# Patient Record
Sex: FEMALE | Race: WHITE | NOT HISPANIC OR LATINO | Employment: FULL TIME | URBAN - METROPOLITAN AREA
[De-identification: names, ages, dates, MRNs, and addresses within clinical notes are randomized per-mention and may not be internally consistent; named-entity substitution may affect disease eponyms.]

---

## 2019-11-07 ENCOUNTER — OFFICE VISIT - HEALTHEAST (OUTPATIENT)
Dept: INTERNAL MEDICINE | Facility: CLINIC | Age: 49
End: 2019-11-07

## 2019-11-07 DIAGNOSIS — Z12.31 VISIT FOR SCREENING MAMMOGRAM: ICD-10-CM

## 2019-11-07 DIAGNOSIS — E66.813 CLASS 3 SEVERE OBESITY DUE TO EXCESS CALORIES WITHOUT SERIOUS COMORBIDITY WITH BODY MASS INDEX (BMI) OF 45.0 TO 49.9 IN ADULT (H): ICD-10-CM

## 2019-11-07 DIAGNOSIS — L70.8 OTHER ACNE: ICD-10-CM

## 2019-11-07 DIAGNOSIS — M25.561 CHRONIC PAIN OF BOTH KNEES: ICD-10-CM

## 2019-11-07 DIAGNOSIS — G89.29 CHRONIC PAIN OF BOTH KNEES: ICD-10-CM

## 2019-11-07 DIAGNOSIS — Z00.00 ROUTINE GENERAL MEDICAL EXAMINATION AT A HEALTH CARE FACILITY: ICD-10-CM

## 2019-11-07 DIAGNOSIS — M25.562 CHRONIC PAIN OF BOTH KNEES: ICD-10-CM

## 2019-11-07 DIAGNOSIS — F32.5 MAJOR DEPRESSIVE DISORDER IN FULL REMISSION, UNSPECIFIED WHETHER RECURRENT (H): ICD-10-CM

## 2019-11-07 DIAGNOSIS — N39.3 FEMALE STRESS INCONTINENCE: ICD-10-CM

## 2019-11-07 DIAGNOSIS — E66.01 CLASS 3 SEVERE OBESITY DUE TO EXCESS CALORIES WITHOUT SERIOUS COMORBIDITY WITH BODY MASS INDEX (BMI) OF 45.0 TO 49.9 IN ADULT (H): ICD-10-CM

## 2019-11-07 LAB
ALBUMIN SERPL-MCNC: 4.1 G/DL (ref 3.5–5)
ALBUMIN UR-MCNC: NEGATIVE MG/DL
ALP SERPL-CCNC: 77 U/L (ref 45–120)
ALT SERPL W P-5'-P-CCNC: 28 U/L (ref 0–45)
ANION GAP SERPL CALCULATED.3IONS-SCNC: 9 MMOL/L (ref 5–18)
APPEARANCE UR: CLEAR
AST SERPL W P-5'-P-CCNC: 27 U/L (ref 0–40)
BILIRUB SERPL-MCNC: 0.4 MG/DL (ref 0–1)
BILIRUB UR QL STRIP: NEGATIVE
BUN SERPL-MCNC: 13 MG/DL (ref 8–22)
CALCIUM SERPL-MCNC: 9.8 MG/DL (ref 8.5–10.5)
CHLORIDE BLD-SCNC: 99 MMOL/L (ref 98–107)
CHOLEST SERPL-MCNC: 213 MG/DL
CO2 SERPL-SCNC: 27 MMOL/L (ref 22–31)
COLOR UR AUTO: YELLOW
CREAT SERPL-MCNC: 0.86 MG/DL (ref 0.6–1.1)
ERYTHROCYTE [DISTWIDTH] IN BLOOD BY AUTOMATED COUNT: 12.1 % (ref 11–14.5)
FASTING STATUS PATIENT QL REPORTED: YES
GFR SERPL CREATININE-BSD FRML MDRD: >60 ML/MIN/1.73M2
GLUCOSE BLD-MCNC: 135 MG/DL (ref 70–125)
GLUCOSE UR STRIP-MCNC: NEGATIVE MG/DL
HCT VFR BLD AUTO: 41.3 % (ref 35–47)
HDLC SERPL-MCNC: 39 MG/DL
HGB BLD-MCNC: 13.9 G/DL (ref 12–16)
HGB UR QL STRIP: NEGATIVE
KETONES UR STRIP-MCNC: NEGATIVE MG/DL
LDLC SERPL CALC-MCNC: 137 MG/DL
LEUKOCYTE ESTERASE UR QL STRIP: NEGATIVE
MCH RBC QN AUTO: 28.1 PG (ref 27–34)
MCHC RBC AUTO-ENTMCNC: 33.7 G/DL (ref 32–36)
MCV RBC AUTO: 84 FL (ref 80–100)
NITRATE UR QL: NEGATIVE
PH UR STRIP: 6 [PH] (ref 5–8)
PLATELET # BLD AUTO: 482 THOU/UL (ref 140–440)
PMV BLD AUTO: 6.7 FL (ref 7–10)
POTASSIUM BLD-SCNC: 4.2 MMOL/L (ref 3.5–5)
PROT SERPL-MCNC: 7.9 G/DL (ref 6–8)
RBC # BLD AUTO: 4.95 MILL/UL (ref 3.8–5.4)
SODIUM SERPL-SCNC: 135 MMOL/L (ref 136–145)
SP GR UR STRIP: 1.01 (ref 1–1.03)
TRIGL SERPL-MCNC: 187 MG/DL
TSH SERPL DL<=0.005 MIU/L-ACNC: 1.88 UIU/ML (ref 0.3–5)
UROBILINOGEN UR STRIP-ACNC: NORMAL
WBC: 9.6 THOU/UL (ref 4–11)

## 2019-11-07 ASSESSMENT — MIFFLIN-ST. JEOR: SCORE: 2038.83

## 2019-11-08 ENCOUNTER — COMMUNICATION - HEALTHEAST (OUTPATIENT)
Dept: INTERNAL MEDICINE | Facility: CLINIC | Age: 49
End: 2019-11-08

## 2019-11-08 DIAGNOSIS — E78.5 HYPERLIPIDEMIA LDL GOAL <130: ICD-10-CM

## 2019-11-08 DIAGNOSIS — R73.9 HYPERGLYCEMIA: ICD-10-CM

## 2019-11-22 ENCOUNTER — TELEPHONE (OUTPATIENT)
Dept: OTHER | Facility: CLINIC | Age: 49
End: 2019-11-22

## 2019-12-03 ENCOUNTER — COMMUNICATION - HEALTHEAST (OUTPATIENT)
Dept: INTERNAL MEDICINE | Facility: CLINIC | Age: 49
End: 2019-12-03

## 2020-01-15 ENCOUNTER — OFFICE VISIT - HEALTHEAST (OUTPATIENT)
Dept: INTERNAL MEDICINE | Facility: CLINIC | Age: 50
End: 2020-01-15

## 2020-01-15 ENCOUNTER — COMMUNICATION - HEALTHEAST (OUTPATIENT)
Dept: SCHEDULING | Facility: CLINIC | Age: 50
End: 2020-01-15

## 2020-01-15 DIAGNOSIS — R21 RASH AND NONSPECIFIC SKIN ERUPTION: ICD-10-CM

## 2020-01-15 ASSESSMENT — MIFFLIN-ST. JEOR: SCORE: 2025.22

## 2020-12-10 ENCOUNTER — COMMUNICATION - HEALTHEAST (OUTPATIENT)
Dept: INTERNAL MEDICINE | Facility: CLINIC | Age: 50
End: 2020-12-10

## 2020-12-10 DIAGNOSIS — L70.8 OTHER ACNE: ICD-10-CM

## 2020-12-10 DIAGNOSIS — F32.5 MAJOR DEPRESSIVE DISORDER IN FULL REMISSION, UNSPECIFIED WHETHER RECURRENT (H): ICD-10-CM

## 2021-01-04 ENCOUNTER — HEALTH MAINTENANCE LETTER (OUTPATIENT)
Age: 51
End: 2021-01-04

## 2021-01-12 ENCOUNTER — OFFICE VISIT - HEALTHEAST (OUTPATIENT)
Dept: INTERNAL MEDICINE | Facility: CLINIC | Age: 51
End: 2021-01-12

## 2021-01-12 DIAGNOSIS — Z00.00 ROUTINE GENERAL MEDICAL EXAMINATION AT A HEALTH CARE FACILITY: ICD-10-CM

## 2021-01-12 DIAGNOSIS — Z12.31 VISIT FOR SCREENING MAMMOGRAM: ICD-10-CM

## 2021-01-12 DIAGNOSIS — E66.813 CLASS 3 SEVERE OBESITY DUE TO EXCESS CALORIES WITHOUT SERIOUS COMORBIDITY WITH BODY MASS INDEX (BMI) OF 40.0 TO 44.9 IN ADULT (H): ICD-10-CM

## 2021-01-12 DIAGNOSIS — M25.522 LEFT ELBOW PAIN: ICD-10-CM

## 2021-01-12 DIAGNOSIS — F32.5 MAJOR DEPRESSIVE DISORDER IN FULL REMISSION, UNSPECIFIED WHETHER RECURRENT (H): ICD-10-CM

## 2021-01-12 DIAGNOSIS — R73.9 HYPERGLYCEMIA: ICD-10-CM

## 2021-01-12 DIAGNOSIS — E66.01 CLASS 3 SEVERE OBESITY DUE TO EXCESS CALORIES WITHOUT SERIOUS COMORBIDITY WITH BODY MASS INDEX (BMI) OF 40.0 TO 44.9 IN ADULT (H): ICD-10-CM

## 2021-01-12 LAB
ALBUMIN SERPL-MCNC: 4.1 G/DL (ref 3.5–5)
ALP SERPL-CCNC: 79 U/L (ref 45–120)
ALT SERPL W P-5'-P-CCNC: 15 U/L (ref 0–45)
ANION GAP SERPL CALCULATED.3IONS-SCNC: 8 MMOL/L (ref 5–18)
AST SERPL W P-5'-P-CCNC: 14 U/L (ref 0–40)
BILIRUB SERPL-MCNC: 0.4 MG/DL (ref 0–1)
BUN SERPL-MCNC: 12 MG/DL (ref 8–22)
CALCIUM SERPL-MCNC: 9.6 MG/DL (ref 8.5–10.5)
CHLORIDE BLD-SCNC: 102 MMOL/L (ref 98–107)
CHOLEST SERPL-MCNC: 203 MG/DL
CO2 SERPL-SCNC: 26 MMOL/L (ref 22–31)
CREAT SERPL-MCNC: 0.91 MG/DL (ref 0.6–1.1)
ERYTHROCYTE [DISTWIDTH] IN BLOOD BY AUTOMATED COUNT: 11.6 % (ref 11–14.5)
FASTING STATUS PATIENT QL REPORTED: YES
GFR SERPL CREATININE-BSD FRML MDRD: >60 ML/MIN/1.73M2
GLUCOSE BLD-MCNC: 129 MG/DL (ref 70–125)
HBA1C MFR BLD: 6.9 %
HCT VFR BLD AUTO: 40 % (ref 35–47)
HDLC SERPL-MCNC: 40 MG/DL
HGB BLD-MCNC: 13.9 G/DL (ref 12–16)
LDLC SERPL CALC-MCNC: 135 MG/DL
MCH RBC QN AUTO: 28.2 PG (ref 27–34)
MCHC RBC AUTO-ENTMCNC: 34.6 G/DL (ref 32–36)
MCV RBC AUTO: 81 FL (ref 80–100)
PLATELET # BLD AUTO: 410 THOU/UL (ref 140–440)
PMV BLD AUTO: 6.4 FL (ref 7–10)
POTASSIUM BLD-SCNC: 4.2 MMOL/L (ref 3.5–5)
PROT SERPL-MCNC: 7.2 G/DL (ref 6–8)
RBC # BLD AUTO: 4.91 MILL/UL (ref 3.8–5.4)
SODIUM SERPL-SCNC: 136 MMOL/L (ref 136–145)
TRIGL SERPL-MCNC: 139 MG/DL
TSH SERPL DL<=0.005 MIU/L-ACNC: 2.42 UIU/ML (ref 0.3–5)
WBC: 8.1 THOU/UL (ref 4–11)

## 2021-01-12 ASSESSMENT — PATIENT HEALTH QUESTIONNAIRE - PHQ9: SUM OF ALL RESPONSES TO PHQ QUESTIONS 1-9: 0

## 2021-01-12 ASSESSMENT — MIFFLIN-ST. JEOR: SCORE: 1957.18

## 2021-03-07 ENCOUNTER — HEALTH MAINTENANCE LETTER (OUTPATIENT)
Age: 51
End: 2021-03-07

## 2021-05-27 ASSESSMENT — PATIENT HEALTH QUESTIONNAIRE - PHQ9: SUM OF ALL RESPONSES TO PHQ QUESTIONS 1-9: 0

## 2021-06-03 VITALS
DIASTOLIC BLOOD PRESSURE: 82 MMHG | HEART RATE: 103 BPM | HEIGHT: 68 IN | OXYGEN SATURATION: 95 % | WEIGHT: 293 LBS | SYSTOLIC BLOOD PRESSURE: 130 MMHG | BODY MASS INDEX: 44.41 KG/M2

## 2021-06-03 NOTE — PROGRESS NOTES
Office Visit - Physical   Yuliya Li   49 y.o.  female    Date of visit: 11/7/2019  Physician: Madison Smiley MD     Assessment and Plan   1. Routine general medical examination at a health care facility  Her examination is satisfactory today.  She is due for mammogram and will set that up.  We did discuss weight loss.  With her BMI of 45 we will get a fasting glucose and lipid panel today.  She does not need Pap smear as she has had in a hysterectomy.  She is a non-smoker.  She is otherwise up-to-date on age-appropriate health maintenance.    2. Major depressive disorder in full remission, unspecified whether recurrent (H)  She is doing quite well on fluoxetine and her is excellent.  We will continue with the medication.  - FLUoxetine (PROZAC) 40 MG capsule; Take 1 capsule (40 mg total) by mouth daily.  Dispense: 90 capsule; Refill: 3  - HM2(CBC w/o Differential)    3. Class 3 severe obesity due to excess calories without serious comorbidity with body mass index (BMI) of 45.0 to 49.9 in adult (H)  As above will check her labs today.  - Lipid Cascade  - Comprehensive Metabolic Panel  - Thyroid Cascade    4. Chronic pain of both knees  She is having worsening knee pain with symptoms that they might give out on her at times.  I am going to have her see Ortho to determine next steps.  She does have a history of known arthritis.  - Ambulatory referral to Orthopedics    5. Female stress incontinence  She has nearly constant incontinence.  We will check her glucose today as well as a urine analysis.  I am also going to refer her to urology.  - Urinalysis-UC if Indicated  - Ambulatory referral to Urology    6. Other acne  Spironolactone is been very helpful.  She sees dermatology.  I will go ahead and check her labs today.  - spironolactone (ALDACTONE) 100 MG tablet; Take 1 tablet (100 mg total) by mouth daily.  Dispense: 90 tablet; Refill: 3    7. Visit for screening mammogram  - Mammo Screening Bilateral;  Future          Return in about 1 year (around 11/7/2020) for Annual physical.     Chief Complaint   Chief Complaint   Patient presents with     Annual Exam     new pt physical-est. [facility]-med check-pt states that she has had a hysterectomy        Patient Profile   Social History     Patient does not qualify to have social determinant information on file (likely too young).   Social History Narrative     Not on file        Past Medical History   Patient Active Problem List   Diagnosis     Other acne     Obesity     Major depressive disorder in full remission, unspecified whether recurrent (H)     Female stress incontinence       Past Surgical History  She has a past surgical history that includes Hysterectomy and Cyst Removal.     History of Present Illness   This 49 y.o. old female comes in to [facility] care and for physical.  She is due for mammogram.  She has had a hysterectomy and does not need a Pap smear.  She is up-to-date on vaccinations.  She is a non-smoker.  Her BMI is 45 and we did discuss working on weight loss.  She is fasting today and will check her labs.  She does have concerns about her knees.  They are painful and at times she feels her going to give out on her.  She was evaluated a few years ago and found to have some osteoarthritis.  I think it would be worth seeing Ortho again.  In addition she has symptoms of urinary incontinence which are extremely frequent.  She has had a hysterectomy.  Feels that the incontinence is worsening.  She has no other acute concerns today.  She has a history of depression that is very well controlled with fluoxetine.  She also has a history of acne which has been improved with spironolactone.    Review of Systems: A comprehensive review of systems was negative except as noted.     Medications and Allergies   Current Outpatient Medications   Medication Sig Dispense Refill     FLUoxetine (PROZAC) 40 MG capsule Take 1 capsule (40 mg total) by mouth daily. 90 capsule 3  "    spironolactone (ALDACTONE) 100 MG tablet Take 1 tablet (100 mg total) by mouth daily. 90 tablet 3     No current facility-administered medications for this visit.      Allergies   Allergen Reactions     Cefuroxime Hives     Keflex [Cephalexin] Hives     Minocycline Hives        Family and Social History   Family History   Problem Relation Age of Onset     Diabetes Mother      Atrial fibrillation Father      Alcohol abuse Father      Breast cancer Maternal Aunt      Cervical cancer Maternal Aunt      Colon cancer Neg Hx         Social History     Tobacco Use     Smoking status: Former Smoker     Packs/day: 0.00     Last attempt to quit: 1999     Years since quittin.0     Smokeless tobacco: Never Used   Substance Use Topics     Alcohol use: Yes     Comment: 1 a week     Drug use: Not Currently        Physical Exam   General Appearance:   This is an alert female, sitting comfortably, no apparent distress.    /82   Pulse (!) 103   Ht 5' 8\" (1.727 m)   Wt (!) 301 lb (136.5 kg)   SpO2 95% Comment: RA  BMI 45.77 kg/m      EYES: PERRL.  EOM were intact.   HEAD, EARS, NOSE, MOUTH, AND THROAT: Normocephalic, atraumatic. Hearing was normal to voice and the ears were normal to external exam. Nose appearance was normal and there was no discharge. Oropharynx was normal.   NECK: Neck appearance was normal and supple. There were no neck masses and the thyroid was not enlarged.  RESPIRATORY: Normal respiratory effort.  Lungs are clear to ausculation both anteriorly and posteriorly.  No wheezes or rales.   CARDIOVASCULAR: Heart rate and rhythm were normal.  S1 and S2 were normal and there were no extra sounds or murmurs. Dorsalis pedis pulses were normal.  Jugular venous pressure was normal.  There was no peripheral edema.  GASTROINTESTINAL:  Bowel sounds were present.  Abdomen was soft, nontender, nondistended, with no organomegaly detected.  No rebound or guarding.  MUSCULOSKELETAL: Skeletal configuration " was normal and muscle mass was normal for age. Joint appearance was overall normal.  LYMPHATIC: There were no enlarged nodes.  SKIN/HAIR/NAILS: Skin was warm and well perfused.  There were no skin lesions or rashes noted.  Hair and nails were normal.  NEUROLOGIC: The patient was alert and oriented to person, place, time, and circumstance. Speech was normal. Cranial nerves were normal. No focal defecits were noted.  PSYCHIATRIC:  Mood and affect were normal and the patient had normal recent and remote memory. The patient's judgment and insight were normal.  BREASTS: Breast exam with no masses or abnormalities.     Additional Information        Madison Smiley MD  Internal Medicine  Contact me at [phone]

## 2021-06-04 VITALS
BODY MASS INDEX: 44.41 KG/M2 | SYSTOLIC BLOOD PRESSURE: 124 MMHG | OXYGEN SATURATION: 97 % | DIASTOLIC BLOOD PRESSURE: 80 MMHG | WEIGHT: 293 LBS | HEIGHT: 68 IN | HEART RATE: 94 BPM | TEMPERATURE: 98.5 F

## 2021-06-05 VITALS
OXYGEN SATURATION: 97 % | DIASTOLIC BLOOD PRESSURE: 88 MMHG | TEMPERATURE: 97.2 F | WEIGHT: 283 LBS | BODY MASS INDEX: 42.89 KG/M2 | SYSTOLIC BLOOD PRESSURE: 124 MMHG | HEART RATE: 84 BPM | HEIGHT: 68 IN

## 2021-06-05 NOTE — TELEPHONE ENCOUNTER
"Triage call:   Wide spread rash for 4 days-   - started on her legs now is wide spread- legs and arms and torso   Smaller lesions and crusted- initially looked like pimples and crusted over  Reports that she has had chicken pox in the past and she feels this rash is very similar  Very itchy   No fever   No sores in her mouth   Legs and arms mainly - \"a handful of spots\" on her torso  Benadryl last night- hydrocortisone cream     Triaged to be seen in the office today- reviewed additional care advice with patient and she verbalizes understanding. Patient warm transferred to scheduling for appointment. Patient got disconnected when connecting with scheduling- scheduling will call patient back to help her schedule. Appointment @ 2:20 pm with Dr Romulo Davila, RN [facility] Triage/Med Refill 1/15/2020 1:50 PM    Reason for Disposition    SEVERE itching    Protocols used: RASH OR REDNESS - WIDESPREAD-A-OH      "

## 2021-06-05 NOTE — PROGRESS NOTES
Office Visit - Follow Up   Yuliya Li   49 y.o. female    Date of Visit: 1/15/2020    Chief Complaint   Patient presents with     Rash     itchy, small red circular rash on arms, legs , and chest since Sunday         Assessment and Plan   1. Rash and nonspecific skin eruption  Sudden onset of skin rashes on her arms chest, back, abdomen and legs which are described to be itchy.  Skin rashes are red and maculopapular in appearance.  Denies allergies but her skin rashes look like they are allergic skin reaction or eruption.  Has a cat at home but she is not allergic to it since  her cat has been with her for years.  Recalls she developed skin rashes in the form of hives when she took minocycline for her acne in the past.  Does not have history of asthma.  She is not taking any new medication and has not used any new detergents, soaps, lotions or fragrances.  Denies allergies to fish or any seafood like shell containing seafood.  Will treat with prednisone on a tapering dose and to follow with Dr. Smiley in 5 days.  - predniSONE (DELTASONE) 10 mg tablet; Take 10 mg by mouth daily Take 3 tablets by mouth daily for 2 days, then take 2 tablets for 2 days, then 1 tablet for 2 days, then stop..  Dispense: 12 tablet; Refill: 0    Follow up in 5 days with Dr. Smiley.     History of Present Illness   This 49 y.o. old female, patient of Dr. Smiley complains of skin rashes which spontaneously erupted 3 days ago.  These are red and maculopapular in appearance and described to be very itchy.  Does not have any allergies.  Has a cat at home but the cat has been with her for years.  Denies using new soap, detergent, lotion, pregnancies.  Denies allergies to fish or any seafood like shell containing seafood.  Does not have history of asthma.  Denies shortness of breath and wheezing with her skin rashes.    Review of Systems   A 12 point comprehensive review of systems was negative except as noted..     Medications, Allergies  "and Problem List   Reviewed and updated             Chief Complaint   Rash (itchy, small red circular rash on arms, legs , and chest since  )       Patient Profile   Social History     Social History Narrative     Not on file        Past Medical History   Patient Active Problem List   Diagnosis     Other acne     Obesity     Major depressive disorder in full remission, unspecified whether recurrent (H)     Female stress incontinence       Past Surgical History  She has a past surgical history that includes Hysterectomy and Cyst Removal.       Medications and Allergies   Current Outpatient Medications   Medication Sig     FLUoxetine (PROZAC) 40 MG capsule Take 1 capsule (40 mg total) by mouth daily.     spironolactone (ALDACTONE) 100 MG tablet Take 1 tablet (100 mg total) by mouth daily.     predniSONE (DELTASONE) 10 mg tablet Take 10 mg by mouth daily Take 3 tablets by mouth daily for 2 days, then take 2 tablets for 2 days, then 1 tablet for 2 days, then stop..     Allergies   Allergen Reactions     Cefuroxime Hives     Keflex [Cephalexin] Hives     Minocycline Hives        Family and Social History   Family History   Problem Relation Age of Onset     Diabetes Mother      Atrial fibrillation Father      Alcohol abuse Father      Breast cancer Maternal Aunt      Cervical cancer Maternal Aunt      Colon cancer Neg Hx         Social History     Tobacco Use     Smoking status: Former Smoker     Packs/day: 0.00     Last attempt to quit: 1999     Years since quittin.2     Smokeless tobacco: Never Used   Substance Use Topics     Alcohol use: Yes     Comment: 1 a week     Drug use: Not Currently        Physical Exam       Physical Exam  /80   Pulse 94   Temp 98.5  F (36.9  C) (Oral)   Ht 5' 8\" (1.727 m)   Wt (!) 298 lb (135.2 kg)   SpO2 97%   BMI 45.31 kg/m    General appearance: alert, appears stated age, cooperative and no distress  Head: Normocephalic, without obvious abnormality, " atraumatic  Ears: normal TM's and external ear canals both ears  Nose: Nares normal. Septum midline. Mucosa normal. No drainage or sinus tenderness.  Throat: lips, mucosa, and tongue normal; teeth and gums normal  Neck: no adenopathy, no carotid bruit, no JVD, supple, symmetrical, trachea midline and thyroid not enlarged, symmetric, no tenderness/mass/nodules  Back: symmetric, no curvature. ROM normal. No CVA tenderness.  Lungs: clear to auscultation bilaterally  Heart: regular rate and rhythm, S1, S2 normal, no murmur, click, rub or gallop  Abdomen: soft, non-tender; bowel sounds normal; no masses,  no organomegaly  Extremities: extremities normal, atraumatic, no cyanosis or edema  Skin:  red pruritic macular papular skin rashes on her arms, legs, chest back and abdomen     Additional Information   Post Discharge Medication Reconciliation Status: discharge medications reconciled, continue medications without change      Bairon Sebastian MD  Internal Medicine  Contact me at [phone]     Additional Information   Current Outpatient Medications   Medication Sig     FLUoxetine (PROZAC) 40 MG capsule Take 1 capsule (40 mg total) by mouth daily.     spironolactone (ALDACTONE) 100 MG tablet Take 1 tablet (100 mg total) by mouth daily.     predniSONE (DELTASONE) 10 mg tablet Take 10 mg by mouth daily Take 3 tablets by mouth daily for 2 days, then take 2 tablets for 2 days, then 1 tablet for 2 days, then stop..     Allergies   Allergen Reactions     Cefuroxime Hives     Keflex [Cephalexin] Hives     Minocycline Hives     Social History     Tobacco Use     Smoking status: Former Smoker     Packs/day: 0.00     Last attempt to quit: 1999     Years since quittin.2     Smokeless tobacco: Never Used   Substance Use Topics     Alcohol use: Yes     Comment: 1 a week     Drug use: Not Currently         Time: total time spent with the patient was 25 minutes of which >50% was spent in counseling and coordination of care

## 2021-06-13 NOTE — TELEPHONE ENCOUNTER
Medication Question or Clarification  Who is calling: Patient  What medication are you calling about (include dose and sig)?:   spironolactone (ALDACTONE) 100 MG tablet  100 mg, Oral, DAILY     FLUoxetine (PROZAC) 40 MG capsule  40 mg, Oral, DAILY         Summary: Take 1 capsule (40 mg total) by mouth daily., Starting Thu 11/7/2019, Normal          Who prescribed the medication?: Madison Smiley MD  What is your question/concern?: Patient had to reschedule the appointment today for her annual due to sore throat. She is scheduled for 1.12.21 for her annual.  She is in need of more medication until appointment.  Please advise.   Requested Pharmacy: [facility] [id number]  Okay to leave a detailed message?: Yes

## 2021-06-14 NOTE — PROGRESS NOTES
Office Visit - Physical   Yuliya Li   50 y.o.  female    Date of visit: 1/12/2021  Physician: Madison Smiley MD     Assessment and Plan   1. Routine general medical examination at a health care facility  Her examination is satisfactory today.  She is due for mammogram.  She does not need a Pap smear.  She is fasting today and will do her labs including a cholesterol and blood sugar.  We will also check her thyroid and A1c due to her obesity.  She is due for a flu shot and will give her that today.  She is otherwise up-to-date on age-appropriate health maintenance.  She has lost about 20 pounds in the last few months and I did applaud her efforts at that.  She will continue with the weight watchers plan.    2. Hyperglycemia  - metFORMIN (GLUCOPHAGE XR) 500 MG 24 hr tablet; Take 1 tablet (500 mg total) by mouth daily with breakfast.  Dispense: 90 tablet; Refill: 3  - HM2(CBC w/o Differential)  - Lipid Cascade  - Glycosylated Hemoglobin A1c  - Thyroid Cascade  - Comprehensive Metabolic Panel    3. Class 3 severe obesity due to excess calories without serious comorbidity with body mass index (BMI) of 40.0 to 44.9 in adult (H)  - Lipid Cascade  - Glycosylated Hemoglobin A1c  - Thyroid Cascade    4. Left elbow pain  - Ambulatory referral to Orthopedics    5. Major depressive disorder in full remission, unspecified whether recurrent (H)    6. Visit for screening mammogram  - Mammo Screening Bilateral; Future          Return in about 1 year (around 1/12/2022) for Routine preventive.     Chief Complaint   Chief Complaint   Patient presents with     Annual Exam     Pt is fasting        Patient Profile   Social History     Social History Narrative     Not on file        Past Medical History   Patient Active Problem List   Diagnosis     Other acne     Obesity     Major depressive disorder in full remission, unspecified whether recurrent (H)     Female stress incontinence       Past Surgical History  She has a past  surgical history that includes Hysterectomy and Cyst Removal.     History of Present Illness   This 50 y.o. old female comes in for physical exam.  She is due for a mammogram.  She is due for flu shot.  She does have a history of obesity.  She has been doing a weight watchers diet and has lost about 20 pounds over the last couple of months.  She is feeling much better.  She has a history of borderline diabetes and hyperlipidemia.  We will plan to recheck her labs today.  She has not had any recent cardiac symptoms or GI symptoms.  Her only concern is some left elbow pain.  She is interested in seeing Ortho about that because it bothers her daily.  She has no other acute concerns today.  She is interested in starting Metformin due to her borderline diabetes.  She has been on it in the past and thinks that it would be helpful.  I think that would be a good plan.    Review of Systems: A comprehensive review of systems was negative except as noted.     Medications and Allergies   Current Outpatient Medications   Medication Sig Dispense Refill     FLUoxetine (PROZAC) 40 MG capsule Take 1 capsule (40 mg total) by mouth daily. 90 capsule 3     spironolactone (ALDACTONE) 100 MG tablet Take 1 tablet (100 mg total) by mouth daily. 90 tablet 3     metFORMIN (GLUCOPHAGE XR) 500 MG 24 hr tablet Take 1 tablet (500 mg total) by mouth daily with breakfast. 90 tablet 3     No current facility-administered medications for this visit.      Allergies   Allergen Reactions     Cefuroxime Hives     Keflex [Cephalexin] Hives     Minocycline Hives        Family and Social History   Family History   Problem Relation Age of Onset     Diabetes Mother      Atrial fibrillation Father      Alcohol abuse Father      Breast cancer Maternal Aunt      Cervical cancer Maternal Aunt      Colon cancer Neg Hx         Social History     Tobacco Use     Smoking status: Former Smoker     Packs/day: 0.00     Quit date: 11/7/1999     Years since quitting:  "21.1     Smokeless tobacco: Never Used   Substance Use Topics     Alcohol use: Yes     Comment: 1 a week     Drug use: Not Currently        Physical Exam   General Appearance:   This is an alert female, sitting comfortably, no apparent distress.    /88 (Patient Site: Left Arm, Patient Position: Sitting)   Pulse 84   Temp 97.2  F (36.2  C)   Ht 5' 8\" (1.727 m)   Wt (!) 283 lb (128.4 kg)   SpO2 97%   BMI 43.03 kg/m      HEENT:  Normocephalic, atraumatic.      NECK: Supple with no lymphadenopathy.  Thyroid was normal.  RESPIRATORY: Normal respiratory effort.  Lungs were clear to ausculation both anteriorly and posteriorly.  No wheezes or rales were detected.   CARDIOVASCULAR: Heart rate and rhythm were normal.  S1 and S2 were normal and there were no extra sounds or murmurs. Dorsalis pedis pulses were normal.  Jugular venous pressure was normal.  There was no peripheral edema.  GASTROINTESTINAL:  Bowel sounds were present.  Abdomen was soft, nontender, nondistended, with no organomegaly detected.  No rebound or guarding.  MUSCULOSKELETAL: Skeletal configuration was normal and muscle mass was normal for age. Joint appearance was overall normal.  LYMPHATIC: There were no enlarged nodes.  SKIN/HAIR/NAILS: Skin was warm and well perfused.  There were no skin lesions or rashes noted.  Hair and nails were normal.  NEUROLOGIC: The patient was alert and oriented to person, place, time, and circumstance. Speech was normal. Cranial nerves were normal. No focal defecits were noted.  PSYCHIATRIC:  Mood and affect were normal and the patient had normal recent and remote memory. The patient's judgment and insight were normal.  CHEST WALL/BREASTS: Normal breast tissue with no masses or abnormalities.       Additional Information        Madison Smiley MD  Internal Medicine  Contact me at [phone]  "

## 2021-06-16 PROBLEM — F32.5 MAJOR DEPRESSIVE DISORDER IN FULL REMISSION, UNSPECIFIED WHETHER RECURRENT (H): Status: ACTIVE | Noted: 2019-11-07

## 2021-06-16 PROBLEM — N39.3 FEMALE STRESS INCONTINENCE: Status: ACTIVE | Noted: 2019-11-07

## 2021-06-16 PROBLEM — L70.8 OTHER ACNE: Status: ACTIVE | Noted: 2019-11-07

## 2021-06-16 PROBLEM — E66.9 OBESITY: Status: ACTIVE | Noted: 2019-11-07

## 2021-06-19 NOTE — LETTER
Letter by Madison Smiley MD at      Author: Madison Smiley MD Service: -- Author Type: --    Filed:  Encounter Date: 12/3/2019 Status: Signed        [facility] INTERNAL MEDICINE  [address]  [facility] [address]  [phone]         Yuliya Li  [address]  [place] [address]        12/03/19    Dear Yuliya Li,     At [facility] we care about your health and well-being. Your primary care provider is committed to ensuring you receive high quality care and has chosen a network of specialists to assist in providing that care. Recently Dr. Smiley referred you to Urology for specialty care.      Please call [facility] [phone] at your earliest convenience for assistance in scheduling an appointment.  If you have already scheduled this appointment, please disregard this notice.  Thank you for choosing [facility] for your healthcare needs.       Sincerely,       [facility] Specialty Scheduling

## 2021-06-19 NOTE — LETTER
Letter by Madison Smiley MD at      Author: Madison Smiley MD Service: -- Author Type: --    Filed:  Encounter Date: 11/8/2019 Status: Signed         Yuliya Li  [address]  [place] [address]             November 8, 2019         Dear Ms. Li,    Below are the results from your recent visit:    Resulted Orders   HM2(CBC w/o Differential)   Result Value Ref Range    WBC 9.6 4.0 - 11.0 thou/uL    RBC 4.95 3.80 - 5.40 mill/uL    Hemoglobin 13.9 12.0 - 16.0 g/dL    Hematocrit 41.3 35.0 - 47.0 %    MCV 84 80 - 100 fL    MCH 28.1 27.0 - 34.0 pg    MCHC 33.7 32.0 - 36.0 g/dL    RDW 12.1 11.0 - 14.5 %    Platelets 482 (H) 140 - 440 thou/uL    MPV 6.7 (L) 7.0 - 10.0 fL   Lipid Cascade   Result Value Ref Range    Cholesterol 213 (H) <=199 mg/dL    Triglycerides 187 (H) <=149 mg/dL    HDL Cholesterol 39 (L) >=50 mg/dL    LDL Calculated 137 (H) <=129 mg/dL    Patient Fasting > 8hrs? Yes    Comprehensive Metabolic Panel   Result Value Ref Range    Sodium 135 (L) 136 - 145 mmol/L    Potassium 4.2 3.5 - 5.0 mmol/L    Chloride 99 98 - 107 mmol/L    CO2 27 22 - 31 mmol/L    Anion Gap, Calculation 9 5 - 18 mmol/L    Glucose 135 (H) 70 - 125 mg/dL    BUN 13 8 - 22 mg/dL    Creatinine 0.86 0.60 - 1.10 mg/dL    GFR MDRD Af Amer >60 >60 mL/min/1.73m2    GFR MDRD Non Af Amer >60 >60 mL/min/1.73m2    Bilirubin, Total 0.4 0.0 - 1.0 mg/dL    Calcium 9.8 8.5 - 10.5 mg/dL    Protein, Total 7.9 6.0 - 8.0 g/dL    Albumin 4.1 3.5 - 5.0 g/dL    Alkaline Phosphatase 77 45 - 120 U/L    AST 27 0 - 40 U/L    ALT 28 0 - 45 U/L    Narrative    Fasting Glucose reference range is 70-99 mg/dL per  American Diabetes Association (ADA) guidelines.   Thyroid Cascade   Result Value Ref Range    TSH 1.88 0.30 - 5.00 uIU/mL   Urinalysis-UC if Indicated   Result Value Ref Range    Color, UA Yellow Colorless, Yellow, Straw, Light Yellow    Clarity, UA Clear Clear    Glucose, UA Negative Negative    Bilirubin, UA Negative  Negative    Ketones, UA Negative Negative    Specific Gravity, UA 1.010 1.005 - 1.030    Blood, UA Negative Negative    pH, UA 6.0 5.0 - 8.0    Protein, UA Negative Negative mg/dL    Urobilinogen, UA 0.2 E.U./dL 0.2 E.U./dL, 1.0 E.U./dL    Nitrite, UA Negative Negative    Leukocytes, UA Negative Negative    Narrative    Microscopic not indicated  UC not indicated       Hi Uyliya.  Per our discussion, your glucose was a little high and your lipids were borderline high.  I'm ordering a recheck in 3 months.  Please feel free to contact me if you have questions.          Sincerely,        Electronically signed by Madison Smiley MD

## 2021-10-10 ENCOUNTER — HEALTH MAINTENANCE LETTER (OUTPATIENT)
Age: 51
End: 2021-10-10

## 2021-11-30 DIAGNOSIS — F32.5 MAJOR DEPRESSIVE DISORDER IN FULL REMISSION, UNSPECIFIED WHETHER RECURRENT (H): Primary | ICD-10-CM

## 2021-11-30 DIAGNOSIS — L70.8 OTHER ACNE: ICD-10-CM

## 2021-11-30 RX ORDER — SPIRONOLACTONE 100 MG/1
100 TABLET, FILM COATED ORAL
COMMUNITY
Start: 2020-12-10 | End: 2021-11-30

## 2021-11-30 RX ORDER — FLUOXETINE 40 MG/1
40 CAPSULE ORAL
COMMUNITY
Start: 2020-12-10 | End: 2021-11-30

## 2021-12-02 ENCOUNTER — MYC MEDICAL ADVICE (OUTPATIENT)
Dept: PEDIATRICS | Facility: CLINIC | Age: 51
End: 2021-12-02
Payer: COMMERCIAL

## 2021-12-02 RX ORDER — FLUOXETINE 40 MG/1
40 CAPSULE ORAL DAILY
Qty: 90 CAPSULE | Refills: 0 | Status: SHIPPED | OUTPATIENT
Start: 2021-12-02 | End: 2021-12-08

## 2021-12-02 RX ORDER — SPIRONOLACTONE 100 MG/1
100 TABLET, FILM COATED ORAL DAILY
Qty: 90 TABLET | Refills: 0 | Status: SHIPPED | OUTPATIENT
Start: 2021-12-02 | End: 2021-12-08

## 2021-12-02 NOTE — TELEPHONE ENCOUNTER
"Routing refill request to provider for review/approval because:  Unable to sign under Madison Smiley MD, patient has upcomming appt with Flakita Salas CNP. Patient last seen 1/21/21.    Last Written Prescription Date:  12/10/20  Last Fill Quantity: 90,  # refills: 3   Last office visit provider:  Madison Smiley     Requested Prescriptions   Pending Prescriptions Disp Refills     spironolactone (ALDACTONE) 100 MG tablet 90 tablet 0     Sig: Take 1 tablet (100 mg) by mouth daily       Diuretics (Including Combos) Protocol Failed - 11/30/2021  1:16 PM        Failed - Recent (12 mo) or future (30 days) visit within the authorizing provider's specialty     Patient has had an office visit with the authorizing provider or a provider within the authorizing providers department within the previous 12 mos or has a future within next 30 days. See \"Patient Info\" tab in inbasket, or \"Choose Columns\" in Meds & Orders section of the refill encounter.              Passed - Blood pressure under 140/90 in past 12 months     BP Readings from Last 3 Encounters:   01/12/21 124/88   01/15/20 124/80   11/07/19 130/82                 Passed - Medication is active on med list        Passed - Patient is age 18 or older        Passed - No active pregancy on record        Passed - Normal serum creatinine on file in past 12 months     Recent Labs   Lab Test 01/12/21  1110   CR 0.91              Passed - Normal serum potassium on file in past 12 months     Recent Labs   Lab Test 01/12/21  1110   POTASSIUM 4.2                    Passed - Normal serum sodium on file in past 12 months     Recent Labs   Lab Test 01/12/21  1110                 Passed - No positive pregnancy test in past 12 months           FLUoxetine (PROZAC) 40 MG capsule 90 capsule 0     Sig: Take 1 capsule (40 mg) by mouth daily       SSRIs Protocol Failed - 11/30/2021  1:16 PM        Failed - Recent (12 mo) or future (30 days) visit within the authorizing provider's " "specialty     Patient has had an office visit with the authorizing provider or a provider within the authorizing providers department within the previous 12 mos or has a future within next 30 days. See \"Patient Info\" tab in inbasket, or \"Choose Columns\" in Meds & Orders section of the refill encounter.              Passed - Medication is active on med list        Passed - Patient is age 18 or older        Passed - No active pregnancy on record        Passed - No positive pregnancy test in last 12 months         Signed Prescriptions Disp Refills    spironolactone (ALDACTONE) 100 MG tablet       Sig: Take 100 mg by mouth       There is no refill protocol information for this order       FLUoxetine (PROZAC) 40 MG capsule       Sig: Take 40 mg by mouth       There is no refill protocol information for this order          Jana Kennedy [facility] 12/02/21 9:29 AM      "

## 2021-12-08 ENCOUNTER — VIRTUAL VISIT (OUTPATIENT)
Dept: PEDIATRICS | Facility: CLINIC | Age: 51
End: 2021-12-08
Payer: COMMERCIAL

## 2021-12-08 DIAGNOSIS — Z12.31 ENCOUNTER FOR SCREENING MAMMOGRAM FOR BREAST CANCER: ICD-10-CM

## 2021-12-08 DIAGNOSIS — F32.5 MAJOR DEPRESSIVE DISORDER IN FULL REMISSION, UNSPECIFIED WHETHER RECURRENT (H): ICD-10-CM

## 2021-12-08 DIAGNOSIS — Z12.11 COLON CANCER SCREENING: ICD-10-CM

## 2021-12-08 DIAGNOSIS — L70.8 OTHER ACNE: ICD-10-CM

## 2021-12-08 DIAGNOSIS — N39.3 FEMALE STRESS INCONTINENCE: ICD-10-CM

## 2021-12-08 DIAGNOSIS — E66.01 MORBID OBESITY (H): ICD-10-CM

## 2021-12-08 DIAGNOSIS — E11.9 TYPE 2 DIABETES MELLITUS WITHOUT COMPLICATION, WITHOUT LONG-TERM CURRENT USE OF INSULIN (H): Primary | ICD-10-CM

## 2021-12-08 PROCEDURE — 99214 OFFICE O/P EST MOD 30 MIN: CPT | Mod: 95 | Performed by: NURSE PRACTITIONER

## 2021-12-08 RX ORDER — LANCETS
EACH MISCELLANEOUS
Qty: 100 EACH | Refills: 6 | Status: SHIPPED | OUTPATIENT
Start: 2021-12-08 | End: 2022-10-26

## 2021-12-08 RX ORDER — SPIRONOLACTONE 100 MG/1
100 TABLET, FILM COATED ORAL DAILY
Qty: 90 TABLET | Refills: 0 | Status: SHIPPED | OUTPATIENT
Start: 2021-12-08 | End: 2022-03-18

## 2021-12-08 RX ORDER — GLUCOSAMINE HCL/CHONDROITIN SU 500-400 MG
CAPSULE ORAL
Qty: 100 EACH | Refills: 3 | Status: SHIPPED | OUTPATIENT
Start: 2021-12-08

## 2021-12-08 RX ORDER — METFORMIN HCL 500 MG
500 TABLET, EXTENDED RELEASE 24 HR ORAL DAILY
COMMUNITY
Start: 2021-01-12 | End: 2021-12-08

## 2021-12-08 RX ORDER — METFORMIN HCL 500 MG
500 TABLET, EXTENDED RELEASE 24 HR ORAL DAILY
Qty: 90 TABLET | Refills: 3 | Status: SHIPPED | OUTPATIENT
Start: 2021-12-08 | End: 2022-10-26

## 2021-12-08 RX ORDER — FLUOXETINE 40 MG/1
40 CAPSULE ORAL DAILY
Qty: 90 CAPSULE | Refills: 3 | Status: SHIPPED | OUTPATIENT
Start: 2021-12-08 | End: 2022-03-18

## 2021-12-08 ASSESSMENT — ANXIETY QUESTIONNAIRES
4. TROUBLE RELAXING: MORE THAN HALF THE DAYS
7. FEELING AFRAID AS IF SOMETHING AWFUL MIGHT HAPPEN: NOT AT ALL
7. FEELING AFRAID AS IF SOMETHING AWFUL MIGHT HAPPEN: NOT AT ALL
6. BECOMING EASILY ANNOYED OR IRRITABLE: SEVERAL DAYS
3. WORRYING TOO MUCH ABOUT DIFFERENT THINGS: MORE THAN HALF THE DAYS
GAD7 TOTAL SCORE: 6
2. NOT BEING ABLE TO STOP OR CONTROL WORRYING: SEVERAL DAYS
5. BEING SO RESTLESS THAT IT IS HARD TO SIT STILL: NOT AT ALL
1. FEELING NERVOUS, ANXIOUS, OR ON EDGE: NOT AT ALL
GAD7 TOTAL SCORE: 6

## 2021-12-08 ASSESSMENT — PATIENT HEALTH QUESTIONNAIRE - PHQ9
SUM OF ALL RESPONSES TO PHQ QUESTIONS 1-9: 9
10. IF YOU CHECKED OFF ANY PROBLEMS, HOW DIFFICULT HAVE THESE PROBLEMS MADE IT FOR YOU TO DO YOUR WORK, TAKE CARE OF THINGS AT HOME, OR GET ALONG WITH OTHER PEOPLE: SOMEWHAT DIFFICULT
SUM OF ALL RESPONSES TO PHQ QUESTIONS 1-9: 9

## 2021-12-08 NOTE — PROGRESS NOTES
Yuliya is a 51 year old who is being evaluated via a billable video visit.      How would you like to obtain your AVS? MyChart  If the video visit is dropped, the invitation should be resent by: cell  Will anyone else be joining your video visit? No      Video Start Time: 3:16 PM    Assessment & Plan     Type 2 diabetes mellitus without complication, without long-term current use of insulin (H)  - Well controlled. Recommend glucometer for when symptomatic and to help with glucose awareness  - Should see diabetes educator. Can help with weight loss as well  - Hemoglobin A1c; Future  - TSH with free T4 reflex; Future  - Comprehensive metabolic panel (BMP + Alb, Alk Phos, ALT, AST, Total. Bili, TP); Future  - Lipid panel reflex to direct LDL Fasting; Future  - AMB Adult Diabetes Educator Referral; Future  - blood glucose monitoring (NO BRAND SPECIFIED) meter device kit; Use to test blood sugar 2 times daily or as directed. Preferred blood glucose meter OR supplies to accompany: Blood Glucose Monitor Brands: per insurance.  - blood glucose (NO BRAND SPECIFIED) test strip; Use to test blood sugar 2 times daily or as directed. To accompany: Blood Glucose Monitor Brands: per insurance.  - blood glucose calibration (NO BRAND SPECIFIED) solution; To accompany: Blood Glucose Monitor Brands: per insurance.  - thin (NO BRAND SPECIFIED) lancets; Use with lanceting device. To accompany: Blood Glucose Monitor Brands: per insurance.  - alcohol swab prep pads; Use to swab area of injection/amy as directed.  - metFORMIN (GLUCOPHAGE-XR) 500 MG 24 hr tablet; Take 1 tablet (500 mg) by mouth daily  - semaglutide (OZEMPIC) 2 MG/1.5ML SOPN pen; Inject .25 mg subcutaneus once weekly x4 weeks, then .5 mg once weekly for 4 weeks then 1 mg once weekly    Morbid obesity (H)  - Discussed indication for and risks and benefits of GLP1. Will try Ozempic. Started dose at .25 mg weekly. Should have little to impact on glucose. Monitor for S/S of  "hypoglycemia. Otherwise, only on low dose metformin    Other acne  - Needs potassium check  - spironolactone (ALDACTONE) 100 MG tablet; Take 1 tablet (100 mg) by mouth daily    Major depressive disorder in full remission, unspecified whether recurrent (H)  - Well controlled  - FLUoxetine (PROZAC) 40 MG capsule; Take 1 capsule (40 mg) by mouth daily    Female stress incontinence  - Adult Urology Referral; Future    Encounter for screening mammogram for breast cancer  - MA Screen Bilateral w/Ashish; Future    Colon cancer screening  - Adult Gastro Ref - Procedure Only; Future             BMI:   Estimated body mass index is 43.03 kg/m  as calculated from the following:    Height as of 1/12/21: 1.727 m (5' 8\").    Weight as of 1/12/21: 128.4 kg (283 lb).           No follow-ups on file.    SHIELA Taylor CNP  M [facility] RENEA Dwyer is a 51 year old who presents for the following health issues     HPI     Establish care    Diabetes - diagnosed earlier this year  A1c 6.9%  She thought she was still in pre-diabetes range  Was previously on metformin for weight loss though not effective     Cystic acne - on spironolactone 100 mg daily. Started 2 years ago by dermatology   Had acne when she was younger and then after menopause    Depression - was on 60 mg 3 years ago \"overmedicated\". \"manic\", mood up and down. Had assessment to rule out bipolar and BPD. Symptoms resolved after decreasing to 40 mg   Saw a therapist previously at the [facility] for binge eating - end of first marriage (8 to 9 years ago)  She found it helpful    Incontinence - has urethral sling  Has daily incontinence  Did pelvic floor rehab  2 vaginal deliveries, 11 pound 2 oz baby  1 c section    Tried whole 30, weight watchers and other options  Active lifestyle. Enjoys hiking, camping  Feeling more tired   Has been thinking about weight loss surgery  Can't lose more than 20 pounds with attempted \"diets\"   Exercise " difficult d/t breast size (H)        Review of Systems   Constitutional, HEENT, cardiovascular, pulmonary, gi and gu systems are negative, except as otherwise noted.      Objective           Vitals:  No vitals were obtained today due to virtual visit.    Physical Exam   GENERAL: Healthy, alert and no distress  EYES: Eyes grossly normal to inspection.  No discharge or erythema, or obvious scleral/conjunctival abnormalities.  RESP: No audible wheeze, cough, or visible cyanosis.  No visible retractions or increased work of breathing.    SKIN: Visible skin clear. No significant rash, abnormal pigmentation or lesions.  NEURO: Cranial nerves grossly intact.  Mentation and speech appropriate for age.  PSYCH: Mentation appears normal, affect normal/bright, judgement and insight intact, normal speech and appearance well-groomed.    Labs reviewed            Video-Visit Details    Type of service:  Video Visit    Video End Time:3:52 PM    Originating Location (pt. Location): Home    Distant Location (provider location):  [facility]     Platform used for Video Visit: [employer]

## 2021-12-08 NOTE — NURSING NOTE
Called to schedule patient appointments for mammo and labs with in the next month and px in the next 6-8 weeks. Pt states that she will call us back to schedule these appointments. Prema Price LPN

## 2021-12-09 ASSESSMENT — PATIENT HEALTH QUESTIONNAIRE - PHQ9: SUM OF ALL RESPONSES TO PHQ QUESTIONS 1-9: 9

## 2021-12-09 ASSESSMENT — ANXIETY QUESTIONNAIRES: GAD7 TOTAL SCORE: 6

## 2021-12-10 ENCOUNTER — TELEPHONE (OUTPATIENT)
Dept: PEDIATRICS | Facility: CLINIC | Age: 51
End: 2021-12-10
Payer: COMMERCIAL

## 2021-12-10 NOTE — TELEPHONE ENCOUNTER
Diabetes Education Scheduling Outreach #1:    Call to patient to schedule. Left message with phone number to call to schedule.    Plan for 2nd outreach attempt within 1 week.    Elisha Crawley OnCall  Diabetes and Nutrition Scheduling

## 2021-12-15 ENCOUNTER — E-VISIT (OUTPATIENT)
Dept: URGENT CARE | Facility: CLINIC | Age: 51
End: 2021-12-15
Payer: COMMERCIAL

## 2021-12-15 DIAGNOSIS — N39.0 ACUTE UTI (URINARY TRACT INFECTION): Primary | ICD-10-CM

## 2021-12-15 PROCEDURE — 99421 OL DIG E/M SVC 5-10 MIN: CPT | Performed by: PHYSICIAN ASSISTANT

## 2021-12-15 RX ORDER — NITROFURANTOIN 25; 75 MG/1; MG/1
100 CAPSULE ORAL 2 TIMES DAILY
Qty: 10 CAPSULE | Refills: 0 | Status: SHIPPED | OUTPATIENT
Start: 2021-12-15 | End: 2021-12-20

## 2021-12-16 NOTE — PATIENT INSTRUCTIONS
Dear Yuliya Li    After reviewing your responses, I've been able to diagnose you with a urinary tract infection, which is a common infection of the bladder with bacteria.  This is not a sexually transmitted infection, though urinating immediately after intercourse can help prevent infections.  Drinking lots of fluids is also helpful to clear your current infection and prevent the next one.      I have sent a prescription for antibiotics to your pharmacy to treat this infection.    It is important that you take all of your prescribed medication even if your symptoms are improving after a few doses.  Taking all of your medicine helps prevent the symptoms from returning.     If your symptoms worsen, you develop pain in your back or stomach, develop fevers, or are not improving in 5 days, please contact your primary care provider for an appointment or visit any of our convenient Walk-in or Urgent Care Centers to be seen, which can be found on our website here.    Thanks again for choosing us as your health care partner,    Jesus Butt PA-C    Urinary Tract Infections in Women  Urinary tract infections (UTIs) are most often caused by bacteria. These bacteria enter the urinary tract. The bacteria may come from inside the body. Or they may travel from the skin outside the rectum or vagina into the urethra. Female anatomy makes it easy for bacteria from the bowel to enter a woman s urinary tract, which is the most common source of UTI. This means women develop UTIs more often than men. Pain in or around the urinary tract is a common UTI symptom. But the only way to know for sure if you have a UTI for the healthcare provider to test your urine. The two tests that may be done are the urinalysis and urine culture.     Types of UTIs    Cystitis. A bladder infection (cystitis) is the most common UTI in women. You may have urgent or frequent need to pee. You may also have pain, burning when you pee, and bloody  urine.    Urethritis. This is an inflamed urethra, which is the tube that carries urine from the bladder to outside the body. You may have lower stomach or back pain. You may also have urgent or frequent need to pee.    Pyelonephritis. This is a kidney infection. If not treated, it can be serious and damage your kidneys. In severe cases, you may need to stay in the hospital. You may have a fever and lower back pain.    Medicines to treat a UTI  Most UTIs are treated with antibiotics. These kill the bacteria. The length of time you need to take them depends on the type of infection. It may be as short as 3 days. If you have repeated UTIs, you may need a low-dose antibiotic for several months. Take antibiotics exactly as directed. Don t stop taking them until all of the medicine is gone. If you stop taking the antibiotic too soon, the infection may not go away. You may also develop a resistance to the antibiotic. This can make it much harder to treat.   Lifestyle changes to treat and prevent UTIs   The lifestyle changes below will help get rid of your UTI. They may also help prevent future UTIs.     Drink plenty of fluids. This includes water, juice, or other caffeine-free drinks. Fluids help flush bacteria out of your body.    Empty your bladder. Always empty your bladder when you feel the urge to pee. And always pee before going to sleep. Urine that stays in your bladder can lead to infection. Try to pee before and after sex as well.    Practice good personal hygiene. Wipe yourself from front to back after using the toilet. This helps keep bacteria from getting into the urethra.    Use condoms during sex. These help prevent UTIs caused by sexually transmitted bacteria. Also don't use spermicides during sex. These can increase the risk for UTIs. Choose other forms of birth control instead. For women who tend to get UTIs after sex, a low-dose of a preventive antibiotic may be used. Be sure to discuss this option with  your healthcare provider.    Follow up with your healthcare provider as directed. He or she may test to make sure the infection has cleared. If needed, more treatment may be started.  Tania last reviewed this educational content on 7/1/2019 2000-2021 [employer]. All rights reserved. This information is not intended as a substitute for professional medical care. Always follow your healthcare professional's instructions.

## 2021-12-21 NOTE — TELEPHONE ENCOUNTER
Diabetes Education Scheduling Outreach #2:    Sent [employer] message.    Karma Crawley OnCall  Diabetes and Nutrition Scheduling

## 2021-12-27 ENCOUNTER — LAB (OUTPATIENT)
Dept: LAB | Facility: CLINIC | Age: 51
End: 2021-12-27
Payer: COMMERCIAL

## 2021-12-27 DIAGNOSIS — E11.9 TYPE 2 DIABETES MELLITUS WITHOUT COMPLICATION, WITHOUT LONG-TERM CURRENT USE OF INSULIN (H): ICD-10-CM

## 2021-12-27 LAB
ALBUMIN SERPL-MCNC: 3.6 G/DL (ref 3.4–5)
ALP SERPL-CCNC: 70 U/L (ref 40–150)
ALT SERPL W P-5'-P-CCNC: 24 U/L (ref 0–50)
ANION GAP SERPL CALCULATED.3IONS-SCNC: 5 MMOL/L (ref 3–14)
AST SERPL W P-5'-P-CCNC: 13 U/L (ref 0–45)
BILIRUB SERPL-MCNC: 0.2 MG/DL (ref 0.2–1.3)
BUN SERPL-MCNC: 12 MG/DL (ref 7–30)
CALCIUM SERPL-MCNC: 9 MG/DL (ref 8.5–10.1)
CHLORIDE BLD-SCNC: 104 MMOL/L (ref 94–109)
CHOLEST SERPL-MCNC: 194 MG/DL
CO2 SERPL-SCNC: 27 MMOL/L (ref 20–32)
CREAT SERPL-MCNC: 0.82 MG/DL (ref 0.52–1.04)
FASTING STATUS PATIENT QL REPORTED: YES
GFR SERPL CREATININE-BSD FRML MDRD: 87 ML/MIN/1.73M2
GLUCOSE BLD-MCNC: 157 MG/DL (ref 70–99)
HBA1C MFR BLD: 6.9 % (ref 0–5.6)
HDLC SERPL-MCNC: 38 MG/DL
LDLC SERPL CALC-MCNC: 121 MG/DL
NONHDLC SERPL-MCNC: 156 MG/DL
POTASSIUM BLD-SCNC: 4.4 MMOL/L (ref 3.4–5.3)
PROT SERPL-MCNC: 7.2 G/DL (ref 6.8–8.8)
SODIUM SERPL-SCNC: 136 MMOL/L (ref 133–144)
TRIGL SERPL-MCNC: 176 MG/DL
TSH SERPL DL<=0.005 MIU/L-ACNC: 2.36 MU/L (ref 0.4–4)

## 2021-12-27 PROCEDURE — 80061 LIPID PANEL: CPT

## 2021-12-27 PROCEDURE — 84443 ASSAY THYROID STIM HORMONE: CPT

## 2021-12-27 PROCEDURE — 83036 HEMOGLOBIN GLYCOSYLATED A1C: CPT

## 2021-12-27 PROCEDURE — 36415 COLL VENOUS BLD VENIPUNCTURE: CPT

## 2021-12-27 PROCEDURE — 80053 COMPREHEN METABOLIC PANEL: CPT

## 2021-12-29 ENCOUNTER — MYC MEDICAL ADVICE (OUTPATIENT)
Dept: PEDIATRICS | Facility: CLINIC | Age: 51
End: 2021-12-29
Payer: COMMERCIAL

## 2021-12-29 DIAGNOSIS — E11.9 TYPE 2 DIABETES MELLITUS WITHOUT COMPLICATION, WITHOUT LONG-TERM CURRENT USE OF INSULIN (H): ICD-10-CM

## 2021-12-29 DIAGNOSIS — E11.9 TYPE 2 DIABETES MELLITUS WITHOUT COMPLICATION, WITHOUT LONG-TERM CURRENT USE OF INSULIN (H): Primary | ICD-10-CM

## 2021-12-29 RX ORDER — ATORVASTATIN CALCIUM 10 MG/1
10 TABLET, FILM COATED ORAL DAILY
Qty: 90 TABLET | Refills: 3 | Status: SHIPPED | OUTPATIENT
Start: 2021-12-29 | End: 2022-10-26

## 2022-01-14 ENCOUNTER — VIRTUAL VISIT (OUTPATIENT)
Dept: EDUCATION SERVICES | Facility: CLINIC | Age: 52
End: 2022-01-14
Payer: COMMERCIAL

## 2022-01-14 DIAGNOSIS — E11.9 DIABETES MELLITUS, TYPE 2 (H): Primary | ICD-10-CM

## 2022-01-14 PROCEDURE — G0108 DIAB MANAGE TRN  PER INDIV: HCPCS | Mod: 95 | Performed by: DIETITIAN, REGISTERED

## 2022-01-14 NOTE — PATIENT INSTRUCTIONS
PLAN  1) aim for portion control at meals, follow plate planner or choose 30-60 g/meal  2) balanced plate of carb, protein, veg, lower fat as able  3) check blood 1x/daily at varying times pre- or 2 hour post- meal

## 2022-01-14 NOTE — Clinical Note
Rolando Boggs   Yuliya is really succeeding with diabetes self care! BG is decreasing and appetite is down with the Ozempic, she is having some weight loss.   We'll touch base again in a month.  Thanks! Gabrielle Perez RD, LD, [facility]

## 2022-01-14 NOTE — PROGRESS NOTES
"Diabetes Self-Management Education & Support    Presents for: Initial Assessment for new diagnosis    Type of Visit: Video Visit    How would patient like to obtain AVS? Marlene    ASSESSMENT:  Yuliya is doing well with diabetes self cares: she is checking BG, taking medication, and has lost 5# in 4 weeks on Ozempic.    She describes that grown up her mother was always dieting and, when in a bad marriage, Yuliya developed unhealthy relationship with food. She had the idea that if she was unhappy losing weight would fix it. She went to [facility] for binge eating do and past couple of years has been working to love herself how she is, not focus on body image and food manipulation. Feels she is in a good place with  and kids now. She is working at home.  Did weight Watchers in past year \"with no success, lost 20# then it stopped\".     Patient's most recent   Lab Results   Component Value Date    A1C 6.9 12/27/2021    is meeting goal of <7.0    Diabetes knowledge and skills assessment:   Patient is knowledgeable in diabetes management concepts related to: needs comprehensive ed    Continue education with the following diabetes management concepts: Healthy Eating, Being Active, Monitoring, Taking Medication, Problem Solving, Reducing Risks and Healthy Coping    Based on learning assessment above, most appropriate setting for further diabetes education would be: Individual setting.    INTERVENTIONS:    Education provided today on:  AADE Self-Care Behaviors:  Diabetes Pathophysiology    Healthy Eating: carbohydrate counting, portion control, plate planning method, label reading and rec'd 30-60 g carb/meal, or if doesn't want to get caught up in numbers, plate planner. Mentioned plant-based but not trying that at this time.    Being Active: relationship to blood glucose    Monitoring: purpose, log and interpret results, individual blood glucose targets, frequency of monitoring and rec'd pre/post meal checks varying " "from day to day    Taking Medication: action of prescribed medication and side effects of prescribed medications    Healthy Coping: recognize feelings about diagnosis, benefits of making appropriate lifestyle changes and utilize support systems    Opportunities for ongoing education and support in diabetes-self management were discussed. Pt verbalized understanding of concepts discussed and recommendations provided today.       Education Materials Provided:  BG Log Sheet, Carbohydrate Counting, My Plate Planner and AADE 7       PLAN  1) aim for portion control at meals, follow plate planner or choose 30-60 g/meal  2) balanced plate of carb, protein, veg, lower fat as able  3) check blood 1x/daily at varying times pre- or 2 hour post- meal    Topics to cover at upcoming visits: Problem Solving and Reducing Risks  Follow-up: 2/17    See Goals Section for co-developed, patient-stated behavior change goals.  AVS provided to patient today.          SUBJECTIVE / OBJECTIVE:  Presents for: Initial Assessment for new diagnosis  Accompanied by: Self  Diabetes education in the past 24mo: No (new)  Focus of Visit: Other (comprehensive ed)  Diabetes type: Type 2  Date of diagnosis: Jan 2022- says had borderline before, saw new doc who said it's T2D, her mom has T2D  Disease course: Improving  Diabetes management related comments/concerns: history of binge eating disorder, treated at [facility]  Other concerns:: None  Cultural Influences/Ethnic Background:  Not  or       Diabetes Symptoms & Complications:          Patient Problem List and Family Medical History reviewed for relevant medical history, current medical status, and diabetes risk factors.    Vitals:  There were no vitals taken for this visit.  Estimated body mass index is 43.03 kg/m  as calculated from the following:    Height as of 1/12/21: 1.727 m (5' 8\").    Weight as of 1/12/21: 128.4 kg (283 lb).   Last 3 BP:   BP Readings from Last 3 Encounters: " "  01/12/21 124/88   01/15/20 124/80   11/07/19 130/82       History   Smoking Status     Former Smoker     Packs/day: 0.00     Quit date: 11/7/1999   Smokeless Tobacco     Never Used       Labs:  Lab Results   Component Value Date    A1C 6.9 12/27/2021     Lab Results   Component Value Date     12/27/2021     Lab Results   Component Value Date     12/27/2021     Direct Measure HDL   Date Value Ref Range Status   12/27/2021 38 (L) >=50 mg/dL Final   ]  GFR Estimate   Date Value Ref Range Status   12/27/2021 87 >60 mL/min/1.73m2 Final     Comment:     Effective December 21, 2021 eGFRcr in adults is calculated using the 2021 CKD-EPI creatinine equation which includes age and gender (Rigo et al., NEJM, DOI: 10.1056/RSIJpz5753876)   01/12/2021 >60 >60 mL/min/1.73m2 Final     GFR Estimate If Black   Date Value Ref Range Status   01/12/2021 >60 >60 mL/min/1.73m2 Final     Lab Results   Component Value Date    CR 0.82 12/27/2021     No results found for: MICROALBUMIN    Healthy Eating:  Healthy Eating Assessed Today: Yes (my need for surgar is gone!)  Meal planning/habits: Smaller portions (already experiencing lower appetite with Ozempic)  How many times a week on average do you eat food made away from home (restaurant/take-out)?: 2 (some , her  is a  at restaurant)    Meals include: Breakfast,Lunch,Dinner (in effort to be kind to herself she had been eating whatever she wanted- says whenver she is asked to restrict it backfires)  Breakfast: PB otast and banana OR scrambled egg with veg and hash browns - lower portions than before & black coffee  Lunch: \"on a soup kick lately\"- onion soup (no bread, cheese) but some croutons  Dinner: chicken tortilla soup (chic, veg, tortillas, beans, corn) - she made it without the tortillas just to keep carb down OR gourmet pizza ( works in restaurant) OR started meal delivery: chicken with roasted potato and broccoli  Snacks: not much, in past " would have- they got some ice cream and she had just a little, maybe almonds- used to snack on toast all day but has stopped  Other: new favorite thing is to drizzle <TBS olive oil on roasted broccoli and onion  Beverages: Water,Coffee,Tea (once every couple months a coke)  Has patient met with a dietitian in the past?: Yes (weight watchers)    Being Active:  Being Active Assessed Today: Yes  Exercise:: Yes (enjoys hiking and camping, has not done lately, is doing some walking recently)  How intense was your typical exercise? : Moderate (like brisk walking)  Barrier to exercise: Physical limitation (Yuliya says she has always had large breasts, she recently walked 4 blocks and her back was in pain, hopes for a reduction eventually)    Monitoring:  Monitoring Assessed Today: Yes  Did patient bring glucose meter to appointment? : Yes  Blood Glucose Meter: Accu-chek (guide me - with [employer] fabienne)  Times checking blood sugar at home (number): Other Times checking blood sugar at home (per): Day  Blood glucose trend: Decreasing    Glucose data:  at first was doing 3x/day: FBG and before/after a meal, was 150-160's; after ozempic saw it come down- now just 1/day check 100-120 range      Taking Medications:  Diabetes Medication(s)     Biguanides       metFORMIN (GLUCOPHAGE-XR) 500 MG 24 hr tablet    Take 1 tablet (500 mg) by mouth daily    Incretin Mimetic Agents (GLP-1 Receptor Agonists)       semaglutide (OZEMPIC) 2 MG/1.5ML SOPN pen    Inject .25 mg subcutaneus once weekly x4 weeks, then .5 mg once weekly for 4 weeks then 1 mg once weekly          Taking Medication Assessed Today: Yes  Current Treatments: Oral Medication (taken by mouth),Non-insulin Injectables  Problems taking diabetes medications regularly?: No  Diabetes medication side effects?: No    Problem Solving:  Problem Solving Assessed Today: Yes  Is the patient at risk for hypoglycemia?: No  Is the patient at risk for DKA?: No  Does patient have severe  weather/disaster plan for diabetes management?: Not Needed  Does patient have sick day plan for diabetes management?: Not Needed              Reducing Risks:  Reducing Risks Assessed Today: Yes  Diabetes Risks: Age over 45 years,Family History  CAD Risks: Diabetes Mellitus,Obesity    Healthy Coping:  Healthy Coping Assessed Today: Yes  Emotional response to diabetes: Ready to learn,Acceptance,Confidence diabetes can be controlled  Informal Support system:: Spouse,Friends ( is a , ex  has DB)  Stage of change: ACTION (Actively working towards change)  Support resources: None  Patient Activation Measure Survey Score:  BRENNAN Score (Last Two) 12/2/2021   BRENNAN Raw Score 34   Activation Score 68.9   BRENNAN Level 3     Emailed with her permission:  Rolando Dwyer,   So nice to talk to you today!! You are off to a super start with diabetes self-care, way to go!  I'll attach a few items here that might be helpful ??  ~ blood glucose log- I know you're using the [employer] fabienne, but it might be helpful to jot down numbers for a week or so before the next visit just so you have them handy to report- or print off the fabienne! whatever makes sense to you  ~ AADE 7 handout- these are the main diabetes topics to be familiar with for success!  ~ sending a carb list, and also a plate planner image that both just encourage the portion control you're experiencing with the Ozempic    If you have any questions or need anything else, please let me know- I'm on mychart. Talk to you next month!  Gabrielle Perez RD, LD, CDCES    Time Spent: 60 minutes  Encounter Type: Individual      Any diabetes medication dose changes were made via the Certified Diabetes Care & Education Protocol in collaboration with the patient's referring provider. A copy of this encounter was shared with the provider.

## 2022-02-09 DIAGNOSIS — E11.9 TYPE 2 DIABETES MELLITUS WITHOUT COMPLICATION, WITHOUT LONG-TERM CURRENT USE OF INSULIN (H): ICD-10-CM

## 2022-02-10 RX ORDER — SEMAGLUTIDE 1.34 MG/ML
INJECTION, SOLUTION SUBCUTANEOUS
Qty: 1.5 ML | Refills: 1 | Status: SHIPPED | OUTPATIENT
Start: 2022-02-10 | End: 2022-03-18

## 2022-02-10 NOTE — TELEPHONE ENCOUNTER
Prescription approved per [facility] Refill Protocol.    Tiffany Wade RN

## 2022-03-18 ENCOUNTER — OFFICE VISIT (OUTPATIENT)
Dept: PEDIATRICS | Facility: CLINIC | Age: 52
End: 2022-03-18
Payer: COMMERCIAL

## 2022-03-18 VITALS
HEART RATE: 104 BPM | RESPIRATION RATE: 16 BRPM | WEIGHT: 275.4 LBS | DIASTOLIC BLOOD PRESSURE: 68 MMHG | SYSTOLIC BLOOD PRESSURE: 112 MMHG | OXYGEN SATURATION: 96 % | BODY MASS INDEX: 40.79 KG/M2 | HEIGHT: 69 IN | TEMPERATURE: 98.6 F

## 2022-03-18 DIAGNOSIS — F32.5 MAJOR DEPRESSIVE DISORDER IN FULL REMISSION, UNSPECIFIED WHETHER RECURRENT (H): Primary | ICD-10-CM

## 2022-03-18 DIAGNOSIS — Z23 HIGH PRIORITY FOR 2019-NCOV VACCINE: ICD-10-CM

## 2022-03-18 DIAGNOSIS — E66.01 MORBID OBESITY (H): ICD-10-CM

## 2022-03-18 DIAGNOSIS — L70.8 OTHER ACNE: ICD-10-CM

## 2022-03-18 DIAGNOSIS — E11.9 TYPE 2 DIABETES MELLITUS WITHOUT COMPLICATION, WITHOUT LONG-TERM CURRENT USE OF INSULIN (H): ICD-10-CM

## 2022-03-18 LAB — HBA1C MFR BLD: 5.9 % (ref 0–5.6)

## 2022-03-18 PROCEDURE — 80061 LIPID PANEL: CPT | Performed by: NURSE PRACTITIONER

## 2022-03-18 PROCEDURE — 91306 COVID-19,PF,MODERNA (18+ YRS BOOSTER .25ML): CPT | Performed by: NURSE PRACTITIONER

## 2022-03-18 PROCEDURE — 99214 OFFICE O/P EST MOD 30 MIN: CPT | Performed by: NURSE PRACTITIONER

## 2022-03-18 PROCEDURE — 83036 HEMOGLOBIN GLYCOSYLATED A1C: CPT | Performed by: NURSE PRACTITIONER

## 2022-03-18 PROCEDURE — 36415 COLL VENOUS BLD VENIPUNCTURE: CPT | Performed by: NURSE PRACTITIONER

## 2022-03-18 PROCEDURE — 0064A COVID-19,PF,MODERNA (18+ YRS BOOSTER .25ML): CPT | Performed by: NURSE PRACTITIONER

## 2022-03-18 RX ORDER — FLUOXETINE 40 MG/1
40 CAPSULE ORAL DAILY
Qty: 90 CAPSULE | Refills: 1 | Status: SHIPPED | OUTPATIENT
Start: 2022-03-18 | End: 2022-10-26

## 2022-03-18 RX ORDER — SPIRONOLACTONE 100 MG/1
100 TABLET, FILM COATED ORAL DAILY
Qty: 90 TABLET | Refills: 1 | Status: SHIPPED | OUTPATIENT
Start: 2022-03-18 | End: 2022-10-26

## 2022-03-18 RX ORDER — SEMAGLUTIDE 1.34 MG/ML
INJECTION, SOLUTION SUBCUTANEOUS
Qty: 1.5 ML | Refills: 5 | Status: SHIPPED | OUTPATIENT
Start: 2022-03-18 | End: 2022-03-29 | Stop reason: DRUGHIGH

## 2022-03-18 ASSESSMENT — ENCOUNTER SYMPTOMS
DIZZINESS: 0
HEMATURIA: 0
HEADACHES: 0
CHILLS: 0
HEARTBURN: 0
MYALGIAS: 0
DIARRHEA: 0
CONSTIPATION: 0
COUGH: 0
PALPITATIONS: 0
ABDOMINAL PAIN: 0
FEVER: 0
BREAST MASS: 0
ARTHRALGIAS: 0
SHORTNESS OF BREATH: 0
NERVOUS/ANXIOUS: 0
NAUSEA: 0
SORE THROAT: 0
WEAKNESS: 0
HEMATOCHEZIA: 0
EYE PAIN: 0
FREQUENCY: 0
DYSURIA: 0
PARESTHESIAS: 0
JOINT SWELLING: 0

## 2022-03-18 ASSESSMENT — PATIENT HEALTH QUESTIONNAIRE - PHQ9
SUM OF ALL RESPONSES TO PHQ QUESTIONS 1-9: 5
SUM OF ALL RESPONSES TO PHQ QUESTIONS 1-9: 5
10. IF YOU CHECKED OFF ANY PROBLEMS, HOW DIFFICULT HAVE THESE PROBLEMS MADE IT FOR YOU TO DO YOUR WORK, TAKE CARE OF THINGS AT HOME, OR GET ALONG WITH OTHER PEOPLE: VERY DIFFICULT

## 2022-03-18 NOTE — PROGRESS NOTES
"  Assessment & Plan     Major depressive disorder in full remission, unspecified whether recurrent (H)  - Doing well. Needs refill  - FLUoxetine (PROZAC) 40 MG capsule; Take 1 capsule (40 mg) by mouth daily    Morbid obesity (H)  - Doing well since starting semiglutide. Focusing more on healthy eating. Enjoys camping and hiking and hope to get back into these. Has lost 13 pounds since starting. Happy with slow, gradual weight loss. Went through treatment at [facility] for binge eating. Happy that medication decreases appetite and is able to live her life and not think about weight. So we're trying to focus treatment on feeling happy and healthy and not solely focusing on weight #    Type 2 diabetes mellitus without complication, without long-term current use of insulin (H)  - Recheck A1c   - semaglutide (OZEMPIC, 0.25 OR 0.5 MG/DOSE,) 2 MG/1.5ML SOPN pen; INJECT .5 MG ONCE WEEKLY  - **A1C FUTURE 6mo  - Lipid panel reflex to direct LDL Fasting    High priority for 2019-nCoV vaccine  - COVID-19,PF,MODERNA (18+ Yrs BOOSTER .25mL)    Other acne  - Refill. Potassium up to date  - spironolactone (ALDACTONE) 100 MG tablet; Take 1 tablet (100 mg) by mouth daily        See Patient Instructions    Return in about 6 months (around 9/18/2022).    SHIELA Taylor CNP  [facility] [place] is a 51 year old who presents for the following health issues     HPI     Happy to be on Ozempic  Currently on .5 mg weekly  \"its given me my life back\"  Notes that she doesn't have persistent hunger  Feels like she can eat and feel full and move on  Mind not constantly hungry  She is really happy with this  Asks about staying at current dose  Mild nausea with starting  Had visit with diabetes educator  Glucoses at goal  No highs or lows      Review of Systems   Constitutional, HEENT, cardiovascular, pulmonary, gi and gu systems are negative, except as otherwise noted.      Objective    /68 (BP " "Location: Right arm, Cuff Size: Adult Large)   Pulse 104   Temp 98.6  F (37  C) (Tympanic)   Resp 16   Ht 1.746 m (5' 8.75\")   Wt 124.9 kg (275 lb 6.4 oz)   LMP  (LMP Unknown)   SpO2 96%   BMI 40.97 kg/m    Body mass index is 40.97 kg/m .  Physical Exam   GENERAL: healthy, alert and no distress  RESP: lungs clear to auscultation - no rales, rhonchi or wheezes  CV: regular rate and rhythm, normal S1 S2, no S3 or S4, no murmur, click or rub, no peripheral edema and peripheral pulses strong  ABDOMEN: soft, nontender, no hepatosplenomegaly, no masses and bowel sounds normal  PSYCH: mentation appears normal, affect normal/bright    Lab on 12/27/2021   Component Date Value Ref Range Status     Hemoglobin A1C 12/27/2021 6.9 (A) 0.0 - 5.6 % Final    Normal <5.7%   Prediabetes 5.7-6.4%    Diabetes 6.5% or higher     Note: Adopted from ADA consensus guidelines.     TSH 12/27/2021 2.36  0.40 - 4.00 mU/L Final     Sodium 12/27/2021 136  133 - 144 mmol/L Final     Potassium 12/27/2021 4.4  3.4 - 5.3 mmol/L Final     Chloride 12/27/2021 104  94 - 109 mmol/L Final     Carbon Dioxide (CO2) 12/27/2021 27  20 - 32 mmol/L Final     Anion Gap 12/27/2021 5  3 - 14 mmol/L Final     Urea Nitrogen 12/27/2021 12  7 - 30 mg/dL Final     Creatinine 12/27/2021 0.82  0.52 - 1.04 mg/dL Final     Calcium 12/27/2021 9.0  8.5 - 10.1 mg/dL Final     Glucose 12/27/2021 157 (A) 70 - 99 mg/dL Final     Alkaline Phosphatase 12/27/2021 70  40 - 150 U/L Final     AST 12/27/2021 13  0 - 45 U/L Final     ALT 12/27/2021 24  0 - 50 U/L Final     Protein Total 12/27/2021 7.2  6.8 - 8.8 g/dL Final     Albumin 12/27/2021 3.6  3.4 - 5.0 g/dL Final     Bilirubin Total 12/27/2021 0.2  0.2 - 1.3 mg/dL Final     GFR Estimate 12/27/2021 87  >60 mL/min/1.73m2 Final    Effective December 21, 2021 eGFRcr in adults is calculated using the 2021 CKD-EPI creatinine equation which includes age and gender (Rigo ibrahim al., NEJM, DOI: 10.1056/PLAGjm6357102)     Cholesterol " 12/27/2021 194  <200 mg/dL Final     Triglycerides 12/27/2021 176 (A) <150 mg/dL Final     Direct Measure HDL 12/27/2021 38 (A) >=50 mg/dL Final     LDL Cholesterol Calculated 12/27/2021 121 (A) <=100 mg/dL Final     Non HDL Cholesterol 12/27/2021 156 (A) <130 mg/dL Final     Patient Fasting > 8hrs? 12/27/2021 Yes   Final

## 2022-03-18 NOTE — LETTER
My Depression Action Plan  Name: Yuliya Li   Date of Birth 1970  Date: 3/18/2022    My doctor: Flakita Salas   My clinic: [facility]  [address]  [address]  [place] [address] [phone]          GREEN    ZONE   Good Control    What it looks like:     Things are going generally well. You have normal ups and downs. You may even feel depressed from time to time, but bad moods usually last less than a day.   What you need to do:  1. Continue to care for yourself (see self care plan)  2. Check your depression survival kit and update it as needed  3. Follow your physician s recommendations including any medication.  4. Do not stop taking medication unless you consult with your physician first.           YELLOW         ZONE Getting Worse    What it looks like:     Depression is starting to interfere with your life.     It may be hard to get out of bed; you may be starting to isolate yourself from others.    Symptoms of depression are starting to last most all day and this has happened for several days.     You may have suicidal thoughts but they are not constant.   What you need to do:     1. Call your care team. Your response to treatment will improve if you keep your care team informed of your progress. Yellow periods are signs an adjustment may need to be made.     2. Continue your self-care.  Just get dressed and ready for the day.  Don't give yourself time to talk yourself out of it.    3. Talk to someone in your support network.    4. Open up your Depression Self-Care Plan/Wellness Kit.           RED    ZONE Medical Alert - Get Help    What it looks like:     Depression is seriously interfering with your life.     You may experience these or other symptoms: You can t get out of bed most days, can t work or engage in other necessary activities, you have trouble taking care of basic hygiene, or basic responsibilities, thoughts of suicide  or death that will not go away, self-injurious behavior.     What you need to do:  1. Call your care team and request a same-day appointment. If they are not available (weekends or after hours) call your local crisis line, emergency room or 911.          Depression Self-Care Plan / Wellness Kit    Many people find that medication and therapy are helpful treatments for managing depression. In addition, making small changes to your everyday life can help to boost your mood and improve your wellbeing. Below are some tips for you to consider. Be sure to talk with your medical provider and/or behavioral health consultant if your symptoms are worsening or not improving.     Sleep   Sleep hygiene  means all of the habits that support good, restful sleep. It includes maintaining a consistent bedtime and wake time, using your bedroom only for sleeping or sex, and keeping the bedroom dark and free of distractions like a computer, smartphone, or television.     Develop a Healthy Routine  Maintain good hygiene. Get out of bed in the morning, make your bed, brush your teeth, take a shower, and get dressed. Don t spend too much time viewing media that makes you feel stressed. Find time to relax each day.    Exercise  Get some form of exercise every day. This will help reduce pain and release endorphins, the  feel good  chemicals in your brain. It can be as simple as just going for a walk or doing some gardening, anything that will get you moving.      Diet  Strive to eat healthy foods, including fruits and vegetables. Drink plenty of water. Avoid excessive sugar, caffeine, alcohol, and other mood-altering substances.     Stay Connected with Others  Stay in touch with friends and family members.    Manage Your Mood  Try deep breathing, massage therapy, biofeedback, or meditation. Take part in fun activities when you can. Try to find something to smile about each day.     Psychotherapy  Be open to working with a therapist if your  provider recommends it.     Medication  Be sure to take your medication as prescribed. Most anti-depressants need to be taken every day. It usually takes several weeks for medications to work. Not all medicines work for all people. It is important to follow-up with your provider to make sure you have a treatment plan that is working for you. Do not stop your medication abruptly without first discussing it with your provider.    Crisis Resources   These hotlines are for both adults and children. They and are open 24 hours a day, 7 days a week unless noted otherwise.      National Suicide Prevention Lifeline   7-705-076-YHTA (9715)      Crisis Text Line    www.crisistextline.org  Text HOME to 033928 from anywhere in the [place], anytime, about any type of crisis. A live, trained crisis counselor will receive the text and respond quickly.      [place] Lifeline for LGBTQ Youth  A national crisis intervention and suicide lifeline for LGBTQ youth under 25. Provides a safe place to talk without judgement. Call 1-837.678.7746; text START to 519187 or visit www.thetrevorproject.org to talk to a trained counselor.      For [facility] crisis numbers, visit the [facility] website at:  https://[place].gov/dhs/people-we-serve/adults/health-care/mental-health/resources/crisis-contacts.jsp

## 2022-03-18 NOTE — PATIENT INSTRUCTIONS
Check with your insurance on coverage for the Shingrix vaccine. You can schedule a visit with our ancillary staff for the vaccine. You will abundio to schedule a booster within 2-6 months after the initial vaccine.     You can continue with your current dose of Ozempic    If you're continuing to do well and no concerns, plan to schedule with a new provider in 6 months    I recommend Dr. Fam, Dr. Knight or Edelmira Ch

## 2022-03-19 LAB
CHOLEST SERPL-MCNC: 122 MG/DL
FASTING STATUS PATIENT QL REPORTED: YES
HDLC SERPL-MCNC: 35 MG/DL
LDLC SERPL CALC-MCNC: 65 MG/DL
NONHDLC SERPL-MCNC: 87 MG/DL
TRIGL SERPL-MCNC: 110 MG/DL

## 2022-03-19 ASSESSMENT — PATIENT HEALTH QUESTIONNAIRE - PHQ9: SUM OF ALL RESPONSES TO PHQ QUESTIONS 1-9: 5

## 2022-03-26 ENCOUNTER — MYC MEDICAL ADVICE (OUTPATIENT)
Dept: PEDIATRICS | Facility: CLINIC | Age: 52
End: 2022-03-26
Payer: COMMERCIAL

## 2022-03-26 DIAGNOSIS — E66.01 MORBID OBESITY (H): Primary | ICD-10-CM

## 2022-03-27 ENCOUNTER — HEALTH MAINTENANCE LETTER (OUTPATIENT)
Age: 52
End: 2022-03-27

## 2022-07-16 ENCOUNTER — HEALTH MAINTENANCE LETTER (OUTPATIENT)
Age: 52
End: 2022-07-16

## 2022-08-01 ENCOUNTER — E-VISIT (OUTPATIENT)
Dept: URGENT CARE | Facility: CLINIC | Age: 52
End: 2022-08-01
Payer: COMMERCIAL

## 2022-08-01 DIAGNOSIS — N39.0 ACUTE UTI (URINARY TRACT INFECTION): Primary | ICD-10-CM

## 2022-08-01 PROCEDURE — 99421 OL DIG E/M SVC 5-10 MIN: CPT | Performed by: EMERGENCY MEDICINE

## 2022-08-01 RX ORDER — NITROFURANTOIN 25; 75 MG/1; MG/1
100 CAPSULE ORAL 2 TIMES DAILY
Qty: 10 CAPSULE | Refills: 0 | Status: SHIPPED | OUTPATIENT
Start: 2022-08-01 | End: 2022-08-06

## 2022-08-01 NOTE — PATIENT INSTRUCTIONS
Dear Yuliya Li    After reviewing your responses, I've been able to diagnose you with a urinary tract infection, which is a common infection of the bladder with bacteria.  This is not a sexually transmitted infection, though urinating immediately after intercourse can help prevent infections.  Drinking lots of fluids is also helpful to clear your current infection and prevent the next one.      I have sent a prescription for antibiotics to your pharmacy to treat this infection.    It is important that you take all of your prescribed medication even if your symptoms are improving after a few doses.  Taking all of your medicine helps prevent the symptoms from returning.     If your symptoms worsen, you develop pain in your back or stomach, develop fevers, or are not improving in 5 days, please contact your primary care provider for an appointment or visit any of our convenient Walk-in or Urgent Care Centers to be seen, which can be found on our website here.    Thanks again for choosing us as your health care partner,    Shaji Marin MD    Urinary Tract Infections in Women  Urinary tract infections (UTIs) are most often caused by bacteria. These bacteria enter the urinary tract. The bacteria may come from inside the body. Or they may travel from the skin outside the rectum or vagina into the urethra. Female anatomy makes it easy for bacteria from the bowel to enter a woman s urinary tract, which is the most common source of UTI. This means women develop UTIs more often than men. Pain in or around the urinary tract is a common UTI symptom. But the only way to know for sure if you have a UTI for the healthcare provider to test your urine. The two tests that may be done are the urinalysis and urine culture.     Types of UTIs    Cystitis. A bladder infection (cystitis) is the most common UTI in women. You may have urgent or frequent need to pee. You may also have pain, burning when you pee, and bloody  urine.    Urethritis. This is an inflamed urethra, which is the tube that carries urine from the bladder to outside the body. You may have lower stomach or back pain. You may also have urgent or frequent need to pee.    Pyelonephritis. This is a kidney infection. If not treated, it can be serious and damage your kidneys. In severe cases, you may need to stay in the hospital. You may have a fever and lower back pain.    Medicines to treat a UTI  Most UTIs are treated with antibiotics. These kill the bacteria. The length of time you need to take them depends on the type of infection. It may be as short as 3 days. If you have repeated UTIs, you may need a low-dose antibiotic for several months. Take antibiotics exactly as directed. Don t stop taking them until all of the medicine is gone. If you stop taking the antibiotic too soon, the infection may not go away. You may also develop a resistance to the antibiotic. This can make it much harder to treat.   Lifestyle changes to treat and prevent UTIs   The lifestyle changes below will help get rid of your UTI. They may also help prevent future UTIs.     Drink plenty of fluids. This includes water, juice, or other caffeine-free drinks. Fluids help flush bacteria out of your body.    Empty your bladder. Always empty your bladder when you feel the urge to pee. And always pee before going to sleep. Urine that stays in your bladder can lead to infection. Try to pee before and after sex as well.    Practice good personal hygiene. Wipe yourself from front to back after using the toilet. This helps keep bacteria from getting into the urethra.    Use condoms during sex. These help prevent UTIs caused by sexually transmitted bacteria. Also don't use spermicides during sex. These can increase the risk for UTIs. Choose other forms of birth control instead. For women who tend to get UTIs after sex, a low-dose of a preventive antibiotic may be used. Be sure to discuss this option with  your healthcare provider.    Follow up with your healthcare provider as directed. He or she may test to make sure the infection has cleared. If needed, more treatment may be started.  Tania last reviewed this educational content on 7/1/2019 2000-2021 [employer]. All rights reserved. This information is not intended as a substitute for professional medical care. Always follow your healthcare professional's instructions.

## 2022-09-18 ENCOUNTER — HEALTH MAINTENANCE LETTER (OUTPATIENT)
Age: 52
End: 2022-09-18

## 2022-10-03 DIAGNOSIS — E66.01 MORBID OBESITY (H): ICD-10-CM

## 2022-10-04 RX ORDER — SEMAGLUTIDE 1.34 MG/ML
INJECTION, SOLUTION SUBCUTANEOUS
Qty: 3 ML | Refills: 0 | Status: SHIPPED | OUTPATIENT
Start: 2022-10-04 | End: 2022-10-26

## 2022-10-04 NOTE — TELEPHONE ENCOUNTER
Routing refill request to provider for review/approval because:  Labs out of range:  A1c  Patient needs to be seen because:  due for 6 month follow up    Laine Davis RN on 10/4/2022 at 12:02 PM

## 2022-10-05 NOTE — TELEPHONE ENCOUNTER
The pt is aware and scheduled for her upcoming appointment.   Cassidy Arias on 10/5/2022 at 9:19 AM

## 2022-10-10 ENCOUNTER — TELEPHONE (OUTPATIENT)
Dept: PEDIATRICS | Facility: CLINIC | Age: 52
End: 2022-10-10

## 2022-10-10 NOTE — TELEPHONE ENCOUNTER
Reason for Call:  Other call back    Detailed comments: Patient is returning call from clinic she stated she did not have a physical on 03/18/2022 she only came in for a med check so she wants to keep her appointment.    Phone Number Patient can be reached at: Cell number on file:    Telephone Information:   Mobile [phone]       Best Time: Anytime    Can we leave a detailed message on this number? YES    Call taken on 10/10/2022 at 10:44 AM by Zakiya Perez

## 2022-10-26 ENCOUNTER — OFFICE VISIT (OUTPATIENT)
Dept: PEDIATRICS | Facility: CLINIC | Age: 52
End: 2022-10-26
Payer: COMMERCIAL

## 2022-10-26 VITALS
HEART RATE: 86 BPM | BODY MASS INDEX: 34.44 KG/M2 | OXYGEN SATURATION: 97 % | DIASTOLIC BLOOD PRESSURE: 66 MMHG | TEMPERATURE: 97.7 F | RESPIRATION RATE: 16 BRPM | HEIGHT: 69 IN | SYSTOLIC BLOOD PRESSURE: 98 MMHG | WEIGHT: 232.5 LBS

## 2022-10-26 DIAGNOSIS — Z12.11 COLON CANCER SCREENING: ICD-10-CM

## 2022-10-26 DIAGNOSIS — F32.5 MAJOR DEPRESSIVE DISORDER IN FULL REMISSION, UNSPECIFIED WHETHER RECURRENT (H): ICD-10-CM

## 2022-10-26 DIAGNOSIS — E11.9 TYPE 2 DIABETES MELLITUS WITHOUT COMPLICATION, WITHOUT LONG-TERM CURRENT USE OF INSULIN (H): ICD-10-CM

## 2022-10-26 DIAGNOSIS — Z12.11 SCREEN FOR COLON CANCER: ICD-10-CM

## 2022-10-26 DIAGNOSIS — E66.01 MORBID OBESITY (H): ICD-10-CM

## 2022-10-26 DIAGNOSIS — Z00.00 ROUTINE GENERAL MEDICAL EXAMINATION AT A HEALTH CARE FACILITY: Primary | ICD-10-CM

## 2022-10-26 DIAGNOSIS — N62 MACROMASTIA: ICD-10-CM

## 2022-10-26 DIAGNOSIS — Z13.220 SCREENING FOR HYPERLIPIDEMIA: ICD-10-CM

## 2022-10-26 DIAGNOSIS — Z23 HIGH PRIORITY FOR 2019-NCOV VACCINE: ICD-10-CM

## 2022-10-26 DIAGNOSIS — Z12.31 ENCOUNTER FOR SCREENING MAMMOGRAM FOR BREAST CANCER: ICD-10-CM

## 2022-10-26 DIAGNOSIS — L70.8 OTHER ACNE: ICD-10-CM

## 2022-10-26 LAB
ANION GAP SERPL CALCULATED.3IONS-SCNC: 5 MMOL/L (ref 3–14)
BUN SERPL-MCNC: 14 MG/DL (ref 7–30)
CALCIUM SERPL-MCNC: 9.5 MG/DL (ref 8.5–10.1)
CHLORIDE BLD-SCNC: 106 MMOL/L (ref 94–109)
CHOLEST SERPL-MCNC: 129 MG/DL
CO2 SERPL-SCNC: 27 MMOL/L (ref 20–32)
CREAT SERPL-MCNC: 1 MG/DL (ref 0.52–1.04)
CREAT UR-MCNC: 372 MG/DL
FASTING STATUS PATIENT QL REPORTED: ABNORMAL
GFR SERPL CREATININE-BSD FRML MDRD: 68 ML/MIN/1.73M2
GLUCOSE BLD-MCNC: 101 MG/DL (ref 70–99)
HBA1C MFR BLD: 5.4 % (ref 0–5.6)
HDLC SERPL-MCNC: 46 MG/DL
LDLC SERPL CALC-MCNC: 66 MG/DL
MICROALBUMIN UR-MCNC: 13 MG/L
MICROALBUMIN/CREAT UR: 3.49 MG/G CR (ref 0–25)
NONHDLC SERPL-MCNC: 83 MG/DL
POTASSIUM BLD-SCNC: 4.2 MMOL/L (ref 3.4–5.3)
SODIUM SERPL-SCNC: 138 MMOL/L (ref 133–144)
TRIGL SERPL-MCNC: 86 MG/DL

## 2022-10-26 PROCEDURE — 80061 LIPID PANEL: CPT | Performed by: STUDENT IN AN ORGANIZED HEALTH CARE EDUCATION/TRAINING PROGRAM

## 2022-10-26 PROCEDURE — 83036 HEMOGLOBIN GLYCOSYLATED A1C: CPT | Performed by: STUDENT IN AN ORGANIZED HEALTH CARE EDUCATION/TRAINING PROGRAM

## 2022-10-26 PROCEDURE — 82043 UR ALBUMIN QUANTITATIVE: CPT | Performed by: STUDENT IN AN ORGANIZED HEALTH CARE EDUCATION/TRAINING PROGRAM

## 2022-10-26 PROCEDURE — 99214 OFFICE O/P EST MOD 30 MIN: CPT | Mod: 25 | Performed by: STUDENT IN AN ORGANIZED HEALTH CARE EDUCATION/TRAINING PROGRAM

## 2022-10-26 PROCEDURE — 0134A COVID-19,PF,MODERNA BIVALENT: CPT | Performed by: STUDENT IN AN ORGANIZED HEALTH CARE EDUCATION/TRAINING PROGRAM

## 2022-10-26 PROCEDURE — 91313 COVID-19,PF,MODERNA BIVALENT: CPT | Performed by: STUDENT IN AN ORGANIZED HEALTH CARE EDUCATION/TRAINING PROGRAM

## 2022-10-26 PROCEDURE — 36415 COLL VENOUS BLD VENIPUNCTURE: CPT | Performed by: STUDENT IN AN ORGANIZED HEALTH CARE EDUCATION/TRAINING PROGRAM

## 2022-10-26 PROCEDURE — 99207 PR FOOT EXAM NO CHARGE: CPT | Mod: 25 | Performed by: STUDENT IN AN ORGANIZED HEALTH CARE EDUCATION/TRAINING PROGRAM

## 2022-10-26 PROCEDURE — 90677 PCV20 VACCINE IM: CPT | Performed by: STUDENT IN AN ORGANIZED HEALTH CARE EDUCATION/TRAINING PROGRAM

## 2022-10-26 PROCEDURE — 99396 PREV VISIT EST AGE 40-64: CPT | Mod: 25 | Performed by: STUDENT IN AN ORGANIZED HEALTH CARE EDUCATION/TRAINING PROGRAM

## 2022-10-26 PROCEDURE — 90471 IMMUNIZATION ADMIN: CPT | Performed by: STUDENT IN AN ORGANIZED HEALTH CARE EDUCATION/TRAINING PROGRAM

## 2022-10-26 PROCEDURE — 80048 BASIC METABOLIC PNL TOTAL CA: CPT | Performed by: STUDENT IN AN ORGANIZED HEALTH CARE EDUCATION/TRAINING PROGRAM

## 2022-10-26 RX ORDER — SEMAGLUTIDE 1.34 MG/ML
1 INJECTION, SOLUTION SUBCUTANEOUS WEEKLY
Qty: 3 ML | Refills: 0 | Status: SHIPPED | OUTPATIENT
Start: 2022-10-26 | End: 2022-11-28

## 2022-10-26 RX ORDER — METFORMIN HCL 500 MG
500 TABLET, EXTENDED RELEASE 24 HR ORAL DAILY
Qty: 90 TABLET | Refills: 3 | Status: SHIPPED | OUTPATIENT
Start: 2022-10-26 | End: 2023-11-01

## 2022-10-26 RX ORDER — ATORVASTATIN CALCIUM 10 MG/1
10 TABLET, FILM COATED ORAL DAILY
Qty: 90 TABLET | Refills: 3 | Status: SHIPPED | OUTPATIENT
Start: 2022-10-26 | End: 2023-02-02

## 2022-10-26 RX ORDER — FLUOXETINE 40 MG/1
40 CAPSULE ORAL DAILY
Qty: 90 CAPSULE | Refills: 1 | Status: SHIPPED | OUTPATIENT
Start: 2022-10-26 | End: 2023-06-01

## 2022-10-26 RX ORDER — SPIRONOLACTONE 100 MG/1
100 TABLET, FILM COATED ORAL DAILY
Qty: 90 TABLET | Refills: 1 | Status: SHIPPED | OUTPATIENT
Start: 2022-10-26 | End: 2023-06-01

## 2022-10-26 RX ORDER — LANCETS
EACH MISCELLANEOUS
Qty: 100 EACH | Refills: 6 | Status: SHIPPED | OUTPATIENT
Start: 2022-10-26

## 2022-10-26 SDOH — HEALTH STABILITY: PHYSICAL HEALTH: ON AVERAGE, HOW MANY MINUTES DO YOU ENGAGE IN EXERCISE AT THIS LEVEL?: 20 MIN

## 2022-10-26 SDOH — ECONOMIC STABILITY: FOOD INSECURITY: WITHIN THE PAST 12 MONTHS, THE FOOD YOU BOUGHT JUST DIDN'T LAST AND YOU DIDN'T HAVE MONEY TO GET MORE.: NEVER TRUE

## 2022-10-26 SDOH — ECONOMIC STABILITY: FOOD INSECURITY: WITHIN THE PAST 12 MONTHS, YOU WORRIED THAT YOUR FOOD WOULD RUN OUT BEFORE YOU GOT MONEY TO BUY MORE.: NEVER TRUE

## 2022-10-26 SDOH — ECONOMIC STABILITY: INCOME INSECURITY: IN THE LAST 12 MONTHS, WAS THERE A TIME WHEN YOU WERE NOT ABLE TO PAY THE MORTGAGE OR RENT ON TIME?: NO

## 2022-10-26 SDOH — ECONOMIC STABILITY: INCOME INSECURITY: HOW HARD IS IT FOR YOU TO PAY FOR THE VERY BASICS LIKE FOOD, HOUSING, MEDICAL CARE, AND HEATING?: NOT VERY HARD

## 2022-10-26 SDOH — HEALTH STABILITY: PHYSICAL HEALTH: ON AVERAGE, HOW MANY DAYS PER WEEK DO YOU ENGAGE IN MODERATE TO STRENUOUS EXERCISE (LIKE A BRISK WALK)?: 1 DAY

## 2022-10-26 SDOH — ECONOMIC STABILITY: TRANSPORTATION INSECURITY
IN THE PAST 12 MONTHS, HAS THE LACK OF TRANSPORTATION KEPT YOU FROM MEDICAL APPOINTMENTS OR FROM GETTING MEDICATIONS?: NO

## 2022-10-26 SDOH — ECONOMIC STABILITY: TRANSPORTATION INSECURITY
IN THE PAST 12 MONTHS, HAS LACK OF TRANSPORTATION KEPT YOU FROM MEETINGS, WORK, OR FROM GETTING THINGS NEEDED FOR DAILY LIVING?: NO

## 2022-10-26 ASSESSMENT — ENCOUNTER SYMPTOMS
BREAST MASS: 0
PALPITATIONS: 0
HEMATURIA: 0
SHORTNESS OF BREATH: 0
MYALGIAS: 0
HEARTBURN: 0
DIARRHEA: 0
ARTHRALGIAS: 0
HEADACHES: 0
HEMATOCHEZIA: 0
EYE PAIN: 0
PARESTHESIAS: 0
DIZZINESS: 0
COUGH: 0
CONSTIPATION: 0
CHILLS: 0
SORE THROAT: 0
WEAKNESS: 0
FEVER: 0
NERVOUS/ANXIOUS: 0
ABDOMINAL PAIN: 0
DYSURIA: 0
JOINT SWELLING: 0
FREQUENCY: 0
NAUSEA: 0

## 2022-10-26 ASSESSMENT — LIFESTYLE VARIABLES
HOW OFTEN DO YOU HAVE A DRINK CONTAINING ALCOHOL: MONTHLY OR LESS
AUDIT-C TOTAL SCORE: 1
HOW MANY STANDARD DRINKS CONTAINING ALCOHOL DO YOU HAVE ON A TYPICAL DAY: 1 OR 2
SKIP TO QUESTIONS 9-10: 1
HOW OFTEN DO YOU HAVE SIX OR MORE DRINKS ON ONE OCCASION: NEVER

## 2022-10-26 ASSESSMENT — PATIENT HEALTH QUESTIONNAIRE - PHQ9
SUM OF ALL RESPONSES TO PHQ QUESTIONS 1-9: 4
10. IF YOU CHECKED OFF ANY PROBLEMS, HOW DIFFICULT HAVE THESE PROBLEMS MADE IT FOR YOU TO DO YOUR WORK, TAKE CARE OF THINGS AT HOME, OR GET ALONG WITH OTHER PEOPLE: NOT DIFFICULT AT ALL
SUM OF ALL RESPONSES TO PHQ QUESTIONS 1-9: 4

## 2022-10-26 ASSESSMENT — SOCIAL DETERMINANTS OF HEALTH (SDOH)
HOW OFTEN DO YOU ATTEND CHURCH OR RELIGIOUS SERVICES?: NEVER
IN A TYPICAL WEEK, HOW MANY TIMES DO YOU TALK ON THE PHONE WITH FAMILY, FRIENDS, OR NEIGHBORS?: MORE THAN THREE TIMES A WEEK
HOW OFTEN DO YOU GET TOGETHER WITH FRIENDS OR RELATIVES?: ONCE A WEEK
DO YOU BELONG TO ANY CLUBS OR ORGANIZATIONS SUCH AS CHURCH GROUPS UNIONS, FRATERNAL OR ATHLETIC GROUPS, OR SCHOOL GROUPS?: NO

## 2022-10-26 ASSESSMENT — PAIN SCALES - GENERAL: PAINLEVEL: NO PAIN (0)

## 2022-10-26 NOTE — PROGRESS NOTES
SUBJECTIVE:   CC: Yuliya is an 52 year old who presents for preventive health visit.     Patient has been advised of split billing requirements and indicates understanding: Yes  Healthy Habits:     Getting at least 3 servings of Calcium per day:  Yes    Bi-annual eye exam:  Yes    Dental care twice a year:  Yes    Sleep apnea or symptoms of sleep apnea:  Sleep apnea    Diet:  Regular (no restrictions)    Frequency of exercise:  None    Taking medications regularly:  Yes    Medication side effects:  None    PHQ-2 Total Score: 1    Additional concerns today:  No    WESLEY - using CPAP nightly    MDD - mood has been well controlled, seeing a new therapist tomorrow.  Does not wish to change medication dosing at this time    DM2: On stable doses of metformin and Ozempic, tolerating these well.  Has been losing significant weight since adding the Ozempic.  States that she is feeling well.    Has been checking blood pressure at home, has been 110-120/70-80's.     Would like to discuss breast reduction surgery, has tried to pursue this in the past.  Has chronic back pain which is improved over time but still exist due to macromastia.  Due for mammogram, colonoscopy.  Continues on spironolactone for acne, which she states is working well for her.    Today's PHQ-2 Score:   PHQ-2 ( 1999 Pfizer) 10/26/2022   Q1: Little interest or pleasure in doing things 0   Q2: Feeling down, depressed or hopeless 1   PHQ-2 Score 1   Q1: Little interest or pleasure in doing things Not at all   Q2: Feeling down, depressed or hopeless Several days   PHQ-2 Score 1     Abuse: Current or Past (Physical, Sexual or Emotional) - No  Do you feel safe in your environment? Yes    Have you ever done Advance Care Planning? (For example, a Health Directive, POLST, or a discussion with a medical provider or your loved ones about your wishes): No, advance care planning information given to patient to review.  Patient declined advance care planning discussion  at this time.    Social History     Tobacco Use     Smoking status: Former     Packs/day: 0.00     Types: Cigarettes     Quit date: 1999     Years since quittin.9     Smokeless tobacco: Never   Substance Use Topics     Alcohol use: Yes     Comment: Alcoholic Drinks/day: 1 a week     If you drink alcohol do you typically have >3 drinks per day or >7 drinks per week? No    Alcohol Use 10/26/2022   Prescreen: >3 drinks/day or >7 drinks/week? No   Prescreen: >3 drinks/day or >7 drinks/week? -     Reviewed orders with patient.  Reviewed health maintenance and updated orders accordingly - Yes    Breast Cancer Screening:    FHS-7:   Breast CA Risk Assessment (FHS-7) 10/26/2022   Did any of your first-degree relatives have breast or ovarian cancer? No   Did any of your relatives have bilateral breast cancer? No   Did any man in your family have breast cancer? No   Did any woman in your family have breast and ovarian cancer? Yes   Did any woman in your family have breast cancer before age 50 y? Yes   Do you have 2 or more relatives with breast and/or ovarian cancer? No   Do you have 2 or more relatives with breast and/or bowel cancer? No     History of abnormal Pap smear: Status post hysterectomy. Pap no longer indicated.      Reviewed and updated as needed this visit by clinical staff   Tobacco  Allergies  Meds   Med Hx  Surg Hx  Fam Hx  Soc Hx      Reviewed and updated as needed this visit by Provider   Tobacco     Med Hx   Fam Hx           Review of Systems   Constitutional: Negative for chills and fever.   HENT: Negative for congestion, ear pain, hearing loss and sore throat.    Eyes: Negative for pain and visual disturbance.   Respiratory: Negative for cough and shortness of breath.    Cardiovascular: Negative for chest pain, palpitations and peripheral edema.   Gastrointestinal: Negative for abdominal pain, constipation, diarrhea, heartburn, hematochezia and nausea.   Breasts:  Negative for  "tenderness, breast mass and discharge.   Genitourinary: Negative for dysuria, frequency, genital sores, hematuria, pelvic pain, urgency, vaginal bleeding and vaginal discharge.   Musculoskeletal: Negative for arthralgias, joint swelling and myalgias.   Skin: Negative for rash.   Neurological: Negative for dizziness, weakness, headaches and paresthesias.   Psychiatric/Behavioral: Negative for mood changes. The patient is not nervous/anxious.      OBJECTIVE:   BP 98/66   Pulse 86   Temp 97.7  F (36.5  C)   Resp 16   Ht 1.756 m (5' 9.13\")   Wt 105.5 kg (232 lb 8 oz)   LMP  (LMP Unknown)   SpO2 97%   BMI 34.20 kg/m       Physical Exam  GENERAL: healthy, alert and no distress  EYES: Eyes grossly normal to inspection, PERRL and conjunctivae and sclerae normal  HENT: ear canals and TM's normal, nose and mouth without ulcers or lesions  NECK: no adenopathy, no asymmetry, masses, or scars and thyroid normal to palpation  RESP: lungs clear to auscultation - no rales, rhonchi or wheezes  CV: regular rate and rhythm, normal S1 S2, no S3 or S4, no murmur, click or rub, no peripheral edema and peripheral pulses strong  ABDOMEN: soft, nontender, no hepatosplenomegaly, no masses and bowel sounds normal  MS: no gross musculoskeletal defects noted, no edema  SKIN: no suspicious lesions or rashes  NEURO: Normal strength and tone, mentation intact and speech normal  PSYCH: mentation appears normal, affect normal/bright    ASSESSMENT/PLAN:   Yuliya was seen today for physical and imm/inj.  Diagnoses and all orders for this visit:    Routine general medical examination at a health care facility  Yuliya has been doing very well, she has no health concerns today.  She would like to talk to a surgeon about breast reduction.  She has been losing weight while on Ozempic, feeling well.  Her diabetes is well controlled.  She continues on spironolactone for acne, this is working well.  She is up-to-date with appropriate screening.  " Discussed the zoster vaccine, advised her to get this at the pharmacy.  Did not want the flu shot today.    Colon cancer screening  -     Colonoscopy Screening  Referral; Future    Morbid obesity (H)  -     Semaglutide, 1 MG/DOSE, (OZEMPIC, 1 MG/DOSE,) 4 MG/3ML SOPN; Inject 1 mg Subcutaneous once a week    Other acne  -     spironolactone (ALDACTONE) 100 MG tablet; Take 1 tablet (100 mg) by mouth daily    Type 2 diabetes mellitus without complication, without long-term current use of insulin (H)  Patient with well-controlled diabetes on metformin and Ozempic, A1c 5.4% today.  She has lost significant weight since starting Ozempic, she is feeling well.  Diabetic foot exam unremarkable.  Sees an eye doctor yearly.  -     Hemoglobin A1c; Future  -     Basic metabolic panel  (Ca, Cl, CO2, Creat, Gluc, K, Na, BUN); Future  -     Albumin Random Urine Quantitative with Creat Ratio; Future  -     metFORMIN (GLUCOPHAGE XR) 500 MG 24 hr tablet; Take 1 tablet (500 mg) by mouth daily  -     blood glucose monitoring (NO BRAND SPECIFIED) meter device kit; Use to test blood sugar 2 times daily or as directed. Preferred blood glucose meter OR supplies to accompany: Blood Glucose Monitor Brands: per insurance.  -     blood glucose calibration (NO BRAND SPECIFIED) solution; To accompany: Blood Glucose Monitor Brands: per insurance.  -     blood glucose (NO BRAND SPECIFIED) test strip; Use to test blood sugar 2 times daily or as directed. To accompany: Blood Glucose Monitor Brands: per insurance.  -     atorvastatin (LIPITOR) 10 MG tablet; Take 1 tablet (10 mg) by mouth daily  -     thin (NO BRAND SPECIFIED) lancets; Use with lanceting device. To accompany: Blood Glucose Monitor Brands: per insurance.  -     FOOT EXAM    Major depressive disorder in full remission, unspecified whether recurrent (H)  Feels that mood is well controlled on current dose of fluoxetine, will be getting a new therapist tomorrow.  Does not wish to  "change anything at this time.  -     FLUoxetine (PROZAC) 40 MG capsule; Take 1 capsule (40 mg) by mouth daily    High priority for 2019-nCoV vaccine    Screening for hyperlipidemia  -     Lipid panel reflex to direct LDL Fasting    Macromastia  -     Adult Plastic Surgery  Referral; Future    Encounter for screening mammogram for breast cancer  -     MA Screen Bilateral w/Ashish; Future    Other orders  -     Pneumococcal 20 Valent Conjugate (Prevnar 20)  -     COVID-19,PF,MODERNA BIVALENT 18+Yrs    Patient has been advised of split billing requirements and indicates understanding: Yes    COUNSELING:  Reviewed preventive health counseling, as reflected in patient instructions       Regular exercise       Healthy diet/nutrition       Immunizations    Vaccinated for: Pneumococcal, Covid       Colorectal Cancer Screening    Estimated body mass index is 34.2 kg/m  as calculated from the following:    Height as of this encounter: 1.756 m (5' 9.13\").    Weight as of this encounter: 105.5 kg (232 lb 8 oz).    She reports that she quit smoking about 22 years ago. Her smoking use included cigarettes. She has never used smokeless tobacco.    Counseling Resources:  ATP IV Guidelines  Pooled Cohorts Equation Calculator  Breast Cancer Risk Calculator  BRCA-Related Cancer Risk Assessment: FHS-7 Tool  FRAX Risk Assessment  ICSI Preventive Guidelines  Dietary Guidelines for Americans, 2010  [place]'s MyPlate  ASA Prophylaxis  Lung CA Screening    Latrice Perez MD  [facility]    I have seen the patient, discussed with the resident and agree with the history, physical and plan as documented above.    Jazzmine Yarbrough MD  Internal Medicine - Pediatrics      Answers for HPI/ROS submitted by the patient on 10/26/2022  If you checked off any problems, how difficult have these problems made it for you to do your work, take care of things at home, or get along with other people?: Not difficult at all  PHQ9 " TOTAL SCORE: 4

## 2022-10-26 NOTE — PATIENT INSTRUCTIONS
Get your shingles vaccine at any pharmacy.     Preventive Health Recommendations  Female Ages 50 - 64    Yearly exam: See your health care provider every year in order to  Review health changes.   Discuss preventive care.    Review your medicines if your doctor has prescribed any.    Get a Pap test every three years (unless you have an abnormal result and your provider advises testing more often).  If you get Pap tests with HPV test, you only need to test every 5 years, unless you have an abnormal result.   You do not need a Pap test if your uterus was removed (hysterectomy) and you have not had cancer.  You should be tested each year for STDs (sexually transmitted diseases) if you're at risk.   Have a mammogram every 1 to 2 years.  Have a colonoscopy at age 50, or have a yearly FIT test (stool test). These exams screen for colon cancer.    Have a cholesterol test every 5 years, or more often if advised.  Have a diabetes test (fasting glucose) every three years. If you are at risk for diabetes, you should have this test more often.   If you are at risk for osteoporosis (brittle bone disease), think about having a bone density scan (DEXA).    Shots: Get a flu shot each year. Get a tetanus shot every 10 years.    Nutrition:   Eat at least 5 servings of fruits and vegetables each day.  Eat whole-grain bread, whole-wheat pasta and brown rice instead of white grains and rice.  Get adequate Calcium and Vitamin D.     Lifestyle  Exercise at least 150 minutes a week (30 minutes a day, 5 days a week). This will help you control your weight and prevent disease.  Limit alcohol to one drink per day.  No smoking.   Wear sunscreen to prevent skin cancer.   See your dentist every six months for an exam and cleaning.  See your eye doctor every 1 to 2 years.

## 2022-11-22 ENCOUNTER — E-VISIT (OUTPATIENT)
Dept: FAMILY MEDICINE | Facility: CLINIC | Age: 52
End: 2022-11-22
Payer: COMMERCIAL

## 2022-11-22 DIAGNOSIS — N39.0 ACUTE UTI (URINARY TRACT INFECTION): Primary | ICD-10-CM

## 2022-11-22 PROCEDURE — 99421 OL DIG E/M SVC 5-10 MIN: CPT | Performed by: PHYSICIAN ASSISTANT

## 2022-11-22 RX ORDER — NITROFURANTOIN 25; 75 MG/1; MG/1
100 CAPSULE ORAL 2 TIMES DAILY
Qty: 10 CAPSULE | Refills: 0 | Status: SHIPPED | OUTPATIENT
Start: 2022-11-22 | End: 2022-11-27

## 2022-11-23 NOTE — PATIENT INSTRUCTIONS
Dear Yuliya Li    After reviewing your responses, I've been able to diagnose you with a urinary tract infection, which is a common infection of the bladder with bacteria.  This is not a sexually transmitted infection, though urinating immediately after intercourse can help prevent infections.  Drinking lots of fluids is also helpful to clear your current infection and prevent the next one.      I have sent a prescription for antibiotics to your pharmacy to treat this infection.    It is important that you take all of your prescribed medication even if your symptoms are improving after a few doses.  Taking all of your medicine helps prevent the symptoms from returning.     If your symptoms worsen, you develop pain in your back or stomach, develop fevers, or are not improving in 5 days, please contact your primary care provider for an appointment or visit any of our convenient Walk-in or Urgent Care Centers to be seen, which can be found on our website here.    Thanks again for choosing us as your health care partner,    Belkis Dupont PA-C    Urinary Tract Infections in Women  Urinary tract infections (UTIs) are most often caused by bacteria. These bacteria enter the urinary tract. The bacteria may come from inside the body. Or they may travel from the skin outside the rectum or vagina into the urethra. Female anatomy makes it easy for bacteria from the bowel to enter a woman s urinary tract, which is the most common source of UTI. This means women develop UTIs more often than men. Pain in or around the urinary tract is a common UTI symptom. But the only way to know for sure if you have a UTI for the healthcare provider to test your urine. The two tests that may be done are the urinalysis and urine culture.     Types of UTIs    Cystitis. A bladder infection (cystitis) is the most common UTI in women. You may have urgent or frequent need to pee. You may also have pain, burning when you pee, and bloody  urine.    Urethritis. This is an inflamed urethra, which is the tube that carries urine from the bladder to outside the body. You may have lower stomach or back pain. You may also have urgent or frequent need to pee.    Pyelonephritis. This is a kidney infection. If not treated, it can be serious and damage your kidneys. In severe cases, you may need to stay in the hospital. You may have a fever and lower back pain.    Medicines to treat a UTI  Most UTIs are treated with antibiotics. These kill the bacteria. The length of time you need to take them depends on the type of infection. It may be as short as 3 days. If you have repeated UTIs, you may need a low-dose antibiotic for several months. Take antibiotics exactly as directed. Don t stop taking them until all of the medicine is gone. If you stop taking the antibiotic too soon, the infection may not go away. You may also develop a resistance to the antibiotic. This can make it much harder to treat.   Lifestyle changes to treat and prevent UTIs   The lifestyle changes below will help get rid of your UTI. They may also help prevent future UTIs.     Drink plenty of fluids. This includes water, juice, or other caffeine-free drinks. Fluids help flush bacteria out of your body.    Empty your bladder. Always empty your bladder when you feel the urge to pee. And always pee before going to sleep. Urine that stays in your bladder can lead to infection. Try to pee before and after sex as well.    Practice good personal hygiene. Wipe yourself from front to back after using the toilet. This helps keep bacteria from getting into the urethra.    Use condoms during sex. These help prevent UTIs caused by sexually transmitted bacteria. Also don't use spermicides during sex. These can increase the risk for UTIs. Choose other forms of birth control instead. For women who tend to get UTIs after sex, a low-dose of a preventive antibiotic may be used. Be sure to discuss this option with  your healthcare provider.    Follow up with your healthcare provider as directed. He or she may test to make sure the infection has cleared. If needed, more treatment may be started.  Tania last reviewed this educational content on 7/1/2019 2000-2021 [employer]. All rights reserved. This information is not intended as a substitute for professional medical care. Always follow your healthcare professional's instructions.

## 2022-11-26 DIAGNOSIS — E66.01 MORBID OBESITY (H): ICD-10-CM

## 2022-11-28 RX ORDER — SEMAGLUTIDE 1.34 MG/ML
1 INJECTION, SOLUTION SUBCUTANEOUS WEEKLY
Qty: 9 ML | Refills: 0 | Status: SHIPPED | OUTPATIENT
Start: 2022-11-28 | End: 2023-02-02

## 2022-12-23 ENCOUNTER — ANCILLARY PROCEDURE (OUTPATIENT)
Dept: MAMMOGRAPHY | Facility: CLINIC | Age: 52
End: 2022-12-23
Attending: PEDIATRICS
Payer: COMMERCIAL

## 2022-12-23 DIAGNOSIS — Z12.31 ENCOUNTER FOR SCREENING MAMMOGRAM FOR BREAST CANCER: ICD-10-CM

## 2022-12-23 PROCEDURE — 77067 SCR MAMMO BI INCL CAD: CPT | Mod: TC | Performed by: RADIOLOGY

## 2022-12-23 PROCEDURE — 77063 BREAST TOMOSYNTHESIS BI: CPT | Mod: TC | Performed by: RADIOLOGY

## 2023-01-02 ENCOUNTER — HOSPITAL ENCOUNTER (OUTPATIENT)
Dept: MAMMOGRAPHY | Facility: CLINIC | Age: 53
Discharge: HOME | End: 2023-01-02
Attending: PEDIATRICS
Payer: COMMERCIAL

## 2023-01-02 ENCOUNTER — HOSPITAL ENCOUNTER (OUTPATIENT)
Dept: ULTRASOUND IMAGING | Facility: CLINIC | Age: 53
Discharge: HOME | End: 2023-01-02
Attending: PEDIATRICS
Payer: COMMERCIAL

## 2023-01-02 DIAGNOSIS — R92.8 ABNORMAL MAMMOGRAM: ICD-10-CM

## 2023-01-02 PROCEDURE — 76642 ULTRASOUND BREAST LIMITED: CPT | Mod: LT

## 2023-01-02 PROCEDURE — 77066 DX MAMMO INCL CAD BI: CPT

## 2023-01-03 ENCOUNTER — HOSPITAL ENCOUNTER (OUTPATIENT)
Dept: MAMMOGRAPHY | Facility: CLINIC | Age: 53
Discharge: HOME | End: 2023-01-03
Attending: PEDIATRICS
Payer: COMMERCIAL

## 2023-01-03 DIAGNOSIS — R92.8 ABNORMAL MAMMOGRAM: ICD-10-CM

## 2023-01-03 PROCEDURE — 88377 M/PHMTRC ALYS ISHQUANT/SEMIQ: CPT | Performed by: PEDIATRICS

## 2023-01-03 PROCEDURE — 88305 TISSUE EXAM BY PATHOLOGIST: CPT | Mod: 26 | Performed by: STUDENT IN AN ORGANIZED HEALTH CARE EDUCATION/TRAINING PROGRAM

## 2023-01-03 PROCEDURE — 88377 M/PHMTRC ALYS ISHQUANT/SEMIQ: CPT | Mod: 26 | Performed by: MEDICAL GENETICS

## 2023-01-03 PROCEDURE — 250N000009 HC RX 250: Performed by: RADIOLOGY

## 2023-01-03 PROCEDURE — A4648 IMPLANTABLE TISSUE MARKER: HCPCS

## 2023-01-03 PROCEDURE — 88360 TUMOR IMMUNOHISTOCHEM/MANUAL: CPT | Mod: TC | Performed by: PEDIATRICS

## 2023-01-03 PROCEDURE — 88360 TUMOR IMMUNOHISTOCHEM/MANUAL: CPT | Mod: 26 | Performed by: STUDENT IN AN ORGANIZED HEALTH CARE EDUCATION/TRAINING PROGRAM

## 2023-01-03 PROCEDURE — 999N000065 MA POST PROCEDURE LEFT

## 2023-01-03 RX ADMIN — LIDOCAINE HYDROCHLORIDE 10 ML: 10 INJECTION, SOLUTION INFILTRATION; PERINEURAL at 12:58

## 2023-01-03 NOTE — DISCHARGE INSTRUCTIONS

## 2023-01-03 NOTE — DISCHARGE INSTRUCTIONS

## 2023-01-04 DIAGNOSIS — C50.912 MALIGNANT NEOPLASM OF LEFT BREAST (H): Primary | ICD-10-CM

## 2023-01-04 NOTE — NURSING NOTE
Yuliya is scheduled for a bilateral breast MRI on 1/13/22 @ 7am (arrive at 6:30am) at the [facility]. I left a message for Yuliya to give me a call back with questions.     Concepcion Corea RN on 1/4/2023 at 2:05 PM  Concepcion Corea RN, BSN, PHN  Breast Care Nurse Coordinator  [facility] [place]  [phone]

## 2023-01-04 NOTE — PROGRESS NOTES
Malignant Path:  Pathology report reviewed with our breast radiologist Dr. Raul Williamson, who confirmed the recent breast imaging is concordant with the final surgical pathology results below.    I phoned Ms. Yuliya Li, confirmed her full name, date of birth, and notified patient of Ultasound Guided Left Breast Biopsy x2 results showing invasive ductal carcinoma.  Estrogen/ Progesterone Receptors are pending, and HER2 is still pending.  Informed patient we will call her once results are available.    Patient states no problems with biopsy site.  Recommended follow up is Medical Oncology & Surgical Consultation. Consider Breast MRI- message sent to Dr. Fontanez.   Surgical Consult has been arranged with Dr. Clarissa Fontanez on 1/10/22 at the [facility].   Medical Oncology Consult has been arranged with Dr. Annabella Watson on 1/10/23 at [facility].   Patient has directions and phone numbers.    Questions were answered and I explained my role as Breast Care Nurse Coordinator in assisting her with appointments, resources and social support.  New diagnosis information packet will be available for patient at surgical consult.  I will follow up with the patient. She has my phone number if she has further questions.  Patient verbalized understanding and agrees with the plan of care.  Ordering provider- Dr. Jazzmine Yarbrough has been notified of the results, recommendations for follow up, and scheduled surgical consultation.  I will forward this note, along with the pathology results.      Concepcion Corea 1/4/2023 1:33 PM    Concepcion Corea, RN, BSN, PHN  Breast Care Nurse Coordinator  [facility] [place]  [phone]            Yuliya Li [id number]  F, 1970  Surgical Pathology Report (Final result) [id number]  Authorizing Provider: Jazzmine Yarbrough MD Ordering Provider: Jazzmine Yarbrough  MD  Ordering Location: [facility]  Collected: 01/03/2023 01:05 PM  Pathologist: Farhana Baca MD Received: 01/03/2023 02:36 PM  .  Specimens  A Breast, Left  B Breast, Left  .  .  Final Diagnosis  A. Breast, left, 3:30, 12 cm from nipple, 0.4 cm size, core biopsy:  -Invasive ductal carcinoma with lobular features, Taon grade 2, see comment.  -In situ carcinoma is not identified.  -Breast biomarkers will be reported in an addendum.  B. Breast, left, 3:00, 11 cm from nipple, 1.9 cm size, core biopsy:  -Invasive ductal carcinoma with lobular features, Tano grade 2, see comment.  -Ductal carcinoma in situ, intermediate nuclear grade, cribriform and solid type.  -Breast biomarkers will be reported in an addendum.  Electronically signed by Farhana Baca MD on 1/4/2023 at 11:09 AM

## 2023-01-06 NOTE — PROGRESS NOTES
[facility]    Hematology/Oncology New Patient Note      Today's Date: 01/10/23    Reason for Consult:  Left breast cancer.    HISTORY OF PRESENT ILLNESS: Yuliya Li is a 52 year old female status post removal of single ovary and hysterectomy who presents with the following oncologic history:  1.  12/23/2022: Mammogram showed possible asymmetry in right breast at 12:00; possible mass in left breast at 3:00.  2. 1/2/2023: Diagnostic bilateral mammogram showed mass in left breast at 3:00 with 0.4 cm satellite mass in retroareolar breast. Right breast with effacement of possible finding at 12:00 consistent with artifact. Left breast U/S showed mass at 3:00, 11 cm from nipple measuring 1.9 x 1.6 x 1.8 cm; no abnormal axillary lymph nodes.  3. 1/03/2022: Left breast core biopsy of 0.4 cm mass at 3:30 position showed grade 2 invasive ductal carcinoma with lobular features. Biopsy of left breast 1.9 cm mass at 3:00 showed grade 2 invasive ductal carcinoma with lobular features, associated grade 2 DCIS. ER and WI strongly positive at %; HER-2 IHC = 2+; HER-2/crow FISH pending.    Yuliya reports palpating a left breast lump.  She has no associated pain with this.  She has had various pain in her neck, back and shoulders.  She was able to lose about 77 pounds over the past year intentionally.    Family history positive for maternal aunt with breast cancer.  Yuliya has a daughter age 22 who is healthy.    REVIEW OF SYSTEMS:   14 point ROS was reviewed and is negative other than as noted above in HPI.       HOME MEDICATIONS:  Current Outpatient Medications   Medication Sig Dispense Refill     alcohol swab prep pads Use to swab area of injection/amy as directed. 100 each 3     atorvastatin (LIPITOR) 10 MG tablet Take 1 tablet (10 mg) by mouth daily 90 tablet 3     blood glucose (NO BRAND SPECIFIED) test strip Use to test blood sugar 2 times daily or as directed. To accompany: Blood Glucose Monitor  Brands: per insurance. 100 strip 6     blood glucose calibration (NO BRAND SPECIFIED) solution To accompany: Blood Glucose Monitor Brands: per insurance. 1 each 1     blood glucose monitoring (NO BRAND SPECIFIED) meter device kit Use to test blood sugar 2 times daily or as directed. Preferred blood glucose meter OR supplies to accompany: Blood Glucose Monitor Brands: per insurance. 1 kit 0     FLUoxetine (PROZAC) 40 MG capsule Take 1 capsule (40 mg) by mouth daily 90 capsule 1     metFORMIN (GLUCOPHAGE XR) 500 MG 24 hr tablet Take 1 tablet (500 mg) by mouth daily 90 tablet 3     OZEMPIC, 1 MG/DOSE, 4 MG/3ML SOPN INJECT 1 MG SUBCUTANEOUS ONCE A WEEK 9 mL 0     spironolactone (ALDACTONE) 100 MG tablet Take 1 tablet (100 mg) by mouth daily 90 tablet 1     thin (NO BRAND SPECIFIED) lancets Use with lanceting device. To accompany: Blood Glucose Monitor Brands: per insurance. 100 each 6         ALLERGIES:  Allergies   Allergen Reactions     Keflex [Cephalexin-Fd&C Yellow #6]      Tetracycline      Trazodone      Very sedating-         PAST MEDICAL HISTORY:  Diabetes mellitus type 2  Hyperlipidemia    Gynecologic history:  Age of menarche at 11.  Last menstrual period in  prior to hysterectomy.  G3, P3.  Age of first pregnancy at 29.  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  No use of HRT.  Used OCPs for 4 years.  No IUD use.      PAST SURGICAL HISTORY:  Past Surgical History:   Procedure Laterality Date     CYST REMOVAL       HYSTERECTOMY       HYSTERECTOMY, PAP NO LONGER INDICATED      menorrhagia         SOCIAL HISTORY:  Social History     Socioeconomic History     Marital status:      Spouse name: Not on file     Number of children: 3     Years of education: Not on file     Highest education level: Not on file   Occupational History     Not on file   Tobacco Use     Smoking status: Former     Packs/day: 0.00     Types: Cigarettes     Quit date: 1999     Years since quittin.1     Smokeless tobacco: Never  "  Vaping Use     Vaping Use: Never used   Substance and Sexual Activity     Alcohol use: Yes     Comment: Alcoholic Drinks/day: 1 a week     Drug use: Yes     Types: Marijuana     Comment: nightly for sleep (gummy)     Sexual activity: Yes     Partners: Male   Other Topics Concern     Not on file   Social History Narrative     Not on file     Social Determinants of Health     Financial Resource Strain: Low Risk      Difficulty of Paying Living Expenses: Not very hard   Food Insecurity: No Food Insecurity     Worried About Running Out of Food in the Last Year: Never true     Ran Out of Food in the Last Year: Never true   Transportation Needs: No Transportation Needs     Lack of Transportation (Medical): No     Lack of Transportation (Non-Medical): No   Physical Activity: Insufficiently Active     Days of Exercise per Week: 1 day     Minutes of Exercise per Session: 20 min   Stress: Stress Concern Present     Feeling of Stress : Rather much   Social Connections: Moderately Isolated     Frequency of Communication with Friends and Family: More than three times a week     Frequency of Social Gatherings with Friends and Family: Once a week     Attends Hinduism Services: Never     Active Member of Clubs or Organizations: No     Attends Club or Organization Meetings: Not on file     Marital Status:    Intimate Partner Violence: Not on file   Housing Stability: Low Risk      Unable to Pay for Housing in the Last Year: No     Number of Places Lived in the Last Year: 1     Unstable Housing in the Last Year: No         FAMILY HISTORY:  Family History   Problem Relation Age of Onset     Diabetes Mother      Atrial fibrillation Father      Alcoholism Father      Breast Cancer Maternal Aunt         in her 40's     Cervical Cancer Maternal Aunt      Colon Cancer No family hx of          PHYSICAL EXAM:  Vital signs:  /81   Pulse 94   Temp 98.2  F (36.8  C) (Oral)   Resp 16   Ht 1.753 m (5' 9\")   Wt 99.3 kg (219 " lb)   LMP  (LMP Unknown)   SpO2 95%   BMI 32.34 kg/m     ECO  GENERAL/CONSTITUTIONAL: No acute distress.  EYES:  No scleral icterus.  ENT/MOUTH: Neck supple.  Oropharynx is grossly clear.  LYMPH: No palpable cervical, supraclavicular, axillary adenopathy.   BREAST: Approximately 2 x 2 centimeter palpable and movable mass in the lower outer left breast.  Both breasts are diffusely fibronodular.  Nipples are everted bilaterally with no discharge.  RESPIRATORY: No audible cough or wheezing.   CARDIOVASCULAR: No PMI displacement.  GASTROINTESTINAL: No hepatosplenomegaly, masses, or tenderness.  No guarding.  No distention.  MUSCULOSKELETAL: Warm and well-perfused, no cyanosis, clubbing, or edema.  NEUROLOGIC: No focal motor deficits. Alert, oriented, answers questions appropriately.  INTEGUMENTARY: No rashes or jaundice.  GAIT: Steady, does not use assistive device      LABS:  CBC RESULTS:   Recent Labs   Lab Test 21  1110   WBC 8.1   RBC 4.91   HGB 13.9   HCT 40.0   MCV 81   MCH 28.2   MCHC 34.6   RDW 11.6          Recent Labs   Lab Test 10/26/22  0958 21  0901    136   POTASSIUM 4.2 4.4   CHLORIDE 106 104   CO2 27 27   ANIONGAP 5 5   * 157*   BUN 14 12   CR 1.00 0.82   CHASE 9.5 9.0         PATHOLOGY:  Reviewed as per HPI.    IMAGING:  Reviewed as per HPI.    ASSESSMENT/PLAN:  Yuliya Li is a 52 year old female with the following issues:  1. Clinical anatomic stage IA, [id number], grade 2 multifocal invasive ductal carcinoma with lobular features of left breast overlapping sites, ER positive, MS positive, HER-2 IHC = 2+; HER-2/crow FISH pending  --I had a detailed discussion with Yuliya today regarding her breast imaging findings biopsy pathology, and significance of her biomarkers.  - She has at least 1.9 cm and 0.4 cm tumors that are histologically similar.  Discussed that the preliminary biomarker findings show that the tumor is strongly estrogen and progesterone receptor  positive at %.  The HER-2 IHC is equivocal at 2+ and has been reflexed to FISH, results of which are pending.  - I explained that given the volume of disease and if HER2 FISH is positive, then I would recommend neoadjuvant chemotherapy in conjunction with every 3-week dual anti-HER2 therapy with trastuzumab and pertuzumab in order to evaluate the pathologic response to neoadjuvant therapy, help prognosticate her breast cancer, and determine if she would benefit from different adjuvant anti-HER2 therapy (ado-trastuzumab) if residual disease is present after neoadjuvant therapy.  -However, if her tumor is HER2 FISH negative, then I would recommend surgery upfront with Oncotype DX testing to assess the predictive benefit of adjuvant chemotherapy and prognosticate with estimated distant risk of recurrence.  I discussed how we would interpret Oncotype DX testing for postmenopausal compared to premenopausal.   -Given that she has a single ovary remaining and is status post hysterectomy, we will need to determine her menopausal status.  Will obtain her FSH and estradiol levels in addition to her CBC and CMP today.  - We will further evaluate extent of disease with breast MRI, given the multifocality of her cancer.  -She is contemplating either bilateral mastectomies or lumpectomy with bilateral breast reduction.  She will be meeting with plastic surgery soon.  Surgery will be coordinated with Dr. Clarissa Fontanez.  -Further discussed that hormonal blockade therapy would play a large role in reducing her risk of breast cancer recurrence by relative 50%.  We will discuss the different types of hormone blockade therapy after surgery.  - Overall, her prognosis is excellent whether this is HER2 positive versus HER2 negative.    2.  Family history of breast cancer  - Maternal aunt with breast cancer.  - I have recommended and referred Yuliya to genetic counseling.  Discussed that Yuliya does not need to wait for genetic testing  results for surgery.    3.  Neck and back pain, chronic  - Yuliya has been heavy breasted which has contributed to neck and back pain.  She is contemplating bilateral breast reduction versus bilateral mastectomies which should help reduce her back pain.  -- Offered obtaining NM bone scan to rule out bone metastases but I think this is much less likely.  Yuliya agrees to hold off on scans at the present time.    Louise Watson MD  Hematology/Oncology  [facility]    Total time spent: 70 minutes in patient evaluation, counseling, documentation, and coordination of care.

## 2023-01-09 ENCOUNTER — TELEPHONE (OUTPATIENT)
Dept: SURGERY | Facility: CLINIC | Age: 53
End: 2023-01-09

## 2023-01-09 NOTE — TELEPHONE ENCOUNTER
Yuliya called wondering if her ER/DE/HER2 is back yet. It is still pending. I called pathology and the pathologist is off today unfortunately. They will have her look at it first thing tomorrow. They are aware Yuliya has an appointment tomorrow at 8am at the [facility].    She is still planning on coming in tomorrow. She is aware that we won't have all the information for her treatment tomorrow, as the receptors do dictate some of that.       Concepcion Corea 1/9/2023 1:20 PM      Concepcion Corea, RN, BSN, PHN  Breast Care Nurse Coordinator  [facility] [place]  [phone]

## 2023-01-10 ENCOUNTER — ONCOLOGY VISIT (OUTPATIENT)
Dept: ONCOLOGY | Facility: CLINIC | Age: 53
End: 2023-01-10
Payer: COMMERCIAL

## 2023-01-10 ENCOUNTER — TELEPHONE (OUTPATIENT)
Dept: SURGERY | Facility: CLINIC | Age: 53
End: 2023-01-10

## 2023-01-10 ENCOUNTER — OFFICE VISIT (OUTPATIENT)
Dept: SURGERY | Facility: CLINIC | Age: 53
End: 2023-01-10
Payer: COMMERCIAL

## 2023-01-10 ENCOUNTER — LAB (OUTPATIENT)
Dept: INFUSION THERAPY | Facility: CLINIC | Age: 53
End: 2023-01-10
Attending: NURSE PRACTITIONER
Payer: COMMERCIAL

## 2023-01-10 VITALS
WEIGHT: 219 LBS | RESPIRATION RATE: 16 BRPM | HEIGHT: 69 IN | TEMPERATURE: 98.2 F | SYSTOLIC BLOOD PRESSURE: 111 MMHG | HEART RATE: 94 BPM | BODY MASS INDEX: 32.44 KG/M2 | OXYGEN SATURATION: 95 % | DIASTOLIC BLOOD PRESSURE: 81 MMHG

## 2023-01-10 VITALS
HEIGHT: 69 IN | OXYGEN SATURATION: 95 % | SYSTOLIC BLOOD PRESSURE: 111 MMHG | BODY MASS INDEX: 32.44 KG/M2 | RESPIRATION RATE: 16 BRPM | WEIGHT: 219 LBS | HEART RATE: 94 BPM | TEMPERATURE: 98.2 F | DIASTOLIC BLOOD PRESSURE: 81 MMHG

## 2023-01-10 DIAGNOSIS — G89.29 OTHER CHRONIC BACK PAIN: ICD-10-CM

## 2023-01-10 DIAGNOSIS — C50.812 MALIGNANT NEOPLASM OF OVERLAPPING SITES OF LEFT BREAST IN FEMALE, ESTROGEN RECEPTOR POSITIVE (H): Primary | ICD-10-CM

## 2023-01-10 DIAGNOSIS — Z80.3 FAMILY HISTORY OF MALIGNANT NEOPLASM OF BREAST: ICD-10-CM

## 2023-01-10 DIAGNOSIS — M54.89 OTHER CHRONIC BACK PAIN: ICD-10-CM

## 2023-01-10 DIAGNOSIS — C50.512 MALIGNANT NEOPLASM OF LOWER-OUTER QUADRANT OF LEFT FEMALE BREAST, UNSPECIFIED ESTROGEN RECEPTOR STATUS (H): Primary | ICD-10-CM

## 2023-01-10 DIAGNOSIS — M54.2 NECK PAIN: ICD-10-CM

## 2023-01-10 DIAGNOSIS — E11.9 TYPE 2 DIABETES MELLITUS WITHOUT COMPLICATION, WITHOUT LONG-TERM CURRENT USE OF INSULIN (H): ICD-10-CM

## 2023-01-10 DIAGNOSIS — Z17.0 MALIGNANT NEOPLASM OF OVERLAPPING SITES OF LEFT BREAST IN FEMALE, ESTROGEN RECEPTOR POSITIVE (H): Primary | ICD-10-CM

## 2023-01-10 LAB
ALBUMIN SERPL-MCNC: 4 G/DL (ref 3.4–5)
ALP SERPL-CCNC: 60 U/L (ref 40–150)
ALT SERPL W P-5'-P-CCNC: 30 U/L (ref 0–50)
ANION GAP SERPL CALCULATED.3IONS-SCNC: 7 MMOL/L (ref 3–14)
AST SERPL W P-5'-P-CCNC: 21 U/L (ref 0–45)
BASOPHILS # BLD AUTO: 0 10E3/UL (ref 0–0.2)
BASOPHILS NFR BLD AUTO: 0 %
BILIRUB SERPL-MCNC: 0.3 MG/DL (ref 0.2–1.3)
BUN SERPL-MCNC: 16 MG/DL (ref 7–30)
CALCIUM SERPL-MCNC: 9.3 MG/DL (ref 8.5–10.1)
CHLORIDE BLD-SCNC: 102 MMOL/L (ref 94–109)
CO2 SERPL-SCNC: 26 MMOL/L (ref 20–32)
CREAT SERPL-MCNC: 0.91 MG/DL (ref 0.52–1.04)
EOSINOPHIL # BLD AUTO: 0.1 10E3/UL (ref 0–0.7)
EOSINOPHIL NFR BLD AUTO: 1 %
ERYTHROCYTE [DISTWIDTH] IN BLOOD BY AUTOMATED COUNT: 12.4 % (ref 10–15)
ESTRADIOL SERPL-MCNC: 63 PG/ML
FSH SERPL IRP2-ACNC: 36.6 MIU/ML
GFR SERPL CREATININE-BSD FRML MDRD: 76 ML/MIN/1.73M2
GLUCOSE BLD-MCNC: 100 MG/DL (ref 70–99)
HCT VFR BLD AUTO: 46.9 % (ref 35–47)
HGB BLD-MCNC: 14.9 G/DL (ref 11.7–15.7)
IMM GRANULOCYTES # BLD: 0 10E3/UL
IMM GRANULOCYTES NFR BLD: 0 %
INTERPRETATION: NORMAL
LYMPHOCYTES # BLD AUTO: 1.7 10E3/UL (ref 0.8–5.3)
LYMPHOCYTES NFR BLD AUTO: 23 %
MCH RBC QN AUTO: 27.6 PG (ref 26.5–33)
MCHC RBC AUTO-ENTMCNC: 31.8 G/DL (ref 31.5–36.5)
MCV RBC AUTO: 87 FL (ref 78–100)
MONOCYTES # BLD AUTO: 0.4 10E3/UL (ref 0–1.3)
MONOCYTES NFR BLD AUTO: 6 %
NEUTROPHILS # BLD AUTO: 5 10E3/UL (ref 1.6–8.3)
NEUTROPHILS NFR BLD AUTO: 70 %
NRBC # BLD AUTO: 0 10E3/UL
NRBC BLD AUTO-RTO: 0 /100
PATH REPORT.COMMENTS IMP SPEC: ABNORMAL
PATH REPORT.COMMENTS IMP SPEC: YES
PATH REPORT.FINAL DX SPEC: ABNORMAL
PATH REPORT.GROSS SPEC: ABNORMAL
PATH REPORT.MICROSCOPIC SPEC OTHER STN: ABNORMAL
PATHOLOGY SYNOPTIC REPORT: ABNORMAL
PHOTO IMAGE: ABNORMAL
PLATELET # BLD AUTO: 491 10E3/UL (ref 150–450)
POTASSIUM BLD-SCNC: 3.9 MMOL/L (ref 3.4–5.3)
PROT SERPL-MCNC: 7.9 G/DL (ref 6.8–8.8)
RBC # BLD AUTO: 5.39 10E6/UL (ref 3.8–5.2)
SODIUM SERPL-SCNC: 135 MMOL/L (ref 133–144)
WBC # BLD AUTO: 7.2 10E3/UL (ref 4–11)

## 2023-01-10 PROCEDURE — 36415 COLL VENOUS BLD VENIPUNCTURE: CPT | Performed by: INTERNAL MEDICINE

## 2023-01-10 PROCEDURE — 82670 ASSAY OF TOTAL ESTRADIOL: CPT | Performed by: INTERNAL MEDICINE

## 2023-01-10 PROCEDURE — G0463 HOSPITAL OUTPT CLINIC VISIT: HCPCS

## 2023-01-10 PROCEDURE — 99204 OFFICE O/P NEW MOD 45 MIN: CPT | Performed by: SURGERY

## 2023-01-10 PROCEDURE — 80053 COMPREHEN METABOLIC PANEL: CPT | Performed by: INTERNAL MEDICINE

## 2023-01-10 PROCEDURE — 83001 ASSAY OF GONADOTROPIN (FSH): CPT | Performed by: INTERNAL MEDICINE

## 2023-01-10 PROCEDURE — G0463 HOSPITAL OUTPT CLINIC VISIT: HCPCS | Performed by: INTERNAL MEDICINE

## 2023-01-10 PROCEDURE — 99205 OFFICE O/P NEW HI 60 MIN: CPT | Performed by: INTERNAL MEDICINE

## 2023-01-10 PROCEDURE — 85025 COMPLETE CBC W/AUTO DIFF WBC: CPT | Performed by: INTERNAL MEDICINE

## 2023-01-10 ASSESSMENT — PAIN SCALES - GENERAL
PAINLEVEL: SEVERE PAIN (6)
PAINLEVEL: SEVERE PAIN (6)

## 2023-01-10 NOTE — PROGRESS NOTES
"Oncology Rooming Note    January 10, 2023 8:22 AM   Yuliya Li is a 52 year old female who presents for:    Chief Complaint   Patient presents with     Oncology Clinic Visit     Initial Vitals: /81   Pulse 94   Temp 98.2  F (36.8  C) (Oral)   Resp 16   Ht 1.753 m (5' 9\")   Wt 99.3 kg (219 lb)   LMP  (LMP Unknown)   SpO2 95%   BMI 32.34 kg/m   Estimated body mass index is 32.34 kg/m  as calculated from the following:    Height as of this encounter: 1.753 m (5' 9\").    Weight as of this encounter: 99.3 kg (219 lb). Body surface area is 2.2 meters squared.  Severe Pain (6) Comment: left neck and back   No LMP recorded (lmp unknown). Patient has had a hysterectomy.  Allergies reviewed: Yes  Medications reviewed: Yes    Medications: Medication refills not needed today.  Pharmacy name entered into [facility]:    [facility] - [address] [place], [place] - [address] [facility] - [place], [place] - [facility] [address]      Shari J. Schoenberger, [facility]            "

## 2023-01-10 NOTE — PROGRESS NOTES
Breast Health History Form     BREAST PATIENTS (ALL)     1-Do you have any of the following symptoms? Lump(s) or Mass(es)  2-In which breast are you having the symptoms? left  3-Have you had a Mammogram? Denisa Bah - Date:  12/23/22  4-Have you ever had a breast cyst drained? No  5-Have you ever had a breast biopsy? Yes:  Left   -   Date:  Jan 2023  6-Have you ever had Breast Cancer? No   7-Is there a history of Breast Cancer in your family? Yes   Relationship to you:    Aunt  8-Have you ever had Ovarian Cancer? No  9-Is there a history of Ovarian Cancer in your family? No  10-Is there a history of Colon Cancer in your family?  Pt didn't respond  11-Is there a history of Uterine Cancer in your family?  Pt didn't resond  12-Any known genetic abnormalities in your family?  Pt didn't respond  13-Summarize your caffeine intake (i.e. coffee, tea, chocolate, soda etc.): 2-4 c a day     BREAST PATIENTS (FEMALE)     14-What age did your periods begin? 11  15-Date your last menstrual period began? 2005  16-Number of full-term pregnancies: 3  17-Your age when your first child was born? 29  18-Did you nurse your children? Yes  19-Are you pregnant now? No  20-Have you begun menopause? I think so  21-Have you had either ovary removed?Yes  Date of Surgery:  pt did not answer  22-Do you have breast implants? No   23-Do you use hormone replacement therapy?  No  24-Have you taken oral contraceptive pills?  Yes, For how many years?  4  25-Have you had an intrauterine device (IUD) placed?  No  26-What is your current bra size?  40L

## 2023-01-10 NOTE — PATIENT INSTRUCTIONS
Lab draw today.  Arrange for genetics consult.  Patient will be contacted for follow-up instructions.

## 2023-01-10 NOTE — LETTER
1/10/2023         RE: Yuliya Li  [address]  [place] [address]        Dear Colleague,    Thank you for referring your patient, Yuliya Li, to the [facility]. Please see a copy of my visit note below.    [facility]    Hematology/Oncology New Patient Note      Today's Date: 01/10/23    Reason for Consult:  Left breast cancer.    HISTORY OF PRESENT ILLNESS: Yuliya Li is a 52 year old female status post removal of single ovary and hysterectomy who presents with the following oncologic history:  1.  12/23/2022: Mammogram showed possible asymmetry in right breast at 12:00; possible mass in left breast at 3:00.  2. 1/2/2023: Diagnostic bilateral mammogram showed mass in left breast at 3:00 with 0.4 cm satellite mass in retroareolar breast. Right breast with effacement of possible finding at 12:00 consistent with artifact. Left breast U/S showed mass at 3:00, 11 cm from nipple measuring 1.9 x 1.6 x 1.8 cm; no abnormal axillary lymph nodes.  3. 1/03/2022: Left breast core biopsy of 0.4 cm mass at 3:30 position showed grade 2 invasive ductal carcinoma with lobular features. Biopsy of left breast 1.9 cm mass at 3:00 showed grade 2 invasive ductal carcinoma with lobular features, associated grade 2 DCIS. ER and ID strongly positive at %; HER-2 IHC = 2+; HER-2/crow FISH pending.    Yuliya reports palpating a left breast lump.  She has no associated pain with this.  She has had various pain in her neck, back and shoulders.  She was able to lose about 77 pounds over the past year intentionally.    Family history positive for maternal aunt with breast cancer.  Yuliya has a daughter age 22 who is healthy.    REVIEW OF SYSTEMS:   14 point ROS was reviewed and is negative other than as noted above in HPI.       HOME MEDICATIONS:  Current Outpatient Medications   Medication Sig Dispense Refill     alcohol swab prep pads Use to swab area of  injection/amy as directed. 100 each 3     atorvastatin (LIPITOR) 10 MG tablet Take 1 tablet (10 mg) by mouth daily 90 tablet 3     blood glucose (NO BRAND SPECIFIED) test strip Use to test blood sugar 2 times daily or as directed. To accompany: Blood Glucose Monitor Brands: per insurance. 100 strip 6     blood glucose calibration (NO BRAND SPECIFIED) solution To accompany: Blood Glucose Monitor Brands: per insurance. 1 each 1     blood glucose monitoring (NO BRAND SPECIFIED) meter device kit Use to test blood sugar 2 times daily or as directed. Preferred blood glucose meter OR supplies to accompany: Blood Glucose Monitor Brands: per insurance. 1 kit 0     FLUoxetine (PROZAC) 40 MG capsule Take 1 capsule (40 mg) by mouth daily 90 capsule 1     metFORMIN (GLUCOPHAGE XR) 500 MG 24 hr tablet Take 1 tablet (500 mg) by mouth daily 90 tablet 3     OZEMPIC, 1 MG/DOSE, 4 MG/3ML SOPN INJECT 1 MG SUBCUTANEOUS ONCE A WEEK 9 mL 0     spironolactone (ALDACTONE) 100 MG tablet Take 1 tablet (100 mg) by mouth daily 90 tablet 1     thin (NO BRAND SPECIFIED) lancets Use with lanceting device. To accompany: Blood Glucose Monitor Brands: per insurance. 100 each 6         ALLERGIES:  Allergies   Allergen Reactions     Keflex [Cephalexin-Fd&C Yellow #6]      Tetracycline      Trazodone      Very sedating-         PAST MEDICAL HISTORY:  Diabetes mellitus type 2  Hyperlipidemia    Gynecologic history:  Age of menarche at 11.  Last menstrual period in 2005 prior to hysterectomy.  G3, P3.  Age of first pregnancy at 29.  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  No use of HRT.  Used OCPs for 4 years.  No IUD use.      PAST SURGICAL HISTORY:  Past Surgical History:   Procedure Laterality Date     CYST REMOVAL       HYSTERECTOMY       HYSTERECTOMY, PAP NO LONGER INDICATED  2005    menorrhagia         SOCIAL HISTORY:  Social History     Socioeconomic History     Marital status:      Spouse name: Not on file     Number of children: 3     Years of  education: Not on file     Highest education level: Not on file   Occupational History     Not on file   Tobacco Use     Smoking status: Former     Packs/day: 0.00     Types: Cigarettes     Quit date: 1999     Years since quittin.1     Smokeless tobacco: Never   Vaping Use     Vaping Use: Never used   Substance and Sexual Activity     Alcohol use: Yes     Comment: Alcoholic Drinks/day: 1 a week     Drug use: Yes     Types: Marijuana     Comment: nightly for sleep (gummy)     Sexual activity: Yes     Partners: Male   Other Topics Concern     Not on file   Social History Narrative     Not on file     Social Determinants of Health     Financial Resource Strain: Low Risk      Difficulty of Paying Living Expenses: Not very hard   Food Insecurity: No Food Insecurity     Worried About Running Out of Food in the Last Year: Never true     Ran Out of Food in the Last Year: Never true   Transportation Needs: No Transportation Needs     Lack of Transportation (Medical): No     Lack of Transportation (Non-Medical): No   Physical Activity: Insufficiently Active     Days of Exercise per Week: 1 day     Minutes of Exercise per Session: 20 min   Stress: Stress Concern Present     Feeling of Stress : Rather much   Social Connections: Moderately Isolated     Frequency of Communication with Friends and Family: More than three times a week     Frequency of Social Gatherings with Friends and Family: Once a week     Attends Congregational Services: Never     Active Member of Clubs or Organizations: No     Attends Club or Organization Meetings: Not on file     Marital Status:    Intimate Partner Violence: Not on file   Housing Stability: Low Risk      Unable to Pay for Housing in the Last Year: No     Number of Places Lived in the Last Year: 1     Unstable Housing in the Last Year: No         FAMILY HISTORY:  Family History   Problem Relation Age of Onset     Diabetes Mother      Atrial fibrillation Father      Alcoholism  "Father      Breast Cancer Maternal Aunt         in her 40's     Cervical Cancer Maternal Aunt      Colon Cancer No family hx of          PHYSICAL EXAM:  Vital signs:  /81   Pulse 94   Temp 98.2  F (36.8  C) (Oral)   Resp 16   Ht 1.753 m (5' 9\")   Wt 99.3 kg (219 lb)   LMP  (LMP Unknown)   SpO2 95%   BMI 32.34 kg/m     ECO  GENERAL/CONSTITUTIONAL: No acute distress.  EYES:  No scleral icterus.  ENT/MOUTH: Neck supple.  Oropharynx is grossly clear.  LYMPH: No palpable cervical, supraclavicular, axillary adenopathy.   BREAST: Approximately 2 x 2 centimeter palpable and movable mass in the lower outer left breast.  Both breasts are diffusely fibronodular.  Nipples are everted bilaterally with no discharge.  RESPIRATORY: No audible cough or wheezing.   CARDIOVASCULAR: No PMI displacement.  GASTROINTESTINAL: No hepatosplenomegaly, masses, or tenderness.  No guarding.  No distention.  MUSCULOSKELETAL: Warm and well-perfused, no cyanosis, clubbing, or edema.  NEUROLOGIC: No focal motor deficits. Alert, oriented, answers questions appropriately.  INTEGUMENTARY: No rashes or jaundice.  GAIT: Steady, does not use assistive device      LABS:  CBC RESULTS:   Recent Labs   Lab Test 21  1110   WBC 8.1   RBC 4.91   HGB 13.9   HCT 40.0   MCV 81   MCH 28.2   MCHC 34.6   RDW 11.6          Recent Labs   Lab Test 10/26/22  0958 21  0901    136   POTASSIUM 4.2 4.4   CHLORIDE 106 104   CO2 27 27   ANIONGAP 5 5   * 157*   BUN 14 12   CR 1.00 0.82   CHASE 9.5 9.0         PATHOLOGY:  Reviewed as per HPI.    IMAGING:  Reviewed as per HPI.    ASSESSMENT/PLAN:  Yuliya Li is a 52 year old female with the following issues:  1. Clinical anatomic stage IA, qV9w-X3-G0, grade 2 multifocal invasive ductal carcinoma with lobular features of left breast overlapping sites, ER positive, MO positive, HER-2 IHC = 2+; HER-2/crow FISH pending  --I had a detailed discussion with Yuliya today regarding her " breast imaging findings biopsy pathology, and significance of her biomarkers.  - She has at least 1.9 cm and 0.4 cm tumors that are histologically similar.  Discussed that the preliminary biomarker findings show that the tumor is strongly estrogen and progesterone receptor positive at %.  The HER-2 IHC is equivocal at 2+ and has been reflexed to FISH, results of which are pending.  - I explained that given the volume of disease and if HER2 FISH is positive, then I would recommend neoadjuvant chemotherapy in conjunction with every 3-week dual anti-HER2 therapy with trastuzumab and pertuzumab in order to evaluate the pathologic response to neoadjuvant therapy, help prognosticate her breast cancer, and determine if she would benefit from different adjuvant anti-HER2 therapy (ado-trastuzumab) if residual disease is present after neoadjuvant therapy.  -However, if her tumor is HER2 FISH negative, then I would recommend surgery upfront with Oncotype DX testing to assess the predictive benefit of adjuvant chemotherapy and prognosticate with estimated distant risk of recurrence.  I discussed how we would interpret Oncotype DX testing for postmenopausal compared to premenopausal.   -Given that she has a single ovary remaining and is status post hysterectomy, we will need to determine her menopausal status.  Will obtain her FSH and estradiol levels in addition to her CBC and CMP today.  - We will further evaluate extent of disease with breast MRI, given the multifocality of her cancer.  -She is contemplating either bilateral mastectomies or lumpectomy with bilateral breast reduction.  She will be meeting with plastic surgery soon.  Surgery will be coordinated with Dr. Clarissa Fontanez.  -Further discussed that hormonal blockade therapy would play a large role in reducing her risk of breast cancer recurrence by relative 50%.  We will discuss the different types of hormone blockade therapy after surgery.  - Overall, her  prognosis is excellent whether this is HER2 positive versus HER2 negative.    2.  Family history of breast cancer  - Maternal aunt with breast cancer.  - I have recommended and referred Yuliya to genetic counseling.  Discussed that Yuliya does not need to wait for genetic testing results for surgery.    3.  Neck and back pain, chronic  - Yuliya has been heavy breasted which has contributed to neck and back pain.  She is contemplating bilateral breast reduction versus bilateral mastectomies which should help reduce her back pain.  -- Offered obtaining NM bone scan to rule out bone metastases but I think this is much less likely.  Yuliya agrees to hold off on scans at the present time.    Louise Watson MD  Hematology/Oncology  [facility]    Total time spent: 70 minutes in patient evaluation, counseling, documentation, and coordination of care.      Breast Health History Form     BREAST PATIENTS (ALL)     1-Do you have any of the following symptoms? Lump(s) or Mass(es)  2-In which breast are you having the symptoms? left  3-Have you had a Mammogram? [facility] - Date:  12/23/22  4-Have you ever had a breast cyst drained? No  5-Have you ever had a breast biopsy? Yes:  Left   -   Date:  Jan 2023  6-Have you ever had Breast Cancer? No   7-Is there a history of Breast Cancer in your family? Yes   Relationship to you:    Aunt  8-Have you ever had Ovarian Cancer? No  9-Is there a history of Ovarian Cancer in your family? No  10-Is there a history of Colon Cancer in your family?  Pt didn't respond  11-Is there a history of Uterine Cancer in your family?  Pt didn't resond  12-Any known genetic abnormalities in your family?  Pt didn't respond  13-Summarize your caffeine intake (i.e. coffee, tea, chocolate, soda etc.): 2-4 c a day     BREAST PATIENTS (FEMALE)     14-What age did your periods begin? 11  15-Date your last menstrual period began? 2005  16-Number of full-term pregnancies: 3  17-Your age  "when your first child was born? 29  18-Did you nurse your children? Yes  19-Are you pregnant now? No  20-Have you begun menopause? I think so  21-Have you had either ovary removed?Yes  Date of Surgery:  pt did not answer  22-Do you have breast implants? No   23-Do you use hormone replacement therapy?  No  24-Have you taken oral contraceptive pills?  Yes, For how many years?  4  25-Have you had an intrauterine device (IUD) placed?  No  26-What is your current bra size?  40L                Oncology Rooming Note    January 10, 2023 8:22 AM   Yuliya Li is a 52 year old female who presents for:    Chief Complaint   Patient presents with     Oncology Clinic Visit     Initial Vitals: /81   Pulse 94   Temp 98.2  F (36.8  C) (Oral)   Resp 16   Ht 1.753 m (5' 9\")   Wt 99.3 kg (219 lb)   LMP  (LMP Unknown)   SpO2 95%   BMI 32.34 kg/m   Estimated body mass index is 32.34 kg/m  as calculated from the following:    Height as of this encounter: 1.753 m (5' 9\").    Weight as of this encounter: 99.3 kg (219 lb). Body surface area is 2.2 meters squared.  Severe Pain (6) Comment: left neck and back   No LMP recorded (lmp unknown). Patient has had a hysterectomy.  Allergies reviewed: Yes  Medications reviewed: Yes    Medications: Medication refills not needed today.  Pharmacy name entered into [employer]:    [id number] - [address] [place], [place] - [address] [facility] - [place], [place] - [facility] [address], [facility]                Again, thank you for allowing me to participate in the care of your patient.        Sincerely,        Louise Watson MD    "

## 2023-01-10 NOTE — PROGRESS NOTES
Medical Assistant Note:  Yuliya GUAJARDO Jen presents today for blood draw.    Patient seen by provider today: Yes: kayden.   present during visit today: Not Applicable.    Concerns: No Concerns.    Procedure:  Lab draw site: rac, Needle type: bf, Gauge: 23.    Post Assessment:  Labs drawn without difficulty: Yes.    Discharge Plan:  Departure Mode: Ambulatory.    Face to Face Time: 5 min.    Alicia Schneider, CMA

## 2023-01-10 NOTE — PROGRESS NOTES
[facility] Consultation    CHIEF COMPLAINT:  Chief Complaint   Patient presents with     Consult     Left IDC x2 ER/MS/HER2 pending       HISTORY OF PRESENT ILLNESS:  Yuliya Li is a 52 year old female who is seen in consultation for evaluation of left breast cancer.  The patient reports that she noticed a palpable lump over the outer aspect of the left breast in December 2022.  She was due for her annual screening mammogram so she presented for imaging shortly after noticing the mass.  She does report noticing some discomfort related to the mass.  The discomfort also radiated towards the left nipple areolar complex.  She did not notice any masses or pain in the right breast.  No nipple discharge bilaterally.  Screening mammogram demonstrated a possible mass at the 3 o'clock position of the left breast.  The patient was also noted to have possible asymmetry in the right breast at the 12 o'clock position.  Follow-up bilateral breast imaging was completed.  This also demonstrated an approximately 4 mm satellite mass adjacent to the left breast mass.  The right breast asymmetry was revealed to be secondary to summation artifact.  The patient's left breast mass was 1.9 cm and 11 cm from the nipple.  The patient subsequently underwent ultrasound-guided biopsy of both the main left breast mass and the left breast satellite mass.  Pathology from both masses revealed invasive ductal carcinoma with lobular features, grade 2.  The main mass did have associated ductal carcinoma in situ.  ER/MS/HER2 are still pending.  This was the patient's first breast biopsy.  Family history is significant only for a maternal aunt with breast cancer in her 40s.  Patient began menarche at the age of 11.  She had her first child at age 29 and did breast-feed.  She believes she is currently perimenopausal.  She reports taking oral contraceptives for approximately 4 years.  Current bra size is 40L.      REVIEW OF  "SYSTEMS:  Constitutional: No fevers or chills  Eyes: No blurred or double vision  HENT: Reports headaches, No rhinorrhea, No sore throat  Respiratory: No cough or shortness of breath  Cardiovascular: Denies chest pain or palpitations  Gastrointestinal: No abdominal pain or nausea/vomiting  Genitourinary: No hematuria or dysuria  Musculoskeletal: Upper back and neck pain  Neurologic: No numbness or tingling  Integumentary: No skin rashes    Past medical history:  -Diabetes mellitus  -Depression  - \" Hormonal acne\"    Past Surgical History:   Procedure Laterality Date     CYST REMOVAL       HYSTERECTOMY       HYSTERECTOMY, PAP NO LONGER INDICATED      menorrhagia       Family History   Problem Relation Age of Onset     Diabetes Mother      Atrial fibrillation Father      Alcoholism Father      Breast Cancer Maternal Aunt         in her 40's     Cervical Cancer Maternal Aunt      Colon Cancer No family hx of        Social History     Tobacco Use     Smoking status: Former     Packs/day: 0.00     Types: Cigarettes     Quit date: 1999     Years since quittin.1     Smokeless tobacco: Never   Substance Use Topics     Alcohol use: Yes     Comment: Alcoholic Drinks/day: 1 a week       Patient Active Problem List   Diagnosis     Other acne     Obesity     Major depressive disorder in full remission, unspecified whether recurrent (H)     Female stress incontinence     Diabetes mellitus, type 2 (H)       Allergies   Allergen Reactions     Keflex [Cephalexin-Fd&C Yellow #6]      Tetracycline      Trazodone      Very sedating-       Current Outpatient Medications   Medication Sig Dispense Refill     alcohol swab prep pads Use to swab area of injection/amy as directed. 100 each 3     atorvastatin (LIPITOR) 10 MG tablet Take 1 tablet (10 mg) by mouth daily 90 tablet 3     blood glucose (NO BRAND SPECIFIED) test strip Use to test blood sugar 2 times daily or as directed. To accompany: Blood Glucose Monitor Brands: " "per insurance. 100 strip 6     blood glucose calibration (NO BRAND SPECIFIED) solution To accompany: Blood Glucose Monitor Brands: per insurance. 1 each 1     blood glucose monitoring (NO BRAND SPECIFIED) meter device kit Use to test blood sugar 2 times daily or as directed. Preferred blood glucose meter OR supplies to accompany: Blood Glucose Monitor Brands: per insurance. 1 kit 0     FLUoxetine (PROZAC) 40 MG capsule Take 1 capsule (40 mg) by mouth daily 90 capsule 1     metFORMIN (GLUCOPHAGE XR) 500 MG 24 hr tablet Take 1 tablet (500 mg) by mouth daily 90 tablet 3     OZEMPIC, 1 MG/DOSE, 4 MG/3ML SOPN INJECT 1 MG SUBCUTANEOUS ONCE A WEEK 9 mL 0     spironolactone (ALDACTONE) 100 MG tablet Take 1 tablet (100 mg) by mouth daily 90 tablet 1     thin (NO BRAND SPECIFIED) lancets Use with lanceting device. To accompany: Blood Glucose Monitor Brands: per insurance. 100 each 6       Vitals: /81   Pulse 94   Temp 98.2  F (36.8  C) (Oral)   Resp 16   Ht 1.753 m (5' 9\")   Wt 99.3 kg (219 lb)   LMP  (LMP Unknown)   SpO2 95%   BMI 32.34 kg/m    BMI= Body mass index is 32.34 kg/m .    EXAM:  General: Vital signs reviewed, in no apparent distress  Eyes: Anicteric  HENT: Normocephalic, atraumatic, trachea midline   Respiratory: Breathing nonlabored  Cardiovascular: Regular rate and rhythm  Breast: Palpable firm approximately 3 cm mass over the deep lateral outer lower quadrant of the left breast, no other discretely palpable breast masses bilaterally  Lymph: No palpable axillary lymphadenopathy bilaterally  Musculoskeletal: No gross deformities  Neurologic: Grossly nonfocal exam  Psychiatric: Normal mood, affect and insight  Integumentary: Warm and dry    All labs and imaging personally reviewed and significant for:     BILATERAL FULL FIELD DIGITAL SCREENING MAMMOGRAM WITH TOMOSYNTHESIS     Performed on: 12/23/22     Compared to: 02/16/2017, 10/21/2015, and 10/15/2014     Technique:  This study was evaluated with " the assistance of Computer-Aided Detection.  Breast Tomosynthesis was used in interpretation.     Findings: The breasts have scattered areas of fibroglandular density..     There is a possible asymmetry in the right breast at the 12 o'clock position, middle depth.     There is a possible mass in the left breast at the 3 o'clock position, posterior depth.     The remainder of the breast tissue is unremarkable.                                                                   MA DIAGNOSTIC BILATERAL W/ JACQUELINE, US BREAST LEFT LIMITED 1-3 QUADRANTS  -  1/2/2023 12:17 PM     HISTORY:  Screening recall     COMPARISON:  12/23/2022     BREAST DENSITY: Scattered fibroglandular densities.     FINDINGS:      Diagnostic mammogram with tomosynthesis: Additional views show a  persistent mass in the left breast at 3:00 posterior depth, with a 0.4  cm satellite mass in the retroareolar breast posterior depth.  Additional imaging of the right breast shows effacement of the  possible finding at 12:00 compatible with summation artifact.     Targeted ultrasound: Targeted ultrasound shows an irregular hypoechoic  mass at 3:00 11 cm from the nipple measuring 1.9 x 1.6 x 1.8 cm with  peripheral vascularity on color flow images. No correlate for the  mammographic satellite retroareolar breast at posterior depth. No  abnormal axillary lymph nodes.                                                                      IMPRESSION:   1. Suspicious 1.9 cm mass in the left breast at 3:00 for which  ultrasound-guided biopsy is recommended.  2. 0.4 cm satellite mass in the retroareolar breast at posterior  depth, with no correlate on ultrasound. Stereotactic biopsy is  recommended.     Final Diagnosis   A.  Breast, left, 3:30, 12 cm from nipple, 0.4 cm size, core biopsy:  -Invasive ductal carcinoma with lobular features, Tano grade 2, see comment.  -In situ carcinoma is not identified.  -Breast biomarkers will be reported in an addendum.    B.   Breast, left, 3:00, 11 cm from nipple, 1.9 cm size, core biopsy:  -Invasive ductal carcinoma with lobular features, Tano grade 2, see comment.  -Ductal carcinoma in situ, intermediate nuclear grade, cribriform and solid type.  -Breast biomarkers will be reported in an addendum.           ASSESSMENT:  Yuliya Li is a 52 year old who presents with multifocal left breast invasive ductal carcinoma.  Significant pertinent co-morbidities include: Obesity, diabetes mellitus.       PLAN:  A thorough discussion was had today in clinic regarding surgical management options for the patient's multifocal left breast cancer.  Both lumpectomy and mastectomy and their risks and benefits were discussed.  The patient understands that I would also recommend left axillary sentinel lymph node biopsy.  Prior to surgery, the patient will need to undergo bilateral breast MRI to further evaluate extent of disease.  I will plan to contact the patient directly when the imaging results are available.  The patient is also interested in meeting with plastic surgery, and I have placed a referral order.  We will plan to contact the patient directly when the hormone receptor results are available.    It was my pleasure to participate in the care of Yuliya Li in clinic today. Thank you for this consultation.         Clarissa Fontanez MD    Please route or send letter to:  Primary Care Provider (PCP) and Referring Provider

## 2023-01-10 NOTE — NURSING NOTE
Breast Nurse Care Coordination:      I introduced self to patient and her , Bigg, and explained my role of breast nurse coordinator. I accompanied Yuliya to her surgical consultation on 1/10/23 with Dr. Fontanez at the [facility].       Yuliya was given the new breast cancer patient packet which includes educational material and support resources such as American Cancer Society, Fighting Cancer through Diet and Lifestyle, [employer], [employer], Pathways and the [facility] Breast Cancer Support Group.  I also enclosed a copy of her left breast biopsy pathology report (1/3/2023).       At the end of the consultation, we reviewed Yuliya's plan of care and education.  Yluiya is also meeting Dr. Watson today (medical oncology). I will notify Yuliya when her HER2 is back. Yuliya is scheduled for a bilateral breast MRI on 1/13/23 @ 7am at the [facility]. She is aware the results take 24-48 hours and Dr. Fontanez will notify her of the results when they are final. Yuliya is debating between a left lumpectomy with bilateral reductions or bilateral mastectomies with reconstructions. She will meet with Plastics to discuss further.     I gave patient Krames educational materials regarding Exercises After Breast Surgery, [place]ins Lymph Node Biopsy, and Lumpectomy/Mastectomy. We will discuss surgical bra's at a later point, when a surgical decision has been reached.      I answered her questions and encouraged patient to call me back with any future questions or concerns.  Yuliya has my contact information, and knows to contact me in the future with any questions or concerns.     Concepcion Corea 1/10/2023 10:08 AM      Concepcion Corea, RN, BSN, PHN  Breast Care Nurse Coordinator  [facility] [place]  [phone]

## 2023-01-10 NOTE — LETTER
January 10, 2023          Latrice Perez MD      RE:   Yuliya Li 1970      Dear Colleague,    Thank you for referring your patient, Yuliya Li, to [facility]. Please see a copy of my visit note below.    Yuliya Li is a 52 year old female who is seen in consultation for evaluation of left breast cancer.  The patient reports that she noticed a palpable lump over the outer aspect of the left breast in December 2022.  She was due for her annual screening mammogram so she presented for imaging shortly after noticing the mass.  She does report noticing some discomfort related to the mass.  The discomfort also radiated towards the left nipple areolar complex.  She did not notice any masses or pain in the right breast.  No nipple discharge bilaterally.  Screening mammogram demonstrated a possible mass at the 3 o'clock position of the left breast.  The patient was also noted to have possible asymmetry in the right breast at the 12 o'clock position.  Follow-up bilateral breast imaging was completed.  This also demonstrated an approximately 4 mm satellite mass adjacent to the left breast mass.  The right breast asymmetry was revealed to be secondary to summation artifact.  The patient's left breast mass was 1.9 cm and 11 cm from the nipple.  The patient subsequently underwent ultrasound-guided biopsy of both the main left breast mass and the left breast satellite mass.  Pathology from both masses revealed invasive ductal carcinoma with lobular features, grade 2.  The main mass did have associated ductal carcinoma in situ.  ER/VA/HER2 are still pending.  This was the patient's first breast biopsy.  Family history is significant only for a maternal aunt with breast cancer in her 40s.  Patient began menarche at the age of 11.  She had her first child at age 29 and did breast-feed.  She believes she is currently perimenopausal.  She reports taking oral  "contraceptives for approximately 4 years.  Current bra size is 40L.       REVIEW OF SYSTEMS:  Constitutional: No fevers or chills  Eyes: No blurred or double vision  HENT: Reports headaches, No rhinorrhea, No sore throat  Respiratory: No cough or shortness of breath  Cardiovascular: Denies chest pain or palpitations  Gastrointestinal: No abdominal pain or nausea/vomiting  Genitourinary: No hematuria or dysuria  Musculoskeletal: Upper back and neck pain  Neurologic: No numbness or tingling  Integumentary: No skin rashes     Past medical history:  -Diabetes mellitus  -Depression  - \" Hormonal acne\"     Past Surgical History         Past Surgical History:   Procedure Laterality Date     CYST REMOVAL         HYSTERECTOMY         HYSTERECTOMY, PAP NO LONGER INDICATED        menorrhagia            Family History         Family History   Problem Relation Age of Onset     Diabetes Mother       Atrial fibrillation Father       Alcoholism Father       Breast Cancer Maternal Aunt           in her 40's     Cervical Cancer Maternal Aunt       Colon Cancer No family hx of              Social History            Tobacco Use     Smoking status: Former       Packs/day: 0.00       Types: Cigarettes       Quit date: 1999       Years since quittin.1     Smokeless tobacco: Never   Substance Use Topics     Alcohol use: Yes       Comment: Alcoholic Drinks/day: 1 a week             Patient Active Problem List   Diagnosis     Other acne     Obesity     Major depressive disorder in full remission, unspecified whether recurrent (H)     Female stress incontinence     Diabetes mellitus, type 2 (H)               Allergies   Allergen Reactions     Keflex [Cephalexin-Fd&C Yellow #6]       Tetracycline       Trazodone         Very sedating-         Current Outpatient Prescriptions          Current Outpatient Medications   Medication Sig Dispense Refill     alcohol swab prep pads Use to swab area of injection/amy as directed. 100 each " "3     atorvastatin (LIPITOR) 10 MG tablet Take 1 tablet (10 mg) by mouth daily 90 tablet 3     blood glucose (NO BRAND SPECIFIED) test strip Use to test blood sugar 2 times daily or as directed. To accompany: Blood Glucose Monitor Brands: per insurance. 100 strip 6     blood glucose calibration (NO BRAND SPECIFIED) solution To accompany: Blood Glucose Monitor Brands: per insurance. 1 each 1     blood glucose monitoring (NO BRAND SPECIFIED) meter device kit Use to test blood sugar 2 times daily or as directed. Preferred blood glucose meter OR supplies to accompany: Blood Glucose Monitor Brands: per insurance. 1 kit 0     FLUoxetine (PROZAC) 40 MG capsule Take 1 capsule (40 mg) by mouth daily 90 capsule 1     metFORMIN (GLUCOPHAGE XR) 500 MG 24 hr tablet Take 1 tablet (500 mg) by mouth daily 90 tablet 3     OZEMPIC, 1 MG/DOSE, 4 MG/3ML SOPN INJECT 1 MG SUBCUTANEOUS ONCE A WEEK 9 mL 0     spironolactone (ALDACTONE) 100 MG tablet Take 1 tablet (100 mg) by mouth daily 90 tablet 1     thin (NO BRAND SPECIFIED) lancets Use with lanceting device. To accompany: Blood Glucose Monitor Brands: per insurance. 100 each 6            Vitals: /81   Pulse 94   Temp 98.2  F (36.8  C) (Oral)   Resp 16   Ht 1.753 m (5' 9\")   Wt 99.3 kg (219 lb)   LMP  (LMP Unknown)   SpO2 95%   BMI 32.34 kg/m    BMI= Body mass index is 32.34 kg/m .     EXAM:  General: Vital signs reviewed, in no apparent distress  Eyes: Anicteric  HENT: Normocephalic, atraumatic, trachea midline   Respiratory: Breathing nonlabored  Cardiovascular: Regular rate and rhythm  Breast: Palpable firm approximately 3 cm mass over the deep lateral outer lower quadrant of the left breast, no other discretely palpable breast masses bilaterally  Lymph: No palpable axillary lymphadenopathy bilaterally  Musculoskeletal: No gross deformities  Neurologic: Grossly nonfocal exam  Psychiatric: Normal mood, affect and insight  Integumentary: Warm and dry     All labs and " imaging personally reviewed and significant for:      BILATERAL FULL FIELD DIGITAL SCREENING MAMMOGRAM WITH TOMOSYNTHESIS     Performed on: 12/23/22     Compared to: 02/16/2017, 10/21/2015, and 10/15/2014     Technique:  This study was evaluated with the assistance of Computer-Aided Detection.  Breast Tomosynthesis was used in interpretation.     Findings: The breasts have scattered areas of fibroglandular density..     There is a possible asymmetry in the right breast at the 12 o'clock position, middle depth.     There is a possible mass in the left breast at the 3 o'clock position, posterior depth.     The remainder of the breast tissue is unremarkable.                                                                   MA DIAGNOSTIC BILATERAL W/ JACQUELINE, US BREAST LEFT LIMITED 1-3 QUADRANTS  -  1/2/2023 12:17 PM     HISTORY:  Screening recall     COMPARISON:  12/23/2022     BREAST DENSITY: Scattered fibroglandular densities.     FINDINGS:      Diagnostic mammogram with tomosynthesis: Additional views show a  persistent mass in the left breast at 3:00 posterior depth, with a 0.4  cm satellite mass in the retroareolar breast posterior depth.  Additional imaging of the right breast shows effacement of the  possible finding at 12:00 compatible with summation artifact.     Targeted ultrasound: Targeted ultrasound shows an irregular hypoechoic  mass at 3:00 11 cm from the nipple measuring 1.9 x 1.6 x 1.8 cm with  peripheral vascularity on color flow images. No correlate for the  mammographic satellite retroareolar breast at posterior depth. No  abnormal axillary lymph nodes.                                                                      IMPRESSION:   1. Suspicious 1.9 cm mass in the left breast at 3:00 for which  ultrasound-guided biopsy is recommended.  2. 0.4 cm satellite mass in the retroareolar breast at posterior  depth, with no correlate on ultrasound. Stereotactic biopsy is  recommended.      Final Diagnosis    A.  Breast, left, 3:30, 12 cm from nipple, 0.4 cm size, core biopsy:  -Invasive ductal carcinoma with lobular features, [place] grade 2, see comment.  -In situ carcinoma is not identified.  -Breast biomarkers will be reported in an addendum.    B.  Breast, left, 3:00, 11 cm from nipple, 1.9 cm size, core biopsy:  -Invasive ductal carcinoma with lobular features, [place] grade 2, see comment.  -Ductal carcinoma in situ, intermediate nuclear grade, cribriform and solid type.  -Breast biomarkers will be reported in an addendum.               ASSESSMENT:  Yuliya Li is a 52 year old who presents with multifocal left breast invasive ductal carcinoma.  Significant pertinent co-morbidities include: Obesity, diabetes mellitus.         PLAN:  A thorough discussion was had today in clinic regarding surgical management options for the patient's multifocal left breast cancer.  Both lumpectomy and mastectomy and their risks and benefits were discussed.  The patient understands that I would also recommend left axillary sentinel lymph node biopsy.  Prior to surgery, the patient will need to undergo bilateral breast MRI to further evaluate extent of disease.  I will plan to contact the patient directly when the imaging results are available.  The patient is also interested in meeting with plastic surgery, and I have placed a referral order.  We will plan to contact the patient directly when the hormone receptor results are available.     It was my pleasure to participate in the care of Yuliya Li in clinic today.    Again, thank you for allowing me to participate in the care of your patient.      Sincerely,      Clarissa Fontanez MD

## 2023-01-11 ENCOUNTER — TELEPHONE (OUTPATIENT)
Dept: SURGERY | Facility: CLINIC | Age: 53
End: 2023-01-11

## 2023-01-11 NOTE — TELEPHONE ENCOUNTER
Yuliya returned my call, verified her name and date of birth, and I informed her of the HER2 results.     Questions answered. Pt verbalized understanding.     Concepcion Corea RN on 1/11/2023 at 10:59 AM  Concepcino Corea RN, BSN, PHN  Breast Care Nurse Coordinator  [facility] [place]  [phone]

## 2023-01-11 NOTE — TELEPHONE ENCOUNTER
I called Yuliya to inform her of the following HER2 result below. After consulting with Dr. Watson, the plan is still surgery upfront.     INTERPRETATION:  Block A1  Per the American Society of Clinical Oncology/College of American Pathologists Clinical Practice Guideline Focused Update (Bandar  AC et al, 2018, Arch Pathol Lab Med doi:10.5858/arpa.7369-7364-YF), the HER2/PETTY 17 ratio of 1.3 and average number of HER2  signals/cell of 3.4 places this specimen in Group 5 (GAIL Negative).  Taken together, the FISH and IHC results for Block A1 are interpreted as HER2 negative.  Block B1  Per the American Society of Clinical Oncology/College of American Pathologists Clinical Practice Guideline Focused Update (Bandar  AC et al, 2018, Arch Pathol Lab Med doi:10.5858/arpa.[id number]-HK), the HER2/PETTY 17 ratio of 1.4 and average number of HER2  signals/cell of 4.4 places this specimen in Group 4.    Taken together, the FISH and IHC results for Block B1 are interpreted as HER2 Negative with the following COMMENT:  Comment: It is uncertain whether patients with greater than/equal to 4.0 and less than 6.0 average HER2 signals/cell and  HER2/CEP17 ratio less than 2.0 benefit from HER2-targeted therapy in the absence of protein overexpression (IHC 3+). If the  specimen test result is close to the GAIL ratio threshold for positive, there is a higher likelihood that repeat testing will result in  different results by chance alone. Therefore, when IHC results are not 3+ positive, it is recommended that the sample be considered  HER2 negative without additional testing on the same specimen.    I left a message asking Yuliya to return my call. I will send a [employer] message as well.     Concepcion Corea 1/11/2023 10:38 AM      Concepcion Corea, RN, BSN, PHN  Breast Care Nurse Coordinator  [facility] [place] [place]  [phone]

## 2023-01-13 ENCOUNTER — TELEPHONE (OUTPATIENT)
Dept: SURGERY | Facility: CLINIC | Age: 53
End: 2023-01-13

## 2023-01-13 ENCOUNTER — ANCILLARY PROCEDURE (OUTPATIENT)
Dept: MRI IMAGING | Facility: CLINIC | Age: 53
End: 2023-01-13
Attending: SURGERY
Payer: COMMERCIAL

## 2023-01-13 DIAGNOSIS — C50.912 MALIGNANT NEOPLASM OF LEFT BREAST (H): ICD-10-CM

## 2023-01-13 PROCEDURE — 77049 MRI BREAST C-+ W/CAD BI: CPT

## 2023-01-13 PROCEDURE — A9585 GADOBUTROL INJECTION: HCPCS | Performed by: SURGERY

## 2023-01-13 PROCEDURE — 255N000002 HC RX 255 OP 636: Performed by: SURGERY

## 2023-01-13 RX ORDER — GADOBUTROL 604.72 MG/ML
10 INJECTION INTRAVENOUS ONCE
Status: COMPLETED | OUTPATIENT
Start: 2023-01-13 | End: 2023-01-13

## 2023-01-13 RX ADMIN — GADOBUTROL 10 ML: 604.72 INJECTION INTRAVENOUS at 06:54

## 2023-01-13 NOTE — TELEPHONE ENCOUNTER
Surgery:    Patient contacted and results of bilateral breast MRI discussed.  Patient reports she is leaning towards bilateral mastectomy with reconstruction.  She plans to meet with plastic surgery next Wednesday.  We will await the patient's final decision on surgical approach.    Bilateral Breast MRI with and without contrast, and computer aided kinetic analysis.   LOCATION: [facility]  DATE/TIME: 1/13/2023 7:31 AM     FINDINGS:  RIGHT BREAST:  Breast composition: Scattered fibroglandular tissue  Background parenchymal enhancement: Mild     No concerning areas of enhancement.      Right Axilla: No lymphadenopathy.   Right Internal Mammary Chain: No lymphadenopathy.      LEFT BREAST:  Breast composition: Scattered fibroglandular tissue  Background parenchymal enhancement: Mild     Enhancing mass at the 3:00 position measures 2.0 x 1.8 x 2.0 cm and corresponds to the known malignancy at the 3:00 position, 11 cm from the nipple.     There is a 0.5 cm satellite lesion posterior and slightly medial to the index malignancy consistent with the known malignant lesion at the 3:30 position.     No other suspicious left breast enhancement.     Left Axilla: No lymphadenopathy.   Left Internal Mammary Chain: No lymphadenopathy.                                                                          IMPRESSION:      1. Enhancing masses in the posterior left breast correspond to the biopsy-proven malignancy and the biopsy-proven satellite lesion. Maximum dimensions are 2.0 and 0.5 cm respectively. When the index lesion and the satellite are measured together, the   maximum dimension is 4.0 cm.     2. No other suspicious enhancement in either breast.     3. No adenopathy.    Clarissa Fontanez MD

## 2023-01-19 ENCOUNTER — VIRTUAL VISIT (OUTPATIENT)
Dept: ONCOLOGY | Facility: CLINIC | Age: 53
End: 2023-01-19
Attending: INTERNAL MEDICINE
Payer: COMMERCIAL

## 2023-01-19 DIAGNOSIS — C50.512 MALIGNANT NEOPLASM OF LOWER-OUTER QUADRANT OF LEFT FEMALE BREAST, UNSPECIFIED ESTROGEN RECEPTOR STATUS (H): Primary | ICD-10-CM

## 2023-01-19 DIAGNOSIS — C50.812 MALIGNANT NEOPLASM OF OVERLAPPING SITES OF LEFT BREAST IN FEMALE, ESTROGEN RECEPTOR POSITIVE (H): ICD-10-CM

## 2023-01-19 DIAGNOSIS — Z85.3 HISTORY OF INVASIVE BREAST CANCER: Primary | ICD-10-CM

## 2023-01-19 DIAGNOSIS — Z80.3 FAMILY HISTORY OF MALIGNANT NEOPLASM OF BREAST: ICD-10-CM

## 2023-01-19 DIAGNOSIS — Z17.0 MALIGNANT NEOPLASM OF OVERLAPPING SITES OF LEFT BREAST IN FEMALE, ESTROGEN RECEPTOR POSITIVE (H): ICD-10-CM

## 2023-01-19 PROCEDURE — 96040 HC GENETIC COUNSELING, EACH 30 MINUTES: CPT | Mod: GT,95 | Performed by: GENETIC COUNSELOR, MS

## 2023-01-19 NOTE — PROGRESS NOTES
1/19/2023    Yuliya is a 52 year old who is being evaluated via a billable video visit.      How would you like to obtain your AVS? MyChart  If the video visit is dropped, the invitation should be resent by: Text to cell phone: [phone]  Will anyone else be joining your video visit? Eun Flores VF    Video-Visit Details  Type of service:  Video Visit   Originating Location (pt. Location): Home  Distant Location (provider location):  Off-site  Platform used for Video Visit: CypherWorX   Length of video visit: 38 minutes    Referring Provider: Louise Watson MD    Presenting Information:   Given concerns regarding the potential for COVID-19 exposure during a clinic visit, Yuliya elected for a video genetic counseling visit through the Cancer Risk Management Program to discuss her personal and family history of breast cancer. We reviewed this history, cancer screening recommendations, and available genetic testing options.    Personal History:  Yuliya is a 52 year old female. She had a mammogram in December 2022, that resulted in a left-sided breast biopsy. She was subsequently diagnosed with multifocal invasive ductal carcinoma with lobular features and DCIS of her left breast at age 52; the tumor is ER/NV+ and Her2-. Treatment will likely begin with surgery (a double mastectomy, per Yuliya).    Yuliya had a hysterectomy plus removal of her left ovary in 2006 to address bleeding; her right ovary remains in place and she has had no ovarian cancer screening to date. Yuliya has not had a colonoscopy and does not regularly do any other cancer screening at this time.    Family History: (Please see scanned pedigree for detailed family history information)    Yuliya's mother is 77 and had an unknown type of skin cancer removed in her 70's.    One maternal aunt is in her 80's and was diagnosed with cervical cancer in her 40's, followed by breast cancer at age 51 and melanoma in her 60's.    Yuliya's father is 77  and was diagnosed with metastatic subcutaneous melanoma at age 77.    Yuliya's paternal grandfather was diagnosed with lung cancer and passed away in his 70's; he had a history of smoking.    Her maternal ethnicity is Algerian and Romansh. Her paternal ethnicity is English, German, Scandinavian, and 2% Ashkenazi Zoroastrian (per DNA testing).    Discussion:    We reviewed the features of sporadic, familial, and hereditary cancers. In looking at Yuliya's maternal family history, it is possible that a cancer susceptibility gene is present as both Yuliya and her maternal aunt have been diagnosed with breast cancer in their early 50's; her aunt has also had several primary cancers. That being said, Yuliya has multiple other relatives that have not been diagnosed with a related cancer, which likely reduces but does not eliminate, the chance for a cancer syndrome in her family.    We discussed the natural history and genetics of hereditary breast cancer, including Hereditary Breast and Ovarian Cancer (HBOC) syndrome.     We reviewed that the most common cause of hereditary breast cancer is HBOC syndrome, which is caused by mutations in the BRCA1 and BRCA2 genes. Individuals with HBOC syndrome are at increased risk for several different cancers, including breast, ovarian, male breast, prostate, melanoma, and pancreatic cancer.    We discussed that there are additional genes that could cause increased risk for breast and related cancers. As many of these genes present with overlapping features in a family and accurate cancer risk cannot always be established based upon the pedigree analysis alone, it would be reasonable for Yuliya to consider panel genetic testing to analyze multiple genes at once.    Based on her personal and family history, Yuliya does not meet current National Comprehensive Cancer Network (NCCN) criteria for genetic testing of the high penetrance breast cancer genes. However, according to the American Society of  Breast Surgeons Consensus Guideline on Genetic Testing for Hereditary Breast Cancer, genetic testing should be available to all patients who have been diagnosed with breast cancer. The guidelines state that testing should include BRCA1, BRCA2, and PALB2, and that additional testing may be warranted based on personal and/or family history.      A detailed handout regarding these genes/syndromes and the information we discussed was provided to Yuliya at the end of our appointment today and can be found in the after visit summary. Topics included: inheritance pattern, cancer risks, cancer screening recommendations, and also risks, benefits and limitations of testing.    We reviewed genetic testing options for hereditary breast and related cancers: actionable breast and gynecologic cancers panel ([facility] Breast [facility] Gyn Cancers Guidelines-Based Panel) and expanded pan-cancer panels ([employer] Common Hereditary Cancers Panel or Multi-Cancer Panel).    Yuliya shared that she may be interested in genetic testing, but would like to take some time to consider her testing options and focus on her upcoming surgery before making a decision. I encouraged her to contact me via [employer] if she is interested in readdressing her testing options. She verbalized agreement with this plan.    Cancer screening recommendations based on Yuliya's current personal/family history:    Yuliya should continue to follow all breast cancer treatment and follow-up recommendations as made by her medical providers.    Yuliya s close female relatives remain at increased risk for breast cancer given their family history. Breast cancer screening is generally recommended to begin approximately 10 years younger than the earliest age of breast cancer diagnosis in the family, or at age 40, whichever comes first. In this family, screening may begin at age 40. Breast screening options should be discussed with an individual's primary care provider and a genetic  counselor, to determine at what age to begin screening, what screening is appropriate, and if additional screening (such as breast MRI) is necessary based on personal/family history factors.    Other population cancer screening options, such as those recommended by the American Cancer Society and the National Comprehensive Cancer Network (NCCN), are also appropriate for Yuliya and her family. These screening recommendations may change if there are changes to Yuliya's personal and/or family history of cancer. These screening recommendations may also change depending on Yuliya's genetic testing results, if pursued. Final screening recommendations should be made by each individual's primary care provider.    Plan:  1) Today Yuliya elected to take some time to consider her genetic testing options before making a decision.  2) I encouraged Yuliya to contact me if she has any questions, if she wishes to readdress her testing options, and/or if there are any changes to her personal/family history.  3) Cancer screening recommendations were reviewed for Yuliya, based on her current family history.    Nalini Neumann MS, [facility]  Licensed, Certified Genetic Counselor  Office: [phone]  Pager: [phone]

## 2023-01-19 NOTE — LETTER
1/19/2023         RE: Yuliya Li  [address]  [place] [address]        Dear Colleague,    Thank you for referring your patient, Yuliya Li, to the [facility]. Please see a copy of my visit note below.    1/19/2023    Yuliya is a 52 year old who is being evaluated via a billable video visit.      How would you like to obtain your AVS? MyChart  If the video visit is dropped, the invitation should be resent by: Text to cell phone: [phone]  Will anyone else be joining your video visit? No    Lois MAJOR    Video-Visit Details  Type of service:  Video Visit   Originating Location (pt. Location): Home  Distant Location (provider location):  Off-site  Platform used for Video Visit: Funplus   Length of video visit: 38 minutes    Referring Provider: Louise Watson MD    Presenting Information:   Given concerns regarding the potential for COVID-19 exposure during a clinic visit, Yuliya elected for a video genetic counseling visit through the Cancer Risk [facility] Program to discuss her personal and family history of breast cancer. We reviewed this history, cancer screening recommendations, and available genetic testing options.    Personal History:  Yuliya is a 52 year old female. She had a mammogram in December 2022, that resulted in a left-sided breast biopsy. 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 Treatment will likely begin with surgery (a double mastectomy, per Yuliya).    Yuliya had a hysterectomy plus removal of her left ovary in 2006 to address bleeding; her right ovary remains in place and she has had no ovarian cancer screening to date. Yuliya has not had a colonoscopy and does not regularly do any other cancer screening at this time.    Family History: (Please see scanned pedigree for detailed family history  information)    Yuliya's mother is 77 and had an unknown type of skin cancer removed in her 70's.    One maternal aunt is in her 80's and was diagnosed with cervical cancer in her 40's, followed by breast cancer at age 51 and melanoma in her 60's.    Yuliya's father is 77 and was diagnosed with metastatic subcutaneous melanoma at age 77.    Yuliya's paternal grandfather was diagnosed with lung cancer and passed away in his 70's; he had a history of smoking.    Her maternal ethnicity is Monegasque and Togolese. Her paternal ethnicity is English, Croatian, Scandinavian, and 2% Ashkenazi Taoism (per DNA testing).    Discussion:    We reviewed the features of sporadic, familial, and hereditary cancers. In looking at Yuliya's maternal family history, it is possible that a cancer susceptibility gene is present as both Yuliya and her maternal aunt have been diagnosed with breast cancer in their early 50's; her aunt has also had several primary cancers. That being said, Yuliya has multiple other relatives that have not been diagnosed with a related cancer, which likely reduces but does not eliminate, the chance for a cancer syndrome in her family.    We discussed the natural history and genetics of hereditary breast cancer, including Hereditary Breast and Ovarian Cancer (HBOC) syndrome.     We reviewed that the most common cause of hereditary breast cancer is HBOC syndrome, which is caused by mutations in the BRCA1 and BRCA2 genes. Individuals with HBOC syndrome are at increased risk for several different cancers, including breast, ovarian, male breast, prostate, melanoma, and pancreatic cancer.    We discussed that there are additional genes that could cause increased risk for breast and related cancers. As many of these genes present with overlapping features in a family and accurate cancer risk cannot always be established based upon the pedigree analysis alone, it would be reasonable for Yuliya to consider panel genetic testing to  analyze multiple genes at once.    Based on her personal and family history, Yuliya does not meet current National Comprehensive Cancer Network (NCCN) criteria for genetic testing of the high penetrance breast cancer genes. However, according to the American Society of Breast Surgeons Consensus Guideline on Genetic Testing for Hereditary Breast Cancer, genetic testing should be available to all patients who have been diagnosed with breast cancer. The guidelines state that testing should include BRCA1, BRCA2, and PALB2, and that additional testing may be warranted based on personal and/or family history.      A detailed handout regarding these genes/syndromes and the information we discussed was provided to Yuliya at the end of our appointment today and can be found in the after visit summary. Topics included: inheritance pattern, cancer risks, cancer screening recommendations, and also risks, benefits and limitations of testing.    We reviewed genetic testing options for hereditary breast and related cancers: actionable breast and gynecologic cancers panel ([employer] Breast [facility] G[facility] Cancers Guidelines-Based Panel) and expanded pan-cancer panels ([employer] Common Hereditary Cancers Panel or Multi-Cancer Panel).    Yuliya shared that she may be interested in genetic testing, but would like to take some time to consider her testing options and focus on her upcoming surgery before making a decision. I encouraged her to contact me via [employer] if she is interested in readdressing her testing options. She verbalized agreement with this plan.    Cancer screening recommendations based on Yuliya's current personal/family history:    Yuliya should continue to follow all breast cancer treatment and follow-up recommendations as made by her medical providers.    Yuliya s close female relatives remain at increased risk for breast cancer given their family history. Breast cancer screening is generally recommended to begin approximately 10  years younger than the earliest age of breast cancer diagnosis in the family, or at age 40, whichever comes first. In this family, screening may begin at age 40. Breast screening options should be discussed with an individual's primary care provider and a genetic counselor, to determine at what age to begin screening, what screening is appropriate, and if additional screening (such as breast MRI) is necessary based on personal/family history factors.    Other population cancer screening options, such as those recommended by the American Cancer Society and the National Comprehensive Cancer Network (NCCN), are also appropriate for Yuliya and her family. These screening recommendations may change if there are changes to Yuliya's personal and/or family history of cancer. These screening recommendations may also change depending on Yuliya's genetic testing results, if pursued. Final screening recommendations should be made by each individual's primary care provider.    Plan:  1) Today Yuliya elected to take some time to consider her genetic testing options before making a decision.  2) I encouraged Yuliya to contact me if she has any questions, if she wishes to readdress her testing options, and/or if there are any changes to her personal/family history.  3) Cancer screening recommendations were reviewed for Yuliya, based on her current family history.    Nalini Neumann MS, [facility] [place]  Licensed, Certified Genetic Counselor  Office: [phone]  Pager: [phone]

## 2023-01-24 ENCOUNTER — TELEPHONE (OUTPATIENT)
Dept: PEDIATRICS | Facility: CLINIC | Age: 53
End: 2023-01-24
Payer: COMMERCIAL

## 2023-01-24 ENCOUNTER — TELEPHONE (OUTPATIENT)
Dept: SURGERY | Facility: CLINIC | Age: 53
End: 2023-01-24
Payer: COMMERCIAL

## 2023-01-24 NOTE — TELEPHONE ENCOUNTER
Reason for Call:  Appointment Request    Patient requesting this type of appt: Pre-op    Requested provider: ANY    Reason patient unable to be scheduled: Not within requested timeframe    When does patient want to be seen/preferred time: 1-2 days. Patient's procedure is 2/3.    Comments: Patient ok with any provider.     Could we send this information to you in [employer] or would you prefer to receive a phone call?:   Patient would prefer a phone call   Okay to leave a detailed message?: Yes at Home number on file [phone] (home)    Call taken on 1/24/2023 at 9:13 AM by Chandrika Kirkpatrick

## 2023-01-24 NOTE — TELEPHONE ENCOUNTER
Type of surgery: bilateral skin sparing mastectomies left axillary SLN biopsy combined with Dr Malave  Location of surgery: [facility]  Date and time of surgery: 2/3/23 12:30pm  Surgeon: Dr Fontanez  Pre-Op Appt Date: pt to schedule  Post-Op Appt Date: pt to schedule   Packet sent out: Yes  Pre-cert/Authorization completed:  Not Applicable  Date: 1/20/23

## 2023-01-25 ENCOUNTER — OFFICE VISIT (OUTPATIENT)
Dept: PEDIATRICS | Facility: CLINIC | Age: 53
End: 2023-01-25
Payer: COMMERCIAL

## 2023-01-25 ENCOUNTER — MYC MEDICAL ADVICE (OUTPATIENT)
Dept: SURGERY | Facility: CLINIC | Age: 53
End: 2023-01-25

## 2023-01-25 VITALS
HEIGHT: 69 IN | HEART RATE: 87 BPM | BODY MASS INDEX: 33.46 KG/M2 | DIASTOLIC BLOOD PRESSURE: 66 MMHG | SYSTOLIC BLOOD PRESSURE: 110 MMHG | RESPIRATION RATE: 18 BRPM | OXYGEN SATURATION: 96 % | WEIGHT: 225.9 LBS | TEMPERATURE: 97.5 F

## 2023-01-25 DIAGNOSIS — Z17.0 MALIGNANT NEOPLASM OF OVERLAPPING SITES OF LEFT BREAST IN FEMALE, ESTROGEN RECEPTOR POSITIVE (H): ICD-10-CM

## 2023-01-25 DIAGNOSIS — C50.812 MALIGNANT NEOPLASM OF OVERLAPPING SITES OF LEFT BREAST IN FEMALE, ESTROGEN RECEPTOR POSITIVE (H): ICD-10-CM

## 2023-01-25 DIAGNOSIS — L70.8 OTHER ACNE: ICD-10-CM

## 2023-01-25 DIAGNOSIS — E11.9 TYPE 2 DIABETES MELLITUS WITHOUT COMPLICATION, WITHOUT LONG-TERM CURRENT USE OF INSULIN (H): ICD-10-CM

## 2023-01-25 DIAGNOSIS — Z01.818 PREOP GENERAL PHYSICAL EXAM: Primary | ICD-10-CM

## 2023-01-25 DIAGNOSIS — F32.5 MAJOR DEPRESSIVE DISORDER IN FULL REMISSION, UNSPECIFIED WHETHER RECURRENT (H): ICD-10-CM

## 2023-01-25 PROCEDURE — 99214 OFFICE O/P EST MOD 30 MIN: CPT | Performed by: NURSE PRACTITIONER

## 2023-01-25 RX ORDER — SPIRONOLACTONE 100 MG/1
100 TABLET, FILM COATED ORAL DAILY
Qty: 90 TABLET | Refills: 1 | Status: CANCELLED | OUTPATIENT
Start: 2023-01-25

## 2023-01-25 RX ORDER — FLUOXETINE 40 MG/1
40 CAPSULE ORAL DAILY
Qty: 90 CAPSULE | Refills: 1 | Status: CANCELLED | OUTPATIENT
Start: 2023-01-25

## 2023-01-25 RX ORDER — SEMAGLUTIDE 1.34 MG/ML
1 INJECTION, SOLUTION SUBCUTANEOUS WEEKLY
Qty: 9 ML | Refills: 0 | Status: CANCELLED | OUTPATIENT
Start: 2023-01-25

## 2023-01-25 ASSESSMENT — PAIN SCALES - GENERAL: PAINLEVEL: NO PAIN (0)

## 2023-01-25 NOTE — H&P (VIEW-ONLY)
[facility]  [address]  [address]  [place] [address]  Phone: [phone]  Fax: [phone]  Primary Provider: Latrice Perez  Pre-op Performing Provider: MICHELET CROWE      PREOPERATIVE EVALUATION:  Today's date: 1/25/2023    Yuliya Li is a 52 year old female who presents for a preoperative evaluation.    Surgical Information:  Surgery/Procedure: Bilateral skin sparing mastectomies with left axillary sentinel lymph node biopsy, BILATERAL TISSUE EXPANDER OR, DIRECT SILICONE GEL IMPLANT BREAST RECONSTRUCTION  Surgery Location: [facility]  Surgeon: Clarissa Fontanez MD & Jaya Malave MD  Surgery Date: 2/3/23  Time of Surgery: 12:30pm  Where patient plans to recover: At home with family  Fax number for surgical facility: Note does not need to be faxed, will be available electronically in Epic.    Type of Anesthesia Anticipated: General    Assessment & Plan     The proposed surgical procedure is considered INTERMEDIATE risk.    Preop general physical exam  Examination performed for upcoming procedure 2/2/2023.     Malignant neoplasm of overlapping sites of left breast in female, estrogen receptor positive (H)  Followed by oncology, general, and plastic surgery.    Type 2 diabetes mellitus without complication, without long-term current use of insulin (H)  Controlled.  Hold metformin along with semaglutide the day of surgery.  Lab Results   Component Value Date    A1C 5.4 10/26/2022    A1C 5.9 03/18/2022    A1C 6.9 12/27/2021    A1C 6.9 01/12/2021     Major depressive disorder in full remission, unspecified whether recurrent (H)  Stable.  Continue with the fluoxetine 40mg daily.    Other acne  Stable.  Continue with medication    Last Comprehensive Metabolic Panel:  Sodium   Date Value Ref Range Status   01/10/2023 135 133 - 144 mmol/L Final     Potassium   Date Value Ref Range Status   01/10/2023 3.9 3.4 - 5.3 mmol/L Final     Chloride   Date  Value Ref Range Status   01/10/2023 102 94 - 109 mmol/L Final     Carbon Dioxide (CO2)   Date Value Ref Range Status   01/10/2023 26 20 - 32 mmol/L Final     Anion Gap   Date Value Ref Range Status   01/10/2023 7 3 - 14 mmol/L Final     Glucose   Date Value Ref Range Status   01/10/2023 100 (H) 70 - 99 mg/dL Final     Urea Nitrogen   Date Value Ref Range Status   01/10/2023 16 7 - 30 mg/dL Final     Creatinine   Date Value Ref Range Status   01/10/2023 0.91 0.52 - 1.04 mg/dL Final     GFR Estimate   Date Value Ref Range Status   01/10/2023 76 >60 mL/min/1.73m2 Final     Comment:     Effective December 21, 2021 eGFRcr in adults is calculated using the 2021 CKD-EPI creatinine equation which includes age and gender (Rigo et al., NEJ, DOI: 10.1056/ECNEwx5363032)   01/12/2021 >60 >60 mL/min/1.73m2 Final     Calcium   Date Value Ref Range Status   01/10/2023 9.3 8.5 - 10.1 mg/dL Final           Possible Sleep Apnea: 2  STOP-Bang Total Score 1/25/2023   Total Score 2   Risk Stratification 0 - 2: Low Risk for WESLEY       Risks and Recommendations:  The patient has the following additional risks and recommendations for perioperative complications:  Obstructive Sleep Apnea: Please note patient has WESLEY and uses CPAP.    Medication Instructions:   - Statins: Continue taking on the day of surgery.    - metformin: HOLD day of surgery.   - GLP-1 Injectable (exenitide, liraglutide, semaglutide, dulaglutide, etc.): HOLD day of surgery    - SSRIs, SNRIs, TCAs, Antipsychotics: Continue without modification.     RECOMMENDATION:  APPROVAL GIVEN to proceed with proposed procedure, without further diagnostic evaluation.      Subjective     HPI related to upcoming procedure:  Lump found on 12/22/2022 and scheduled a diagnostic mammogram.  Ultrasound and biopsy done.  Diagnosed with invasive ductal carcinoma.  Estrogen positive HER-2 negative.  Bilateral mastectomy and bilateral tissue expander.  Unsure regarding chemotherapy at this  point.    Preop Questions 1/25/2023   1. Have you ever had a heart attack or stroke? No   2. Have you ever had surgery on your heart or blood vessels, such as a stent placement, a coronary artery bypass, or surgery on an artery in your head, neck, heart, or legs? No   3. Do you have chest pain with activity? No   4. Do you have a history of  heart failure? No   5. Do you currently have a cold, bronchitis or symptoms of other infection? No   6. Do you have a cough, shortness of breath, or wheezing? No   7. Do you or anyone in your family have previous history of blood clots? No   8. Do you or does anyone in your family have a serious bleeding problem such as prolonged bleeding following surgeries or cuts? No   9. Have you ever had problems with anemia or been told to take iron pills? YES - At a young age.   10. Have you had any abnormal blood loss such as black, tarry or bloody stools, or abnormal vaginal bleeding? YES - due to chronic blood loss. Hysterectomy performed   11. Have you ever had a blood transfusion? No   12. Are you willing to have a blood transfusion if it is medically needed before, during, or after your surgery? Yes   13. Have you or any of your relatives ever had problems with anesthesia? No   14. Do you have sleep apnea, excessive snoring or daytime drowsiness? YES - Dx with sleep apnea.  Uses CPAP   14a. Do you have a CPAP machine? Yes   15. Do you have any artifical heart valves or other implanted medical devices like a pacemaker, defibrillator, or continuous glucose monitor? No   16. Do you have artificial joints? No   17. Are you allergic to latex? No   18. Is there any chance that you may be pregnant? No       Health Care Directive:  Patient does not have a Health Care Directive or Living Will: Patient states has Advance Directive and will bring in a copy to clinic.    Preoperative Review of :   reviewed - no record of controlled substances prescribed.      Status of Chronic  Conditions:  See problem list for active medical problems.  Problems all longstanding and stable, except as noted/documented.  See ROS for pertinent symptoms related to these conditions.      Review of Systems  CONSTITUTIONAL: NEGATIVE for fever, chills, change in weight  INTEGUMENTARY/SKIN: NEGATIVE for worrisome rashes, moles or lesions  EYES: NEGATIVE for vision changes or irritation  ENT/MOUTH: NEGATIVE for ear, mouth and throat problems  RESP: NEGATIVE for significant cough or SOB  CV: NEGATIVE for chest pain, palpitations or peripheral edema  GI: NEGATIVE for nausea, abdominal pain, heartburn, or change in bowel habits  : NEGATIVE for frequency, dysuria, or hematuria  MUSCULOSKELETAL: NEGATIVE for significant arthralgias or myalgia  NEURO: NEGATIVE for weakness, dizziness or paresthesias  ENDOCRINE: NEGATIVE for temperature intolerance, skin/hair changes  HEME: NEGATIVE for bleeding problems  PSYCHIATRIC: NEGATIVE for changes in mood or affect    Patient Active Problem List    Diagnosis Date Noted     Malignant neoplasm of overlapping sites of left breast in female, estrogen receptor positive (H) 01/25/2023     Priority: Medium     Diabetes mellitus, type 2 (H) 12/08/2021     Priority: Medium     Other acne 11/07/2019     Priority: Medium     Obesity 11/07/2019     Priority: Medium     Major depressive disorder in full remission, unspecified whether recurrent (H) 11/07/2019     Priority: Medium     Female stress incontinence 11/07/2019     Priority: Medium      No past medical history on file.  Past Surgical History:   Procedure Laterality Date     CYST REMOVAL       HYSTERECTOMY       HYSTERECTOMY, PAP NO LONGER INDICATED  2005    menorrhagia     Current Outpatient Medications   Medication Sig Dispense Refill     Cannabinoids (THC FREE PO) THC gummies       FLUoxetine (PROZAC) 40 MG capsule Take 1 capsule (40 mg) by mouth daily 90 capsule 1     MELATONIN PO        metFORMIN (GLUCOPHAGE XR) 500 MG 24 hr  tablet Take 1 tablet (500 mg) by mouth daily 90 tablet 3     OZEMPIC, 1 MG/DOSE, 4 MG/3ML SOPN INJECT 1 MG SUBCUTANEOUS ONCE A WEEK 9 mL 0     spironolactone (ALDACTONE) 100 MG tablet Take 1 tablet (100 mg) by mouth daily 90 tablet 1     alcohol swab prep pads Use to swab area of injection/amy as directed. (Patient not taking: Reported on 2023) 100 each 3     atorvastatin (LIPITOR) 10 MG tablet Take 1 tablet (10 mg) by mouth daily (Patient not taking: Reported on 2023) 90 tablet 3     blood glucose (NO BRAND SPECIFIED) test strip Use to test blood sugar 2 times daily or as directed. To accompany: Blood Glucose Monitor Brands: per insurance. (Patient not taking: Reported on 2023) 100 strip 6     blood glucose calibration (NO BRAND SPECIFIED) solution To accompany: Blood Glucose Monitor Brands: per insurance. (Patient not taking: Reported on 2023) 1 each 1     blood glucose monitoring (NO BRAND SPECIFIED) meter device kit Use to test blood sugar 2 times daily or as directed. Preferred blood glucose meter OR supplies to accompany: Blood Glucose Monitor Brands: per insurance. (Patient not taking: Reported on 2023) 1 kit 0     thin (NO BRAND SPECIFIED) lancets Use with lanceting device. To accompany: Blood Glucose Monitor Brands: per insurance. (Patient not taking: Reported on 2023) 100 each 6       Allergies   Allergen Reactions     Keflex [Cephalexin-Fd&C Yellow #6]      Tetracycline      Trazodone      Very sedating-        Social History     Tobacco Use     Smoking status: Former     Packs/day: 0.00     Types: Cigarettes     Quit date: 1999     Years since quittin.2     Smokeless tobacco: Never   Substance Use Topics     Alcohol use: Yes     Comment: Alcoholic Drinks/day: 1 a week     Family History   Problem Relation Age of Onset     Diabetes Mother      Atrial fibrillation Father      Alcoholism Father      Melanoma Father      Breast Cancer Maternal Aunt         in her  "40's     Cervical Cancer Maternal Aunt      Colon Cancer No family hx of      History   Drug Use     Types: Marijuana     Comment: nightly for sleep (gummy)         Objective     /66 (BP Location: Right arm, Cuff Size: Adult Large)   Pulse 87   Temp 97.5  F (36.4  C) (Tympanic)   Resp 18   Ht 1.746 m (5' 8.75\")   Wt 102.5 kg (225 lb 14.4 oz)   LMP  (LMP Unknown)   SpO2 96%   BMI 33.60 kg/m      Physical Exam    GENERAL APPEARANCE: healthy, alert and no distress     EYES: EOMI, PERRL     HENT: ear canals and TM's normal and nose and mouth without ulcers or lesions     NECK: no adenopathy, no asymmetry, masses, or scars and thyroid normal to palpation     RESP: lungs clear to auscultation - no rales, rhonchi or wheezes     CV: regular rates and rhythm, normal S1 S2, no S3 or S4 and no murmur, click or rub     ABDOMEN:  soft, nontender, no HSM or masses and bowel sounds normal     MS: extremities normal- no gross deformities noted, no evidence of inflammation in joints, FROM in all extremities.     SKIN: no suspicious lesions or rashes     NEURO: Normal strength and tone, sensory exam grossly normal, mentation intact and speech normal     PSYCH: mentation appears normal. and affect normal/bright     LYMPHATICS: No cervical adenopathy    Recent Labs   Lab Test 01/10/23  1020 01/10/23  1019 10/26/22  0958 03/18/22  1027   HGB  --  14.9  --   --    PLT  --  491*  --   --      --  138  --    POTASSIUM 3.9  --  4.2  --    CR 0.91  --  1.00  --    A1C  --   --  5.4 5.9*        Diagnostics:  No labs were ordered during this visit.   No EKG required, no history of coronary heart disease, significant arrhythmia, peripheral arterial disease or other structural heart disease.    Revised Cardiac Risk Index (RCRI):  The patient has the following serious cardiovascular risks for perioperative complications:   - No serious cardiac risks = 0 points     RCRI Interpretation: 0 points: Class I (very low risk - 0.4% " complication rate)           Signed Electronically by: Rosette Traylor NP  Copy of this evaluation report is provided to requesting physician.

## 2023-01-25 NOTE — PROGRESS NOTES
[facility]  [address]  [address]  [place] [address]  Phone: [phone]  Fax: [phone]  Primary Provider: Latrice Perez  Pre-op Performing Provider: MICHELET CROWE      PREOPERATIVE EVALUATION:  Today's date: 1/25/2023    Yuliya Li is a 52 year old female who presents for a preoperative evaluation.    Surgical Information:  Surgery/Procedure: Bilateral skin sparing mastectomies with left axillary sentinel lymph node biopsy, BILATERAL TISSUE EXPANDER OR, DIRECT SILICONE GEL IMPLANT BREAST RECONSTRUCTION  Surgery Location: [facility]  Surgeon: Clarissa Fontanez MD & Jaya Malave MD  Surgery Date: 2/3/23  Time of Surgery: 12:30pm  Where patient plans to recover: At home with family  Fax number for surgical facility: Note does not need to be faxed, will be available electronically in Epic.    Type of Anesthesia Anticipated: General    Assessment & Plan     The proposed surgical procedure is considered INTERMEDIATE risk.    Preop general physical exam  Examination performed for upcoming procedure 2/2/2023.     Malignant neoplasm of overlapping sites of left breast in female, estrogen receptor positive (H)  Followed by oncology, general, and plastic surgery.    Type 2 diabetes mellitus without complication, without long-term current use of insulin (H)  Controlled.  Hold metformin along with semaglutide the day of surgery.  Lab Results   Component Value Date    A1C 5.4 10/26/2022    A1C 5.9 03/18/2022    A1C 6.9 12/27/2021    A1C 6.9 01/12/2021     Major depressive disorder in full remission, unspecified whether recurrent (H)  Stable.  Continue with the fluoxetine 40mg daily.    Other acne  Stable.  Continue with medication    Last Comprehensive Metabolic Panel:  Sodium   Date Value Ref Range Status   01/10/2023 135 133 - 144 mmol/L Final     Potassium   Date Value Ref Range Status   01/10/2023 3.9 3.4 - 5.3 mmol/L Final     Chloride   Date  Value Ref Range Status   01/10/2023 102 94 - 109 mmol/L Final     Carbon Dioxide (CO2)   Date Value Ref Range Status   01/10/2023 26 20 - 32 mmol/L Final     Anion Gap   Date Value Ref Range Status   01/10/2023 7 3 - 14 mmol/L Final     Glucose   Date Value Ref Range Status   01/10/2023 100 (H) 70 - 99 mg/dL Final     Urea Nitrogen   Date Value Ref Range Status   01/10/2023 16 7 - 30 mg/dL Final     Creatinine   Date Value Ref Range Status   01/10/2023 0.91 0.52 - 1.04 mg/dL Final     GFR Estimate   Date Value Ref Range Status   01/10/2023 76 >60 mL/min/1.73m2 Final     Comment:     Effective December 21, 2021 eGFRcr in adults is calculated using the 2021 CKD-EPI creatinine equation which includes age and gender (Rigo et al., NEJ, DOI: 10.1056/UHLDiw2162085)   01/12/2021 >60 >60 mL/min/1.73m2 Final     Calcium   Date Value Ref Range Status   01/10/2023 9.3 8.5 - 10.1 mg/dL Final           Possible Sleep Apnea: 2  STOP-Bang Total Score 1/25/2023   Total Score 2   Risk Stratification 0 - 2: Low Risk for WESLEY       Risks and Recommendations:  The patient has the following additional risks and recommendations for perioperative complications:  Obstructive Sleep Apnea: Please note patient has WESLEY and uses CPAP.    Medication Instructions:   - Statins: Continue taking on the day of surgery.    - metformin: HOLD day of surgery.   - GLP-1 Injectable (exenitide, liraglutide, semaglutide, dulaglutide, etc.): HOLD day of surgery    - SSRIs, SNRIs, TCAs, Antipsychotics: Continue without modification.     RECOMMENDATION:  APPROVAL GIVEN to proceed with proposed procedure, without further diagnostic evaluation.      Subjective     HPI related to upcoming procedure:  Lump found on 12/22/2022 and scheduled a diagnostic mammogram.  Ultrasound and biopsy done.  Diagnosed with invasive ductal carcinoma.  Estrogen positive HER-2 negative.  Bilateral mastectomy and bilateral tissue expander.  Unsure regarding chemotherapy at this  point.    Preop Questions 1/25/2023   1. Have you ever had a heart attack or stroke? No   2. Have you ever had surgery on your heart or blood vessels, such as a stent placement, a coronary artery bypass, or surgery on an artery in your head, neck, heart, or legs? No   3. Do you have chest pain with activity? No   4. Do you have a history of  heart failure? No   5. Do you currently have a cold, bronchitis or symptoms of other infection? No   6. Do you have a cough, shortness of breath, or wheezing? No   7. Do you or anyone in your family have previous history of blood clots? No   8. Do you or does anyone in your family have a serious bleeding problem such as prolonged bleeding following surgeries or cuts? No   9. Have you ever had problems with anemia or been told to take iron pills? YES - At a young age.   10. Have you had any abnormal blood loss such as black, tarry or bloody stools, or abnormal vaginal bleeding? YES - due to chronic blood loss. Hysterectomy performed   11. Have you ever had a blood transfusion? No   12. Are you willing to have a blood transfusion if it is medically needed before, during, or after your surgery? Yes   13. Have you or any of your relatives ever had problems with anesthesia? No   14. Do you have sleep apnea, excessive snoring or daytime drowsiness? YES - Dx with sleep apnea.  Uses CPAP   14a. Do you have a CPAP machine? Yes   15. Do you have any artifical heart valves or other implanted medical devices like a pacemaker, defibrillator, or continuous glucose monitor? No   16. Do you have artificial joints? No   17. Are you allergic to latex? No   18. Is there any chance that you may be pregnant? No       Health Care Directive:  Patient does not have a Health Care Directive or Living Will: Patient states has Advance Directive and will bring in a copy to clinic.    Preoperative Review of :   reviewed - no record of controlled substances prescribed.      Status of Chronic  Conditions:  See problem list for active medical problems.  Problems all longstanding and stable, except as noted/documented.  See ROS for pertinent symptoms related to these conditions.      Review of Systems  CONSTITUTIONAL: NEGATIVE for fever, chills, change in weight  INTEGUMENTARY/SKIN: NEGATIVE for worrisome rashes, moles or lesions  EYES: NEGATIVE for vision changes or irritation  ENT/MOUTH: NEGATIVE for ear, mouth and throat problems  RESP: NEGATIVE for significant cough or SOB  CV: NEGATIVE for chest pain, palpitations or peripheral edema  GI: NEGATIVE for nausea, abdominal pain, heartburn, or change in bowel habits  : NEGATIVE for frequency, dysuria, or hematuria  MUSCULOSKELETAL: NEGATIVE for significant arthralgias or myalgia  NEURO: NEGATIVE for weakness, dizziness or paresthesias  ENDOCRINE: NEGATIVE for temperature intolerance, skin/hair changes  HEME: NEGATIVE for bleeding problems  PSYCHIATRIC: NEGATIVE for changes in mood or affect    Patient Active Problem List    Diagnosis Date Noted     Malignant neoplasm of overlapping sites of left breast in female, estrogen receptor positive (H) 01/25/2023     Priority: Medium     Diabetes mellitus, type 2 (H) 12/08/2021     Priority: Medium     Other acne 11/07/2019     Priority: Medium     Obesity 11/07/2019     Priority: Medium     Major depressive disorder in full remission, unspecified whether recurrent (H) 11/07/2019     Priority: Medium     Female stress incontinence 11/07/2019     Priority: Medium      No past medical history on file.  Past Surgical History:   Procedure Laterality Date     CYST REMOVAL       HYSTERECTOMY       HYSTERECTOMY, PAP NO LONGER INDICATED  2005    menorrhagia     Current Outpatient Medications   Medication Sig Dispense Refill     Cannabinoids (THC FREE PO) THC gummies       FLUoxetine (PROZAC) 40 MG capsule Take 1 capsule (40 mg) by mouth daily 90 capsule 1     MELATONIN PO        metFORMIN (GLUCOPHAGE XR) 500 MG 24 hr  tablet Take 1 tablet (500 mg) by mouth daily 90 tablet 3     OZEMPIC, 1 MG/DOSE, 4 MG/3ML SOPN INJECT 1 MG SUBCUTANEOUS ONCE A WEEK 9 mL 0     spironolactone (ALDACTONE) 100 MG tablet Take 1 tablet (100 mg) by mouth daily 90 tablet 1     alcohol swab prep pads Use to swab area of injection/amy as directed. (Patient not taking: Reported on 2023) 100 each 3     atorvastatin (LIPITOR) 10 MG tablet Take 1 tablet (10 mg) by mouth daily (Patient not taking: Reported on 2023) 90 tablet 3     blood glucose (NO BRAND SPECIFIED) test strip Use to test blood sugar 2 times daily or as directed. To accompany: Blood Glucose Monitor Brands: per insurance. (Patient not taking: Reported on 2023) 100 strip 6     blood glucose calibration (NO BRAND SPECIFIED) solution To accompany: Blood Glucose Monitor Brands: per insurance. (Patient not taking: Reported on 2023) 1 each 1     blood glucose monitoring (NO BRAND SPECIFIED) meter device kit Use to test blood sugar 2 times daily or as directed. Preferred blood glucose meter OR supplies to accompany: Blood Glucose Monitor Brands: per insurance. (Patient not taking: Reported on 2023) 1 kit 0     thin (NO BRAND SPECIFIED) lancets Use with lanceting device. To accompany: Blood Glucose Monitor Brands: per insurance. (Patient not taking: Reported on 2023) 100 each 6       Allergies   Allergen Reactions     Keflex [Cephalexin-Fd&C Yellow #6]      Tetracycline      Trazodone      Very sedating-        Social History     Tobacco Use     Smoking status: Former     Packs/day: 0.00     Types: Cigarettes     Quit date: 1999     Years since quittin.2     Smokeless tobacco: Never   Substance Use Topics     Alcohol use: Yes     Comment: Alcoholic Drinks/day: 1 a week     Family History   Problem Relation Age of Onset     Diabetes Mother      Atrial fibrillation Father      Alcoholism Father      Melanoma Father      Breast Cancer Maternal Aunt         in her  "40's     Cervical Cancer Maternal Aunt      Colon Cancer No family hx of      History   Drug Use     Types: Marijuana     Comment: nightly for sleep (gummy)         Objective     /66 (BP Location: Right arm, Cuff Size: Adult Large)   Pulse 87   Temp 97.5  F (36.4  C) (Tympanic)   Resp 18   Ht 1.746 m (5' 8.75\")   Wt 102.5 kg (225 lb 14.4 oz)   LMP  (LMP Unknown)   SpO2 96%   BMI 33.60 kg/m      Physical Exam    GENERAL APPEARANCE: healthy, alert and no distress     EYES: EOMI, PERRL     HENT: ear canals and TM's normal and nose and mouth without ulcers or lesions     NECK: no adenopathy, no asymmetry, masses, or scars and thyroid normal to palpation     RESP: lungs clear to auscultation - no rales, rhonchi or wheezes     CV: regular rates and rhythm, normal S1 S2, no S3 or S4 and no murmur, click or rub     ABDOMEN:  soft, nontender, no HSM or masses and bowel sounds normal     MS: extremities normal- no gross deformities noted, no evidence of inflammation in joints, FROM in all extremities.     SKIN: no suspicious lesions or rashes     NEURO: Normal strength and tone, sensory exam grossly normal, mentation intact and speech normal     PSYCH: mentation appears normal. and affect normal/bright     LYMPHATICS: No cervical adenopathy    Recent Labs   Lab Test 01/10/23  1020 01/10/23  1019 10/26/22  0958 03/18/22  1027   HGB  --  14.9  --   --    PLT  --  491*  --   --      --  138  --    POTASSIUM 3.9  --  4.2  --    CR 0.91  --  1.00  --    A1C  --   --  5.4 5.9*        Diagnostics:  No labs were ordered during this visit.   No EKG required, no history of coronary heart disease, significant arrhythmia, peripheral arterial disease or other structural heart disease.    Revised Cardiac Risk Index (RCRI):  The patient has the following serious cardiovascular risks for perioperative complications:   - No serious cardiac risks = 0 points     RCRI Interpretation: 0 points: Class I (very low risk - 0.4% " complication rate)           Signed Electronically by: Rosette Traylor NP  Copy of this evaluation report is provided to requesting physician.

## 2023-01-25 NOTE — PATIENT INSTRUCTIONS
Assessing Cancer Risk  Cancer is a common diagnosis which impacts many families.  Individuals may develop cancer due to environmental factors (such as exposures and lifestyle), aging, genetic predisposition, or a combination of these factors.      Only about 5-10% of cancers are thought to be due to an inherited cancer susceptibility gene.    These families often have:  Several people with the same or related types of cancer  Cancers diagnosed at a young age (before age 50)  Individuals with more than one primary cancer  Multiple generations of the family affected with cancer    Comprehensive Breast and Gynecologic Cancer Panel  We each inherit two copies of every gene in our bodies: one from our mother, and one from our father. Each gene has a specific job to do.  When a gene has a mistake or  mutation  in it, it does not work like it should.     Some people may be candidates for genetic testing of more than one gene.  For these families, genetic testing using a cancer panel may be offered. These panels will test different genes at once known to increase the risk for breast, ovarian, uterine, and/or other cancers.    This handout will review common hereditary breast and gynecologic cancer syndromes. The genes that will be discussed in this handout are: DIDI, BRCA1, BRCA2, BRIP1, CDH1, CHEK2, MLH1, MSH2, MSH6, PMS2, EPCAM, PTEN, PALB2, RAD51C, RAD51D, and TP53.    The purpose of this handout is to serve as a brief summary of the breast and gynecologic cancer risk genes that have published clinical management guidelines for individuals who are found to carry a mutation. Inheriting a mutation does not mean a person will develop cancer, but it does significantly increase their risk above the general population risk.     ______________________________________________________________________________    Hereditary Breast and Ovarian Cancer Syndrome (BRCA1 and BRCA2)  A single mutation in one of the copies of BRCA1 or  BRCA2 increases the risk for breast and ovarian cancer, among others.  The risk for pancreatic cancer and melanoma may also be slightly increased in some families.  The chart below shows the chance that someone with a BRCA mutation would develop cancer in his or her lifetime1,2,3,4.       Lifetime Cancer Risks    General Population BRCA1  BRCA2   Breast  12% >60% >60%   Ovarian  1-2% 39-58% 13-29%   Prostate 12% 7-26% 19-61%   Male Breast 0.1% 0.2-1.2% 1.8-7.1%   Pancreas 1-2% Up to 5% 5-10%     A person s ethnic background is also important to consider, as individuals of Ashkenazi Yazdanism ancestry have a higher chance of having a BRCA gene mutation.  There are three BRCA mutations that occur more frequently in this population.      Suarez Syndrome (MLH1, MSH2, MSH6, PMS2, and EPCAM)  Currently five genes are known to cause Suarez Syndrome: MLH1, MSH2, MSH6, PMS2, and EPCAM.  A single mutation in one of the Suarez Syndrome genes increases the risk for colon, endometrial, ovarian, and stomach cancers.  Other cancers that occur less commonly in Suarez Syndrome include urinary tract, skin, and brain cancers.  The chart below shows the chance that a person with Suarez syndrome would develop cancer in his or her lifetime5.      Lifetime Cancer Risks    General Population Suarez Syndrome   Colon 5% 10-61%   Endometrial 3% 13-57%   Ovarian 1-2% 1-38%   Stomach <1% 1-9%   *Cancer risk varies depending on Suarez syndrome gene found      Cowden Syndrome (PTEN)  Cowden syndrome is a hereditary condition that increases the risk for breast, thyroid, endometrial, colon, and kidney cancer.  Cowden syndrome is caused by a mutation in the PTEN gene.  A single mutation in one of the copies of PTEN causes Cowden syndrome and increases cancer risk.  The chart below shows the chance that someone with a PTEN mutation would develop cancer in their lifetime6,7.  Other benign features seen in some individuals with Cowden syndrome include benign  skin lesions (facial papules, keratoses, lipomas), learning disability, autism, thyroid nodules, colon polyps, and larger head size.     Lifetime Cancer Risks    General Population Cowden   Breast 12% 40-60%*   Thyroid 1% Up to 38%   Renal 1-2% Up to 35%   Endometrial 3% Up to 28%   Colon 5% Up to 9%   Melanoma 2-3% Up to 6%   *Emerging data suggests the risk for breast cancer could be greater than 60%               Li-Fraumeni Syndrome (TP53)  Li-Fraumeni Syndrome (LFS) is a cancer predisposition syndrome caused by a mutation in the TP53 gene. A single mutation in one of the copies of TP53 increases the risk for multiple cancers. Individuals with LFS are at an increased risk for developing cancer at a young age. The lifetime risk for development of a LFS-associated cancer is 50% by age 30 and 90% by age 60.   Core Cancers: Sarcomas, Breast, Brain, Lung, Leukemias/Lymphomas, Adrenocortical carcinomas  Other Cancers: Gastrointestinal, Thyroid, Skin, Genitourinary       Hereditary Diffuse Gastric Cancer (CDH1)  Currently, one gene is known to cause hereditary diffuse gastric cancer (HDGC): CDH1.  Individuals with HDGC are at increased risk for diffuse gastric cancer and lobular breast cancer. Of people diagnosed with HDGC, 30-50% have a mutation in the CDH1 gene.  This suggests there are likely other genes that may cause HDGC that have not been identified yet.      Lifetime Cancer Risks    General Population HDGC   Diffuse Gastric  <1% ~80%   Breast 12% 41-60%       Additional Genes    DIDI  DIDI is a moderate-risk breast cancer gene. Women who have a mutation in DIDI can have between a 2-4 fold increased risk for breast cancer compared to the general population8. DIDI mutations have also been associated with increased risk for pancreatic cancer between 5-10%9. Individuals who inherit two DIDI mutations have a condition called ataxia-telangiectasia (AT).  This rare autosomal recessive condition affects the nervous system  and immune system, and is associated with progressive cerebellar ataxia beginning in childhood. Individuals with ataxia-telangiectasia often have a weakened immune system and have an increased risk for childhood cancers.    PALB2  Mutations in PALB2 have been shown to increase the risk of breast cancer up to 41-60% in some families; where individuals fall within this risk range is dependent upon family [id number]. PALB2 mutations have also been associated with increased risk for pancreatic cancer between 5-10%.  Individuals who inherit two PALB2 mutations--one from their mother and one from their father--have a condition called Fanconi Anemia.  This rare autosomal recessive condition is associated with short stature, developmental delay, bone marrow failure, and increased risk for childhood cancers.    CHEK2   CHEK2 is a moderate-risk breast cancer gene.  Women who have a mutation in CHEK2 have around a 2-4 fold increased risk for breast cancer compared to the general population, and this risk may be higher depending upon family history.11,12,13 The risk of colon cancer may be twice as high as the general population risk of colon cancer of 5%. Mutations in CHEK2 have also been shown to increase the risk of other cancers, including prostate, however these cancer risks are currently not well understood.    BRIP1, RAD51C and RAD51D  Mutations in RAD51C and RAD51D have been shown to increase the risk of ovarian cancer and breast cancer 14,. Mutations in BRIP1 have been shown to increase the risk of ovarian cancer and possibly female breast cancer 15 .       Lifetime Cancer Risk    General Population        BRIP1   RAD51C  RAD51D   Breast 12% Not well defined 20-40% 20-40%   Ovarian 1-2% 5-15% 10-15% 10-20%     ______________________________________________________________  Inheritance  All of the cancer syndromes reviewed above are inherited in an autosomal dominant pattern.  This means that if a parent has a mutation,  each of their children will have a 50% chance of inheriting that same mutation. Therefore, each child --male or female-- would have a 50% chance of being at increased risk for developing cancer.    Image obtained from Genetics Home Reference, 2013     Mutations in some genes can occur de panfilo, which means that a person s mutation occurred for the first time in them and was not inherited from a parent.  Now that they have the mutation, however, it can be passed on to future generations.    Genetic Testing  Genetic testing involves a blood test and will look for any harmful mutations that are associated with increased cancer risk.  If possible, it is recommended that the person(s) who has had cancer be tested before other family members.  That person will give us the most useful information about whether or not a specific gene is associated with the cancer in the family.    Results  There are three possible results of genetic testing:  Positive--a harmful mutation was identified in one or more of the genes  Negative--no mutations were identified in any of the genes tested  Variant of unknown significance--a variation in one of the genes was identified, but it is unclear how this impacts cancer risk in the family    Advantages and Disadvantages   There are advantages and disadvantages to genetic testing.    Advantages  May clarify your cancer risk  Can help you make medical decisions  May explain the cancers in your family  May give useful information to your family members (if you share your results)    Disadvantages  Possible negative emotional impact of learning about inherited cancer risk  Uncertainty in interpreting a negative test result in some situations  Possible genetic discrimination concerns (see below)    Genetic Information Nondiscrimination Act (AMERICO)  The Genetic Information Nondiscrimination Act of 2008 (AMERICO) is a federal law that protects individuals from health insurance or employment discrimination  based on a genetic test result alone (with some exceptions, including employers with fewer than 15 employees, and ).  Although rare, AMERICO  does not cover discrimination protections in terms of life insurance, long term care, or disability insurances.  Visit the National Human Guides.co Research [place] website to learn more.    Reducing Cancer Risk  All of the genes described in this handout have nationally recognized cancer screening guidelines that would be recommended for individuals who test positive.  In addition to increased cancer screening, surgeries may be offered or recommended to reduce cancer risk.  Recommendations are based upon an individual s genetic test result as well as their personal and family history of cancer.    Questions to Think About Regarding Genetic Testing:  What effect will the test result have on me and my relationship with my family members if I have an inherited gene mutation?  If I don t have a gene mutation?  Should I share my test results, and how will my family react to this news, which may also affect them?  Are my children ready to learn new information that may one day affect their own health?    Hereditary Cancer Resources    FORCE: Facing Our Risk of Cancer Empowered facingourrisk.org   Bright Pink bebrightpink.org   Li-Fraumeni Syndrome Association lfsassociation.org   PTEN World PTENworld.com   No stomach for cancer, Inc. nostomachforcancer.org   Stomach cancer relief network Scrnet.org   Collaborative Group of the Americas on Inherited Colorectal Cancer (CGA) cgaicc.com    Cancer Care cancercare.org   American Cancer Society (ACS) cancer.org   National Cancer [place] (NCI) cancer.gov     Please call us if you have any questions or concerns.   Cancer Risk Management Program [phone]-CANCER ([phone])  John Lazaro, MS [facility]  [phone]  Suri Neely, MS, [facility] [phone]  Elayne Castrejon, MS, [facility] [place]  [phone]  Shannan Avilez, MS, [facility] [place]  [phone]  Bridgette Hamm,  MS, [facility] [place]  [phone]  Nalini Neumann, MS, [facility] [phone]  Angelica Aguilar, MS, [facility] [phone]    References  Estuardo Patterson PDP, Thierno S, Vicente MAIN, Nishi JE, Leonid JL, Raudel N, Fer H, Villa O, Crow A, Pasini B, Radicarie P, Manabhijeet S, Shirley DM, Cruz N, Carlos Manuel E, Rita H, Emlgoza E, Yina J, Grondawit J, Julius B, Tulinius H, Thorlacius S, Eerola H, Nevanlinna H, Martha K, Victor Hugo OP. Average risks of breast and ovarian cancer associated with BRCA1 or BRCA2 mutations detected in case series unselected for family history: a combined analysis of 222 studies. Am J Hum Erica. 2003;72:1117-30.  Jacob N, Naina M, Presley G.  BRCA1 and BRCA2 Hereditary Breast and Ovarian Cancer. Gene Reviews online. 2013.  Hakeem YC, Skip S, Bertram G, Looney S. Breast cancer risk among male BRCA1 and BRCA2 mutation carriers. J Natl Cancer Inst. 2007;99:1811-4.  Rafael MONTOYA, Katrina I, Chad J, Dontrell E, Yuni ER, Alexx F. Risk of breast cancer in male BRCA2 carriers. J Med Erica. 2010;47:710-1.  National Comprehensive Cancer Network. Clinical practice guidelines in oncology, colorectal cancer screening. Available online (registration required). 2015.  Christopher MH, Salomón J, Facundo J, Cruz WALTON, Cherrie MS, Eng C. Lifetime cancer risks in individuals with germline PTEN mutations. Clin Cancer Res. 2012;18:400-7.  Alivia R. Cowden Syndrome: A Critical Review of the Clinical Literature. J Erica . 2009:18:13-27.  Madeline FINE, Nikos MORALES, Unruly S, Jodee P, Law T, Elton M, Chris B, Lazaro H, Cara R, Salazar K, Annette L, Rafael MONTOYA, Shirley MORALES, Alber DF, Steven MR, The Breast Cancer Susceptibility Collaboration (UK) & Johnathan MCCARTY. DIDI mutations that cause ataxia-telangiectasia are breast cancer susceptibility alleles. Nature Genetics. 2006;38:873-875  Kevyn N , Sofia Y, Jessica J, Rocco L, Renetta MART , Mariaz ML, Torey S, Pepe AG, Bethany S, Praneeth ML, Scott J , Eddie R, Laina JOY, Lexus  JR, Margarita VE, Souleymane M, Vomeistein B, Trixie N, Kristian RH, Jamaal KW, and Dickson AP. DIDI mutations in patients with hereditary pancreatic cancer. Cancer Discover. 2012;2:41-46  Radha PIPER., et al. Breast-Cancer Risk in Families with Mutations in PALB2. NEJM. 2014; 371(6):497-506.  CHEK2 Breast Cancer Case-Control Consortium. CHEK2*1100delC and susceptibility to breast cancer: A collaborative analysis involving 10,860 breast cancer cases and 9,065 controls from 10 studies. Am J Hum Erica, 74 (2004), pp. 5035-5330  Ann Marie T, Marianne S, Shiloh K, et al. Spectrum of Mutations in BRCA1, BRCA2, CHEK2, and TP53 in Families at High Risk of Breast Cancer. MIRANDA. 2006;295(12):5903-1007.   Juan C, Demar D, Halley FINE, et al. Risk of breast cancer in women with a CHEK2 mutation with and without a family history of breast cancer. J Clin Oncol. 2011;29:7450-0860.  Song H, Giovannis E, Ramus SJ, et al. Contribution of germline mutations in the RAD51B, RAD51C, and RAD51D genes to ovarian cancer in the population. J Clin Oncol. 2015;33(26):3426-6350. Doi:10.1200/JCO.2015.61.2408.  Marion T, Robert DF, Yasmani P, et al. Mutations in BRIP1 confer high risk of ovarian cancer. Belen Erica. 2011;43(11):6882-8263. doi:10.1038/ng.955.

## 2023-01-25 NOTE — PATIENT INSTRUCTIONS
For informational purposes only. Not to replace the advice of your health care provider. Copyright   2003,  [facility]. All rights reserved. Clinically reviewed by Christel Shafer MD. "[employer] [id number] - REV .  Preparing for Your Surgery  Getting started  A nurse will call you to review your health history and instructions. They will give you an arrival time based on your scheduled surgery time. Please be ready to share:    Your doctor's clinic name and phone number    Your medical, surgical, and anesthesia history    A list of allergies and sensitivities    A list of medicines, including herbal treatments and over-the-counter drugs    Whether the patient has a legal guardian (ask how to send us the papers in advance)  Please tell us if you're pregnant--or if there's any chance you might be pregnant. Some surgeries may injure a fetus (unborn baby), so they require a pregnancy test. Surgeries that are safe for a fetus don't always need a test, and you can choose whether to have one.   If you have a child who's having surgery, please ask for a copy of Preparing for Your Child's Surgery.    Preparing for surgery    Within 10 to 30 days of surgery: Have a pre-op exam (sometimes called an H&P, or History and Physical). This can be done at a clinic or pre-operative center.  ? If you're having a , you may not need this exam. Talk to your care team.    At your pre-op exam, talk to your care team about all medicines you take. If you need to stop any medicines before surgery, ask when to start taking them again.  ? We do this for your safety. Many medicines can make you bleed too much during surgery. Some change how well surgery (anesthesia) drugs work.    Call your insurance company to let them know you're having surgery. (If you don't have insurance, call [phone].)    Call your clinic if there's any change in your health. This includes signs of a cold or flu (sore throat, runny nose,  cough, rash, fever). It also includes a scrape or scratch near the surgery site.    If you have questions on the day of surgery, call your hospital or surgery center.  Eating and drinking guidelines  For your safety: Unless your surgeon tells you otherwise, follow the guidelines below.    Eat and drink as usual until 8 hours before you arrive for surgery. After that, no food or milk.    Drink clear liquids until 2 hours before you arrive. These are liquids you can see through, like water, Gatorade, and Propel Water. They also include plain black coffee and tea (no cream or milk), candy, and breath mints. You can spit out gum when you arrive.    If you drink alcohol: Stop drinking it the night before surgery.    If your care team tells you to take medicine on the morning of surgery, it's okay to take it with a sip of water.  Preventing infection    Shower or bathe the night before and morning of your surgery. Follow the instructions your clinic gave you. (If no instructions, use regular soap.)    Don't shave or clip hair near your surgery site. We'll remove the hair if needed.    Don't smoke or vape the morning of surgery. You may chew nicotine gum up to 2 hours before surgery. A nicotine patch is okay.  ? Note: Some surgeries require you to completely quit smoking and nicotine. Check with your surgeon.    Your care team will make every effort to keep you safe from infection. We will:  ? Clean our hands often with soap and water (or an alcohol-based hand rub).  ? Clean the skin at your surgery site with a special soap that kills germs.  ? Give you a special gown to keep you warm. (Cold raises the risk of infection.)  ? Wear special hair covers, masks, gowns and gloves during surgery.  ? Give antibiotic medicine, if prescribed. Not all surgeries need antibiotics.  What to bring on the day of surgery    Photo ID and insurance card    Copy of your health care directive, if you have one    Glasses and hearing aids (bring  cases)  ? You can't wear contacts during surgery    Inhaler and eye drops, if you use them (tell us about these when you arrive)    CPAP machine or breathing device, if you use them    A few personal items, if spending the night    If you have . . .  ? A pacemaker, ICD (cardiac defibrillator) or other implant: Bring the ID card.  ? An implanted stimulator: Bring the remote control.  ? A legal guardian: Bring a copy of the certified (court-stamped) guardianship papers.  Please remove any jewelry, including body piercings. Leave jewelry and other valuables at home.  If you're going home the day of surgery    You must have a responsible adult drive you home. They should stay with you overnight as well.    If you don't have someone to stay with you, and you aren't safe to go home alone, we may keep you overnight. Insurance often won't pay for this.  After surgery  If it's hard to control your pain or you need more pain medicine, please call your surgeon's office.  Questions?   If you have any questions for your care team, list them here: _________________________________________________________________________________________________________________________________________________________________________ ____________________________________ ____________________________________ ____________________________________

## 2023-01-27 ENCOUNTER — MEDICAL CORRESPONDENCE (OUTPATIENT)
Dept: HEALTH INFORMATION MANAGEMENT | Facility: CLINIC | Age: 53
End: 2023-01-27
Payer: COMMERCIAL

## 2023-01-27 ENCOUNTER — TELEPHONE (OUTPATIENT)
Dept: SURGERY | Facility: CLINIC | Age: 53
End: 2023-01-27
Payer: COMMERCIAL

## 2023-01-27 NOTE — TELEPHONE ENCOUNTER
Breast Care Nurse Coordination:      Preoperative call placed to Yuliya today to assess her needs, and check in on whether she has any questions or concerns regarding her upcoming breast surgery.    Patient was recently diagnosed with left breast cancer and is scheduled to have bilateral mastectomies with left sentinel lymph node biopsy and reconstruction by Dr. Fontanez & Dr. Dumas on 2/3/23 at the [facility].    Yuliya states she has had a preop physical exam with her primary provider Rosette Traylor NP on 1/25/23. We discussed post mastectomy garments, and where to get them. Prescription to [facility] faxed. I informed patient to bring one of the post mastectomy garments with her to the hospital the day of surgery.  We discussed the day of surgery and what to expect the days and weeks following.  I informed patient to wear loose, comfortable fitted clothing.     Yuliya is scheduled for a post op with Dr. Fontanez on 2/17/23 @ 11:45am at [facility] [place]. Address & Directions provided.     I answered all of patient's questions completely and thoroughly to her satisfaction. I informed patient that I will reach out to her next week to check in with her, or she can always call me back with any questions or concerns.  Patient verbalized understanding and agrees with the plan of care.      Concepcion Corea 1/27/2023 12:24 PM    Concepcion Corea, RN, BSN, PHN  Breast Care Nurse Coordinator  [facility] [place]  [phone]

## 2023-01-29 ENCOUNTER — HEALTH MAINTENANCE LETTER (OUTPATIENT)
Age: 53
End: 2023-01-29

## 2023-02-02 RX ORDER — ACETAMINOPHEN 500 MG
500-1000 TABLET ORAL EVERY 6 HOURS PRN
COMMUNITY
End: 2023-11-01

## 2023-02-02 RX ORDER — SEMAGLUTIDE 1.34 MG/ML
1 INJECTION, SOLUTION SUBCUTANEOUS
COMMUNITY
End: 2023-02-20

## 2023-02-02 NOTE — PROGRESS NOTES
PTA medications updated by Medication Scribe prior to surgery via phone call with patient (last doses completed by Nurse)     Medication history sources: Patient, Surescripts and H&P  In the past week, patient estimated taking medication this percent of the time: Greater than 90%  Adherence assessment: N/A Not Observed    Significant changes made to the medication list:  Patient reports no longer taking the following meds (med scribe removed from PTA med list): Atrovastatin       Additional medication history information:   None    Medication reconciliation completed by provider prior to medication history? No    Time spent in this activity: 30 minutes    The information provided in this note is only as accurate as the sources available at the time of update(s)    Prior to Admission medications    Medication Sig Last Dose Taking? Auth Provider Long Term End Date   acetaminophen (TYLENOL) 500 MG tablet Take 500-1,000 mg by mouth every 6 hours as needed for mild pain Unknown at prn Yes Reported, Patient     FLUoxetine (PROZAC) 40 MG capsule Take 1 capsule (40 mg) by mouth daily 2/2/2023 at pm Yes Jazzmine Yarbrough MD Yes    medical cannabis (Patient's own supply) Take 1 Dose by mouth nightly as needed Gummies: 5 mg   Formulary: Red  Location: Unknown    (The purpose of this order is to document that the patient reports taking medical cannabis.  This is not a prescription, and is not used to certify that the patient has a qualifying medical condition.) 2/1/2023 at pm Yes Reported, Patient     MELATONIN PO Take 1 chew tab by mouth nightly as needed 2/1/2023 at pm Yes Reported, Patient     metFORMIN (GLUCOPHAGE XR) 500 MG 24 hr tablet Take 1 tablet (500 mg) by mouth daily 2/2/2023 at pm Yes Jazzmine Yarbrough MD Yes    Semaglutide, 1 MG/DOSE, (OZEMPIC, 1 MG/DOSE,) 4 MG/3ML pen Inject 1 mg Subcutaneous every 7 days (On Fridays) 1/27/2023 at am Yes Reported, Patient     spironolactone (ALDACTONE) 100 MG tablet Take  1 tablet (100 mg) by mouth daily 2/2/2023 at pm Yes Jazzmine Yarbrough MD Yes    alcohol swab prep pads Use to swab area of injection/amy as directed.  Patient not taking: Reported on 1/25/2023   Flakita Salas APRN CNP     blood glucose (NO BRAND SPECIFIED) test strip Use to test blood sugar 2 times daily or as directed. To accompany: Blood Glucose Monitor Brands: per insurance.  Patient not taking: Reported on 1/25/2023   Jazzmine Yarbrough MD     blood glucose calibration (NO BRAND SPECIFIED) solution To accompany: Blood Glucose Monitor Brands: per insurance.  Patient not taking: Reported on 1/25/2023   Jazzmine Yarbrough MD     blood glucose monitoring (NO BRAND SPECIFIED) meter device kit Use to test blood sugar 2 times daily or as directed. Preferred blood glucose meter OR supplies to accompany: Blood Glucose Monitor Brands: per insurance.  Patient not taking: Reported on 1/25/2023   Jazzmine Yarbrough MD     thin (NO BRAND SPECIFIED) lancets Use with lanceting device. To accompany: Blood Glucose Monitor Brands: per insurance.  Patient not taking: Reported on 1/25/2023   Jazzmine Yarbrough MD       Medication history completed by:    Sergey Byrne CPhT  Medication [facility]

## 2023-02-03 ENCOUNTER — OFFICE VISIT (OUTPATIENT)
Dept: SURGERY | Facility: PHYSICIAN GROUP | Age: 53
End: 2023-02-03
Payer: COMMERCIAL

## 2023-02-03 ENCOUNTER — HOSPITAL ENCOUNTER (OUTPATIENT)
Dept: NUCLEAR MEDICINE | Facility: CLINIC | Age: 53
Setting detail: OBSERVATION
Discharge: HOME | End: 2023-02-03
Attending: SURGERY | Admitting: SURGERY
Payer: COMMERCIAL

## 2023-02-03 ENCOUNTER — HOSPITAL ENCOUNTER (OUTPATIENT)
Facility: CLINIC | Age: 53
Discharge: HOME | End: 2023-02-04
Attending: SURGERY | Admitting: PLASTIC SURGERY
Payer: COMMERCIAL

## 2023-02-03 ENCOUNTER — ANESTHESIA EVENT (OUTPATIENT)
Dept: SURGERY | Facility: CLINIC | Age: 53
End: 2023-02-03
Payer: COMMERCIAL

## 2023-02-03 ENCOUNTER — ANESTHESIA (OUTPATIENT)
Dept: SURGERY | Facility: CLINIC | Age: 53
End: 2023-02-03
Payer: COMMERCIAL

## 2023-02-03 DIAGNOSIS — Z53.9 ERRONEOUS ENCOUNTER--DISREGARD: Primary | ICD-10-CM

## 2023-02-03 DIAGNOSIS — Z17.0 MALIGNANT NEOPLASM OF OVERLAPPING SITES OF LEFT BREAST IN FEMALE, ESTROGEN RECEPTOR POSITIVE (H): Primary | ICD-10-CM

## 2023-02-03 DIAGNOSIS — C50.812 MALIGNANT NEOPLASM OF OVERLAPPING SITES OF LEFT BREAST IN FEMALE, ESTROGEN RECEPTOR POSITIVE (H): ICD-10-CM

## 2023-02-03 DIAGNOSIS — Z17.0 MALIGNANT NEOPLASM OF OVERLAPPING SITES OF LEFT BREAST IN FEMALE, ESTROGEN RECEPTOR POSITIVE (H): ICD-10-CM

## 2023-02-03 DIAGNOSIS — C50.812 MALIGNANT NEOPLASM OF OVERLAPPING SITES OF LEFT BREAST IN FEMALE, ESTROGEN RECEPTOR POSITIVE (H): Primary | ICD-10-CM

## 2023-02-03 DIAGNOSIS — C50.512 MALIGNANT NEOPLASM OF LOWER-OUTER QUADRANT OF LEFT FEMALE BREAST, UNSPECIFIED ESTROGEN RECEPTOR STATUS (H): ICD-10-CM

## 2023-02-03 LAB
FASTING STATUS PATIENT QL REPORTED: YES
GLUCOSE BLDC GLUCOMTR-MCNC: 120 MG/DL (ref 70–99)
GLUCOSE SERPL-MCNC: 85 MG/DL (ref 70–99)
POTASSIUM SERPL-SCNC: 4.1 MMOL/L (ref 3.4–5.3)

## 2023-02-03 PROCEDURE — 88307 TISSUE EXAM BY PATHOLOGIST: CPT | Mod: 26 | Performed by: STUDENT IN AN ORGANIZED HEALTH CARE EDUCATION/TRAINING PROGRAM

## 2023-02-03 PROCEDURE — 84132 ASSAY OF SERUM POTASSIUM: CPT | Performed by: ANESTHESIOLOGY

## 2023-02-03 PROCEDURE — 360N000076 HC SURGERY LEVEL 3, PER MIN: Performed by: SURGERY

## 2023-02-03 PROCEDURE — 250N000011 HC RX IP 250 OP 636: Performed by: ANESTHESIOLOGY

## 2023-02-03 PROCEDURE — 258N000003 HC RX IP 258 OP 636: Performed by: ANESTHESIOLOGY

## 2023-02-03 PROCEDURE — 272N000001 HC OR GENERAL SUPPLY STERILE: Performed by: SURGERY

## 2023-02-03 PROCEDURE — 82565 ASSAY OF CREATININE: CPT

## 2023-02-03 PROCEDURE — 250N000011 HC RX IP 250 OP 636: Performed by: NURSE ANESTHETIST, CERTIFIED REGISTERED

## 2023-02-03 PROCEDURE — 88307 TISSUE EXAM BY PATHOLOGIST: CPT | Mod: TC | Performed by: SURGERY

## 2023-02-03 PROCEDURE — 82947 ASSAY GLUCOSE BLOOD QUANT: CPT | Performed by: ANESTHESIOLOGY

## 2023-02-03 PROCEDURE — 250N000009 HC RX 250: Performed by: ANESTHESIOLOGY

## 2023-02-03 PROCEDURE — 370N000017 HC ANESTHESIA TECHNICAL FEE, PER MIN: Performed by: SURGERY

## 2023-02-03 PROCEDURE — 88305 TISSUE EXAM BY PATHOLOGIST: CPT | Mod: TC | Performed by: SURGERY

## 2023-02-03 PROCEDURE — L8699 PROSTHETIC IMPLANT NOS: HCPCS | Performed by: SURGERY

## 2023-02-03 PROCEDURE — 82962 GLUCOSE BLOOD TEST: CPT

## 2023-02-03 PROCEDURE — 38525 BIOPSY/REMOVAL LYMPH NODES: CPT | Mod: LT | Performed by: SURGERY

## 2023-02-03 PROCEDURE — 19303 MAST SIMPLE COMPLETE: CPT | Mod: 50 | Performed by: SURGERY

## 2023-02-03 PROCEDURE — 250N000009 HC RX 250: Performed by: NURSE ANESTHETIST, CERTIFIED REGISTERED

## 2023-02-03 PROCEDURE — 38792 RA TRACER ID OF SENTINL NODE: CPT

## 2023-02-03 PROCEDURE — 88342 IMHCHEM/IMCYTCHM 1ST ANTB: CPT | Mod: 26 | Performed by: STUDENT IN AN ORGANIZED HEALTH CARE EDUCATION/TRAINING PROGRAM

## 2023-02-03 PROCEDURE — 88305 TISSUE EXAM BY PATHOLOGIST: CPT | Mod: 26 | Performed by: STUDENT IN AN ORGANIZED HEALTH CARE EDUCATION/TRAINING PROGRAM

## 2023-02-03 PROCEDURE — 250N000011 HC RX IP 250 OP 636

## 2023-02-03 PROCEDURE — 88377 M/PHMTRC ALYS ISHQUANT/SEMIQ: CPT | Performed by: SURGERY

## 2023-02-03 PROCEDURE — 19303 MAST SIMPLE COMPLETE: CPT | Mod: AS | Performed by: PHYSICIAN ASSISTANT

## 2023-02-03 PROCEDURE — 36415 COLL VENOUS BLD VENIPUNCTURE: CPT | Performed by: ANESTHESIOLOGY

## 2023-02-03 PROCEDURE — 88377 M/PHMTRC ALYS ISHQUANT/SEMIQ: CPT | Mod: 26 | Performed by: MEDICAL GENETICS

## 2023-02-03 PROCEDURE — 88360 TUMOR IMMUNOHISTOCHEM/MANUAL: CPT | Mod: 26 | Performed by: STUDENT IN AN ORGANIZED HEALTH CARE EDUCATION/TRAINING PROGRAM

## 2023-02-03 PROCEDURE — 250N000025 HC SEVOFLURANE, PER MIN: Performed by: SURGERY

## 2023-02-03 PROCEDURE — 999N000141 HC STATISTIC PRE-PROCEDURE NURSING ASSESSMENT: Performed by: SURGERY

## 2023-02-03 PROCEDURE — 250N000013 HC RX MED GY IP 250 OP 250 PS 637

## 2023-02-03 PROCEDURE — 710N000009 HC RECOVERY PHASE 1, LEVEL 1, PER MIN: Performed by: SURGERY

## 2023-02-03 PROCEDURE — 258N000003 HC RX IP 258 OP 636: Performed by: NURSE ANESTHETIST, CERTIFIED REGISTERED

## 2023-02-03 PROCEDURE — 250N000009 HC RX 250: Performed by: SURGERY

## 2023-02-03 DEVICE — NATRELLE TE SMOOTH 133S-FX-14-T (US)
Type: IMPLANTABLE DEVICE | Site: BREAST | Status: FUNCTIONAL
Brand: NATRELLE 133S TISSUE EXPANDERS

## 2023-02-03 RX ORDER — ACETAMINOPHEN 325 MG/1
975 TABLET ORAL EVERY 8 HOURS
Status: DISCONTINUED | OUTPATIENT
Start: 2023-02-03 | End: 2023-02-04 | Stop reason: HOSPADM

## 2023-02-03 RX ORDER — BISACODYL 10 MG
10 SUPPOSITORY, RECTAL RECTAL DAILY PRN
Status: DISCONTINUED | OUTPATIENT
Start: 2023-02-03 | End: 2023-02-04 | Stop reason: HOSPADM

## 2023-02-03 RX ORDER — ONDANSETRON 4 MG/1
4 TABLET, ORALLY DISINTEGRATING ORAL EVERY 30 MIN PRN
Status: DISCONTINUED | OUTPATIENT
Start: 2023-02-03 | End: 2023-02-03 | Stop reason: HOSPADM

## 2023-02-03 RX ORDER — AMOXICILLIN 250 MG
1 CAPSULE ORAL 2 TIMES DAILY
Status: DISCONTINUED | OUTPATIENT
Start: 2023-02-03 | End: 2023-02-04 | Stop reason: HOSPADM

## 2023-02-03 RX ORDER — FENTANYL CITRATE 0.05 MG/ML
50 INJECTION, SOLUTION INTRAMUSCULAR; INTRAVENOUS EVERY 5 MIN PRN
Status: DISCONTINUED | OUTPATIENT
Start: 2023-02-03 | End: 2023-02-03 | Stop reason: HOSPADM

## 2023-02-03 RX ORDER — SODIUM CHLORIDE, SODIUM LACTATE, POTASSIUM CHLORIDE, CALCIUM CHLORIDE 600; 310; 30; 20 MG/100ML; MG/100ML; MG/100ML; MG/100ML
INJECTION, SOLUTION INTRAVENOUS CONTINUOUS
Status: DISCONTINUED | OUTPATIENT
Start: 2023-02-03 | End: 2023-02-03 | Stop reason: HOSPADM

## 2023-02-03 RX ORDER — OXYCODONE HYDROCHLORIDE 5 MG/1
5 TABLET ORAL EVERY 4 HOURS PRN
Status: DISCONTINUED | OUTPATIENT
Start: 2023-02-03 | End: 2023-02-04 | Stop reason: HOSPADM

## 2023-02-03 RX ORDER — ONDANSETRON 2 MG/ML
INJECTION INTRAMUSCULAR; INTRAVENOUS PRN
Status: DISCONTINUED | OUTPATIENT
Start: 2023-02-03 | End: 2023-02-03

## 2023-02-03 RX ORDER — BUPIVACAINE HYDROCHLORIDE AND EPINEPHRINE 2.5; 5 MG/ML; UG/ML
INJECTION, SOLUTION INFILTRATION; PERINEURAL
Status: COMPLETED | OUTPATIENT
Start: 2023-02-03 | End: 2023-02-03

## 2023-02-03 RX ORDER — OXYCODONE HYDROCHLORIDE 5 MG/1
5 TABLET ORAL EVERY 6 HOURS PRN
Qty: 24 TABLET | Refills: 0 | Status: SHIPPED | OUTPATIENT
Start: 2023-02-03 | End: 2023-05-10

## 2023-02-03 RX ORDER — LIDOCAINE HYDROCHLORIDE 20 MG/ML
INJECTION, SOLUTION INFILTRATION; PERINEURAL PRN
Status: DISCONTINUED | OUTPATIENT
Start: 2023-02-03 | End: 2023-02-03

## 2023-02-03 RX ORDER — CEFAZOLIN SODIUM/WATER 2 G/20 ML
2 SYRINGE (ML) INTRAVENOUS SEE ADMIN INSTRUCTIONS
Status: DISCONTINUED | OUTPATIENT
Start: 2023-02-03 | End: 2023-02-03 | Stop reason: HOSPADM

## 2023-02-03 RX ORDER — LABETALOL HYDROCHLORIDE 5 MG/ML
5 INJECTION, SOLUTION INTRAVENOUS
Status: DISCONTINUED | OUTPATIENT
Start: 2023-02-03 | End: 2023-02-03 | Stop reason: HOSPADM

## 2023-02-03 RX ORDER — POLYETHYLENE GLYCOL 3350 17 G/17G
17 POWDER, FOR SOLUTION ORAL DAILY
Status: DISCONTINUED | OUTPATIENT
Start: 2023-02-04 | End: 2023-02-04 | Stop reason: HOSPADM

## 2023-02-03 RX ORDER — DEXAMETHASONE SODIUM PHOSPHATE 4 MG/ML
INJECTION, SOLUTION INTRA-ARTICULAR; INTRALESIONAL; INTRAMUSCULAR; INTRAVENOUS; SOFT TISSUE PRN
Status: DISCONTINUED | OUTPATIENT
Start: 2023-02-03 | End: 2023-02-03

## 2023-02-03 RX ORDER — NALOXONE HYDROCHLORIDE 0.4 MG/ML
0.4 INJECTION, SOLUTION INTRAMUSCULAR; INTRAVENOUS; SUBCUTANEOUS
Status: DISCONTINUED | OUTPATIENT
Start: 2023-02-03 | End: 2023-02-04 | Stop reason: HOSPADM

## 2023-02-03 RX ORDER — IBUPROFEN 600 MG/1
600 TABLET, FILM COATED ORAL EVERY 6 HOURS PRN
Status: DISCONTINUED | OUTPATIENT
Start: 2023-02-03 | End: 2023-02-04 | Stop reason: HOSPADM

## 2023-02-03 RX ORDER — HYDROMORPHONE HCL IN WATER/PF 6 MG/30 ML
0.2 PATIENT CONTROLLED ANALGESIA SYRINGE INTRAVENOUS
Status: DISCONTINUED | OUTPATIENT
Start: 2023-02-03 | End: 2023-02-04 | Stop reason: HOSPADM

## 2023-02-03 RX ORDER — PROPOFOL 10 MG/ML
INJECTION, EMULSION INTRAVENOUS PRN
Status: DISCONTINUED | OUTPATIENT
Start: 2023-02-03 | End: 2023-02-03

## 2023-02-03 RX ORDER — HYDROMORPHONE HCL IN WATER/PF 6 MG/30 ML
0.4 PATIENT CONTROLLED ANALGESIA SYRINGE INTRAVENOUS
Status: DISCONTINUED | OUTPATIENT
Start: 2023-02-03 | End: 2023-02-04 | Stop reason: HOSPADM

## 2023-02-03 RX ORDER — HYDROMORPHONE HYDROCHLORIDE 1 MG/ML
INJECTION, SOLUTION INTRAMUSCULAR; INTRAVENOUS; SUBCUTANEOUS PRN
Status: DISCONTINUED | OUTPATIENT
Start: 2023-02-03 | End: 2023-02-03

## 2023-02-03 RX ORDER — PROCHLORPERAZINE MALEATE 10 MG
10 TABLET ORAL EVERY 6 HOURS PRN
Status: DISCONTINUED | OUTPATIENT
Start: 2023-02-03 | End: 2023-02-04 | Stop reason: HOSPADM

## 2023-02-03 RX ORDER — OXYCODONE HYDROCHLORIDE 5 MG/1
10 TABLET ORAL EVERY 4 HOURS PRN
Status: DISCONTINUED | OUTPATIENT
Start: 2023-02-03 | End: 2023-02-03 | Stop reason: HOSPADM

## 2023-02-03 RX ORDER — FENTANYL CITRATE 0.05 MG/ML
25 INJECTION, SOLUTION INTRAMUSCULAR; INTRAVENOUS EVERY 5 MIN PRN
Status: DISCONTINUED | OUTPATIENT
Start: 2023-02-03 | End: 2023-02-03 | Stop reason: HOSPADM

## 2023-02-03 RX ORDER — CEFAZOLIN SODIUM/WATER 2 G/20 ML
2 SYRINGE (ML) INTRAVENOUS
Status: DISCONTINUED | OUTPATIENT
Start: 2023-02-03 | End: 2023-02-03 | Stop reason: HOSPADM

## 2023-02-03 RX ORDER — CLINDAMYCIN PHOSPHATE 900 MG/50ML
900 INJECTION, SOLUTION INTRAVENOUS
Status: COMPLETED | OUTPATIENT
Start: 2023-02-03 | End: 2023-02-03

## 2023-02-03 RX ORDER — OXYCODONE HYDROCHLORIDE 5 MG/1
10 TABLET ORAL EVERY 4 HOURS PRN
Status: DISCONTINUED | OUTPATIENT
Start: 2023-02-03 | End: 2023-02-04 | Stop reason: HOSPADM

## 2023-02-03 RX ORDER — BUPIVACAINE HYDROCHLORIDE 5 MG/ML
INJECTION, SOLUTION EPIDURAL; INTRACAUDAL
Status: DISCONTINUED
Start: 2023-02-03 | End: 2023-02-03 | Stop reason: HOSPADM

## 2023-02-03 RX ORDER — OXYCODONE HYDROCHLORIDE 5 MG/1
5 TABLET ORAL EVERY 4 HOURS PRN
Status: DISCONTINUED | OUTPATIENT
Start: 2023-02-03 | End: 2023-02-03 | Stop reason: HOSPADM

## 2023-02-03 RX ORDER — MAGNESIUM HYDROXIDE 1200 MG/15ML
LIQUID ORAL PRN
Status: DISCONTINUED | OUTPATIENT
Start: 2023-02-03 | End: 2023-02-03 | Stop reason: HOSPADM

## 2023-02-03 RX ORDER — CLINDAMYCIN PHOSPHATE 900 MG/50ML
900 INJECTION, SOLUTION INTRAVENOUS SEE ADMIN INSTRUCTIONS
Status: DISCONTINUED | OUTPATIENT
Start: 2023-02-03 | End: 2023-02-03 | Stop reason: HOSPADM

## 2023-02-03 RX ORDER — SPIRONOLACTONE 25 MG/1
100 TABLET ORAL DAILY
Status: DISCONTINUED | OUTPATIENT
Start: 2023-02-03 | End: 2023-02-04 | Stop reason: HOSPADM

## 2023-02-03 RX ORDER — METHOCARBAMOL 750 MG/1
750 TABLET, FILM COATED ORAL EVERY 6 HOURS PRN
Qty: 30 TABLET | Refills: 0 | Status: SHIPPED | OUTPATIENT
Start: 2023-02-03 | End: 2023-02-17

## 2023-02-03 RX ORDER — PROPOFOL 10 MG/ML
INJECTION, EMULSION INTRAVENOUS CONTINUOUS PRN
Status: DISCONTINUED | OUTPATIENT
Start: 2023-02-03 | End: 2023-02-03

## 2023-02-03 RX ORDER — CLINDAMYCIN HCL 300 MG
300 CAPSULE ORAL 3 TIMES DAILY
Qty: 21 CAPSULE | Refills: 0 | Status: SHIPPED | OUTPATIENT
Start: 2023-02-03 | End: 2023-05-10

## 2023-02-03 RX ORDER — ONDANSETRON 2 MG/ML
4 INJECTION INTRAMUSCULAR; INTRAVENOUS EVERY 30 MIN PRN
Status: DISCONTINUED | OUTPATIENT
Start: 2023-02-03 | End: 2023-02-03 | Stop reason: HOSPADM

## 2023-02-03 RX ORDER — MEPERIDINE HYDROCHLORIDE 25 MG/ML
12.5 INJECTION INTRAMUSCULAR; INTRAVENOUS; SUBCUTANEOUS EVERY 5 MIN PRN
Status: DISCONTINUED | OUTPATIENT
Start: 2023-02-03 | End: 2023-02-03 | Stop reason: HOSPADM

## 2023-02-03 RX ORDER — FENTANYL CITRATE 50 UG/ML
INJECTION, SOLUTION INTRAMUSCULAR; INTRAVENOUS PRN
Status: DISCONTINUED | OUTPATIENT
Start: 2023-02-03 | End: 2023-02-03

## 2023-02-03 RX ORDER — ONDANSETRON 2 MG/ML
4 INJECTION INTRAMUSCULAR; INTRAVENOUS EVERY 6 HOURS PRN
Status: DISCONTINUED | OUTPATIENT
Start: 2023-02-03 | End: 2023-02-04 | Stop reason: HOSPADM

## 2023-02-03 RX ORDER — ACETAMINOPHEN 325 MG/1
650 TABLET ORAL EVERY 4 HOURS PRN
Status: DISCONTINUED | OUTPATIENT
Start: 2023-02-06 | End: 2023-02-04 | Stop reason: HOSPADM

## 2023-02-03 RX ORDER — HYDRALAZINE HYDROCHLORIDE 20 MG/ML
2.5-5 INJECTION INTRAMUSCULAR; INTRAVENOUS
Status: DISCONTINUED | OUTPATIENT
Start: 2023-02-03 | End: 2023-02-03 | Stop reason: HOSPADM

## 2023-02-03 RX ORDER — HYDROMORPHONE HCL IN WATER/PF 6 MG/30 ML
0.2 PATIENT CONTROLLED ANALGESIA SYRINGE INTRAVENOUS EVERY 5 MIN PRN
Status: DISCONTINUED | OUTPATIENT
Start: 2023-02-03 | End: 2023-02-03 | Stop reason: HOSPADM

## 2023-02-03 RX ORDER — METFORMIN HCL 500 MG
500 TABLET, EXTENDED RELEASE 24 HR ORAL DAILY
Status: DISCONTINUED | OUTPATIENT
Start: 2023-02-03 | End: 2023-02-04 | Stop reason: HOSPADM

## 2023-02-03 RX ORDER — NALOXONE HYDROCHLORIDE 0.4 MG/ML
0.2 INJECTION, SOLUTION INTRAMUSCULAR; INTRAVENOUS; SUBCUTANEOUS
Status: DISCONTINUED | OUTPATIENT
Start: 2023-02-03 | End: 2023-02-04 | Stop reason: HOSPADM

## 2023-02-03 RX ORDER — ONDANSETRON 4 MG/1
4 TABLET, ORALLY DISINTEGRATING ORAL EVERY 6 HOURS PRN
Status: DISCONTINUED | OUTPATIENT
Start: 2023-02-03 | End: 2023-02-04 | Stop reason: HOSPADM

## 2023-02-03 RX ORDER — HYDROMORPHONE HCL IN WATER/PF 6 MG/30 ML
0.4 PATIENT CONTROLLED ANALGESIA SYRINGE INTRAVENOUS EVERY 5 MIN PRN
Status: DISCONTINUED | OUTPATIENT
Start: 2023-02-03 | End: 2023-02-03 | Stop reason: HOSPADM

## 2023-02-03 RX ORDER — CLINDAMYCIN PHOSPHATE 900 MG/50ML
900 INJECTION, SOLUTION INTRAVENOUS EVERY 8 HOURS
Status: COMPLETED | OUTPATIENT
Start: 2023-02-03 | End: 2023-02-04

## 2023-02-03 RX ORDER — METHOCARBAMOL 750 MG/1
750 TABLET, FILM COATED ORAL EVERY 6 HOURS PRN
Status: DISCONTINUED | OUTPATIENT
Start: 2023-02-03 | End: 2023-02-04 | Stop reason: HOSPADM

## 2023-02-03 RX ORDER — LIDOCAINE 40 MG/G
CREAM TOPICAL
Status: DISCONTINUED | OUTPATIENT
Start: 2023-02-03 | End: 2023-02-04 | Stop reason: HOSPADM

## 2023-02-03 RX ADMIN — MIDAZOLAM 2 MG: 1 INJECTION INTRAMUSCULAR; INTRAVENOUS at 12:55

## 2023-02-03 RX ADMIN — CLINDAMYCIN PHOSPHATE 900 MG: 900 INJECTION, SOLUTION INTRAVENOUS at 11:38

## 2023-02-03 RX ADMIN — FENTANYL CITRATE 25 MCG: 50 INJECTION, SOLUTION INTRAMUSCULAR; INTRAVENOUS at 18:13

## 2023-02-03 RX ADMIN — BUPIVACAINE HYDROCHLORIDE AND EPINEPHRINE BITARTRATE 30 ML: 2.5; .005 INJECTION, SOLUTION INFILTRATION; PERINEURAL at 13:16

## 2023-02-03 RX ADMIN — PROPOFOL 30 MG: 10 INJECTION, EMULSION INTRAVENOUS at 14:52

## 2023-02-03 RX ADMIN — LIDOCAINE HYDROCHLORIDE 40 MG: 20 INJECTION, SOLUTION INFILTRATION; PERINEURAL at 13:01

## 2023-02-03 RX ADMIN — SUGAMMADEX 200 MG: 100 INJECTION, SOLUTION INTRAVENOUS at 17:20

## 2023-02-03 RX ADMIN — SPIRONOLACTONE 100 MG: 25 TABLET ORAL at 20:44

## 2023-02-03 RX ADMIN — HYDROMORPHONE HYDROCHLORIDE 0.4 MG: 0.2 INJECTION, SOLUTION INTRAMUSCULAR; INTRAVENOUS; SUBCUTANEOUS at 20:17

## 2023-02-03 RX ADMIN — OXYCODONE HYDROCHLORIDE 10 MG: 5 TABLET ORAL at 22:29

## 2023-02-03 RX ADMIN — PHENYLEPHRINE HYDROCHLORIDE 100 MCG: 10 INJECTION INTRAVENOUS at 14:43

## 2023-02-03 RX ADMIN — FENTANYL CITRATE 25 MCG: 50 INJECTION, SOLUTION INTRAMUSCULAR; INTRAVENOUS at 18:02

## 2023-02-03 RX ADMIN — FENTANYL CITRATE 25 MCG: 50 INJECTION, SOLUTION INTRAMUSCULAR; INTRAVENOUS at 18:19

## 2023-02-03 RX ADMIN — METFORMIN ER 500 MG 500 MG: 500 TABLET ORAL at 20:44

## 2023-02-03 RX ADMIN — HYDROMORPHONE HYDROCHLORIDE 0.5 MG: 1 INJECTION, SOLUTION INTRAMUSCULAR; INTRAVENOUS; SUBCUTANEOUS at 14:52

## 2023-02-03 RX ADMIN — ROCURONIUM BROMIDE 50 MG: 50 INJECTION, SOLUTION INTRAVENOUS at 13:02

## 2023-02-03 RX ADMIN — BUPIVACAINE HYDROCHLORIDE AND EPINEPHRINE 30 ML: 2.5; 5 INJECTION, SOLUTION INFILTRATION; PERINEURAL at 13:20

## 2023-02-03 RX ADMIN — PHENYLEPHRINE HYDROCHLORIDE 100 MCG: 10 INJECTION INTRAVENOUS at 13:57

## 2023-02-03 RX ADMIN — PHENYLEPHRINE HYDROCHLORIDE 50 MCG: 10 INJECTION INTRAVENOUS at 14:20

## 2023-02-03 RX ADMIN — SENNOSIDES AND DOCUSATE SODIUM 1 TABLET: 50; 8.6 TABLET ORAL at 20:44

## 2023-02-03 RX ADMIN — FLUOXETINE 40 MG: 20 CAPSULE ORAL at 20:44

## 2023-02-03 RX ADMIN — FENTANYL CITRATE 50 MCG: 50 INJECTION, SOLUTION INTRAMUSCULAR; INTRAVENOUS at 13:46

## 2023-02-03 RX ADMIN — SODIUM CHLORIDE, POTASSIUM CHLORIDE, SODIUM LACTATE AND CALCIUM CHLORIDE: 600; 310; 30; 20 INJECTION, SOLUTION INTRAVENOUS at 11:30

## 2023-02-03 RX ADMIN — PROPOFOL 20 MCG/KG/MIN: 10 INJECTION, EMULSION INTRAVENOUS at 13:05

## 2023-02-03 RX ADMIN — FENTANYL CITRATE 50 MCG: 50 INJECTION, SOLUTION INTRAMUSCULAR; INTRAVENOUS at 13:01

## 2023-02-03 RX ADMIN — CLINDAMYCIN PHOSPHATE 900 MG: 900 INJECTION, SOLUTION INTRAVENOUS at 21:51

## 2023-02-03 RX ADMIN — DEXAMETHASONE SODIUM PHOSPHATE 4 MG: 4 INJECTION, SOLUTION INTRA-ARTICULAR; INTRALESIONAL; INTRAMUSCULAR; INTRAVENOUS; SOFT TISSUE at 13:01

## 2023-02-03 RX ADMIN — ONDANSETRON 4 MG: 2 INJECTION INTRAMUSCULAR; INTRAVENOUS at 17:08

## 2023-02-03 RX ADMIN — BUPIVACAINE HYDROCHLORIDE 30 ML: 2.5 INJECTION, SOLUTION EPIDURAL; INFILTRATION; INTRACAUDAL at 13:13

## 2023-02-03 RX ADMIN — SODIUM CHLORIDE, POTASSIUM CHLORIDE, SODIUM LACTATE AND CALCIUM CHLORIDE: 600; 310; 30; 20 INJECTION, SOLUTION INTRAVENOUS at 13:40

## 2023-02-03 RX ADMIN — FENTANYL CITRATE 25 MCG: 50 INJECTION, SOLUTION INTRAMUSCULAR; INTRAVENOUS at 18:07

## 2023-02-03 RX ADMIN — ACETAMINOPHEN 975 MG: 325 TABLET, FILM COATED ORAL at 21:24

## 2023-02-03 RX ADMIN — PHENYLEPHRINE HYDROCHLORIDE 50 MCG: 10 INJECTION INTRAVENOUS at 16:33

## 2023-02-03 RX ADMIN — PROPOFOL 200 MG: 10 INJECTION, EMULSION INTRAVENOUS at 13:01

## 2023-02-03 RX ADMIN — BUPIVACAINE HYDROCHLORIDE 30 ML: 2.5 INJECTION, SOLUTION EPIDURAL; INFILTRATION; INTRACAUDAL at 13:18

## 2023-02-03 RX ADMIN — PHENYLEPHRINE HYDROCHLORIDE 50 MCG: 10 INJECTION INTRAVENOUS at 14:25

## 2023-02-03 ASSESSMENT — ACTIVITIES OF DAILY LIVING (ADL)
ADLS_ACUITY_SCORE: 24
ADLS_ACUITY_SCORE: 18
ADLS_ACUITY_SCORE: 24
ADLS_ACUITY_SCORE: 24

## 2023-02-03 NOTE — ANESTHESIA PREPROCEDURE EVALUATION
Anesthesia Pre-Procedure Evaluation    Patient: Yuliya Li   MRN: [id number] : 1970        Procedure : Procedure(s):  Bilateral skin sparing mastectomies with left axillary sentinel lymph node biopsy  BILATERAL TISSUE EXPANDER OR  DIRECT SILICONE GEL IMPLANT BREAST RECONSTRUCTION          History reviewed. No pertinent past medical history.   Past Surgical History:   Procedure Laterality Date     CYST REMOVAL       HYSTERECTOMY       HYSTERECTOMY, PAP NO LONGER INDICATED      menorrhagia      Allergies   Allergen Reactions     Keflex [Cephalexin-Fd&C Yellow #6]      Tetracycline      Trazodone      Very sedating-      Social History     Tobacco Use     Smoking status: Former     Packs/day: 0.00     Types: Cigarettes     Quit date: 1999     Years since quittin.2     Smokeless tobacco: Never   Substance Use Topics     Alcohol use: Yes     Comment: Alcoholic Drinks/day: 1 a week      Wt Readings from Last 1 Encounters:   23 102.5 kg (225 lb 14.4 oz)        Anesthesia Evaluation            ROS/MED HX  ENT/Pulmonary:  - neg pulmonary ROS  (-) sleep apnea   Neurologic:  - neg neurologic ROS     Cardiovascular:  - neg cardiovascular ROS     METS/Exercise Tolerance:     Hematologic:       Musculoskeletal:       GI/Hepatic:  - neg GI/hepatic ROS  (-) GERD   Renal/Genitourinary:  - neg Renal ROS     Endo:     (+) type II DM, Obesity,     Psychiatric/Substance Use:     (+) psychiatric history depression Recreational drug usage: Cannabis.    Infectious Disease:       Malignancy:       Other:            Physical Exam    Airway        Mallampati: II   TM distance: > 3 FB   Neck ROM: full   Mouth opening: > 3 cm    Respiratory Devices and Support         Dental       (+) Minor Abnormalities - some fillings, tiny chips      Cardiovascular          Rhythm and rate: regular     Pulmonary           breath sounds clear to auscultation           OUTSIDE LABS:  CBC:   Lab Results   Component Value Date     WBC 7.2 01/10/2023    WBC 8.1 01/12/2021    HGB 14.9 01/10/2023    HGB 13.9 01/12/2021    HCT 46.9 01/10/2023    HCT 40.0 01/12/2021     (H) 01/10/2023     01/12/2021     BMP:   Lab Results   Component Value Date     01/10/2023     10/26/2022    POTASSIUM 3.9 01/10/2023    POTASSIUM 4.2 10/26/2022    CHLORIDE 102 01/10/2023    CHLORIDE 106 10/26/2022    CO2 26 01/10/2023    CO2 27 10/26/2022    BUN 16 01/10/2023    BUN 14 10/26/2022    CR 0.91 01/10/2023    CR 1.00 10/26/2022     (H) 01/10/2023     (H) 10/26/2022     COAGS: No results found for: PTT, INR, FIBR  POC: No results found for: BGM, HCG, HCGS  HEPATIC:   Lab Results   Component Value Date    ALBUMIN 4.0 01/10/2023    PROTTOTAL 7.9 01/10/2023    ALT 30 01/10/2023    AST 21 01/10/2023    ALKPHOS 60 01/10/2023    BILITOTAL 0.3 01/10/2023     OTHER:   Lab Results   Component Value Date    A1C 5.4 10/26/2022    CHASE 9.3 01/10/2023    TSH 2.36 12/27/2021       Anesthesia Plan    ASA Status:  2      Anesthesia Type: General.     - Airway: ETT   Induction: Intravenous, Propofol.   Maintenance: Balanced.        Consents    Anesthesia Plan(s) and associated risks, benefits, and realistic alternatives discussed. Questions answered and patient/representative(s) expressed understanding.    - Discussed:     - Discussed with:  Patient         Postoperative Care    Pain management: Multi-modal analgesia.   PONV prophylaxis: Ondansetron (or other 5HT-3), Dexamethasone or Solumedrol     Comments:    Other Comments: PECS blocks            Emmanuel Chavez MD

## 2023-02-03 NOTE — ANESTHESIA PROCEDURE NOTES
Airway       Patient location during procedure: OR       Procedure Start/Stop Times: 2/3/2023 1:04 PM  Staff -        Anesthesiologist:  Emmanuel Chavez MD       CRNA: Nikkie Chinchilla APRN CRNA       Performed By: CRNA  Consent for Airway        Urgency: elective  Indications and Patient Condition       Indications for airway management: william-procedural       Induction type:intravenous       Mask difficulty assessment: 2 - vent by mask + OA or adjuvant +/- NMBA    Final Airway Details       Final airway type: endotracheal airway       Successful airway: ETT - single  Endotracheal Airway Details        ETT size (mm): 7.0       Cuffed: yes       Successful intubation technique: direct laryngoscopy       DL Blade Type: Chinchilla 2       Grade View of Cords: 1       Adjucts: stylet       Position: Right       Measured from: lips       Secured at (cm): 21       Bite block used: None    Post intubation assessment        Placement verified by: capnometry, equal breath sounds and chest rise        Number of attempts at approach: 1       Secured with: pink tape       Ease of procedure: easy       Dentition: Intact and Unchanged    Medication(s) Administered   Medication Administration Time: 2/3/2023 1:04 PM

## 2023-02-03 NOTE — ANESTHESIA PROCEDURE NOTES
"Pectoralis Procedure Note    Pre-Procedure   Staff -        Anesthesiologist:  Emmanuel Chavez MD       Performed By: anesthesiologist       Location: OR       Pre-Anesthestic Checklist: patient identified, IV checked, site marked, risks and benefits discussed, informed consent, monitors and equipment checked, pre-op evaluation and at physician/surgeon's request  Timeout:       Correct Patient: Yes        Correct Procedure: Yes        Correct Site: Yes        Correct Position: Yes        Correct Laterality: Yes        Site Marked: Yes  Procedure Documentation  Procedure: Pectoralis             Pectoralis I and Pectoralis II       Laterality: left       Patient Position: supine       Skin prep: Chloraprep       Needle Type: insulated       Needle Gauge: 22.        Needle Length (Inches): 2        Ultrasound guided       1. Ultrasound was used to identify targeted nerve, plexus, vascular marker, or fascial plane and place a needle adjacent to it in real-time.       2. Ultrasound was used to visualize the spread of anesthetic in close proximity to the above referenced structure.       3. A permanent image is entered into the patient's record.       4. The visualized anatomic structures appeared normal.       5. There were no apparent abnormal pathologic findings.    Assessment/Narrative         Bolus given via needle..        Secured via.        Insertion/Infusion Method: Single Shot       Complications: none    Medication(s) Administered   Bupivacaine 0.25% w/ 1:200K Epi (Injection) - Injection   30 mL - 2/3/2023 1:20:00 PM    FOR [facility] ([facility] ONLY:   Pain Team Contact information: please page the Pain Team Via [employer]. Search \"Pain\". During daytime hours, please page the attending first. At night please page the resident first.    "

## 2023-02-03 NOTE — ANESTHESIA CARE TRANSFER NOTE
Patient: Yuliya Li    Procedure: Procedure(s):  Bilateral skin sparing mastectomies with left axillary sentinel lymph node biopsy  BILATERAL TISSUE EXPANDER       Diagnosis: History of invasive breast cancer [Z85.3]  Diagnosis Additional Information: No value filed.    Anesthesia Type:   General     Note:    Oropharynx: oropharynx clear of all foreign objects and spontaneously breathing  Level of Consciousness: awake  Oxygen Supplementation: face mask  Level of Supplemental Oxygen (L/min / FiO2): 10  Independent Airway: airway patency satisfactory and stable  Dentition: dentition unchanged  Vital Signs Stable: post-procedure vital signs reviewed and stable  Report to RN Given: handoff report given  Patient transferred to: PACU  Comments: Pt awake and able to verbalize needs. ALL monitors on and audible. Spontaneous respiration without difficulty. o2 sats 99%. Pt denies pain. Report given to PACU RN.  Handoff Report: Identifed the Patient, Identified the Reponsible Provider, Reviewed the pertinent medical history, Discussed the surgical course, Reviewed Intra-OP anesthesia mangement and issues during anesthesia, Set expectations for post-procedure period and Allowed opportunity for questions and acknowledgement of understanding      Vitals:  Vitals Value Taken Time   BP     Temp     Pulse 100 02/03/23 1744   Resp 12 02/03/23 1744   SpO2 99 % 02/03/23 1744   Vitals shown include unvalidated device data.    Electronically Signed By: SHIELA Gamboa CRNA  February 3, 2023  5:45 PM

## 2023-02-03 NOTE — PROGRESS NOTES
POST OPERATIVE NOTE-IMMEDIATE :    Preoperative Diagnosis:  History of invasive breast cancer [Z85.3]    Postoperative Diagnosis:  Same    Procedures:  Procedure(s):  BILATERAL TISSUE EXPANDER RECONSTRUCTION    Surgeon(s) and Assistants (if any):  Surgeon(s):  Jaya Malave MD Kunde, Gerda MCCARTY PA-C    EBL:  45 mL    Anesthesia:  General with Block    Complications: None    Specimen: None    Findings:  Above    Condition on discharge from OR:  Satisfactory    Gerda Norton PA-C

## 2023-02-03 NOTE — ANESTHESIA PROCEDURE NOTES
"Pectoralis Procedure Note    Pre-Procedure   Staff -        Anesthesiologist:  Emmanuel Chavez MD       Performed By: anesthesiologist       Location: OR       Pre-Anesthestic Checklist: patient identified, IV checked, site marked, risks and benefits discussed, informed consent, monitors and equipment checked, pre-op evaluation and at physician/surgeon's request  Timeout:       Correct Patient: Yes        Correct Procedure: Yes        Correct Site: Yes        Correct Position: Yes        Correct Laterality: Yes        Site Marked: Yes  Procedure Documentation  Procedure: Pectoralis             Pectoralis I and Pectoralis II       Laterality: right       Patient Position: supine       Skin prep: Chloraprep       Needle Type: insulated       Needle Gauge: 22.        Needle Length (Inches): 2        Ultrasound guided       1. Ultrasound was used to identify targeted nerve, plexus, vascular marker, or fascial plane and place a needle adjacent to it in real-time.       2. Ultrasound was used to visualize the spread of anesthetic in close proximity to the above referenced structure.       3. A permanent image is entered into the patient's record.       4. The visualized anatomic structures appeared normal.       5. There were no apparent abnormal pathologic findings.    Assessment/Narrative         Bolus given via needle..        Secured via.        Insertion/Infusion Method: Single Shot       Complications: none    Medication(s) Administered   Bupivacaine 0.25% w/ 1:200K Epi (Injection) - Injection   30 mL - 2/3/2023 1:16:00 PM    FOR [facility] ([facility]) ONLY:   Pain Team Contact information: please page the Pain Team Via [employer]. Search \"Pain\". During daytime hours, please page the attending first. At night please page the resident first.    "

## 2023-02-03 NOTE — BRIEF OP NOTE
[facility]    Brief Operative Note    Pre-operative diagnosis: History of invasive breast cancer [Z85.3]  Post-operative diagnosis Left Breast Cancer    Procedure: Procedure(s):  Bilateral skin sparing mastectomies with left axillary sentinel lymph node biopsy  BILATERAL TISSUE EXPANDER OR  DIRECT SILICONE GEL IMPLANT BREAST RECONSTRUCTION     Surgeon: Surgeon(s) and Role:  Panel 1:     * Clarissa Fontanez MD - Primary     * Surya Sargent PA-C - Assisting  Panel 2:     * Jaya Malave MD - Primary     * Gerda Norton PA-C - Assisting      Anesthesia: General with Block   Estimated Blood Loss: Less than 50 ml for mastectomy portion of procedure.    Drains: Zechariah-Frausto to be placed by Plastics team.  Specimens:   ID Type Source Tests Collected by Time Destination   1 : Left Breast stitch ruel axillary tail Tissue Breast, Left SURGICAL PATHOLOGY EXAM Clarissa Fontanez MD 2/3/2023  3:36 PM    2 : Right Breast stitch ruel axillary tail  Tissue Breast, Right SURGICAL PATHOLOGY EXAM Clarissa Fontanez MD 2/3/2023  2:14 PM    3 : left axillary sentinel lymph node #1 Tissue Lymph Node(s), [place] SURGICAL PATHOLOGY EXAM Clarissa Fontanez MD 2/3/2023  3:48 PM    4 : left chest wall nodule Tissue Chest SURGICAL PATHOLOGY EXAM Clarissa Fontanez MD 2/3/2023  3:42 PM    5 : palpable left axillary sentinel lymph node Tissue Lymph Node(s), [place] SURGICAL PATHOLOGY EXAM Clarissa Fontanez MD 2/3/2023  3:48 PM    6 : left axillary sentinel lymph node #2 Tissue Lymph Node(s), [place] SURGICAL PATHOLOGY EXAM Clarissa Fontanez MD 2/3/2023  3:52 PM      Findings:   None.  Complications: None.  Implants: * No implants in log *     Surya Sargent PA-C  [phone]

## 2023-02-04 VITALS
SYSTOLIC BLOOD PRESSURE: 102 MMHG | DIASTOLIC BLOOD PRESSURE: 73 MMHG | OXYGEN SATURATION: 97 % | HEART RATE: 83 BPM | BODY MASS INDEX: 33.52 KG/M2 | WEIGHT: 226.3 LBS | TEMPERATURE: 97.7 F | HEIGHT: 69 IN | RESPIRATION RATE: 16 BRPM

## 2023-02-04 LAB
CREAT SERPL-MCNC: 0.97 MG/DL (ref 0.51–0.95)
GFR SERPL CREATININE-BSD FRML MDRD: 70 ML/MIN/1.73M2

## 2023-02-04 PROCEDURE — 250N000013 HC RX MED GY IP 250 OP 250 PS 637

## 2023-02-04 PROCEDURE — 250N000011 HC RX IP 250 OP 636

## 2023-02-04 PROCEDURE — L8015 EXT BREASTPROSTHESIS GARMENT: HCPCS

## 2023-02-04 RX ORDER — LANOLIN ALCOHOL/MO/W.PET/CERES
6 CREAM (GRAM) TOPICAL
COMMUNITY

## 2023-02-04 RX ADMIN — SENNOSIDES AND DOCUSATE SODIUM 1 TABLET: 50; 8.6 TABLET ORAL at 08:15

## 2023-02-04 RX ADMIN — CLINDAMYCIN PHOSPHATE 900 MG: 900 INJECTION, SOLUTION INTRAVENOUS at 05:11

## 2023-02-04 RX ADMIN — ACETAMINOPHEN 975 MG: 325 TABLET, FILM COATED ORAL at 05:12

## 2023-02-04 RX ADMIN — OXYCODONE HYDROCHLORIDE 5 MG: 5 TABLET ORAL at 11:12

## 2023-02-04 RX ADMIN — POLYETHYLENE GLYCOL 3350 17 G: 17 POWDER, FOR SOLUTION ORAL at 08:14

## 2023-02-04 RX ADMIN — IBUPROFEN 600 MG: 600 TABLET ORAL at 08:17

## 2023-02-04 ASSESSMENT — ACTIVITIES OF DAILY LIVING (ADL)
ADLS_ACUITY_SCORE: 24

## 2023-02-04 NOTE — PROGRESS NOTES
Plastic Surgery POD# 1    Patient feeling well    Skin flaps healthy, no hematoma    Plan: home today, f/u next week. Fully instructed.

## 2023-02-04 NOTE — PLAN OF CARE
Patient discharging home with family. AVS gone over with patient in room and prescriptions given to patient. CURTIS drain teaching provided with demonstrated understanding and handout given to patient and . PIV removed. Camisole on. All belongings left with patient. All questions answered.

## 2023-02-04 NOTE — PHARMACY-ADMISSION MEDICATION HISTORY
Pharmacy Medication History  Admission medication history interview status for the 2/3/2023  admission is complete. See EPIC admission navigator for prior to admission medications     Location of Interview: Patient room  Medication history sources: Patient, Surescripts and Care Everywhere    Significant changes made to the medication list:  none     In the past week, patient estimated taking medication this percent of the time: greater than 90%        Additional medication history information:   Updated med list and last doses per patient's testimony.      Medication reconciliation completed by provider prior to medication history? No    Time spent in this activity: 30 min    Prior to Admission medications    Medication Sig Last Dose Taking? Auth Provider Long Term End Date   acetaminophen (TYLENOL) 500 MG tablet Take 500-1,000 mg by mouth every 6 hours as needed for mild pain 2/2/2023 at prn Yes Reported, Patient     FLUoxetine (PROZAC) 40 MG capsule Take 1 capsule (40 mg) by mouth daily 2/2/2023 at pm Yes Jazzmine Yarbrough MD Yes    medical cannabis (Patient's own supply) Take 1 Dose by mouth nightly as needed Gummies: 5 mg   Formulary: Red  Location: Unknown    (The purpose of this order is to document that the patient reports taking medical cannabis.  This is not a prescription, and is not used to certify that the patient has a qualifying medical condition.) Past Week at pm Yes Reported, Patient     melatonin 3 MG tablet Take 6 mg by mouth nightly as needed for sleep Past Week Yes Unknown, Entered By History     metFORMIN (GLUCOPHAGE XR) 500 MG 24 hr tablet Take 1 tablet (500 mg) by mouth daily 2/2/2023 at pm Yes Jazzmine Yarbrough MD Yes    Semaglutide, 1 MG/DOSE, (OZEMPIC, 1 MG/DOSE,) 4 MG/3ML pen Inject 1 mg Subcutaneous every 7 days (On Fridays) 1/27/2023 at am Yes Reported, Patient     spironolactone (ALDACTONE) 100 MG tablet Take 1 tablet (100 mg) by mouth daily 2/2/2023 at pm Yes Jazzmine Yarbrough  MD Andrés Yes    alcohol swab prep pads Use to swab area of injection/amy as directed.  Patient not taking: Reported on 1/25/2023   Flakita Salas APRN CNP     blood glucose (NO BRAND SPECIFIED) test strip Use to test blood sugar 2 times daily or as directed. To accompany: Blood Glucose Monitor Brands: per insurance.  Patient not taking: Reported on 1/25/2023   Jazzmine Yarbrough MD     blood glucose calibration (NO BRAND SPECIFIED) solution To accompany: Blood Glucose Monitor Brands: per insurance.  Patient not taking: Reported on 1/25/2023   Jazzmine Yarbrough MD     blood glucose monitoring (NO BRAND SPECIFIED) meter device kit Use to test blood sugar 2 times daily or as directed. Preferred blood glucose meter OR supplies to accompany: Blood Glucose Monitor Brands: per insurance.  Patient not taking: Reported on 1/25/2023   Jazzmine Yarbrough MD     thin (NO BRAND SPECIFIED) lancets Use with lanceting device. To accompany: Blood Glucose Monitor Brands: per insurance.  Patient not taking: Reported on 1/25/2023   Jazzmine Yarbrough MD         The information provided in this note is only as accurate as the sources available at the time of update(s)

## 2023-02-04 NOTE — PLAN OF CARE
Goal Outcome Evaluation: VSS on RA. Dressing to bilateral breast incisions CDI. CURTIS x2 with serosanguinous output. Up Independently. Walking halls x1. Pain managed with prn oxy x1 and scheduled pain med. Up to BR voiding. Tolerating diet. PIV SL. Flatus+. Possible discharge today.       Plan of Care Reviewed With: patient    Overall Patient Progress: improvingOverall Patient Progress: improving

## 2023-02-04 NOTE — OP NOTE
Procedure Date: 02/03/2023    SURGEON:  Jaya Malave M.D.    ASSISTANT:  Gerda Norton PA-C.    PREOPERATIVE DIAGNOSES:     1.  Left breast cancer.  2.  Acquired absence of bilateral breasts.    POSTOPERATIVE DIAGNOSES:  1.  Left breast cancer.  2.  Acquired absence of bilateral breasts.    PROCEDURES:     1.  Bilateral first stage breast reconstruction with placement of tissue expanders.  2.  Creation of dermal fascial flaps, bilateral chest wall, 12 x 20 cm.    A surgical first assistant was medically necessary for patient positioning, retraction and visualization of anatomic structures, hemostasis, and suture closure of incisions.    DESCRIPTION OF PROCEDURE:  The patient was marked preoperatively.  We discussed the planned reconstruction and she has decided to go ahead simply to start with tissue expanders today to allow for postoperative volume adjustment prior to choosing her implant size.  This is certainly reasonable in a more standard way to do reconstruction in this fashion.  We talked about expected discomfort, time off work, activity restrictions.  All of her questions were answered.  She was placed supine on the procedure table under general LMA anesthesia and underwent bilateral skin-sparing mastectomy through a reduction pattern incisions by Dr. Fontanez.  The margins were clear.  A timeout was done.  I examined the flaps.  There was nice flap viability and vascularity throughout.  There was excellent even thickness on all the flaps.  I felt that a prepectoral reconstruction would be satisfactory and then a dermal sling could be created out of her own lower mastectomy flap skin.  I then controlled the mastectomy pocket with internal sutures of 2-0 Vicryl.  I deepithelialized the lower mastectomy flap to create a dermal fascial flap for lower pole device coverage.  I placed an Apcera style 133FX 650 mL tissue expander.  This was prepared by removing the air and rinsing it in Betadine solution.  It  was filled with 450 mL of saline.  It was placed in the prepectoral pocket and affixed to the pectoral fascia with 2-0 silk in multiple locations using the suture tabs.  The dermal sling was tacked around this and affixed to the chest wall laterally to create a sling for support and then it was brought superiorly and tacked to the backside of the upper mastectomy flaps.  A drain was placed and sewn in with 2-0 silk.  The inverted T incision was closed over the dermal fascial flap with 3-0 and 4-0 Vicryl.  I turned my attention to the right side.  I used the same technique to control the mastectomy pocket and to create the dermal fascial flap.  An identical style and size expander was placed in the right side in a symmetrical position with the same fixation technique and same closure over a drain.  Prior to final closure, both sides were irrigated with dilute Betadine solution.  There was good symmetry and good tissue viability at the termination of the procedure.  Sterile dressings were applied.  Anesthesia was reversed and the patient was taken to the recovery room in satisfactory condition.    ESTIMATED BLOOD LOSS:  10 mL    COUNTS:  Sponge and needle counts were correct.    SPECIMENS:  None.    FINDINGS:  Noted above.    Jaya Malave MD        D: 2023   T: 2023   MT: AVINASH    Name:     JUS CABRERA  MRN:      [id number]        Account:        [id number]   :      1970           Procedure Date: 2023     Document: [id number]

## 2023-02-04 NOTE — ANESTHESIA POSTPROCEDURE EVALUATION
Patient: Yuliya Li    Procedure: Procedure(s):  Bilateral skin sparing mastectomies with left axillary sentinel lymph node biopsy  BILATERAL TISSUE EXPANDER       Anesthesia Type:  General    Note:  Disposition: Inpatient   Postop Pain Control: Uneventful            Sign Out: Well controlled pain   PONV: No   Neuro/Psych: Uneventful            Sign Out: Acceptable/Baseline neuro status   Airway/Respiratory: Uneventful            Sign Out: Acceptable/Baseline resp. status   CV/Hemodynamics: Uneventful            Sign Out: Acceptable CV status; No obvious hypovolemia; No obvious fluid overload   Other NRE: NONE   DID A NON-ROUTINE EVENT OCCUR? No           Last vitals:  Vitals Value Taken Time   /90 02/03/23 1830   Temp 36.8  C (98.2  F) 02/03/23 1820   Pulse 99 02/03/23 1831   Resp 8 02/03/23 1831   SpO2 97 % 02/03/23 1831   Vitals shown include unvalidated device data.    Electronically Signed By: Jorje Amezquita MD  February 3, 2023  8:43 PM

## 2023-02-04 NOTE — OP NOTE
Procedure Date: 02/03/2023    STAFF SURGEON:  Clarissa Fontanez MD    ASSISTANT:  BERT Hernandez.    PREOPERATIVE DIAGNOSIS:  Multifocal left breast cancer.    POSTOPERATIVE DIAGNOSIS:  Multifocal left breast cancer.    PROCEDURE PERFORMED:  Bilateral skin-sparing mastectomies with left axillary sentinel lymph node biopsy.    INDICATIONS FOR PROCEDURE:  Yuliya is a 52-year-old female who noticed a palpable lump over the outer left breast.  Imaging was completed and this demonstrated a possible mass at the 3 o'clock position of the left breast.  There was also an approximately 4 mm mass adjacent to the main left breast mass.  The patient then underwent ultrasound-guided biopsy of both the main mass and the satellite mass.  Pathology from both masses revealed invasive ductal carcinoma with lobular features, ER/OH positive.  The patient ultimately decided to proceed with bilateral skin-sparing mastectomies with left axillary sentinel lymph node biopsy.  The risks and benefits of the procedure were discussed with the patient and consent for the procedure was obtained.    ANESTHESIA:  General.    DESCRIPTION OF PROCEDURE:  The patient was brought to the preoperative area and preoperative antibiotics were administered.  The patient underwent left-sided periareolar injection with radionuclide for sentinel lymph node biopsy.  The patient was then taken back to the operating room and placed in the supine position on the operating room table.  A timeout was completed.  Anesthesia was induced and the patient was intubated.  The patient was secured to the operating table and her pressure points were padded.  The patient's bilateral breasts and bilateral axillae were prepped and draped in the sterile fashion.  We began our dissection on the patient's right side.  A triangular shaped incision was created, encompassing the nipple areolar complex with medial and lateral extension from the nipple areolar complex.  Dissection was carried  out down through the subcutaneous tissues utilizing the electrocautery.  Skin flaps were then raised circumferentially, working in the relatively avascular plane between the deep dermal tissues and the underlying breast tissue.  When the patient's breast tissue had been dissected out circumferentially, the breast tissue was dissected directly off the underlying chest wall musculature in a medial to lateral fashion utilizing the electrocautery.  The breast tissue was marked at the level of the axillary tail utilizing the electrocautery.  The breast tissue was then passed off the table for pathologic evaluation.  The patient's surgical site was inspected and hemostasis was ensured utilizing the electrocautery.  A saline moistened lap was placed into the surgical site.     Attention was then turned to the patient's left side.  In a similar fashion, the 10 blade scalpel was used to make a triangular shaped incision encompassing the left nipple areolar complex with medial and lateral extension out from the nipple areolar complex.  Dissection was carried out down through the subcutaneous tissues utilizing the electrocautery.  The skin flaps were then raised circumferentially, working in the relatively avascular plane between the deep dermal tissues and the underlying breast tissue.  The patient's left breast tissue was then dissected directly off the underlying chest wall musculature in a medial to lateral fashion.  We were able to palpate both the main mass and the satellite mass in the left breast.  There was an apparent fatty tissue plane between the satellite mass and the chest wall.  We did cauterize through this plane.  There was no obvious evidence that the satellite mass was adherent to the chest wall.  We dissected out laterally and the left breast tissue was transected at the level of the axillary tail utilizing the electrocautery.  The breast tissue was marked at the axillary tail with a silk suture for proper  orientation and passed off the table for pathologic evaluation.  The chest wall surgical site was inspected.  There was a small firm nodule at the lower lateral aspect of the chest wall and this was excised utilizing the electrocautery.  This nodule was passed off the table for pathologic evaluation.  Three surgical clips were then triangulated in the area where this nodule had been excised.      Attention was then turned to the patient's left axilla.  The TruNode probe was used to direct our dissection.  We incised the clavipectoral fascia and then entered into the axillary space.  We identified 2 left axillary sentinel lymph nodes with significant counts.  Each of the nodes was dissected free from the surrounding tissues utilizing the electrocautery and they were separately passed off the table for pathologic evaluation.  There was an additional palpable left axillary lymph node that was excised and also submitted for pathologic evaluation.  The patient's left axilla was inspected and hemostasis was ensured utilizing the electrocautery.  Our surgical site was again inspected and hemostasis was also ensured utilizing the electrocautery.  A saline moistened lap was placed into our surgical site.     Dr. Malave then entered the operating room to proceed with immediate reconstruction.  Please refer to his operative report for details.     At the conclusion of our portion of the procedure, the patient was tolerating the procedure well under general anesthetic, and the lap, needle and instrument counts were correct.    Surya Sargent PA-C assisted in the above procedure. They provided assistance with pre-operative positioning, prepping, and draping of the patient. The assistant provided vital operative assistance with retraction using instruments thus providing the necessary exposure and visualization for the case, manipulation of tissues to achieve hemostasis, suction for visualization.    ESTIMATED BLOOD LOSS:  25  mL    FINDINGS:  Bilateral skin-sparing mastectomies performed.  Left breast mass and adjacent satellite mass palpable, without evidence of invasion into the chest wall.  A small left chest wall nodule excised and sent for pathologic evaluation.  Two left axillary sentinel lymph nodes and one left axillary palpable lymph node excised.    COMPLICATIONS:  None.    SPECIMENS:  1.  Right breast.  2.  Left breast.  3.  Left chest wall nodule.  4.  Left axillary sentinel lymph node #1.  5.  Palpable left axillary sentinel lymph node.  6.  Left axillary sentinel lymph node #2.    DRAINS:  None.    CONDITION:  The patient will be admitted to the surgical floor postoperatively with instructions for postoperative cares and medications for pain management.      Clarissa Fontanez MD        D: 2023   T: 2023   MT: [id number]    Name:     JUS CABRERA  MRN:      [id number]        Account:        [id number]   :      1970           Procedure Date: 2023     Document: [id number]

## 2023-02-04 NOTE — PROGRESS NOTES
"General Surgery Note    Stable S/P Bilateral Mastectomies and Left Axillary LN biopsy  POD1    -Drain education  -Dressing education per Plastics  -Anticipate discharge to home today pending Plastics approval.  -Follow up with Dr. Dumas next week for drain removal.  -Follow up with Dr. Fontanez in 2 weeks.  We will likely call when pathology results in as well.    Doing well.  Surprised she is not in more pain.  Dilaudid gave her nausea but oxycodone better tolerated.  Tolerating diet.  Voiding and ambulating ok.  Feels ready for discharge home today.    NAD, very pleasant, A&O  /73 (BP Location: Left arm)   Pulse 83   Temp 97.7  F (36.5  C) (Oral)   Resp 16   Ht 1.753 m (5' 9\")   Wt 102.6 kg (226 lb 4.8 oz)   LMP  (LMP Unknown)   SpO2 97%   BMI 33.42 kg/m      Intake/Output Summary (Last 24 hours) at 2/4/2023 0903  Last data filed at 2/4/2023 0600  Gross per 24 hour   Intake 1820 ml   Output 862 ml   Net 958 ml     Chest: ACE on.  No drainage noted through this or on visible dressings.  JPs: dark sanguinous and thin.  Combined less than 120mL out since surgery.      "

## 2023-02-06 ENCOUNTER — TELEPHONE (OUTPATIENT)
Dept: SURGERY | Facility: CLINIC | Age: 53
End: 2023-02-06
Payer: COMMERCIAL

## 2023-02-06 NOTE — DISCHARGE SUMMARY
[facility] Discharge Summary    Yuliya Li MRN# [id number]   Age: 52 year old YOB: 1970     Date of Admission:  2/3/2023  Date of Discharge:  2/4/2023 11:41 AM  Admitting Provider:  Jaya Malave MD  Discharge Provider:  Surya Sargent PA-C  Discharging Service: General Surgery     Primary Provider: Latrice Perez  Primary Care Physician Phone Number: [phone]          Admission Diagnoses:   Principle Diagnosis: History of invasive breast cancer [Z85.3]  Breast cancer (H) [C50.919]  Secondary Diagnoses:          Discharge Diagnosis:   History of invasive breast cancer [Z85.3]          Procedures:   Procedure(s): 1.  Bilateral skin-sparing mastectomies with left axillary sentinel lymph node biopsy.    By Dr. Clarissa Fontanez    1.  Bilateral first stage breast reconstruction with placement of tissue expanders.  2.  Creation of dermal fascial flaps, bilateral chest wall, 12 x 20 cm.  By Dr. Jaya Malave            Discharge Medications:     Discharge Medication List as of 2/4/2023 11:03 AM      START taking these medications    Details   clindamycin (CLEOCIN) 300 MG capsule Take 1 capsule (300 mg) by mouth 3 times daily, Disp-21 capsule, R-0, E-Prescribe      methocarbamol (ROBAXIN) 750 MG tablet Take 1 tablet (750 mg) by mouth every 6 hours as needed for muscle spasms, Disp-30 tablet, R-0, E-Prescribe      oxyCODONE (ROXICODONE) 5 MG tablet Take 1 tablet (5 mg) by mouth every 6 hours as needed for pain, Disp-24 tablet, R-0, E-Prescribe         CONTINUE these medications which have NOT CHANGED    Details   acetaminophen (TYLENOL) 500 MG tablet Take 500-1,000 mg by mouth every 6 hours as needed for mild pain, Historical      FLUoxetine (PROZAC) 40 MG capsule Take 1 capsule (40 mg) by mouth daily, Disp-90 capsule, R-1, E-Prescribe      medical cannabis (Patient's own supply) Take 1 Dose by mouth nightly as needed Gummies: 5 mg   Formulary: Red  Location: Unknown    (The  "purpose of this order is to document that the patient reports taking medical cannabis.  This is not a prescription, and is not used to certify that the patie nt has a qualifying medical condition.), Historical      melatonin 3 MG tablet Take 6 mg by mouth nightly as needed for sleep, Historical      metFORMIN (GLUCOPHAGE XR) 500 MG 24 hr tablet Take 1 tablet (500 mg) by mouth daily, Disp-90 tablet, R-3, E-Prescribe      Semaglutide, 1 MG/DOSE, (OZEMPIC, 1 MG/DOSE,) 4 MG/3ML pen Inject 1 mg Subcutaneous every 7 days (On Fridays), Historical      spironolactone (ALDACTONE) 100 MG tablet Take 1 tablet (100 mg) by mouth daily, Disp-90 tablet, R-1, E-Prescribe      alcohol swab prep pads Use to swab area of injection/amy as directed.Disp-100 each, [id number]      blood glucose (NO BRAND SPECIFIED) test strip Use to test blood sugar 2 times daily or as directed. To accompany: Blood Glucose Monitor Brands: per insurance., Disp-100 strip, R-6, E-Prescribe      blood glucose calibration (NO BRAND SPECIFIED) solution To accompany: Blood Glucose Monitor Brands: per insurance., Disp-1 each, R-1, E-Prescribe      blood glucose monitoring (NO BRAND SPECIFIED) meter device kit Use to test blood sugar 2 times daily or as directed. Preferred blood glucose meter OR supplies to accompany: Blood Glucose Monitor Brands: per insurance.Disp-1 kit, [id number]      thin (NO BRAND SPECIFIED) lancets Use with lanceting device. To accompany: Blood Glucose Monitor Brands: per insurance., Disp-100 each, R-6, E-Prescribe                 Allergies:         Allergies   Allergen Reactions     Keflex [Cephalexin-Fd&C Yellow #6]      Tetracycline      Trazodone      Very sedating-             Brief History of Illness:    Reason for your hospital stay      Bilateral mastectomies with tissue expander reconstruction         From Op Note Indication 2/3/23:   \"Yuliya is a 52-year-old female who noticed a palpable lump over the outer left breast.  " "Imaging was completed and this demonstrated a possible mass at the 3 o'clock position of the left breast.  There was also an approximately 4 mm mass adjacent to the main left breast mass.  The patient then underwent ultrasound-guided biopsy of both the main mass and the satellite mass.  Pathology from both masses revealed invasive ductal carcinoma with lobular features, ER/PA positive.  The patient ultimately decided to proceed with bilateral skin-sparing mastectomies with left axillary sentinel lymph node biopsy.  The risks and benefits of the procedure were discussed with the patient and consent for the procedure was obtained.\"    After discussing the risks, benefits, and possible complications, informed consent was obtained and the patient underwent the above procedure.  There were no complications.  Please see the Operative Report for full details.           Hospital Course:   Yuliya Li's hospital course was unremarkable.  She recovered as anticipated and experienced no post-operative complications.  Plastics and General Surgery both saw patient POD1 and felt she was appropriate for discharge.    On the date of discharge, the patient was discharged to home in stable condition and afebrile.  She verbalized understanding of all discharge instructions and felt comfortable with the discharge plan.  She was asked to call with any further questions or concerns.         Condition on Discharge:      Discharge condition: Stable   Discharge vitals: Blood pressure 102/73, pulse 83, temperature 97.7  F (36.5  C), temperature source Oral, resp. rate 16, height 1.753 m (5' 9\"), weight 102.6 kg (226 lb 4.8 oz), SpO2 97 %.           Discharge Disposition:   Discharged to home          Discharge Instructions and Follow-Up:      Yuliya Li was asked to follow up with surgical team in 1-2 weeks for drain removal and recovery check.  General surgeon will likely call with pathology results prior to this.      Surya" ARTIS Sargent  Dictating on behalf of Dr. Clarissa Fontanez  General Surgery  [facility]  [phone]

## 2023-02-06 NOTE — TELEPHONE ENCOUNTER
Breast Nurse Care Coordination Post Op Call.     Yuliya is s/p bilateral mastectomies, left sentinel lymph node biopsy and reconstruction on 2/3/2023 with Dr. Fontanez and Dr. Dumas. Pathology results are still pending. Informed Dr. Fontanez will be in touch with patient once pathology has been finalized.    Patient reports good pain control with alternating Tylenol & Ibuprofen. She has not needed the Oxycodone. She has removed her dressing and the incision is clean, dry, pink and intact. Patient denies fevers. Patient reports having 60 ml/daily out of her right CURTIS drain, and 60 ml/daily out of left CURTIS drain.      Patient is tolerating fluids, eating a well balanced diet and urinating with no difficulty. Denies nausea. She had a BM today and has been taking the senna.  She is gradually increasing her activity.    Informed Yuliya that Dr. Fontanez would like to see her in clinic 2-3 weeks after surgery for a post op visit. She is scheduled on 2/172023 @ 11:45am with Dr[facility]. She also has follow up in place with Dr. Dumas on 2/8/23.    I informed patient to call me with any questions or concerns. Patient verbalized understanding and agrees with the plan of care.    Concepcion Corea, RN, BSN, PHN  Breast Care Nurse Coordinator  [facility] [place]  [phone]

## 2023-02-08 ENCOUNTER — TELEPHONE (OUTPATIENT)
Dept: SURGERY | Facility: CLINIC | Age: 53
End: 2023-02-08
Payer: COMMERCIAL

## 2023-02-08 DIAGNOSIS — C50.812 MALIGNANT NEOPLASM OF OVERLAPPING SITES OF LEFT BREAST IN FEMALE, ESTROGEN RECEPTOR POSITIVE (H): Primary | ICD-10-CM

## 2023-02-08 DIAGNOSIS — Z17.0 MALIGNANT NEOPLASM OF OVERLAPPING SITES OF LEFT BREAST IN FEMALE, ESTROGEN RECEPTOR POSITIVE (H): Primary | ICD-10-CM

## 2023-02-10 DIAGNOSIS — Z17.0 MALIGNANT NEOPLASM OF OVERLAPPING SITES OF LEFT BREAST IN FEMALE, ESTROGEN RECEPTOR POSITIVE (H): Primary | ICD-10-CM

## 2023-02-10 DIAGNOSIS — R59.1 LYMPHADENOPATHY: ICD-10-CM

## 2023-02-10 DIAGNOSIS — G89.29 OTHER CHRONIC BACK PAIN: ICD-10-CM

## 2023-02-10 DIAGNOSIS — M54.89 OTHER CHRONIC BACK PAIN: ICD-10-CM

## 2023-02-10 DIAGNOSIS — C50.812 MALIGNANT NEOPLASM OF OVERLAPPING SITES OF LEFT BREAST IN FEMALE, ESTROGEN RECEPTOR POSITIVE (H): Primary | ICD-10-CM

## 2023-02-10 DIAGNOSIS — M54.2 NECK PAIN: ICD-10-CM

## 2023-02-10 DIAGNOSIS — C77.3 MALIGNANT NEOPLASM METASTATIC TO LYMPH NODE OF AXILLA (H): ICD-10-CM

## 2023-02-13 LAB
PATH REPORT.ADDENDUM SPEC: ABNORMAL
PATH REPORT.ADDENDUM SPEC: ABNORMAL
PATH REPORT.COMMENTS IMP SPEC: ABNORMAL
PATH REPORT.COMMENTS IMP SPEC: ABNORMAL
PATH REPORT.COMMENTS IMP SPEC: YES
PATH REPORT.FINAL DX SPEC: ABNORMAL
PATH REPORT.GROSS SPEC: ABNORMAL
PATH REPORT.MICROSCOPIC SPEC OTHER STN: ABNORMAL
PATH REPORT.RELEVANT HX SPEC: ABNORMAL
PATHOLOGY SYNOPTIC REPORT: ABNORMAL
PHOTO IMAGE: ABNORMAL

## 2023-02-15 LAB — INTERPRETATION: NORMAL

## 2023-02-16 ENCOUNTER — DOCUMENTATION ONLY (OUTPATIENT)
Dept: ONCOLOGY | Facility: CLINIC | Age: 53
End: 2023-02-16
Payer: COMMERCIAL

## 2023-02-16 NOTE — NURSING NOTE
Oncotype submitted via online portal. Pathology reports, demographics page and insurance cards faxed with receipt confirmation via Right Fax.    Peyton Quintana RN

## 2023-02-17 ENCOUNTER — OFFICE VISIT (OUTPATIENT)
Dept: SURGERY | Facility: CLINIC | Age: 53
End: 2023-02-17
Payer: COMMERCIAL

## 2023-02-17 DIAGNOSIS — Z98.890 POSTOPERATIVE STATE: Primary | ICD-10-CM

## 2023-02-17 PROCEDURE — 99024 POSTOP FOLLOW-UP VISIT: CPT | Performed by: SURGERY

## 2023-02-17 NOTE — LETTER
February 17, 2023          Jazzmine Yarbrough MD  [address] [place] [address]      RE:   Yuliya Li 1970      Dear Colleague,    Thank you for referring your patient, Yuliya Li, to [facility] [place] location. Please see a copy of my visit note below.    Yuliya is a 52-year-old female who recently underwent bilateral skin sparing mastectomies with left axillary sentinel lymph node biopsy and immediate tissue expander based reconstruction.  The patient has done very well postoperatively.  No significant pain in her surgical sites.  No wound concerns.  The patient's drains have been removed by plastic surgery.  She has already started expander fills.  Patient's pathology was again discussed today in clinic and the patient was provided with a copy of her pathology report.     Breast: Bilateral breast flaps warm and well perfused, breast incisions healing well without evidence of infection or significant underlying fluid collection     Pathology:   Final Diagnosis   A.  Breast, right, mastectomy:  -Benign breast tissue and skin.    B.  Breast, left, mastectomy:  -INVASIVE DUCTAL CARCINOMA with lobular features, Tano grade 2, multifocal, tumor size 20 mm and 4 mm.  -Ductal carcinoma in situ (DCIS), intermediate nuclear grade, solid and cribriform types.  -All margins are negative for invasive and in situ carcinoma; closest margin to invasive carcinoma is posterior at 0.8 mm.  -See tumor synoptic report.     C.  Breast, left axillary sentinel #1, excision:  -One lymph node; negative for metastatic carcinoma (0/1).    D.  Left chest wall, nodule, excision:  -Positive for metastatic breast carcinoma involving the edge of the tissue fragment.    E.  Lymph node, palpable left axillary sentinel, excision:  -One lymph node positive for macrometastatic carcinoma (1/1).  -Metastatic focus is 6 mm; positive for extranodal extension.    F.  Lymph node, left axillary  sentinel #2, excision:  -One lymph node positive for micrometastatic carcinoma (1mi/1).  -Metastatic focus is 1.1 mm; negative for extranodal extension.            A/P  Yuliya Li is recovering from a bilateral simple skin sparing mastectomy with left axillary sentinel lymph node biopsy and immediate tissue expander based reconstruction. I advised her to slowly return to regular activity. I expect she will make a complete recovery without issues. We reviewed her pathology today as well.  I did offer the patient a referral to physical therapy, which she declined at this time.  Patient plans to follow-up with medical oncology postoperatively.  She may follow-up in the general surgery clinic on an as-needed basis.    Again, thank you for allowing me to participate in the care of your patient.      Sincerely,      Clarissa Fontanez MD

## 2023-02-20 ENCOUNTER — MYC REFILL (OUTPATIENT)
Dept: PEDIATRICS | Facility: CLINIC | Age: 53
End: 2023-02-20
Payer: COMMERCIAL

## 2023-02-20 DIAGNOSIS — E11.9 TYPE 2 DIABETES MELLITUS WITHOUT COMPLICATION, WITHOUT LONG-TERM CURRENT USE OF INSULIN (H): Primary | ICD-10-CM

## 2023-02-21 NOTE — PROGRESS NOTES
Video-Visit Details    Type of service:  Video Visit    Video Start Time (time video started): 3:54PM    Video End Time (time video stopped): 4:27pm    Originating Location (pt. Location): Home        Distant Location (provider location):  On-site    Mode of Communication:  Video Conference via [facility]    Hematology/Oncology Established Patient Note      Today's Date: 2/27/2023    Reason for follow-up:  Left breast cancer.    HISTORY OF PRESENT ILLNESS: Yuliya Li is a 52 year old female status post removal of single ovary and hysterectomy who presents with the following oncologic history:  1.  12/23/2022: Mammogram showed possible asymmetry in right breast at 12:00; possible mass in left breast at 3:00.  2. 1/2/2023: Diagnostic bilateral mammogram showed mass in left breast at 3:00 with 0.4 cm satellite mass in retroareolar breast. Right breast with effacement of possible finding at 12:00 consistent with artifact. Left breast U/S showed mass at 3:00, 11 cm from nipple measuring 1.9 x 1.6 x 1.8 cm; no abnormal axillary lymph nodes.  3. 1/03/2022: Left breast core biopsy of 0.4 cm mass at 3:30 position showed grade 2 invasive ductal carcinoma with lobular features. Biopsy of left breast 1.9 cm mass at 3:00 showed grade 2 invasive ductal carcinoma with lobular features, associated grade 2 DCIS. ER and [place] strongly positive at %; HER-2 IHC = 2+; HER-2/crow FISH negative.  4. 1/10/2023: FSH = 36.6 but estradiol = 63.  5.  2/03/2023: Bilateral mastectomies with left axillary sentinel lymph node excision and left chest wall nodule excision with Dr. Clarissa Fontanez.  Pathology showed left breast grade 2 invasive ductal carcinoma with lobular features, multifocal with 2 cm and 0.4 cm tumors, grade 2 DCIS, all margins negative.  Of 3 left axillary sentinel lymph nodes removed, one with 6 mm macrometastasis positive for extranodal extension and one with 1.1 mm micrometastasis,  negative for extranodal extension, identified. Left chest wall nodule positive for metastatic breast carcinoma involving edge of tissue fragment. HER-2 FISH repeated, group 4, considered HER-2 negative. Oncotype DX = pending.  6. 2/23/2023: PET/CT scan negative for metastatic disease. Small right middle lobe lung nodules, non-FDG avid.    INTERVAL HISTORY:  Yuliya reports healing well from mastectomies.      REVIEW OF SYSTEMS:   14 point ROS was reviewed and is negative other than as noted above in HPI.       HOME MEDICATIONS:  Current Outpatient Medications   Medication Sig Dispense Refill     acetaminophen (TYLENOL) 500 MG tablet Take 500-1,000 mg by mouth every 6 hours as needed for mild pain       alcohol swab prep pads Use to swab area of injection/amy as directed. 100 each 3     blood glucose (NO BRAND SPECIFIED) test strip Use to test blood sugar 2 times daily or as directed. To accompany: Blood Glucose Monitor Brands: per insurance. 100 strip 6     blood glucose calibration (NO BRAND SPECIFIED) solution To accompany: Blood Glucose Monitor Brands: per insurance. 1 each 1     blood glucose monitoring (NO BRAND SPECIFIED) meter device kit Use to test blood sugar 2 times daily or as directed. Preferred blood glucose meter OR supplies to accompany: Blood Glucose Monitor Brands: per insurance. 1 kit 0     clindamycin (CLEOCIN) 300 MG capsule Take 1 capsule (300 mg) by mouth 3 times daily 21 capsule 0     FLUoxetine (PROZAC) 40 MG capsule Take 1 capsule (40 mg) by mouth daily 90 capsule 1     medical cannabis (Patient's own supply) Take 1 Dose by mouth nightly as needed Gummies: 5 mg   Formulary: Red  Location: Unknown    (The purpose of this order is to document that the patient reports taking medical cannabis.  This is not a prescription, and is not used to certify that the patient has a qualifying medical condition.)       melatonin 3 MG tablet Take 6 mg by mouth nightly as needed for sleep       metFORMIN  (GLUCOPHAGE XR) 500 MG 24 hr tablet Take 1 tablet (500 mg) by mouth daily 90 tablet 3     oxyCODONE (ROXICODONE) 5 MG tablet Take 1 tablet (5 mg) by mouth every 6 hours as needed for pain 24 tablet 0     Semaglutide, 1 MG/DOSE, (OZEMPIC, 1 MG/DOSE,) 4 MG/3ML pen Inject 1 mg Subcutaneous every 7 days (On )       spironolactone (ALDACTONE) 100 MG tablet Take 1 tablet (100 mg) by mouth daily 90 tablet 1     thin (NO BRAND SPECIFIED) lancets Use with lanceting device. To accompany: Blood Glucose Monitor Brands: per insurance. 100 each 6         ALLERGIES:  Allergies   Allergen Reactions     Keflex [Cephalexin-Fd&C Yellow #6]      Tetracycline      Trazodone      Very sedating-         PAST MEDICAL HISTORY:  Diabetes mellitus type 2  Hyperlipidemia    Gynecologic history:  Age of menarche at 11.  Last menstrual period in  prior to hysterectomy.  G3, P3.  Age of first pregnancy at 29.  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  No use of HRT.  Used OCPs for 4 years.  No IUD use.      PAST SURGICAL HISTORY:  Past Surgical History:   Procedure Laterality Date     CYST REMOVAL       HYSTERECTOMY       HYSTERECTOMY, PAP NO LONGER INDICATED      menorrhagia     INSERT TISSUE EXPANDER BREAST Bilateral 2/3/2023    Procedure: BILATERAL TISSUE EXPANDER;  Surgeon: Jaya Malave MD;  Location:  OR     MASTECTOMY SIMPLE BILATERAL, SENTINEL NODE BILATERAL, COMBINED Left 2/3/2023    Procedure: Bilateral skin sparing mastectomies with left axillary sentinel lymph node biopsy;  Surgeon: Clarissa Fontanez MD;  Location:  OR         SOCIAL HISTORY:  Social History     Socioeconomic History     Marital status:      Spouse name: Not on file     Number of children: 3     Years of education: Not on file     Highest education level: Not on file   Occupational History     Not on file   Tobacco Use     Smoking status: Former     Packs/day: 0.00     Types: Cigarettes     Quit date: 1999     Years since quittin.3      Smokeless tobacco: Never   Vaping Use     Vaping Use: Never used   Substance and Sexual Activity     Alcohol use: Yes     Comment: Alcoholic Drinks/day: 1 a week     Drug use: Yes     Types: Marijuana     Comment: nightly for sleep (gummy)     Sexual activity: Yes     Partners: Male   Other Topics Concern     Not on file   Social History Narrative     Not on file     Social Determinants of Health     Financial Resource Strain: Low Risk      Difficulty of Paying Living Expenses: Not very hard   Food Insecurity: No Food Insecurity     Worried About Running Out of Food in the Last Year: Never true     Ran Out of Food in the Last Year: Never true   Transportation Needs: No Transportation Needs     Lack of Transportation (Medical): No     Lack of Transportation (Non-Medical): No   Physical Activity: Insufficiently Active     Days of Exercise per Week: 1 day     Minutes of Exercise per Session: 20 min   Stress: Stress Concern Present     Feeling of Stress : Rather much   Social Connections: Moderately Isolated     Frequency of Communication with Friends and Family: More than three times a week     Frequency of Social Gatherings with Friends and Family: Once a week     Attends Advent Services: Never     Active Member of Clubs or Organizations: No     Attends Club or Organization Meetings: Not on file     Marital Status:    Intimate Partner Violence: Not on file   Housing Stability: Low Risk      Unable to Pay for Housing in the Last Year: No     Number of Places Lived in the Last Year: 1     Unstable Housing in the Last Year: No         FAMILY HISTORY:  Family History   Problem Relation Age of Onset     Diabetes Mother      Atrial fibrillation Father      Alcoholism Father      Melanoma Father      Breast Cancer Maternal Aunt         in her 40's     Cervical Cancer Maternal Aunt      Colon Cancer No family hx of          PHYSICAL EXAM:  Vital signs:  LMP  (LMP Unknown)    GENERAL: No acute distress.  EYES: No  scleral icterus. No overt erythema.  RESPIRATORY: No audible cough, wheezing, or labored breathing.  MUSCULOSKELETAL: Range of motion in the neck, shoulders, and arms appear normal.  SKIN: No overt rashes, discolorations, or lesions over the face and neck.  NEUROLOGIC: Alert.  No overt tremors.  PSYCHIATRIC: Normal affect and mood.  Does not appear anxious.      LABS:  CBC RESULTS: Recent Labs   Lab Test 01/10/23  1019   WBC 7.2   RBC 5.39*   HGB 14.9   HCT 46.9   MCV 87   MCH 27.6   MCHC 31.8   RDW 12.4   *     Last Comprehensive Metabolic Panel:  Sodium   Date Value Ref Range Status   01/10/2023 135 133 - 144 mmol/L Final     Potassium   Date Value Ref Range Status   02/03/2023 4.1 3.4 - 5.3 mmol/L Final   01/10/2023 3.9 3.4 - 5.3 mmol/L Final     Chloride   Date Value Ref Range Status   01/10/2023 102 94 - 109 mmol/L Final     Carbon Dioxide (CO2)   Date Value Ref Range Status   01/10/2023 26 20 - 32 mmol/L Final     Anion Gap   Date Value Ref Range Status   01/10/2023 7 3 - 14 mmol/L Final     Glucose   Date Value Ref Range Status   01/10/2023 100 (H) 70 - 99 mg/dL Final     GLUCOSE BY METER POCT   Date Value Ref Range Status   02/03/2023 120 (H) 70 - 99 mg/dL Final     Urea Nitrogen   Date Value Ref Range Status   01/10/2023 16 7 - 30 mg/dL Final     Creatinine   Date Value Ref Range Status   02/03/2023 0.97 (H) 0.51 - 0.95 mg/dL Final     GFR Estimate   Date Value Ref Range Status   02/03/2023 70 >60 mL/min/1.73m2 Final     Comment:     eGFR calculated using 2021 CKD-EPI equation.   01/12/2021 >60 >60 mL/min/1.73m2 Final     Calcium   Date Value Ref Range Status   01/10/2023 9.3 8.5 - 10.1 mg/dL Final     Bilirubin Total   Date Value Ref Range Status   01/10/2023 0.3 0.2 - 1.3 mg/dL Final     Alkaline Phosphatase   Date Value Ref Range Status   01/10/2023 60 40 - 150 U/L Final     ALT   Date Value Ref Range Status   01/10/2023 30 0 - 50 U/L Final     AST   Date Value Ref Range Status   01/10/2023 21 0 -  45 U/L Final       PATHOLOGY:  Reviewed as per HPI.    IMAGING:  Reviewed as per HPI.    ASSESSMENT/PLAN:  Yuliya Li is a 52 year old female with the following issues:  1. Pathologic prognostic stage IA, [id number], grade 2 multifocal invasive ductal carcinoma with lobular features of left breast overlapping sites, ER positive, GA positive, HER-2 IHC = 2+; HER-2/crow FISH negative  --I had a detailed discussion with Yuliya regarding her final pathology results.  She had 2 tumors measuring 2 cm and 0.4 cm and additional left chest wall nodule, but with 2/3 lymph nodes involved, one that was a macrometastasis with extranodal extension and the other with micrometastasis with no extranodal extension.  All areas of tumor were excised.  The repeat HER-2 FISH was interpreted as negative.   --I told her that her case was discussed in detail at our recent multi-disciplinary breast tumor board.  --I reviewed her PET scan which showed no evidence for metastatic disease.  --Discussed that her Oncotype DX results are pending and should hopefully be back by next week. We talked about proper interpretation of her Oncotype results as she is over age 50 and nearly postmenopausal.  -Given that she has a single ovary remaining and is status post hysterectomy, we checked her FSH and estradiol levels. Her FSH level was 36.6 but estradiol was in the premenopausal range at 63.  Therefore, I recommended either tamoxifen or OST + AI with Zoladex every 4 weeks with anastrozole for the first 5 years of hormone blockade therapy.  Then she can sequence with AI if she takes tamoxifen for first 5 years or AI alone after OST + AI.  Overall, would favor AI therapy to maximize risk reduction. In all likelihood, she would probably not need more than a few months to a year of Zoladex.  --Will arrange for DEXA scan.    2.  Family history of breast cancer  - Maternal aunt with breast cancer.  - I have recommended and referred Yuliya to genetic  counseling.      3.  Neck and back pain, chronic  - PET scan showed no evidence of distant metastatic disease.    Louise Watson MD  Hematology/Oncology  [facility]    Total time spent: 40 minutes in patient evaluation, counseling, documentation, and coordination of care.

## 2023-02-22 ENCOUNTER — TUMOR CONFERENCE (OUTPATIENT)
Dept: ONCOLOGY | Facility: CLINIC | Age: 53
End: 2023-02-22
Payer: COMMERCIAL

## 2023-02-22 DIAGNOSIS — E66.01 MORBID (SEVERE) OBESITY DUE TO EXCESS CALORIES (H): ICD-10-CM

## 2023-02-22 NOTE — TELEPHONE ENCOUNTER
Routing refill request to provider for review/approval because:  Labs out of range:  creatinine  Medication is reported/historical    Tiffany Wade RN

## 2023-02-23 ENCOUNTER — HOSPITAL ENCOUNTER (OUTPATIENT)
Dept: PET IMAGING | Facility: CLINIC | Age: 53
Discharge: HOME | End: 2023-02-23
Attending: INTERNAL MEDICINE | Admitting: INTERNAL MEDICINE
Payer: COMMERCIAL

## 2023-02-23 DIAGNOSIS — Z17.0 MALIGNANT NEOPLASM OF OVERLAPPING SITES OF LEFT BREAST IN FEMALE, ESTROGEN RECEPTOR POSITIVE (H): ICD-10-CM

## 2023-02-23 DIAGNOSIS — G89.29 OTHER CHRONIC BACK PAIN: ICD-10-CM

## 2023-02-23 DIAGNOSIS — M54.89 OTHER CHRONIC BACK PAIN: ICD-10-CM

## 2023-02-23 DIAGNOSIS — C50.812 MALIGNANT NEOPLASM OF OVERLAPPING SITES OF LEFT BREAST IN FEMALE, ESTROGEN RECEPTOR POSITIVE (H): ICD-10-CM

## 2023-02-23 DIAGNOSIS — M54.2 NECK PAIN: ICD-10-CM

## 2023-02-23 DIAGNOSIS — C77.3 MALIGNANT NEOPLASM METASTATIC TO LYMPH NODE OF AXILLA (H): ICD-10-CM

## 2023-02-23 PROCEDURE — 71260 CT THORAX DX C+: CPT | Mod: 26 | Performed by: RADIOLOGY

## 2023-02-23 PROCEDURE — 78816 PET IMAGE W/CT FULL BODY: CPT | Mod: 26 | Performed by: RADIOLOGY

## 2023-02-23 PROCEDURE — A9552 F18 FDG: HCPCS | Performed by: INTERNAL MEDICINE

## 2023-02-23 PROCEDURE — 74177 CT ABD & PELVIS W/CONTRAST: CPT | Mod: 26 | Performed by: RADIOLOGY

## 2023-02-23 PROCEDURE — 250N000011 HC RX IP 250 OP 636: Performed by: INTERNAL MEDICINE

## 2023-02-23 PROCEDURE — 74177 CT ABD & PELVIS W/CONTRAST: CPT

## 2023-02-23 PROCEDURE — 343N000001 HC RX 343: Performed by: INTERNAL MEDICINE

## 2023-02-23 PROCEDURE — 78816 PET IMAGE W/CT FULL BODY: CPT | Mod: PI

## 2023-02-23 RX ORDER — SEMAGLUTIDE 1.34 MG/ML
1 INJECTION, SOLUTION SUBCUTANEOUS
Qty: 9 ML | Refills: 1 | Status: SHIPPED | OUTPATIENT
Start: 2023-02-23 | End: 2023-05-11

## 2023-02-23 RX ORDER — IOPAMIDOL 755 MG/ML
10-135 INJECTION, SOLUTION INTRAVASCULAR ONCE
Status: COMPLETED | OUTPATIENT
Start: 2023-02-23 | End: 2023-02-23

## 2023-02-23 RX ADMIN — FLUDEOXYGLUCOSE F-18 13.2 MILLICURIE: 500 INJECTION, SOLUTION INTRAVENOUS at 08:46

## 2023-02-23 RX ADMIN — IOPAMIDOL 122 ML: 755 INJECTION, SOLUTION INTRAVENOUS at 09:39

## 2023-02-27 ENCOUNTER — VIRTUAL VISIT (OUTPATIENT)
Dept: ONCOLOGY | Facility: CLINIC | Age: 53
End: 2023-02-27
Attending: INTERNAL MEDICINE
Payer: COMMERCIAL

## 2023-02-27 DIAGNOSIS — C50.812 MALIGNANT NEOPLASM OF OVERLAPPING SITES OF LEFT BREAST IN FEMALE, ESTROGEN RECEPTOR POSITIVE (H): Primary | ICD-10-CM

## 2023-02-27 DIAGNOSIS — M85.9 DISORDER OF BONE DENSITY AND STRUCTURE, UNSPECIFIED: ICD-10-CM

## 2023-02-27 DIAGNOSIS — C77.3 MALIGNANT NEOPLASM METASTATIC TO LYMPH NODE OF AXILLA (H): ICD-10-CM

## 2023-02-27 DIAGNOSIS — Z17.0 MALIGNANT NEOPLASM OF OVERLAPPING SITES OF LEFT BREAST IN FEMALE, ESTROGEN RECEPTOR POSITIVE (H): Primary | ICD-10-CM

## 2023-02-27 PROCEDURE — G0463 HOSPITAL OUTPT CLINIC VISIT: HCPCS | Mod: PN,GT | Performed by: INTERNAL MEDICINE

## 2023-02-27 PROCEDURE — 99215 OFFICE O/P EST HI 40 MIN: CPT | Mod: VID | Performed by: INTERNAL MEDICINE

## 2023-02-27 NOTE — NURSING NOTE
Is the patient currently in the state of [place]? YES    Visit mode:VIDEO    If the visit is dropped, the patient can be reconnected by: VIDEO VISIT: Text to cell phone: [phone]    Will anyone else be joining the visit? NO      How would you like to obtain your AVS? MyChart    Are changes needed to the allergy or medication list? YES: adding Ibuprofen prn    Reason for visit: follow up-

## 2023-02-27 NOTE — LETTER
2/27/2023         RE: Yuliya Li  [address]  [place] [address]        Dear Colleague,    Thank you for referring your patient, Yuliya Li, to the [facility]. Please see a copy of my visit note below.    Video-Visit Details    Type of service:  Video Visit    Video Start Time (time video started): 3:54PM    Video End Time (time video stopped): 4:27pm    Originating Location (pt. Location): Home        Distant Location (provider location):  On-site    Mode of Communication:  Video Conference via [facility]    Hematology/Oncology Established Patient Note      Today's Date: 2/27/2023    Reason for follow-up:  Left breast cancer.    HISTORY OF PRESENT ILLNESS: Yuliya Li is a 52 year old female status post removal of single ovary and hysterectomy who presents with the following oncologic history:  1.  12/23/2022: Mammogram showed possible asymmetry in right breast at 12:00; possible mass in left breast at 3:00.  2. 1/2/2023: Diagnostic bilateral mammogram showed mass in left breast at 3:00 with 0.4 cm satellite mass in retroareolar breast. Right breast with effacement of possible finding at 12:00 consistent with artifact. Left breast U/S showed mass at 3:00, 11 cm from nipple measuring 1.9 x 1.6 x 1.8 cm; no abnormal axillary lymph nodes.  3. 1/03/2022: Left breast core biopsy of 0.4 cm mass at 3:30 position showed grade 2 invasive ductal carcinoma with lobular features. Biopsy of left breast 1.9 cm mass at 3:00 showed grade 2 invasive ductal carcinoma with lobular features, associated grade 2 DCIS. ER and WV strongly positive at %; HER-2 IHC = 2+; HER-2/crow FISH negative.  4. 1/10/2023: FSH = 36.6 but estradiol = 63.  5.  2/03/2023: Bilateral mastectomies with left axillary sentinel lymph node excision and left chest wall nodule excision with Dr. Clarissa Fontanez.  Pathology showed left breast grade 2 invasive ductal  carcinoma with lobular features, multifocal with 2 cm and 0.4 cm tumors, grade 2 DCIS, all margins negative.  Of 3 left axillary sentinel lymph nodes removed, one with 6 mm macrometastasis positive for extranodal extension and one with 1.1 mm micrometastasis, negative for extranodal extension, identified. Left chest wall nodule positive for metastatic breast carcinoma involving edge of tissue fragment. HER-2 FISH repeated, group 4, considered HER-2 negative. Oncotype DX = pending.  6. 2/23/2023: PET/CT scan negative for metastatic disease. Small right middle lobe lung nodules, non-FDG avid.    INTERVAL HISTORY:  Yuliya reports healing well from mastectomies.      REVIEW OF SYSTEMS:   14 point ROS was reviewed and is negative other than as noted above in HPI.       HOME MEDICATIONS:  Current Outpatient Medications   Medication Sig Dispense Refill     acetaminophen (TYLENOL) 500 MG tablet Take 500-1,000 mg by mouth every 6 hours as needed for mild pain       alcohol swab prep pads Use to swab area of injection/amy as directed. 100 each 3     blood glucose (NO BRAND SPECIFIED) test strip Use to test blood sugar 2 times daily or as directed. To accompany: Blood Glucose Monitor Brands: per insurance. 100 strip 6     blood glucose calibration (NO BRAND SPECIFIED) solution To accompany: Blood Glucose Monitor Brands: per insurance. 1 each 1     blood glucose monitoring (NO BRAND SPECIFIED) meter device kit Use to test blood sugar 2 times daily or as directed. Preferred blood glucose meter OR supplies to accompany: Blood Glucose Monitor Brands: per insurance. 1 kit 0     clindamycin (CLEOCIN) 300 MG capsule Take 1 capsule (300 mg) by mouth 3 times daily 21 capsule 0     FLUoxetine (PROZAC) 40 MG capsule Take 1 capsule (40 mg) by mouth daily 90 capsule 1     medical cannabis (Patient's own supply) Take 1 Dose by mouth nightly as needed Gummies: 5 mg   Formulary: Red  Location: Unknown    (The purpose of this order is to  document that the patient reports taking medical cannabis.  This is not a prescription, and is not used to certify that the patient has a qualifying medical condition.)       melatonin 3 MG tablet Take 6 mg by mouth nightly as needed for sleep       metFORMIN (GLUCOPHAGE XR) 500 MG 24 hr tablet Take 1 tablet (500 mg) by mouth daily 90 tablet 3     oxyCODONE (ROXICODONE) 5 MG tablet Take 1 tablet (5 mg) by mouth every 6 hours as needed for pain 24 tablet 0     Semaglutide, 1 MG/DOSE, (OZEMPIC, 1 MG/DOSE,) 4 MG/3ML pen Inject 1 mg Subcutaneous every 7 days (On Fridays)       spironolactone (ALDACTONE) 100 MG tablet Take 1 tablet (100 mg) by mouth daily 90 tablet 1     thin (NO BRAND SPECIFIED) lancets Use with lanceting device. To accompany: Blood Glucose Monitor Brands: per insurance. 100 each 6         ALLERGIES:  Allergies   Allergen Reactions     Keflex [Cephalexin-Fd&C Yellow #6]      Tetracycline      Trazodone      Very sedating-         PAST MEDICAL HISTORY:  Diabetes mellitus type 2  Hyperlipidemia    Gynecologic history:  Age of menarche at 11.  Last menstrual period in 2005 prior to hysterectomy.  G3, P3.  Age of first pregnancy at 29.  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  No use of HRT.  Used OCPs for 4 years.  No IUD use.      PAST SURGICAL HISTORY:  Past Surgical History:   Procedure Laterality Date     CYST REMOVAL       HYSTERECTOMY       HYSTERECTOMY, PAP NO LONGER INDICATED  2005    menorrhagia     INSERT TISSUE EXPANDER BREAST Bilateral 2/3/2023    Procedure: BILATERAL TISSUE EXPANDER;  Surgeon: Jaya Malave MD;  Location:  OR     MASTECTOMY SIMPLE BILATERAL, SENTINEL NODE BILATERAL, COMBINED Left 2/3/2023    Procedure: Bilateral skin sparing mastectomies with left axillary sentinel lymph node biopsy;  Surgeon: Clarissa Fontanez MD;  Location:  OR         SOCIAL HISTORY:  Social History     Socioeconomic History     Marital status:      Spouse name: Not on file     Number of children:  3     Years of education: Not on file     Highest education level: Not on file   Occupational History     Not on file   Tobacco Use     Smoking status: Former     Packs/day: 0.00     Types: Cigarettes     Quit date: 1999     Years since quittin.3     Smokeless tobacco: Never   Vaping Use     Vaping Use: Never used   Substance and Sexual Activity     Alcohol use: Yes     Comment: Alcoholic Drinks/day: 1 a week     Drug use: Yes     Types: Marijuana     Comment: nightly for sleep (gummy)     Sexual activity: Yes     Partners: Male   Other Topics Concern     Not on file   Social History Narrative     Not on file     Social Determinants of Health     Financial Resource Strain: Low Risk      Difficulty of Paying Living Expenses: Not very hard   Food Insecurity: No Food Insecurity     Worried About Running Out of Food in the Last Year: Never true     Ran Out of Food in the Last Year: Never true   Transportation Needs: No Transportation Needs     Lack of Transportation (Medical): No     Lack of Transportation (Non-Medical): No   Physical Activity: Insufficiently Active     Days of Exercise per Week: 1 day     Minutes of Exercise per Session: 20 min   Stress: Stress Concern Present     Feeling of Stress : Rather much   Social Connections: Moderately Isolated     Frequency of Communication with Friends and Family: More than three times a week     Frequency of Social Gatherings with Friends and Family: Once a week     Attends [employer] Services: Never     Active Member of Clubs or Organizations: No     Attends Club or Organization Meetings: Not on file     Marital Status:    Intimate Partner Violence: Not on file   Housing Stability: Low Risk      Unable to Pay for Housing in the Last Year: No     Number of Places Lived in the Last Year: 1     Unstable Housing in the Last Year: No         FAMILY HISTORY:  Family History   Problem Relation Age of Onset     Diabetes Mother      Atrial fibrillation Father       Alcoholism Father      Melanoma Father      Breast Cancer Maternal Aunt         in her 40's     Cervical Cancer Maternal Aunt      Colon Cancer No family hx of          PHYSICAL EXAM:  Vital signs:  LMP  (LMP Unknown)    GENERAL: No acute distress.  EYES: No scleral icterus. No overt erythema.  RESPIRATORY: No audible cough, wheezing, or labored breathing.  MUSCULOSKELETAL: Range of motion in the neck, shoulders, and arms appear normal.  SKIN: No overt rashes, discolorations, or lesions over the face and neck.  NEUROLOGIC: Alert.  No overt tremors.  PSYCHIATRIC: Normal affect and mood.  Does not appear anxious.      LABS:  CBC RESULTS: Recent Labs   Lab Test 01/10/23  1019   WBC 7.2   RBC 5.39*   HGB 14.9   HCT 46.9   MCV 87   MCH 27.6   MCHC 31.8   RDW 12.4   *     Last Comprehensive Metabolic Panel:  Sodium   Date Value Ref Range Status   01/10/2023 135 133 - 144 mmol/L Final     Potassium   Date Value Ref Range Status   02/03/2023 4.1 3.4 - 5.3 mmol/L Final   01/10/2023 3.9 3.4 - 5.3 mmol/L Final     Chloride   Date Value Ref Range Status   01/10/2023 102 94 - 109 mmol/L Final     Carbon Dioxide (CO2)   Date Value Ref Range Status   01/10/2023 26 20 - 32 mmol/L Final     Anion Gap   Date Value Ref Range Status   01/10/2023 7 3 - 14 mmol/L Final     Glucose   Date Value Ref Range Status   01/10/2023 100 (H) 70 - 99 mg/dL Final     GLUCOSE BY METER POCT   Date Value Ref Range Status   02/03/2023 120 (H) 70 - 99 mg/dL Final     Urea Nitrogen   Date Value Ref Range Status   01/10/2023 16 7 - 30 mg/dL Final     Creatinine   Date Value Ref Range Status   02/03/2023 0.97 (H) 0.51 - 0.95 mg/dL Final     GFR Estimate   Date Value Ref Range Status   02/03/2023 70 >60 mL/min/1.73m2 Final     Comment:     eGFR calculated using 2021 CKD-EPI equation.   01/12/2021 >60 >60 mL/min/1.73m2 Final     Calcium   Date Value Ref Range Status   01/10/2023 9.3 8.5 - 10.1 mg/dL Final     Bilirubin Total   Date Value Ref Range  Status   01/10/2023 0.3 0.2 - 1.3 mg/dL Final     Alkaline Phosphatase   Date Value Ref Range Status   01/10/2023 60 40 - 150 U/L Final     ALT   Date Value Ref Range Status   01/10/2023 30 0 - 50 U/L Final     AST   Date Value Ref Range Status   01/10/2023 21 0 - 45 U/L Final       PATHOLOGY:  Reviewed as per HPI.    IMAGING:  Reviewed as per HPI.    ASSESSMENT/PLAN:  Yuliya Li is a 52 year old female with the following issues:  1. Pathologic prognostic stage IA, [id number], grade 2 multifocal invasive ductal carcinoma with lobular features of left breast overlapping sites, ER positive, WV positive, HER-2 IHC = 2+; HER-2/crow FISH negative  --I had a detailed discussion with Yuliya regarding her final pathology results.  She had 2 tumors measuring 2 cm and 0.4 cm and additional left chest wall nodule, but with 2/3 lymph nodes involved, one that was a macrometastasis with extranodal extension and the other with micrometastasis with no extranodal extension.  All areas of tumor were excised.  The repeat HER-2 FISH was interpreted as negative.   --I told her that her case was discussed in detail at our recent multi-disciplinary breast tumor board.  --I reviewed her PET scan which showed no evidence for metastatic disease.  --Discussed that her Oncotype DX results are pending and should hopefully be back by next week. We talked about proper interpretation of her Oncotype results as she is over age 50 and nearly postmenopausal.  -Given that she has a single ovary remaining and is status post hysterectomy, we checked her FSH and estradiol levels. Her FSH level was 36.6 but estradiol was in the premenopausal range at 63.  Therefore, I recommended either tamoxifen or OST + AI with Zoladex every 4 weeks with anastrozole for the first 5 years of hormone blockade therapy.  Then she can sequence with AI if she takes tamoxifen for first 5 years or AI alone after OST + AI.  Overall, would favor AI therapy to maximize risk  reduction. In all likelihood, she would probably not need more than a few months to a year of Zoladex.  --Will arrange for DEXA scan.    2.  Family history of breast cancer  - Maternal aunt with breast cancer.  - I have recommended and referred Yuliya to genetic counseling.      3.  Neck and back pain, chronic  - PET scan showed no evidence of distant metastatic disease.    Louise Watson MD  Hematology/Oncology  [facility]    Total time spent: 40 minutes in patient evaluation, counseling, documentation, and coordination of care.       Again, thank you for allowing me to participate in the care of your patient.        Sincerely,        Louise Watson MD

## 2023-02-27 NOTE — LETTER
2/27/2023         RE: Yuliya Li  [address]  [place] [address]        Dear Colleague,    Thank you for referring your patient, Yuliya Li, to the [facility]. Please see a copy of my visit note below.    Video-Visit Details    Type of service:  Video Visit    Video Start Time (time video started): 3:54PM    Video End Time (time video stopped): 4:27pm    Originating Location (pt. Location): Home        Distant Location (provider location):  On-site    Mode of Communication:  Video Conference via [facility]    Hematology/Oncology Established Patient Note      Today's Date: 2/27/2023    Reason for follow-up:  Left breast cancer.    HISTORY OF PRESENT ILLNESS: Yuliya Li is a 52 year old female status post removal of single ovary and hysterectomy who presents with the following oncologic history:  1.  12/23/2022: Mammogram showed possible asymmetry in right breast at 12:00; possible mass in left breast at 3:00.  2. 1/2/2023: Diagnostic bilateral mammogram showed mass in left breast at 3:00 with 0.4 cm satellite mass in retroareolar breast. Right breast with effacement of possible finding at 12:00 consistent with artifact. Left breast U/S showed mass at 3:00, 11 cm from nipple measuring 1.9 x 1.6 x 1.8 cm; no abnormal axillary lymph nodes.  3. 1/03/2022: Left breast core biopsy of 0.4 cm mass at 3:30 position showed grade 2 invasive ductal carcinoma with lobular features. Biopsy of left breast 1.9 cm mass at 3:00 showed grade 2 invasive ductal carcinoma with lobular features, associated grade 2 DCIS. ER and AK strongly positive at %; HER-2 IHC = 2+; HER-2/crow FISH negative.  4. 1/10/2023: FSH = 36.6 but estradiol = 63.  5.  2/03/2023: Bilateral mastectomies with left axillary sentinel lymph node excision and left chest wall nodule excision with Dr. Clarissa Fontanez.  Pathology showed left breast grade 2 invasive ductal  carcinoma with lobular features, multifocal with 2 cm and 0.4 cm tumors, grade 2 DCIS, all margins negative.  Of 3 left axillary sentinel lymph nodes removed, one with 6 mm macrometastasis positive for extranodal extension and one with 1.1 mm micrometastasis, negative for extranodal extension, identified. Left chest wall nodule positive for metastatic breast carcinoma involving edge of tissue fragment. HER-2 FISH repeated, group 4, considered HER-2 negative. Oncotype DX = pending.  6. 2/23/2023: PET/CT scan negative for metastatic disease. Small right middle lobe lung nodules, non-FDG avid.    INTERVAL HISTORY:  Yuliya reports healing well from mastectomies.      REVIEW OF SYSTEMS:   14 point ROS was reviewed and is negative other than as noted above in HPI.       HOME MEDICATIONS:  Current Outpatient Medications   Medication Sig Dispense Refill     acetaminophen (TYLENOL) 500 MG tablet Take 500-1,000 mg by mouth every 6 hours as needed for mild pain       alcohol swab prep pads Use to swab area of injection/amy as directed. 100 each 3     blood glucose (NO BRAND SPECIFIED) test strip Use to test blood sugar 2 times daily or as directed. To accompany: Blood Glucose Monitor Brands: per insurance. 100 strip 6     blood glucose calibration (NO BRAND SPECIFIED) solution To accompany: Blood Glucose Monitor Brands: per insurance. 1 each 1     blood glucose monitoring (NO BRAND SPECIFIED) meter device kit Use to test blood sugar 2 times daily or as directed. Preferred blood glucose meter OR supplies to accompany: Blood Glucose Monitor Brands: per insurance. 1 kit 0     clindamycin (CLEOCIN) 300 MG capsule Take 1 capsule (300 mg) by mouth 3 times daily 21 capsule 0     FLUoxetine (PROZAC) 40 MG capsule Take 1 capsule (40 mg) by mouth daily 90 capsule 1     medical cannabis (Patient's own supply) Take 1 Dose by mouth nightly as needed Gummies: 5 mg   Formulary: Red  Location: Unknown    (The purpose of this order is to  document that the patient reports taking medical cannabis.  This is not a prescription, and is not used to certify that the patient has a qualifying medical condition.)       melatonin 3 MG tablet Take 6 mg by mouth nightly as needed for sleep       metFORMIN (GLUCOPHAGE XR) 500 MG 24 hr tablet Take 1 tablet (500 mg) by mouth daily 90 tablet 3     oxyCODONE (ROXICODONE) 5 MG tablet Take 1 tablet (5 mg) by mouth every 6 hours as needed for pain 24 tablet 0     Semaglutide, 1 MG/DOSE, (OZEMPIC, 1 MG/DOSE,) 4 MG/3ML pen Inject 1 mg Subcutaneous every 7 days (On Fridays)       spironolactone (ALDACTONE) 100 MG tablet Take 1 tablet (100 mg) by mouth daily 90 tablet 1     thin (NO BRAND SPECIFIED) lancets Use with lanceting device. To accompany: Blood Glucose Monitor Brands: per insurance. 100 each 6         ALLERGIES:  Allergies   Allergen Reactions     Keflex [Cephalexin-Fd&C Yellow #6]      Tetracycline      Trazodone      Very sedating-         PAST MEDICAL HISTORY:  Diabetes mellitus type 2  Hyperlipidemia    Gynecologic history:  Age of menarche at 11.  Last menstrual period in 2005 prior to hysterectomy.  G3, P3.  Age of first pregnancy at 29.  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  No use of HRT.  Used OCPs for 4 years.  No IUD use.      PAST SURGICAL HISTORY:  Past Surgical History:   Procedure Laterality Date     CYST REMOVAL       HYSTERECTOMY       HYSTERECTOMY, PAP NO LONGER INDICATED  2005    menorrhagia     INSERT TISSUE EXPANDER BREAST Bilateral 2/3/2023    Procedure: BILATERAL TISSUE EXPANDER;  Surgeon: Jaya Malave MD;  Location:  OR     MASTECTOMY SIMPLE BILATERAL, SENTINEL NODE BILATERAL, COMBINED Left 2/3/2023    Procedure: Bilateral skin sparing mastectomies with left axillary sentinel lymph node biopsy;  Surgeon: Clarissa Fontanez MD;  Location:  OR         SOCIAL HISTORY:  Social History     Socioeconomic History     Marital status:      Spouse name: Not on file     Number of children:  3     Years of education: Not on file     Highest education level: Not on file   Occupational History     Not on file   Tobacco Use     Smoking status: Former     Packs/day: 0.00     Types: Cigarettes     Quit date: 1999     Years since quittin.3     Smokeless tobacco: Never   Vaping Use     Vaping Use: Never used   Substance and Sexual Activity     Alcohol use: Yes     Comment: Alcoholic Drinks/day: 1 a week     Drug use: Yes     Types: Marijuana     Comment: nightly for sleep (gummy)     Sexual activity: Yes     Partners: Male   Other Topics Concern     Not on file   Social History Narrative     Not on file     Social Determinants of Health     Financial Resource Strain: Low Risk      Difficulty of Paying Living Expenses: Not very hard   Food Insecurity: No Food Insecurity     Worried About Running Out of Food in the Last Year: Never true     Ran Out of Food in the Last Year: Never true   Transportation Needs: No Transportation Needs     Lack of Transportation (Medical): No     Lack of Transportation (Non-Medical): No   Physical Activity: Insufficiently Active     Days of Exercise per Week: 1 day     Minutes of Exercise per Session: 20 min   Stress: Stress Concern Present     Feeling of Stress : Rather much   Social Connections: Moderately Isolated     Frequency of Communication with Friends and Family: More than three times a week     Frequency of Social Gatherings with Friends and Family: Once a week     Attends Quaker Services: Never     Active Member of Clubs or Organizations: No     Attends Club or Organization Meetings: Not on file     Marital Status:    Intimate Partner Violence: Not on file   Housing Stability: Low Risk      Unable to Pay for Housing in the Last Year: No     Number of Places Lived in the Last Year: 1     Unstable Housing in the Last Year: No         FAMILY HISTORY:  Family History   Problem Relation Age of Onset     Diabetes Mother      Atrial fibrillation Father       Alcoholism Father      Melanoma Father      Breast Cancer Maternal Aunt         in her 40's     Cervical Cancer Maternal Aunt      Colon Cancer No family hx of          PHYSICAL EXAM:  Vital signs:  LMP  (LMP Unknown)    GENERAL: No acute distress.  EYES: No scleral icterus. No overt erythema.  RESPIRATORY: No audible cough, wheezing, or labored breathing.  MUSCULOSKELETAL: Range of motion in the neck, shoulders, and arms appear normal.  SKIN: No overt rashes, discolorations, or lesions over the face and neck.  NEUROLOGIC: Alert.  No overt tremors.  PSYCHIATRIC: Normal affect and mood.  Does not appear anxious.      LABS:  CBC RESULTS: Recent Labs   Lab Test 01/10/23  1019   WBC 7.2   RBC 5.39*   HGB 14.9   HCT 46.9   MCV 87   MCH 27.6   MCHC 31.8   RDW 12.4   *     Last Comprehensive Metabolic Panel:  Sodium   Date Value Ref Range Status   01/10/2023 135 133 - 144 mmol/L Final     Potassium   Date Value Ref Range Status   02/03/2023 4.1 3.4 - 5.3 mmol/L Final   01/10/2023 3.9 3.4 - 5.3 mmol/L Final     Chloride   Date Value Ref Range Status   01/10/2023 102 94 - 109 mmol/L Final     Carbon Dioxide (CO2)   Date Value Ref Range Status   01/10/2023 26 20 - 32 mmol/L Final     Anion Gap   Date Value Ref Range Status   01/10/2023 7 3 - 14 mmol/L Final     Glucose   Date Value Ref Range Status   01/10/2023 100 (H) 70 - 99 mg/dL Final     GLUCOSE BY METER POCT   Date Value Ref Range Status   02/03/2023 120 (H) 70 - 99 mg/dL Final     Urea Nitrogen   Date Value Ref Range Status   01/10/2023 16 7 - 30 mg/dL Final     Creatinine   Date Value Ref Range Status   02/03/2023 0.97 (H) 0.51 - 0.95 mg/dL Final     GFR Estimate   Date Value Ref Range Status   02/03/2023 70 >60 mL/min/1.73m2 Final     Comment:     eGFR calculated using 2021 CKD-EPI equation.   01/12/2021 >60 >60 mL/min/1.73m2 Final     Calcium   Date Value Ref Range Status   01/10/2023 9.3 8.5 - 10.1 mg/dL Final     Bilirubin Total   Date Value Ref Range  Status   01/10/2023 0.3 0.2 - 1.3 mg/dL Final     Alkaline Phosphatase   Date Value Ref Range Status   01/10/2023 60 40 - 150 U/L Final     ALT   Date Value Ref Range Status   01/10/2023 30 0 - 50 U/L Final     AST   Date Value Ref Range Status   01/10/2023 21 0 - 45 U/L Final       PATHOLOGY:  Reviewed as per HPI.    IMAGING:  Reviewed as per HPI.    ASSESSMENT/PLAN:  Yuliya Li is a 52 year old female with the following issues:  1. Pathologic prognostic stage IA, [id number], grade 2 multifocal invasive ductal carcinoma with lobular features of left breast overlapping sites, ER positive, ND positive, HER-2 IHC = 2+; HER-2/crow FISH negative  --I had a detailed discussion with Yuliya regarding her final pathology results.  She had 2 tumors measuring 2 cm and 0.4 cm and additional left chest wall nodule, but with 2/3 lymph nodes involved, one that was a macrometastasis with extranodal extension and the other with micrometastasis with no extranodal extension.  All areas of tumor were excised.  The repeat HER-2 FISH was interpreted as negative.   --I told her that her case was discussed in detail at our recent multi-disciplinary breast tumor board.  --I reviewed her PET scan which showed no evidence for metastatic disease.  --Discussed that her Oncotype DX results are pending and should hopefully be back by next week. We talked about proper interpretation of her Oncotype results as she is over age 50 and nearly postmenopausal.  -Given that she has a single ovary remaining and is status post hysterectomy, we checked her FSH and estradiol levels. Her FSH level was 36.6 but estradiol was in the premenopausal range at 63.  Therefore, I recommended either tamoxifen or OST + AI with Zoladex every 4 weeks with anastrozole for the first 5 years of hormone blockade therapy.  Then she can sequence with AI if she takes tamoxifen for first 5 years or AI alone after OST + AI.  Overall, would favor AI therapy to maximize risk  reduction. In all likelihood, she would probably not need more than a few months to a year of Zoladex.  --Will arrange for DEXA scan.    2.  Family history of breast cancer  - Maternal aunt with breast cancer.  - I have recommended and referred Yuliya to genetic counseling.      3.  Neck and back pain, chronic  - PET scan showed no evidence of distant metastatic disease.    Louise Watson MD  Hematology/Oncology  [facility]    Total time spent: 40 minutes in patient evaluation, counseling, documentation, and coordination of care.       Again, thank you for allowing me to participate in the care of your patient.        Sincerely,        Louise Watson MD

## 2023-03-03 NOTE — TUMOR CONFERENCE
Tumor Conference Information  Tumor Conference: Breast  Specialties Present: Medical oncology, Radiation oncology, Pathology, Radiology, Surgery  Patient Status: Prospective  Stage: path stage IA  Treatment to Date: Biopsy, Surgery  Clinical Trials: Not discussed  Genetic Testing Discussed/Recommended?: Yes  Supportive Care Services Discussed/Recommended?: No  Recommended Plan: Follows evidence-based guidelines  Did the review exceed 30 minutes?: did not       Genetics referral placed.  Oncotype is pending.  Plan for endocrine therapy.    Virginia Nolen RN BSN  Breast Nurse Care Coordinator  [facility] [place] [place]  [phone]      Documentation / Disclaimer Cancer Tumor Board Note  Cancer tumor board recommendations do not override what is determined to be reasonable care and treatment, which is dependent on the circumstances of a patient's case; the patient's medical, social, and personal concerns; and the clinical judgment of the oncologist [physician].

## 2023-03-08 ENCOUNTER — VIRTUAL VISIT (OUTPATIENT)
Dept: ONCOLOGY | Facility: CLINIC | Age: 53
End: 2023-03-08
Attending: INTERNAL MEDICINE
Payer: COMMERCIAL

## 2023-03-08 DIAGNOSIS — Z17.0 MALIGNANT NEOPLASM OF OVERLAPPING SITES OF LEFT BREAST IN FEMALE, ESTROGEN RECEPTOR POSITIVE (H): ICD-10-CM

## 2023-03-08 DIAGNOSIS — C50.812 MALIGNANT NEOPLASM OF OVERLAPPING SITES OF LEFT BREAST IN FEMALE, ESTROGEN RECEPTOR POSITIVE (H): ICD-10-CM

## 2023-03-08 DIAGNOSIS — R91.8 PULMONARY NODULES: Primary | ICD-10-CM

## 2023-03-08 PROCEDURE — G0463 HOSPITAL OUTPT CLINIC VISIT: HCPCS | Mod: PN,GT | Performed by: INTERNAL MEDICINE

## 2023-03-08 PROCEDURE — 99215 OFFICE O/P EST HI 40 MIN: CPT | Mod: VID | Performed by: INTERNAL MEDICINE

## 2023-03-08 NOTE — NURSING NOTE
Is the patient currently in the state of [place]? YES    Visit mode:VIDEO    If the visit is dropped, the patient can be reconnected by: VIDEO VISIT: Send to e-mail at: [email or web address]    Will anyone else be joining the visit? NO      How would you like to obtain your AVS? MyChart    Are changes needed to the allergy or medication list? NO    Reason for visit: return video visit    Rosalia Bledsoe, MORIAH

## 2023-03-08 NOTE — LETTER
3/8/2023         RE: Yuliya Li  [address]  [place] [address]        Dear Colleague,    Thank you for referring your patient, Yuliya Li, to the [facility]. Please see a copy of my visit note below.    Video-Visit Details    Type of service:  Video Visit    Video Start Time (time video started): 11:35am    Video End Time (time video stopped): 12:06pm    Originating Location (pt. Location): Home        Distant Location (provider location):  On-site    Mode of Communication:  Video Conference via [facility]    Hematology/Oncology Established Patient Note      Today's Date: 3/8/2023    Reason for follow-up:  Left breast cancer.    HISTORY OF PRESENT ILLNESS: Yulyia Li is a 52 year old female status post removal of single ovary and hysterectomy who presents with the following oncologic history:  1.  12/23/2022: Mammogram showed possible asymmetry in right breast at 12:00; possible mass in left breast at 3:00.  2. 1/2/2023: Diagnostic bilateral mammogram showed mass in left breast at 3:00 with 0.4 cm satellite mass in retroareolar breast. Right breast with effacement of possible finding at 12:00 consistent with artifact. Left breast U/S showed mass at 3:00, 11 cm from nipple measuring 1.9 x 1.6 x 1.8 cm; no abnormal axillary lymph nodes.  3. 1/03/2022: Left breast core biopsy of 0.4 cm mass at 3:30 position showed grade 2 invasive ductal carcinoma with lobular features. Biopsy of left breast 1.9 cm mass at 3:00 showed grade 2 invasive ductal carcinoma with lobular features, associated grade 2 DCIS. ER and SC strongly positive at %; HER-2 IHC = 2+; HER-2/crow FISH negative.  4. 1/10/2023: FSH = 36.6 but estradiol = 63.  5.  2/03/2023: Bilateral mastectomies with left axillary sentinel lymph node excision and left chest wall nodule excision with Dr. Clarissa Fontanez.  Pathology showed left breast grade 2 invasive ductal  carcinoma with lobular features, multifocal with 2 cm and 0.4 cm tumors, grade 2 DCIS, all margins negative.  Of 3 left axillary sentinel lymph nodes removed, one with 6 mm macrometastasis positive for extranodal extension and one with 1.1 mm micrometastasis, negative for extranodal extension, identified. Left chest wall nodule positive for metastatic breast carcinoma involving edge of tissue fragment. HER-2 FISH repeated, group 4, considered HER-2 negative. Oncotype DX = 20, 9-year risk of distant recurrence of 17% after 5 years of hormone blockade therapy; breast-cancer specific survival of 96.7% treated without chemotherapy.  6. 2/23/2023: PET/CT scan negative for metastatic disease. Small right middle lobe lung nodules, non-FDG avid.    INTERVAL HISTORY:  Yuliya reports no new issues.      REVIEW OF SYSTEMS:   14 point ROS was reviewed and is negative other than as noted above in HPI.       HOME MEDICATIONS:  Current Outpatient Medications   Medication Sig Dispense Refill     acetaminophen (TYLENOL) 500 MG tablet Take 500-1,000 mg by mouth every 6 hours as needed for mild pain       alcohol swab prep pads Use to swab area of injection/amy as directed. 100 each 3     blood glucose (NO BRAND SPECIFIED) test strip Use to test blood sugar 2 times daily or as directed. To accompany: Blood Glucose Monitor Brands: per insurance. 100 strip 6     blood glucose calibration (NO BRAND SPECIFIED) solution To accompany: Blood Glucose Monitor Brands: per insurance. 1 each 1     blood glucose monitoring (NO BRAND SPECIFIED) meter device kit Use to test blood sugar 2 times daily or as directed. Preferred blood glucose meter OR supplies to accompany: Blood Glucose Monitor Brands: per insurance. 1 kit 0     FLUoxetine (PROZAC) 40 MG capsule Take 1 capsule (40 mg) by mouth daily 90 capsule 1     medical cannabis (Patient's own supply) Take 1 Dose by mouth nightly as needed Gummies: 5 mg   Formulary: Red  Location: Unknown    (The  purpose of this order is to document that the patient reports taking medical cannabis.  This is not a prescription, and is not used to certify that the patient has a qualifying medical condition.)       melatonin 3 MG tablet Take 6 mg by mouth nightly as needed for sleep       metFORMIN (GLUCOPHAGE XR) 500 MG 24 hr tablet Take 1 tablet (500 mg) by mouth daily 90 tablet 3     Semaglutide, 1 MG/DOSE, (OZEMPIC, 1 MG/DOSE,) 4 MG/3ML pen Inject 1 mg Subcutaneous every 7 days (On Fridays) 9 mL 1     spironolactone (ALDACTONE) 100 MG tablet Take 1 tablet (100 mg) by mouth daily 90 tablet 1     clindamycin (CLEOCIN) 300 MG capsule Take 1 capsule (300 mg) by mouth 3 times daily (Patient not taking: Reported on 2/27/2023) 21 capsule 0     oxyCODONE (ROXICODONE) 5 MG tablet Take 1 tablet (5 mg) by mouth every 6 hours as needed for pain (Patient not taking: Reported on 2/27/2023) 24 tablet 0     thin (NO BRAND SPECIFIED) lancets Use with lanceting device. To accompany: Blood Glucose Monitor Brands: per insurance. (Patient not taking: Reported on 3/8/2023) 100 each 6         ALLERGIES:  Allergies   Allergen Reactions     Keflex [Cephalexin-Fd&C Yellow #6]      Tetracycline      Trazodone      Very sedating-         PAST MEDICAL HISTORY:  Diabetes mellitus type 2  Hyperlipidemia    Gynecologic history:  Age of menarche at 11.  Last menstrual period in 2005 prior to hysterectomy.  G3, P3.  Age of first pregnancy at 29.  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  No use of HRT.  Used OCPs for 4 years.  No IUD use.      PAST SURGICAL HISTORY:  Past Surgical History:   Procedure Laterality Date     CYST REMOVAL       HYSTERECTOMY       HYSTERECTOMY, PAP NO LONGER INDICATED  2005    menorrhagia     INSERT TISSUE EXPANDER BREAST Bilateral 2/3/2023    Procedure: BILATERAL TISSUE EXPANDER;  Surgeon: Jaya Malave MD;  Location: [facility] OR     MASTECTOMY SIMPLE BILATERAL, SENTINEL NODE BILATERAL, COMBINED Left 2/3/2023    Procedure: Bilateral  skin sparing mastectomies with left axillary sentinel lymph node biopsy;  Surgeon: Clarissa Fontanez MD;  Location:  OR         SOCIAL HISTORY:  Social History     Socioeconomic History     Marital status:      Spouse name: Not on file     Number of children: 3     Years of education: Not on file     Highest education level: Not on file   Occupational History     Not on file   Tobacco Use     Smoking status: Former     Packs/day: 0.00     Types: Cigarettes     Quit date: 1999     Years since quittin.3     Smokeless tobacco: Never   Vaping Use     Vaping Use: Never used   Substance and Sexual Activity     Alcohol use: Yes     Comment: Alcoholic Drinks/day: 1 a week     Drug use: Yes     Types: Marijuana     Comment: nightly for sleep (gummy)     Sexual activity: Yes     Partners: Male   Other Topics Concern     Not on file   Social History Narrative     Not on file     Social Determinants of Health     Financial Resource Strain: Low Risk      Difficulty of Paying Living Expenses: Not very hard   Food Insecurity: No Food Insecurity     Worried About Running Out of Food in the Last Year: Never true     Ran Out of Food in the Last Year: Never true   Transportation Needs: No Transportation Needs     Lack of Transportation (Medical): No     Lack of Transportation (Non-Medical): No   Physical Activity: Insufficiently Active     Days of Exercise per Week: 1 day     Minutes of Exercise per Session: 20 min   Stress: Stress Concern Present     Feeling of Stress : Rather much   Social Connections: Moderately Isolated     Frequency of Communication with Friends and Family: More than three times a week     Frequency of Social Gatherings with Friends and Family: Once a week     Attends [employer] Services: Never     Active Member of Clubs or Organizations: No     Attends Club or Organization Meetings: Not on file     Marital Status:    Intimate Partner Violence: Not on file   Housing Stability: Low Risk       Unable to Pay for Housing in the Last Year: No     Number of Places Lived in the Last Year: 1     Unstable Housing in the Last Year: No         FAMILY HISTORY:  Family History   Problem Relation Age of Onset     Diabetes Mother      Atrial fibrillation Father      Alcoholism Father      Melanoma Father      Breast Cancer Maternal Aunt         in her 40's     Cervical Cancer Maternal Aunt      Colon Cancer No family hx of          PHYSICAL EXAM:  Vital signs:  Not taken.   GENERAL: No acute distress.  EYES: No scleral icterus. No overt erythema.  RESPIRATORY: No audible cough, wheezing, or labored breathing.  MUSCULOSKELETAL: Range of motion in the neck, shoulders, and arms appear normal.  SKIN: No overt rashes, discolorations, or lesions over the face and neck.  NEUROLOGIC: Alert.  No overt tremors.  PSYCHIATRIC: Normal affect and mood.  Does not appear anxious.      LABS:  CBC RESULTS: Recent Labs   Lab Test 01/10/23  1019   WBC 7.2   RBC 5.39*   HGB 14.9   HCT 46.9   MCV 87   MCH 27.6   MCHC 31.8   RDW 12.4   *     Last Comprehensive Metabolic Panel:  Sodium   Date Value Ref Range Status   01/10/2023 135 133 - 144 mmol/L Final     Potassium   Date Value Ref Range Status   02/03/2023 4.1 3.4 - 5.3 mmol/L Final   01/10/2023 3.9 3.4 - 5.3 mmol/L Final     Chloride   Date Value Ref Range Status   01/10/2023 102 94 - 109 mmol/L Final     Carbon Dioxide (CO2)   Date Value Ref Range Status   01/10/2023 26 20 - 32 mmol/L Final     Anion Gap   Date Value Ref Range Status   01/10/2023 7 3 - 14 mmol/L Final     Glucose   Date Value Ref Range Status   01/10/2023 100 (H) 70 - 99 mg/dL Final     GLUCOSE BY METER POCT   Date Value Ref Range Status   02/03/2023 120 (H) 70 - 99 mg/dL Final     Urea Nitrogen   Date Value Ref Range Status   01/10/2023 16 7 - 30 mg/dL Final     Creatinine   Date Value Ref Range Status   02/03/2023 0.97 (H) 0.51 - 0.95 mg/dL Final     GFR Estimate   Date Value Ref Range Status   02/03/2023  70 >60 mL/min/1.73m2 Final     Comment:     eGFR calculated using 2021 CKD-EPI equation.   01/12/2021 >60 >60 mL/min/1.73m2 Final     Calcium   Date Value Ref Range Status   01/10/2023 9.3 8.5 - 10.1 mg/dL Final     Bilirubin Total   Date Value Ref Range Status   01/10/2023 0.3 0.2 - 1.3 mg/dL Final     Alkaline Phosphatase   Date Value Ref Range Status   01/10/2023 60 40 - 150 U/L Final     ALT   Date Value Ref Range Status   01/10/2023 30 0 - 50 U/L Final     AST   Date Value Ref Range Status   01/10/2023 21 0 - 45 U/L Final       PATHOLOGY:  Reviewed as per HPI.    IMAGING:  Reviewed as per HPI.    ASSESSMENT/PLAN:  Yuliya Li is a 52 year old female with the following issues:  1. Pathologic prognostic stage IA, [id number], grade 2 multifocal invasive ductal carcinoma with lobular features of left breast overlapping sites, ER positive, MS positive, HER-2 IHC = 2+; HER-2/crow FISH negative, Oncotype DX = 20  --I had a detailed discussion with Yuliya regarding her final pathology results.  She had 2 tumors measuring 2 cm and 0.4 cm and additional left chest wall nodule, but with 2/3 lymph nodes involved, one that was a macrometastasis with extranodal extension and the other with micrometastasis with no extranodal extension.  All areas of tumor were excised.  The repeat HER-2 FISH was interpreted as negative.   --I told her that her case was discussed in detail at our recent multi-disciplinary breast tumor board.  --2/23/2023 PET scan showed no evidence for metastatic disease.  --Discussed that her Oncotype DX score is 20, with 9-year risk of distant recurrence of 17% after 5 years of hormone blockade therapy; breast-cancer specific survival of 96.7% treated without chemotherapy. Discussed that this would indicate up to 3.3% absolute benefit of chemotherapy.    --Discussed that this is not a massive benefit of chemotherapy but still some benefit.  I offered 4 cycles of Taxotere and Cytoxan every 3 weeks if she  would like to maximally reduce her risk beyond just hormone blockade therapy alone.  Discussed weighing the risks and benefits and the potential acute and long-term side effects.  She would like to think about the chemo option further and will get back to me on this over the next 1-2 weeks.  -Given that she has a single ovary remaining and is status post hysterectomy, we checked her FSH and estradiol levels. Her FSH level was 36.6 but estradiol was in the premenopausal range at 63.  Therefore, I recommended OST + AI with Zoladex every 4 weeks with anastrozole for 10 years of hormone blockade therapy.   In all likelihood, she would probably not need more than a few months to a year of Zoladex. Would plan to repeat FSH and estradiol off Zoladex in the future.  --We talked a bit about abemaciclib (Verzenio) and whether this could benefit her due to the chest wall nodule that was found and resected at time of surgery.  Even with the expanded indication for adjuvant abemaciclib based on the monarchE trial, her grade 2 and TNM stage of disease would not meet criteria for this drug.  --DEXA scan pending.  --Will refer her to radiation oncology for discussion of adjuvant radiation therapy.    2.  Family history of breast cancer  - Maternal aunt with breast cancer.  - I have recommended and referred Yuliya to genetic counseling.      3.  Neck and back pain, chronic  - PET scan showed no evidence of distant metastatic disease.    4. Indeterminate right middle lobe pulmonary nodules  --2/23/2023 PET/CT scan showed 2 small indeterminate right middle lobe pulmonary nodules.   --Advised repeat CT chest in 8/2023.    Louise Watson MD  Hematology/Oncology  [facility]    Total time spent: 52 minutes in review of data, patient evaluation, counseling, documentation, referral order and coordination of care.         Again, thank you for allowing me to participate in the care of your patient.         Sincerely,        Louise Watson MD

## 2023-03-08 NOTE — PROGRESS NOTES
Video-Visit Details    Type of service:  Video Visit    Video Start Time (time video started): 11:35am    Video End Time (time video stopped): 12:06pm    Originating Location (pt. Location): Home        Distant Location (provider location):  On-site    Mode of Communication:  Video Conference via [facility]    Hematology/Oncology Established Patient Note      Today's Date: 3/8/2023    Reason for follow-up:  Left breast cancer.    HISTORY OF PRESENT ILLNESS: Yuliya Li is a 52 year old female status post removal of single ovary and hysterectomy who presents with the following oncologic history:  1.  12/23/2022: Mammogram showed possible asymmetry in right breast at 12:00; possible mass in left breast at 3:00.  2. 1/2/2023: Diagnostic bilateral mammogram showed mass in left breast at 3:00 with 0.4 cm satellite mass in retroareolar breast. Right breast with effacement of possible finding at 12:00 consistent with artifact. Left breast U/S showed mass at 3:00, 11 cm from nipple measuring 1.9 x 1.6 x 1.8 cm; no abnormal axillary lymph nodes.  3. 1/03/2022: Left breast core biopsy of 0.4 cm mass at 3:30 position showed grade 2 invasive ductal carcinoma with lobular features. Biopsy of left breast 1.9 cm mass at 3:00 showed grade 2 invasive ductal carcinoma with lobular features, associated grade 2 DCIS. ER and MS strongly positive at %; HER-2 IHC = 2+; HER-2/crow FISH negative.  4. 1/10/2023: FSH = 36.6 but estradiol = 63.  5.  2/03/2023: Bilateral mastectomies with left axillary sentinel lymph node excision and left chest wall nodule excision with Dr. Clarissa Fontanez.  Pathology showed left breast grade 2 invasive ductal carcinoma with lobular features, multifocal with 2 cm and 0.4 cm tumors, grade 2 DCIS, all margins negative.  Of 3 left axillary sentinel lymph nodes removed, one with 6 mm macrometastasis positive for extranodal extension and one with 1.1 mm micrometastasis,  negative for extranodal extension, identified. Left chest wall nodule positive for metastatic breast carcinoma involving edge of tissue fragment. HER-2 FISH repeated, group 4, considered HER-2 negative. Oncotype DX = 20, 9-year risk of distant recurrence of 17% after 5 years of hormone blockade therapy; breast-cancer specific survival of 96.7% treated without chemotherapy.  6. 2/23/2023: PET/CT scan negative for metastatic disease. Small right middle lobe lung nodules, non-FDG avid.    INTERVAL HISTORY:  Yuliya reports no new issues.      REVIEW OF SYSTEMS:   14 point ROS was reviewed and is negative other than as noted above in HPI.       HOME MEDICATIONS:  Current Outpatient Medications   Medication Sig Dispense Refill     acetaminophen (TYLENOL) 500 MG tablet Take 500-1,000 mg by mouth every 6 hours as needed for mild pain       alcohol swab prep pads Use to swab area of injection/amy as directed. 100 each 3     blood glucose (NO BRAND SPECIFIED) test strip Use to test blood sugar 2 times daily or as directed. To accompany: Blood Glucose Monitor Brands: per insurance. 100 strip 6     blood glucose calibration (NO BRAND SPECIFIED) solution To accompany: Blood Glucose Monitor Brands: per insurance. 1 each 1     blood glucose monitoring (NO BRAND SPECIFIED) meter device kit Use to test blood sugar 2 times daily or as directed. Preferred blood glucose meter OR supplies to accompany: Blood Glucose Monitor Brands: per insurance. 1 kit 0     FLUoxetine (PROZAC) 40 MG capsule Take 1 capsule (40 mg) by mouth daily 90 capsule 1     medical cannabis (Patient's own supply) Take 1 Dose by mouth nightly as needed Gummies: 5 mg   Formulary: Red  Location: Unknown    (The purpose of this order is to document that the patient reports taking medical cannabis.  This is not a prescription, and is not used to certify that the patient has a qualifying medical condition.)       melatonin 3 MG tablet Take 6 mg by mouth nightly as  needed for sleep       metFORMIN (GLUCOPHAGE XR) 500 MG 24 hr tablet Take 1 tablet (500 mg) by mouth daily 90 tablet 3     Semaglutide, 1 MG/DOSE, (OZEMPIC, 1 MG/DOSE,) 4 MG/3ML pen Inject 1 mg Subcutaneous every 7 days (On Fridays) 9 mL 1     spironolactone (ALDACTONE) 100 MG tablet Take 1 tablet (100 mg) by mouth daily 90 tablet 1     clindamycin (CLEOCIN) 300 MG capsule Take 1 capsule (300 mg) by mouth 3 times daily (Patient not taking: Reported on 2/27/2023) 21 capsule 0     oxyCODONE (ROXICODONE) 5 MG tablet Take 1 tablet (5 mg) by mouth every 6 hours as needed for pain (Patient not taking: Reported on 2/27/2023) 24 tablet 0     thin (NO BRAND SPECIFIED) lancets Use with lanceting device. To accompany: Blood Glucose Monitor Brands: per insurance. (Patient not taking: Reported on 3/8/2023) 100 each 6         ALLERGIES:  Allergies   Allergen Reactions     Keflex [Cephalexin-Fd&C Yellow #6]      Tetracycline      Trazodone      Very sedating-         PAST MEDICAL HISTORY:  Diabetes mellitus type 2  Hyperlipidemia    Gynecologic history:  Age of menarche at 11.  Last menstrual period in 2005 prior to hysterectomy.  G3, P3.  Age of first pregnancy at 29.  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  No use of HRT.  Used OCPs for 4 years.  No IUD use.      PAST SURGICAL HISTORY:  Past Surgical History:   Procedure Laterality Date     CYST REMOVAL       HYSTERECTOMY       HYSTERECTOMY, PAP NO LONGER INDICATED  2005    menorrhagia     INSERT TISSUE EXPANDER BREAST Bilateral 2/3/2023    Procedure: BILATERAL TISSUE EXPANDER;  Surgeon: Jaya Malave MD;  Location: [facility] OR     MASTECTOMY SIMPLE BILATERAL, SENTINEL NODE BILATERAL, COMBINED Left 2/3/2023    Procedure: Bilateral skin sparing mastectomies with left axillary sentinel lymph node biopsy;  Surgeon: Clarissa Fontanez MD;  Location:  OR         SOCIAL HISTORY:  Social History     Socioeconomic History     Marital status:      Spouse name: Not on file     Number of  children: 3     Years of education: Not on file     Highest education level: Not on file   Occupational History     Not on file   Tobacco Use     Smoking status: Former     Packs/day: 0.00     Types: Cigarettes     Quit date: 1999     Years since quittin.3     Smokeless tobacco: Never   Vaping Use     Vaping Use: Never used   Substance and Sexual Activity     Alcohol use: Yes     Comment: Alcoholic Drinks/day: 1 a week     Drug use: Yes     Types: Marijuana     Comment: nightly for sleep (gummy)     Sexual activity: Yes     Partners: Male   Other Topics Concern     Not on file   Social History Narrative     Not on file     Social Determinants of Health     Financial Resource Strain: Low Risk      Difficulty of Paying Living Expenses: Not very hard   Food Insecurity: No Food Insecurity     Worried About Running Out of Food in the Last Year: Never true     Ran Out of Food in the Last Year: Never true   Transportation Needs: No Transportation Needs     Lack of Transportation (Medical): No     Lack of Transportation (Non-Medical): No   Physical Activity: Insufficiently Active     Days of Exercise per Week: 1 day     Minutes of Exercise per Session: 20 min   Stress: Stress Concern Present     Feeling of Stress : Rather much   Social Connections: Moderately Isolated     Frequency of Communication with Friends and Family: More than three times a week     Frequency of Social Gatherings with Friends and Family: Once a week     Attends [employer] Services: Never     Active Member of Clubs or Organizations: No     Attends Club or Organization Meetings: Not on file     Marital Status:    Intimate Partner Violence: Not on file   Housing Stability: Low Risk      Unable to Pay for Housing in the Last Year: No     Number of Places Lived in the Last Year: 1     Unstable Housing in the Last Year: No         FAMILY HISTORY:  Family History   Problem Relation Age of Onset     Diabetes Mother      Atrial fibrillation  Father      Alcoholism Father      Melanoma Father      Breast Cancer Maternal Aunt         in her 40's     Cervical Cancer Maternal Aunt      Colon Cancer No family hx of          PHYSICAL EXAM:  Vital signs:  Not taken.   GENERAL: No acute distress.  EYES: No scleral icterus. No overt erythema.  RESPIRATORY: No audible cough, wheezing, or labored breathing.  MUSCULOSKELETAL: Range of motion in the neck, shoulders, and arms appear normal.  SKIN: No overt rashes, discolorations, or lesions over the face and neck.  NEUROLOGIC: Alert.  No overt tremors.  PSYCHIATRIC: Normal affect and mood.  Does not appear anxious.      LABS:  CBC RESULTS: Recent Labs   Lab Test 01/10/23  1019   WBC 7.2   RBC 5.39*   HGB 14.9   HCT 46.9   MCV 87   MCH 27.6   MCHC 31.8   RDW 12.4   *     Last Comprehensive Metabolic Panel:  Sodium   Date Value Ref Range Status   01/10/2023 135 133 - 144 mmol/L Final     Potassium   Date Value Ref Range Status   02/03/2023 4.1 3.4 - 5.3 mmol/L Final   01/10/2023 3.9 3.4 - 5.3 mmol/L Final     Chloride   Date Value Ref Range Status   01/10/2023 102 94 - 109 mmol/L Final     Carbon Dioxide (CO2)   Date Value Ref Range Status   01/10/2023 26 20 - 32 mmol/L Final     Anion Gap   Date Value Ref Range Status   01/10/2023 7 3 - 14 mmol/L Final     Glucose   Date Value Ref Range Status   01/10/2023 100 (H) 70 - 99 mg/dL Final     GLUCOSE BY METER POCT   Date Value Ref Range Status   02/03/2023 120 (H) 70 - 99 mg/dL Final     Urea Nitrogen   Date Value Ref Range Status   01/10/2023 16 7 - 30 mg/dL Final     Creatinine   Date Value Ref Range Status   02/03/2023 0.97 (H) 0.51 - 0.95 mg/dL Final     GFR Estimate   Date Value Ref Range Status   02/03/2023 70 >60 mL/min/1.73m2 Final     Comment:     eGFR calculated using 2021 CKD-EPI equation.   01/12/2021 >60 >60 mL/min/1.73m2 Final     Calcium   Date Value Ref Range Status   01/10/2023 9.3 8.5 - 10.1 mg/dL Final     Bilirubin Total   Date Value Ref Range  Status   01/10/2023 0.3 0.2 - 1.3 mg/dL Final     Alkaline Phosphatase   Date Value Ref Range Status   01/10/2023 60 40 - 150 U/L Final     ALT   Date Value Ref Range Status   01/10/2023 30 0 - 50 U/L Final     AST   Date Value Ref Range Status   01/10/2023 21 0 - 45 U/L Final       PATHOLOGY:  Reviewed as per HPI.    IMAGING:  Reviewed as per HPI.    ASSESSMENT/PLAN:  Yuliya Li is a 52 year old female with the following issues:  1. Pathologic prognostic stage IA, [id number], grade 2 multifocal invasive ductal carcinoma with lobular features of left breast overlapping sites, ER positive, LA positive, HER-2 IHC = 2+; HER-2/crow FISH negative, Oncotype DX = 20  --I had a detailed discussion with Yuliya regarding her final pathology results.  She had 2 tumors measuring 2 cm and 0.4 cm and additional left chest wall nodule, but with 2/3 lymph nodes involved, one that was a macrometastasis with extranodal extension and the other with micrometastasis with no extranodal extension.  All areas of tumor were excised.  The repeat HER-2 FISH was interpreted as negative.   --I told her that her case was discussed in detail at our recent multi-disciplinary breast tumor board.  --2/23/2023 PET scan showed no evidence for metastatic disease.  --Discussed that her Oncotype DX score is 20, with 9-year risk of distant recurrence of 17% after 5 years of hormone blockade therapy; breast-cancer specific survival of 96.7% treated without chemotherapy. Discussed that this would indicate up to 3.3% absolute benefit of chemotherapy.    --Discussed that this is not a massive benefit of chemotherapy but still some benefit.  I offered 4 cycles of Taxotere and Cytoxan every 3 weeks if she would like to maximally reduce her risk beyond just hormone blockade therapy alone.  Discussed weighing the risks and benefits and the potential acute and long-term side effects.  She would like to think about the chemo option further and will get back  to me on this over the next 1-2 weeks.  -Given that she has a single ovary remaining and is status post hysterectomy, we checked her FSH and estradiol levels. Her FSH level was 36.6 but estradiol was in the premenopausal range at 63.  Therefore, I recommended OST + AI with Zoladex every 4 weeks with anastrozole for 10 years of hormone blockade therapy.   In all likelihood, she would probably not need more than a few months to a year of Zoladex. Would plan to repeat FSH and estradiol off Zoladex in the future.  --We talked a bit about abemaciclib (Verzenio) and whether this could benefit her due to the chest wall nodule that was found and resected at time of surgery.  Even with the expanded indication for adjuvant abemaciclib based on the monarchE trial, her grade 2 and TNM stage of disease would not meet criteria for this drug.  --DEXA scan pending.  --Will refer her to radiation oncology for discussion of adjuvant radiation therapy.    2.  Family history of breast cancer  - Maternal aunt with breast cancer.  - I have recommended and referred Yuliya to genetic counseling.      3.  Neck and back pain, chronic  - PET scan showed no evidence of distant metastatic disease.    4. Indeterminate right middle lobe pulmonary nodules  --2/23/2023 PET/CT scan showed 2 small indeterminate right middle lobe pulmonary nodules.   --Advised repeat CT chest in 8/2023.    Louise Watson MD  Hematology/Oncology  [facility]    Total time spent: 52 minutes in review of data, patient evaluation, counseling, documentation, referral order and coordination of care.

## 2023-03-08 NOTE — LETTER
3/8/2023         RE: Yuliya Li  [address]  [place] [address]        Dear Colleague,    Thank you for referring your patient, Yuliya Li, to the [facility]. Please see a copy of my visit note below.    Video-Visit Details    Type of service:  Video Visit    Video Start Time (time video started): 11:35am    Video End Time (time video stopped): 12:06pm    Originating Location (pt. Location): Home        Distant Location (provider location):  On-site    Mode of Communication:  Video Conference via [facility]    Hematology/Oncology Established Patient Note      Today's Date: 3/8/2023    Reason for follow-up:  Left breast cancer.    HISTORY OF PRESENT ILLNESS: Yuliya Li is a 52 year old female status post removal of single ovary and hysterectomy who presents with the following oncologic history:  1.  12/23/2022: Mammogram showed possible asymmetry in right breast at 12:00; possible mass in left breast at 3:00.  2. 1/2/2023: Diagnostic bilateral mammogram showed mass in left breast at 3:00 with 0.4 cm satellite mass in retroareolar breast. Right breast with effacement of possible finding at 12:00 consistent with artifact. Left breast U/S showed mass at 3:00, 11 cm from nipple measuring 1.9 x 1.6 x 1.8 cm; no abnormal axillary lymph nodes.  3. 1/03/2022: Left breast core biopsy of 0.4 cm mass at 3:30 position showed grade 2 invasive ductal carcinoma with lobular features. Biopsy of left breast 1.9 cm mass at 3:00 showed grade 2 invasive ductal carcinoma with lobular features, associated grade 2 DCIS. ER and TN strongly positive at %; HER-2 IHC = 2+; HER-2/crow FISH negative.  4. 1/10/2023: FSH = 36.6 but estradiol = 63.  5.  2/03/2023: Bilateral mastectomies with left axillary sentinel lymph node excision and left chest wall nodule excision with Dr. Clarissa Fontanez.  Pathology showed left breast grade 2 invasive ductal  carcinoma with lobular features, multifocal with 2 cm and 0.4 cm tumors, grade 2 DCIS, all margins negative.  Of 3 left axillary sentinel lymph nodes removed, one with 6 mm macrometastasis positive for extranodal extension and one with 1.1 mm micrometastasis, negative for extranodal extension, identified. Left chest wall nodule positive for metastatic breast carcinoma involving edge of tissue fragment. HER-2 FISH repeated, group 4, considered HER-2 negative. Oncotype DX = 20, 9-year risk of distant recurrence of 17% after 5 years of hormone blockade therapy; breast-cancer specific survival of 96.7% treated without chemotherapy.  6. 2/23/2023: PET/CT scan negative for metastatic disease. Small right middle lobe lung nodules, non-FDG avid.    INTERVAL HISTORY:  Yuliya reports no new issues.      REVIEW OF SYSTEMS:   14 point ROS was reviewed and is negative other than as noted above in HPI.       HOME MEDICATIONS:  Current Outpatient Medications   Medication Sig Dispense Refill     acetaminophen (TYLENOL) 500 MG tablet Take 500-1,000 mg by mouth every 6 hours as needed for mild pain       alcohol swab prep pads Use to swab area of injection/amy as directed. 100 each 3     blood glucose (NO BRAND SPECIFIED) test strip Use to test blood sugar 2 times daily or as directed. To accompany: Blood Glucose Monitor Brands: per insurance. 100 strip 6     blood glucose calibration (NO BRAND SPECIFIED) solution To accompany: Blood Glucose Monitor Brands: per insurance. 1 each 1     blood glucose monitoring (NO BRAND SPECIFIED) meter device kit Use to test blood sugar 2 times daily or as directed. Preferred blood glucose meter OR supplies to accompany: Blood Glucose Monitor Brands: per insurance. 1 kit 0     FLUoxetine (PROZAC) 40 MG capsule Take 1 capsule (40 mg) by mouth daily 90 capsule 1     medical cannabis (Patient's own supply) Take 1 Dose by mouth nightly as needed Gummies: 5 mg   Formulary: Red  Location: Unknown    (The  purpose of this order is to document that the patient reports taking medical cannabis.  This is not a prescription, and is not used to certify that the patient has a qualifying medical condition.)       melatonin 3 MG tablet Take 6 mg by mouth nightly as needed for sleep       metFORMIN (GLUCOPHAGE XR) 500 MG 24 hr tablet Take 1 tablet (500 mg) by mouth daily 90 tablet 3     Semaglutide, 1 MG/DOSE, (OZEMPIC, 1 MG/DOSE,) 4 MG/3ML pen Inject 1 mg Subcutaneous every 7 days (On Fridays) 9 mL 1     spironolactone (ALDACTONE) 100 MG tablet Take 1 tablet (100 mg) by mouth daily 90 tablet 1     clindamycin (CLEOCIN) 300 MG capsule Take 1 capsule (300 mg) by mouth 3 times daily (Patient not taking: Reported on 2/27/2023) 21 capsule 0     oxyCODONE (ROXICODONE) 5 MG tablet Take 1 tablet (5 mg) by mouth every 6 hours as needed for pain (Patient not taking: Reported on 2/27/2023) 24 tablet 0     thin (NO BRAND SPECIFIED) lancets Use with lanceting device. To accompany: Blood Glucose Monitor Brands: per insurance. (Patient not taking: Reported on 3/8/2023) 100 each 6         ALLERGIES:  Allergies   Allergen Reactions     Keflex [Cephalexin-Fd&C Yellow #6]      Tetracycline      Trazodone      Very sedating-         PAST MEDICAL HISTORY:  Diabetes mellitus type 2  Hyperlipidemia    Gynecologic history:  Age of menarche at 11.  Last menstrual period in 2005 prior to hysterectomy.  G3, P3.  Age of first pregnancy at 29.  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  No use of HRT.  Used OCPs for 4 years.  No IUD use.      PAST SURGICAL HISTORY:  Past Surgical History:   Procedure Laterality Date     CYST REMOVAL       HYSTERECTOMY       HYSTERECTOMY, PAP NO LONGER INDICATED  2005    menorrhagia     INSERT TISSUE EXPANDER BREAST Bilateral 2/3/2023    Procedure: BILATERAL TISSUE EXPANDER;  Surgeon: Jaya Malave MD;  Location: [facility] OR     MASTECTOMY SIMPLE BILATERAL, SENTINEL NODE BILATERAL, COMBINED Left 2/3/2023    Procedure: Bilateral  skin sparing mastectomies with left axillary sentinel lymph node biopsy;  Surgeon: Clarissa Fontanez MD;  Location:  OR         SOCIAL HISTORY:  Social History     Socioeconomic History     Marital status:      Spouse name: Not on file     Number of children: 3     Years of education: Not on file     Highest education level: Not on file   Occupational History     Not on file   Tobacco Use     Smoking status: Former     Packs/day: 0.00     Types: Cigarettes     Quit date: 1999     Years since quittin.3     Smokeless tobacco: Never   Vaping Use     Vaping Use: Never used   Substance and Sexual Activity     Alcohol use: Yes     Comment: Alcoholic Drinks/day: 1 a week     Drug use: Yes     Types: Marijuana     Comment: nightly for sleep (gummy)     Sexual activity: Yes     Partners: Male   Other Topics Concern     Not on file   Social History Narrative     Not on file     Social Determinants of Health     Financial Resource Strain: Low Risk      Difficulty of Paying Living Expenses: Not very hard   Food Insecurity: No Food Insecurity     Worried About Running Out of Food in the Last Year: Never true     Ran Out of Food in the Last Year: Never true   Transportation Needs: No Transportation Needs     Lack of Transportation (Medical): No     Lack of Transportation (Non-Medical): No   Physical Activity: Insufficiently Active     Days of Exercise per Week: 1 day     Minutes of Exercise per Session: 20 min   Stress: Stress Concern Present     Feeling of Stress : Rather much   Social Connections: Moderately Isolated     Frequency of Communication with Friends and Family: More than three times a week     Frequency of Social Gatherings with Friends and Family: Once a week     Attends [employer] Services: Never     Active Member of Clubs or Organizations: No     Attends Club or Organization Meetings: Not on file     Marital Status:    Intimate Partner Violence: Not on file   Housing Stability: Low Risk       Unable to Pay for Housing in the Last Year: No     Number of Places Lived in the Last Year: 1     Unstable Housing in the Last Year: No         FAMILY HISTORY:  Family History   Problem Relation Age of Onset     Diabetes Mother      Atrial fibrillation Father      Alcoholism Father      Melanoma Father      Breast Cancer Maternal Aunt         in her 40's     Cervical Cancer Maternal Aunt      Colon Cancer No family hx of          PHYSICAL EXAM:  Vital signs:  Not taken.   GENERAL: No acute distress.  EYES: No scleral icterus. No overt erythema.  RESPIRATORY: No audible cough, wheezing, or labored breathing.  MUSCULOSKELETAL: Range of motion in the neck, shoulders, and arms appear normal.  SKIN: No overt rashes, discolorations, or lesions over the face and neck.  NEUROLOGIC: Alert.  No overt tremors.  PSYCHIATRIC: Normal affect and mood.  Does not appear anxious.      LABS:  CBC RESULTS: Recent Labs   Lab Test 01/10/23  1019   WBC 7.2   RBC 5.39*   HGB 14.9   HCT 46.9   MCV 87   MCH 27.6   MCHC 31.8   RDW 12.4   *     Last Comprehensive Metabolic Panel:  Sodium   Date Value Ref Range Status   01/10/2023 135 133 - 144 mmol/L Final     Potassium   Date Value Ref Range Status   02/03/2023 4.1 3.4 - 5.3 mmol/L Final   01/10/2023 3.9 3.4 - 5.3 mmol/L Final     Chloride   Date Value Ref Range Status   01/10/2023 102 94 - 109 mmol/L Final     Carbon Dioxide (CO2)   Date Value Ref Range Status   01/10/2023 26 20 - 32 mmol/L Final     Anion Gap   Date Value Ref Range Status   01/10/2023 7 3 - 14 mmol/L Final     Glucose   Date Value Ref Range Status   01/10/2023 100 (H) 70 - 99 mg/dL Final     GLUCOSE BY METER POCT   Date Value Ref Range Status   02/03/2023 120 (H) 70 - 99 mg/dL Final     Urea Nitrogen   Date Value Ref Range Status   01/10/2023 16 7 - 30 mg/dL Final     Creatinine   Date Value Ref Range Status   02/03/2023 0.97 (H) 0.51 - 0.95 mg/dL Final     GFR Estimate   Date Value Ref Range Status   02/03/2023  70 >60 mL/min/1.73m2 Final     Comment:     eGFR calculated using 2021 CKD-EPI equation.   01/12/2021 >60 >60 mL/min/1.73m2 Final     Calcium   Date Value Ref Range Status   01/10/2023 9.3 8.5 - 10.1 mg/dL Final     Bilirubin Total   Date Value Ref Range Status   01/10/2023 0.3 0.2 - 1.3 mg/dL Final     Alkaline Phosphatase   Date Value Ref Range Status   01/10/2023 60 40 - 150 U/L Final     ALT   Date Value Ref Range Status   01/10/2023 30 0 - 50 U/L Final     AST   Date Value Ref Range Status   01/10/2023 21 0 - 45 U/L Final       PATHOLOGY:  Reviewed as per HPI.    IMAGING:  Reviewed as per HPI.    ASSESSMENT/PLAN:  Yuliya Li is a 52 year old female with the following issues:  1. Pathologic prognostic stage IA, [id number], grade 2 multifocal invasive ductal carcinoma with lobular features of left breast overlapping sites, ER positive, MN positive, HER-2 IHC = 2+; HER-2/crow FISH negative, Oncotype DX = 20  --I had a detailed discussion with Yuliya regarding her final pathology results.  She had 2 tumors measuring 2 cm and 0.4 cm and additional left chest wall nodule, but with 2/3 lymph nodes involved, one that was a macrometastasis with extranodal extension and the other with micrometastasis with no extranodal extension.  All areas of tumor were excised.  The repeat HER-2 FISH was interpreted as negative.   --I told her that her case was discussed in detail at our recent multi-disciplinary breast tumor board.  --2/23/2023 PET scan showed no evidence for metastatic disease.  --Discussed that her Oncotype DX score is 20, with 9-year risk of distant recurrence of 17% after 5 years of hormone blockade therapy; breast-cancer specific survival of 96.7% treated without chemotherapy. Discussed that this would indicate up to 3.3% absolute benefit of chemotherapy.    --Discussed that this is not a massive benefit of chemotherapy but still some benefit.  I offered 4 cycles of Taxotere and Cytoxan every 3 weeks if she  would like to maximally reduce her risk beyond just hormone blockade therapy alone.  Discussed weighing the risks and benefits and the potential acute and long-term side effects.  She would like to think about the chemo option further and will get back to me on this over the next 1-2 weeks.  -Given that she has a single ovary remaining and is status post hysterectomy, we checked her FSH and estradiol levels. Her FSH level was 36.6 but estradiol was in the premenopausal range at 63.  Therefore, I recommended OST + AI with Zoladex every 4 weeks with anastrozole for 10 years of hormone blockade therapy.   In all likelihood, she would probably not need more than a few months to a year of Zoladex. Would plan to repeat FSH and estradiol off Zoladex in the future.  --We talked a bit about abemaciclib (Verzenio) and whether this could benefit her due to the chest wall nodule that was found and resected at time of surgery.  Even with the expanded indication for adjuvant abemaciclib based on the monarchE trial, her grade 2 and TNM stage of disease would not meet criteria for this drug.  --DEXA scan pending.  --Will refer her to radiation oncology for discussion of adjuvant radiation therapy.    2.  Family history of breast cancer  - Maternal aunt with breast cancer.  - I have recommended and referred Yuliya to genetic counseling.      3.  Neck and back pain, chronic  - PET scan showed no evidence of distant metastatic disease.    4. Indeterminate right middle lobe pulmonary nodules  --2/23/2023 PET/CT scan showed 2 small indeterminate right middle lobe pulmonary nodules.   --Advised repeat CT chest in 8/2023.    Louise Watson MD  Hematology/Oncology  [facility]    Total time spent: 52 minutes in review of data, patient evaluation, counseling, documentation, referral order and coordination of care.         Again, thank you for allowing me to participate in the care of your patient.         Sincerely,        Louise Watson MD

## 2023-03-09 DIAGNOSIS — C50.812 MALIGNANT NEOPLASM OF OVERLAPPING SITES OF LEFT BREAST IN FEMALE, ESTROGEN RECEPTOR POSITIVE (H): Primary | ICD-10-CM

## 2023-03-09 DIAGNOSIS — T45.1X5A CHEMOTHERAPY-INDUCED NEUTROPENIA (H): ICD-10-CM

## 2023-03-09 DIAGNOSIS — Z17.0 MALIGNANT NEOPLASM OF OVERLAPPING SITES OF LEFT BREAST IN FEMALE, ESTROGEN RECEPTOR POSITIVE (H): Primary | ICD-10-CM

## 2023-03-09 DIAGNOSIS — D70.1 CHEMOTHERAPY-INDUCED NEUTROPENIA (H): ICD-10-CM

## 2023-03-16 LAB — SPECIMEN STATUS: NORMAL

## 2023-03-17 ENCOUNTER — PATIENT OUTREACH (OUTPATIENT)
Dept: ONCOLOGY | Facility: CLINIC | Age: 53
End: 2023-03-17
Payer: COMMERCIAL

## 2023-03-17 RX ORDER — ONDANSETRON 8 MG/1
8 TABLET, FILM COATED ORAL EVERY 8 HOURS PRN
Qty: 30 TABLET | Refills: 2 | Status: SHIPPED | OUTPATIENT
Start: 2023-03-17 | End: 2023-10-16

## 2023-03-17 RX ORDER — DEXAMETHASONE 4 MG/1
8 TABLET ORAL 2 TIMES DAILY WITH MEALS
Qty: 6 TABLET | Refills: 3 | Status: SHIPPED | OUTPATIENT
Start: 2023-03-17 | End: 2023-05-17

## 2023-03-17 RX ORDER — PROCHLORPERAZINE MALEATE 10 MG
10 TABLET ORAL EVERY 6 HOURS PRN
Qty: 30 TABLET | Refills: 2 | Status: SHIPPED | OUTPATIENT
Start: 2023-03-17 | End: 2023-10-16

## 2023-03-17 NOTE — PROGRESS NOTES
Spoke with Yuliya regarding upcoming chemotherapy appts. Discussed side effects of carbo and taxotere. Informed of how to day will flow, timing, medications she will receive. Will send take home meds to pharmacy and she will bring on D1.     All questions answered and she knows when to call clinic.    Peyton Quintana RN

## 2023-03-20 ENCOUNTER — HOSPITAL ENCOUNTER (OUTPATIENT)
Dept: BONE DENSITY | Facility: CLINIC | Age: 53
Discharge: HOME | End: 2023-03-20
Attending: INTERNAL MEDICINE | Admitting: INTERNAL MEDICINE
Payer: COMMERCIAL

## 2023-03-20 DIAGNOSIS — M85.9 DISORDER OF BONE DENSITY AND STRUCTURE, UNSPECIFIED: ICD-10-CM

## 2023-03-20 PROCEDURE — 77080 DXA BONE DENSITY AXIAL: CPT

## 2023-03-23 PROBLEM — T45.1X5A CHEMOTHERAPY-INDUCED NEUTROPENIA (H): Status: ACTIVE | Noted: 2023-03-23

## 2023-03-23 PROBLEM — D70.1 CHEMOTHERAPY-INDUCED NEUTROPENIA (H): Status: ACTIVE | Noted: 2023-03-23

## 2023-03-27 ENCOUNTER — LAB (OUTPATIENT)
Dept: INFUSION THERAPY | Facility: CLINIC | Age: 53
End: 2023-03-27
Attending: INTERNAL MEDICINE
Payer: COMMERCIAL

## 2023-03-27 ENCOUNTER — ONCOLOGY VISIT (OUTPATIENT)
Dept: ONCOLOGY | Facility: CLINIC | Age: 53
End: 2023-03-27
Attending: INTERNAL MEDICINE
Payer: COMMERCIAL

## 2023-03-27 VITALS
HEIGHT: 69 IN | RESPIRATION RATE: 16 BRPM | HEART RATE: 83 BPM | BODY MASS INDEX: 33.47 KG/M2 | DIASTOLIC BLOOD PRESSURE: 72 MMHG | SYSTOLIC BLOOD PRESSURE: 105 MMHG | OXYGEN SATURATION: 97 % | WEIGHT: 226 LBS

## 2023-03-27 DIAGNOSIS — C50.812 MALIGNANT NEOPLASM OF OVERLAPPING SITES OF LEFT BREAST IN FEMALE, ESTROGEN RECEPTOR POSITIVE (H): Primary | ICD-10-CM

## 2023-03-27 DIAGNOSIS — T45.1X5A CHEMOTHERAPY-INDUCED NEUTROPENIA (H): Primary | ICD-10-CM

## 2023-03-27 DIAGNOSIS — Z17.0 MALIGNANT NEOPLASM OF OVERLAPPING SITES OF LEFT BREAST IN FEMALE, ESTROGEN RECEPTOR POSITIVE (H): ICD-10-CM

## 2023-03-27 DIAGNOSIS — Z17.0 MALIGNANT NEOPLASM OF OVERLAPPING SITES OF LEFT BREAST IN FEMALE, ESTROGEN RECEPTOR POSITIVE (H): Primary | ICD-10-CM

## 2023-03-27 DIAGNOSIS — C50.812 MALIGNANT NEOPLASM OF OVERLAPPING SITES OF LEFT BREAST IN FEMALE, ESTROGEN RECEPTOR POSITIVE (H): ICD-10-CM

## 2023-03-27 DIAGNOSIS — D70.1 CHEMOTHERAPY-INDUCED NEUTROPENIA (H): Primary | ICD-10-CM

## 2023-03-27 LAB
ALBUMIN SERPL BCG-MCNC: 4.1 G/DL (ref 3.5–5.2)
ALP SERPL-CCNC: 67 U/L (ref 35–104)
ALT SERPL W P-5'-P-CCNC: 12 U/L (ref 10–35)
ANION GAP SERPL CALCULATED.3IONS-SCNC: 10 MMOL/L (ref 7–15)
AST SERPL W P-5'-P-CCNC: 18 U/L (ref 10–35)
BASOPHILS # BLD AUTO: 0 10E3/UL (ref 0–0.2)
BASOPHILS NFR BLD AUTO: 0 %
BILIRUB SERPL-MCNC: 0.2 MG/DL
BUN SERPL-MCNC: 18.8 MG/DL (ref 6–20)
CALCIUM SERPL-MCNC: 9.2 MG/DL (ref 8.6–10)
CHLORIDE SERPL-SCNC: 103 MMOL/L (ref 98–107)
CREAT SERPL-MCNC: 1.01 MG/DL (ref 0.51–0.95)
DEPRECATED HCO3 PLAS-SCNC: 26 MMOL/L (ref 22–29)
EOSINOPHIL # BLD AUTO: 0.1 10E3/UL (ref 0–0.7)
EOSINOPHIL NFR BLD AUTO: 2 %
ERYTHROCYTE [DISTWIDTH] IN BLOOD BY AUTOMATED COUNT: 12 % (ref 10–15)
GFR SERPL CREATININE-BSD FRML MDRD: 67 ML/MIN/1.73M2
GLUCOSE SERPL-MCNC: 96 MG/DL (ref 70–99)
HCT VFR BLD AUTO: 42.1 % (ref 35–47)
HGB BLD-MCNC: 13.7 G/DL (ref 11.7–15.7)
IMM GRANULOCYTES # BLD: 0 10E3/UL
IMM GRANULOCYTES NFR BLD: 0 %
LYMPHOCYTES # BLD AUTO: 1.8 10E3/UL (ref 0.8–5.3)
LYMPHOCYTES NFR BLD AUTO: 24 %
MCH RBC QN AUTO: 28.5 PG (ref 26.5–33)
MCHC RBC AUTO-ENTMCNC: 32.5 G/DL (ref 31.5–36.5)
MCV RBC AUTO: 88 FL (ref 78–100)
MONOCYTES # BLD AUTO: 0.5 10E3/UL (ref 0–1.3)
MONOCYTES NFR BLD AUTO: 6 %
NEUTROPHILS # BLD AUTO: 4.9 10E3/UL (ref 1.6–8.3)
NEUTROPHILS NFR BLD AUTO: 68 %
NRBC # BLD AUTO: 0 10E3/UL
NRBC BLD AUTO-RTO: 0 /100
PLATELET # BLD AUTO: 371 10E3/UL (ref 150–450)
POTASSIUM SERPL-SCNC: 4.6 MMOL/L (ref 3.4–5.3)
PROT SERPL-MCNC: 7 G/DL (ref 6.4–8.3)
RBC # BLD AUTO: 4.81 10E6/UL (ref 3.8–5.2)
SODIUM SERPL-SCNC: 139 MMOL/L (ref 136–145)
WBC # BLD AUTO: 7.3 10E3/UL (ref 4–11)

## 2023-03-27 PROCEDURE — G0463 HOSPITAL OUTPT CLINIC VISIT: HCPCS | Performed by: PHYSICIAN ASSISTANT

## 2023-03-27 PROCEDURE — 80053 COMPREHEN METABOLIC PANEL: CPT | Performed by: INTERNAL MEDICINE

## 2023-03-27 PROCEDURE — 85025 COMPLETE CBC W/AUTO DIFF WBC: CPT | Performed by: INTERNAL MEDICINE

## 2023-03-27 PROCEDURE — 99214 OFFICE O/P EST MOD 30 MIN: CPT | Performed by: PHYSICIAN ASSISTANT

## 2023-03-27 PROCEDURE — 36415 COLL VENOUS BLD VENIPUNCTURE: CPT | Performed by: INTERNAL MEDICINE

## 2023-03-27 ASSESSMENT — PAIN SCALES - GENERAL: PAINLEVEL: NO PAIN (0)

## 2023-03-27 NOTE — Clinical Note
"    3/27/2023         RE: Yuliya Li  [address]  [place] [address]        Dear Colleague,    Thank you for referring your patient, Yuliya Li, to the [facility]. Please see a copy of my visit note below.    Oncology Rooming Note    March 27, 2023 1:48 PM   Yuliya Li is a 52 year old female who presents for:    Chief Complaint   Patient presents with     Oncology Clinic Visit     Initial Vitals: LMP  (LMP Unknown)  Estimated body mass index is 33.42 kg/m  as calculated from the following:    Height as of 2/3/23: 1.753 m (5' 9\").    Weight as of 2/3/23: 102.6 kg (226 lb 4.8 oz). There is no height or weight on file to calculate BSA.  Data Unavailable Comment: Data Unavailable   No LMP recorded (lmp unknown). Patient has had a hysterectomy.  Allergies reviewed: Yes  Medications reviewed: Yes    Medications: Medication refills not needed today.  Pharmacy name entered into [employer]:    [id number] [place] [facility] [place], [place] - [address] [facility] - [place], [place] - [address], [place] - [address] [address]    Clinical concerns:  PA was notified.      Guillermina Jennings MA              Oncology/Hematology Visit Note  Mar 27, 2023    Reason for Visit: Follow up of breast cancer    Primary Oncologist: Dr. Watson    Hematologic and Oncologic History:  Yuliya Li is a 52 year old female status post removal of single ovary and hysterectomy who presents with the following oncologic history:  1.  12/23/2022: Mammogram showed possible asymmetry in right breast at 12:00; possible mass in left breast at 3:00.  2. 1/2/2023: Diagnostic bilateral mammogram showed mass in left breast at 3:00 with 0.4 cm satellite mass in retroareolar breast. Right breast with effacement of possible finding at 12:00 consistent with artifact. Left breast U/S showed mass at 3:00, 11 cm from nipple measuring 1.9 x 1.6 x 1.8 cm; no abnormal " "axillary lymph nodes.  3. 1/03/2022: Left breast core biopsy of 0.4 cm mass at 3:30 position showed grade 2 invasive ductal carcinoma with lobular features. Biopsy of left breast 1.9 cm mass at 3:00 showed grade 2 invasive ductal carcinoma with lobular features, associated grade 2 DCIS. ER and RI strongly positive at %; HER-2 IHC = 2+; HER-2/crow FISH negative.  4. 1/10/2023: FSH = 36.6 but estradiol = 63.  5.  2/03/2023: Bilateral mastectomies with left axillary sentinel lymph node excision and left chest wall nodule excision with Dr. Clarissa Fontanez.  Pathology showed left breast grade 2 invasive ductal carcinoma with lobular features, multifocal with 2 cm and 0.4 cm tumors, grade 2 DCIS, all margins negative.  Of 3 left axillary sentinel lymph nodes removed, one with 6 mm macrometastasis positive for extranodal extension and one with 1.1 mm micrometastasis, negative for extranodal extension, identified. Left chest wall nodule positive for metastatic breast carcinoma involving edge of tissue fragment. HER-2 FISH repeated, group 4, considered HER-2 negative. Oncotype DX = 20, 9-year risk of distant recurrence of 17% after 5 years of hormone blockade therapy; breast-cancer specific survival of 96.7% treated without chemotherapy.  6. 2/23/2023: PET/CT scan negative for metastatic disease. Small right middle lobe lung nodules, non-FDG avid.    Interval History:  Doing well. Unfortunately her dad passed away from metastatic melanoma. She was very close with him and was able to be with him when he passed. She recently completed a course of antibiotics for drainage at the left breast incision.     Review of Systems:  A complete review of systems was negative except as noted in the HPI.     Past medical, Surgical, and social history:  Reviewed    Physical Examination:  /72   Pulse 83   Resp 16   Ht 1.753 m (5' 9\")   Wt 102.5 kg (226 lb)   LMP  (LMP Unknown)   SpO2 97%   BMI 33.37 kg/m    Wt Readings from " Last 10 Encounters:   03/27/23 102.5 kg (226 lb)   02/03/23 102.6 kg (226 lb 4.8 oz)   01/25/23 102.5 kg (225 lb 14.4 oz)   01/10/23 99.3 kg (219 lb)   01/10/23 99.3 kg (219 lb)   10/26/22 105.5 kg (232 lb 8 oz)   03/18/22 124.9 kg (275 lb 6.4 oz)   01/12/21 128.4 kg (283 lb)   01/15/20 135.2 kg (298 lb)   11/07/19 136.5 kg (301 lb)     General: Pleasant patient in no acute distress.  Skin: Warm and dry. No abnormal ecchymosis or rashes.  Breast: Bilateral mastectomies with expanders. Left incision laterally there is a small, 1 cm dehisced portion that is now with central granulation and surrounding yellowish crust. The right incision has a scabbed incision.   Eyes: Anicteric.   Lungs: Normal work of breathing.  Abdomen: Non-distended.  Extremities: No peripheral edema. Gross movement intact without difficulty.  Mental: Calm, cooperative, appropriate. Mood euthymic.  Neuro: Cranial nerves and coordination grossly intact. Alert and oriented x 3.    Laboratory Data:   CBC, CMP ordered    Assessment and Plan:  Yuliya Li is a 52 year old female with the following issues:  1. Pathologic prognostic stage IA, [id number], grade 2 multifocal invasive ductal carcinoma with lobular features of left breast overlapping sites, ER positive, MN positive, HER-2 IHC = 2+; HER-2/crow FISH negative, Oncotype DX = 20  - Okay to proceed with cycle 1 of 4 of Taxotere and Cytoxan  --Overall plan for anastrozole x 10 years and Zoladex (currently premenopausal per estradiol level, s/p single oophorectomy)  --DEXA scan pending.  --Radiation oncology referral pending  --Follow up with Dr. Watson next cycle     2.  Family history of breast cancer  - Maternal aunt with breast cancer.  - Genetic consult pending.    3.  Neck and back pain, chronic  - PET scan showed no evidence of distant metastatic disease.     4. Indeterminate right middle lobe pulmonary nodules  --2/23/2023 PET/CT scan showed 2 small indeterminate right middle lobe  pulmonary nodules.   --Advised repeat CT chest in 8/2023.    5. Pre-Diabetes  --Continues Ozempic per PCP    30 minutes spent on the date of the encounter doing {2021 E&M time in:[id number]}     La Holly PA-C  [facility]   [phone]    Chart documentation with Dragon Voice recognition Software. Although reviewed after completion, some words and grammatical errors may remain.        Again, thank you for allowing me to participate in the care of your patient.        Sincerely,        LA HOLLY PA-C

## 2023-03-27 NOTE — PROGRESS NOTES
"Oncology Rooming Note    March 27, 2023 1:48 PM   Yuliya Li is a 52 year old female who presents for:    Chief Complaint   Patient presents with     Oncology Clinic Visit     Initial Vitals: LMP  (LMP Unknown)  Estimated body mass index is 33.42 kg/m  as calculated from the following:    Height as of 2/3/23: 1.753 m (5' 9\").    Weight as of 2/3/23: 102.6 kg (226 lb 4.8 oz). There is no height or weight on file to calculate BSA.  Data Unavailable Comment: Data Unavailable   No LMP recorded (lmp unknown). Patient has had a hysterectomy.  Allergies reviewed: Yes  Medications reviewed: Yes    Medications: Medication refills not needed today.  Pharmacy name entered into [employer]    [id number] [place], [place] - [address] [facility] - [place], [place] - [address] [place] [address] PHARMACY [facility], [place] - [address] [address]    Clinical concerns:  PA was notified.      Guillermina Jennings MA            "

## 2023-03-27 NOTE — PROGRESS NOTES
Medical Assistant Note:  Yuliya GUAJARDO Mellemilio presents today for blood draw.    Patient seen by provider today: Yes: jose.   present during visit today: Not Applicable.    Concerns: No Concerns.    Procedure:  Lab draw site: rac, Needle type: bf, Gauge: 23.    Post Assessment:  Labs drawn without difficulty: Yes.    Discharge Plan:  Departure Mode: Ambulatory.    Face to Face Time: 5 min.    Alicia Schneider, CMA

## 2023-03-28 ENCOUNTER — DOCUMENTATION ONLY (OUTPATIENT)
Dept: ONCOLOGY | Facility: CLINIC | Age: 53
End: 2023-03-28
Payer: COMMERCIAL

## 2023-03-28 RX ORDER — LORAZEPAM 2 MG/ML
0.5 INJECTION INTRAMUSCULAR EVERY 4 HOURS PRN
Status: CANCELLED | OUTPATIENT
Start: 2023-03-28

## 2023-03-28 RX ORDER — HEPARIN SODIUM (PORCINE) LOCK FLUSH IV SOLN 100 UNIT/ML 100 UNIT/ML
5 SOLUTION INTRAVENOUS
Status: CANCELLED | OUTPATIENT
Start: 2023-03-28

## 2023-03-28 RX ORDER — EPINEPHRINE 1 MG/ML
0.3 INJECTION, SOLUTION, CONCENTRATE INTRAVENOUS EVERY 5 MIN PRN
Status: CANCELLED | OUTPATIENT
Start: 2023-03-28

## 2023-03-28 RX ORDER — ONDANSETRON 2 MG/ML
8 INJECTION INTRAMUSCULAR; INTRAVENOUS ONCE
Status: CANCELLED | OUTPATIENT
Start: 2023-03-29

## 2023-03-28 RX ORDER — ALBUTEROL SULFATE 90 UG/1
1-2 AEROSOL, METERED RESPIRATORY (INHALATION)
Status: CANCELLED
Start: 2023-03-28

## 2023-03-28 RX ORDER — MEPERIDINE HYDROCHLORIDE 25 MG/ML
25 INJECTION INTRAMUSCULAR; INTRAVENOUS; SUBCUTANEOUS EVERY 30 MIN PRN
Status: CANCELLED | OUTPATIENT
Start: 2023-03-28

## 2023-03-28 RX ORDER — DIPHENHYDRAMINE HYDROCHLORIDE 50 MG/ML
50 INJECTION INTRAMUSCULAR; INTRAVENOUS
Status: CANCELLED
Start: 2023-03-28

## 2023-03-28 RX ORDER — HEPARIN SODIUM,PORCINE 10 UNIT/ML
5 VIAL (ML) INTRAVENOUS
Status: CANCELLED | OUTPATIENT
Start: 2023-03-28

## 2023-03-28 RX ORDER — ALBUTEROL SULFATE 0.83 MG/ML
2.5 SOLUTION RESPIRATORY (INHALATION)
Status: CANCELLED | OUTPATIENT
Start: 2023-03-28

## 2023-03-28 NOTE — PROGRESS NOTES
Oncology/Hematology Visit Note  Mar 27, 2023    Reason for Visit: Follow up of breast cancer    Primary Oncologist: Dr. Watson    Hematologic and Oncologic History:  Yuliya Li is a 52 year old female status post removal of single ovary and hysterectomy who presents with the following oncologic history:  1.  12/23/2022: Mammogram showed possible asymmetry in right breast at 12:00; possible mass in left breast at 3:00.  2. 1/2/2023: Diagnostic bilateral mammogram showed mass in left breast at 3:00 with 0.4 cm satellite mass in retroareolar breast. Right breast with effacement of possible finding at 12:00 consistent with artifact. Left breast U/S showed mass at 3:00, 11 cm from nipple measuring 1.9 x 1.6 x 1.8 cm; no abnormal axillary lymph nodes.  3. 1/03/2022: Left breast core biopsy of 0.4 cm mass at 3:30 position showed grade 2 invasive ductal carcinoma with lobular features. Biopsy of left breast 1.9 cm mass at 3:00 showed grade 2 invasive ductal carcinoma with lobular features, associated grade 2 DCIS. ER and AR strongly positive at %; HER-2 IHC = 2+; HER-2/crow FISH negative.  4. 1/10/2023: FSH = 36.6 but estradiol = 63.  5.  2/03/2023: Bilateral mastectomies with left axillary sentinel lymph node excision and left chest wall nodule excision with Dr. Clarissa Fontanez.  Pathology showed left breast grade 2 invasive ductal carcinoma with lobular features, multifocal with 2 cm and 0.4 cm tumors, grade 2 DCIS, all margins negative.  Of 3 left axillary sentinel lymph nodes removed, one with 6 mm macrometastasis positive for extranodal extension and one with 1.1 mm micrometastasis, negative for extranodal extension, identified. Left chest wall nodule positive for metastatic breast carcinoma involving edge of tissue fragment. HER-2 FISH repeated, group 4, considered HER-2 negative. Oncotype DX = 20, 9-year risk of distant recurrence of 17% after 5 years of hormone blockade therapy; breast-cancer specific  "survival of 96.7% treated without chemotherapy.  6. 2/23/2023: PET/CT scan negative for metastatic disease. Small right middle lobe lung nodules, non-FDG avid.    Interval History:  Doing well. Unfortunately her dad passed away from metastatic melanoma. She was very close with him and was able to be with him when he passed. She recently completed a course of antibiotics for drainage at the left breast incision.     Review of Systems:  A complete review of systems was negative except as noted in the HPI.     Past medical, Surgical, and social history:  Reviewed    Physical Examination:  /72   Pulse 83   Resp 16   Ht 1.753 m (5' 9\")   Wt 102.5 kg (226 lb)   LMP  (LMP Unknown)   SpO2 97%   BMI 33.37 kg/m    Wt Readings from Last 10 Encounters:   03/27/23 102.5 kg (226 lb)   02/03/23 102.6 kg (226 lb 4.8 oz)   01/25/23 102.5 kg (225 lb 14.4 oz)   01/10/23 99.3 kg (219 lb)   01/10/23 99.3 kg (219 lb)   10/26/22 105.5 kg (232 lb 8 oz)   03/18/22 124.9 kg (275 lb 6.4 oz)   01/12/21 128.4 kg (283 lb)   01/15/20 135.2 kg (298 lb)   11/07/19 136.5 kg (301 lb)     General: Pleasant patient in no acute distress.  Skin: Warm and dry. No abnormal ecchymosis or rashes.  Breast: Bilateral mastectomies with expanders. Left incision laterally there is a small, 1 cm dehisced portion that is now with central granulation and surrounding yellowish crust. The right incision has a scabbed incision.   Eyes: Anicteric.   Lungs: Normal work of breathing.  Abdomen: Non-distended.  Extremities: No peripheral edema. Gross movement intact without difficulty.  Mental: Calm, cooperative, appropriate. Mood euthymic.  Neuro: Cranial nerves and coordination grossly intact. Alert and oriented x 3.    Laboratory Data:   CBC, CMP ordered    Assessment and Plan:  Yuliya Li is a 52 year old female with the following issues:  1. Pathologic prognostic stage IA, [id number], grade 2 multifocal invasive ductal carcinoma with lobular " features of left breast overlapping sites, ER positive, PA positive, HER-2 IHC = 2+; HER-2/crow FISH negative, Oncotype DX = 20  - Okay to proceed with cycle 1 of 4 of Taxotere and Cytoxan  --Overall plan for anastrozole x 10 years and Zoladex (currently premenopausal per estradiol level, s/p single oophorectomy)  --DEXA scan pending.  --Radiation oncology referral pending  --Follow up with Dr. Watson next cycle     2.  Family history of breast cancer  - Maternal aunt with breast cancer.  - Genetic consult pending.    3.  Neck and back pain, chronic  - PET scan showed no evidence of distant metastatic disease.     4. Indeterminate right middle lobe pulmonary nodules  --2/23/2023 PET/CT scan showed 2 small indeterminate right middle lobe pulmonary nodules.   --Advised repeat CT chest in 8/2023.    5. Pre-Diabetes  --Continues Ozempic per PCP    30 minutes spent on the date of the encounter doing chart review, interpretation of tests, patient visit and documentation     La [place] PA-C  [facility] [place]   [phone]    Chart documentation with Dragon Voice recognition Software. Although reviewed after completion, some words and grammatical errors may remain.

## 2023-03-29 ENCOUNTER — INFUSION THERAPY VISIT (OUTPATIENT)
Dept: INFUSION THERAPY | Facility: CLINIC | Age: 53
End: 2023-03-29
Attending: INTERNAL MEDICINE
Payer: COMMERCIAL

## 2023-03-29 VITALS
DIASTOLIC BLOOD PRESSURE: 78 MMHG | OXYGEN SATURATION: 96 % | SYSTOLIC BLOOD PRESSURE: 112 MMHG | HEART RATE: 88 BPM | TEMPERATURE: 98.3 F | RESPIRATION RATE: 20 BRPM

## 2023-03-29 DIAGNOSIS — D70.1 CHEMOTHERAPY-INDUCED NEUTROPENIA (H): Primary | ICD-10-CM

## 2023-03-29 DIAGNOSIS — C50.812 MALIGNANT NEOPLASM OF OVERLAPPING SITES OF LEFT BREAST IN FEMALE, ESTROGEN RECEPTOR POSITIVE (H): ICD-10-CM

## 2023-03-29 DIAGNOSIS — Z17.0 MALIGNANT NEOPLASM OF OVERLAPPING SITES OF LEFT BREAST IN FEMALE, ESTROGEN RECEPTOR POSITIVE (H): ICD-10-CM

## 2023-03-29 DIAGNOSIS — T45.1X5A CHEMOTHERAPY-INDUCED NEUTROPENIA (H): Primary | ICD-10-CM

## 2023-03-29 PROCEDURE — 96413 CHEMO IV INFUSION 1 HR: CPT

## 2023-03-29 PROCEDURE — 250N000011 HC RX IP 250 OP 636: Performed by: INTERNAL MEDICINE

## 2023-03-29 PROCEDURE — 258N000003 HC RX IP 258 OP 636: Performed by: INTERNAL MEDICINE

## 2023-03-29 PROCEDURE — 96377 APPLICATON ON-BODY INJECTOR: CPT | Mod: XS | Performed by: INTERNAL MEDICINE

## 2023-03-29 PROCEDURE — 96417 CHEMO IV INFUS EACH ADDL SEQ: CPT

## 2023-03-29 PROCEDURE — 96367 TX/PROPH/DG ADDL SEQ IV INF: CPT

## 2023-03-29 PROCEDURE — 96372 THER/PROPH/DIAG INJ SC/IM: CPT | Performed by: INTERNAL MEDICINE

## 2023-03-29 PROCEDURE — 96375 TX/PRO/DX INJ NEW DRUG ADDON: CPT

## 2023-03-29 RX ORDER — ONDANSETRON 2 MG/ML
8 INJECTION INTRAMUSCULAR; INTRAVENOUS ONCE
Status: COMPLETED | OUTPATIENT
Start: 2023-03-29 | End: 2023-03-29

## 2023-03-29 RX ADMIN — SODIUM CHLORIDE 168 MG: 9 INJECTION, SOLUTION INTRAVENOUS at 11:46

## 2023-03-29 RX ADMIN — ONDANSETRON 8 MG: 2 INJECTION INTRAMUSCULAR; INTRAVENOUS at 10:59

## 2023-03-29 RX ADMIN — FOSAPREPITANT: 150 INJECTION, POWDER, LYOPHILIZED, FOR SOLUTION INTRAVENOUS at 11:04

## 2023-03-29 RX ADMIN — CYCLOPHOSPHAMIDE 1340 MG: 1 INJECTION, POWDER, FOR SOLUTION INTRAVENOUS; ORAL at 12:54

## 2023-03-29 RX ADMIN — SODIUM CHLORIDE 250 ML: 9 INJECTION, SOLUTION INTRAVENOUS at 10:58

## 2023-03-29 RX ADMIN — PEGFILGRASTIM 6 MG: KIT SUBCUTANEOUS at 13:09

## 2023-03-29 ASSESSMENT — PAIN SCALES - GENERAL: PAINLEVEL: NO PAIN (0)

## 2023-03-29 NOTE — PROGRESS NOTES
Infusion Nursing Note:  Yuliya Li presents today for C1D1 taxotere, cytoxan and Onpro.    Patient seen by provider today: No   present during visit today: Not Applicable.    Note: Oriented to the infusion area. Discussed premeds, chemo, and onpro and answered appropriate questions.    Intravenous Access:  Peripheral IV placed.    Treatment Conditions:  Lab Results   Component Value Date    HGB 13.7 03/27/2023    WBC 7.3 03/27/2023    ANEUTAUTO 4.9 03/27/2023     03/27/2023      Lab Results   Component Value Date     03/27/2023    POTASSIUM 4.6 03/27/2023    CR 1.01 (H) 03/27/2023    CHASE 9.2 03/27/2023    BILITOTAL 0.2 03/27/2023    ALBUMIN 4.1 03/27/2023    ALT 12 03/27/2023    AST 18 03/27/2023     Results reviewed, labs MET treatment parameters, ok to proceed with treatment.    Post Infusion Assessment:  Patient tolerated infusion without incident.  Blood return noted pre and post infusion.  Site patent and intact, free from redness, edema or discomfort.  No evidence of extravasations.  Access discontinued per protocol.     ONPRO  Was placed on patient's: back of left arm.    Was placed at 1:10 PM    Podpal used: Yes    ONPRO injector device Lot number: [id number]    Patient education included: what patient can expect after application, what colored lights mean on the device, when to remove device, when and where to call with questions or issues, all patients questions answered and that Neulasta administration will occur at 3/30 at 4:10pm.    Patient tolerated administration well.      Discharge Plan:   Discharge instructions reviewed with: Patient and Family.  Patient and/or family verbalized understanding of discharge instructions and all questions answered.  Copy of AVS reviewed with patient and/or family.  Patient will return as prev rose for next appointment.  Patient discharged in stable condition accompanied by: .  Departure Mode: Ambulatory.      Francisco Short,  RN

## 2023-03-29 NOTE — PATIENT INSTRUCTIONS
Your On-body Neulasta Injector was applied to your left upper arm at 1:10pm.  At approximately 4:10pm on 3/30, your On-body Injector will beep to let you know your dose delivery will begin in 2 minutes.  Your medication will be delivered over the next 45 minutes.  You can remove your Injector at 5:15pm.  Please make sure your Injector has a solid green light or has turned off prior to removing the device.  Please contact your provider at [phone] with questions or concerns.

## 2023-03-29 NOTE — PROGRESS NOTES
Take Home Medication Teaching    Patient was educated on the following oral medications: Ondansetron and Prochlorperazine as needed and Dexamethasone scheduled x 3 doses post chemo infusion on March 29, 2023. Teaching provided to patient included indication, dose, administration, adverse effects and side effect management. Written materials were provided and patient was given the opportunity to ask questions. Patient verbalized understanding of the information presented.     Dev Brennan PharmD  [facility]  [phone]  March 29, 2023

## 2023-03-30 ENCOUNTER — TRANSFERRED RECORDS (OUTPATIENT)
Dept: HEALTH INFORMATION MANAGEMENT | Facility: CLINIC | Age: 53
End: 2023-03-30
Payer: COMMERCIAL

## 2023-03-31 ENCOUNTER — NURSE TRIAGE (OUTPATIENT)
Dept: ONCOLOGY | Facility: CLINIC | Age: 53
End: 2023-03-31
Payer: COMMERCIAL

## 2023-03-31 NOTE — TELEPHONE ENCOUNTER
Oncology Nurse Triage    Situation:   Yuliya reporting the following symptoms: facial flushing    Background:   Treating Provider:   Dr. Watson. Breast Cancer    Date of last office visit: 3/27/23    Recent Treatments:3/29/23, C1D1 taxotere, cytoxan, and onpro.      Assessment:     Onset: Patient noticed facial flushing today. No rash, hives, swelling, trouble breathing, or fever. Denies n/v/c/d. She is having some fatigue and decreased appetite otherwise she is feeling fine. No other sx noted.    Recommendations:   Advised patient to push fluids and monitor for any new or worsening sx. Writer will connect with care team for further advise.     Cinthia Soria, RN, BSN, PHN, OCN  [facility]

## 2023-04-07 LAB
BKR LAB AP ADD'L TEST STATUS: NORMAL
BKR PATH ADDL TEST FINAL COMMENTS: NORMAL

## 2023-04-17 RX ORDER — DIPHENHYDRAMINE HYDROCHLORIDE 50 MG/ML
50 INJECTION INTRAMUSCULAR; INTRAVENOUS
Status: CANCELLED
Start: 2023-04-17

## 2023-04-17 RX ORDER — LORAZEPAM 2 MG/ML
0.5 INJECTION INTRAMUSCULAR EVERY 4 HOURS PRN
Status: CANCELLED | OUTPATIENT
Start: 2023-04-17

## 2023-04-17 RX ORDER — ONDANSETRON 2 MG/ML
8 INJECTION INTRAMUSCULAR; INTRAVENOUS ONCE
Status: CANCELLED | OUTPATIENT
Start: 2023-04-19

## 2023-04-17 RX ORDER — MEPERIDINE HYDROCHLORIDE 25 MG/ML
25 INJECTION INTRAMUSCULAR; INTRAVENOUS; SUBCUTANEOUS EVERY 30 MIN PRN
Status: CANCELLED | OUTPATIENT
Start: 2023-04-17

## 2023-04-17 RX ORDER — EPINEPHRINE 1 MG/ML
0.3 INJECTION, SOLUTION, CONCENTRATE INTRAVENOUS EVERY 5 MIN PRN
Status: CANCELLED | OUTPATIENT
Start: 2023-04-17

## 2023-04-17 RX ORDER — HEPARIN SODIUM (PORCINE) LOCK FLUSH IV SOLN 100 UNIT/ML 100 UNIT/ML
5 SOLUTION INTRAVENOUS
Status: CANCELLED | OUTPATIENT
Start: 2023-04-17

## 2023-04-17 RX ORDER — ALBUTEROL SULFATE 90 UG/1
1-2 AEROSOL, METERED RESPIRATORY (INHALATION)
Status: CANCELLED
Start: 2023-04-17

## 2023-04-17 RX ORDER — ALBUTEROL SULFATE 0.83 MG/ML
2.5 SOLUTION RESPIRATORY (INHALATION)
Status: CANCELLED | OUTPATIENT
Start: 2023-04-17

## 2023-04-17 RX ORDER — HEPARIN SODIUM,PORCINE 10 UNIT/ML
5 VIAL (ML) INTRAVENOUS
Status: CANCELLED | OUTPATIENT
Start: 2023-04-17

## 2023-04-19 ENCOUNTER — INFUSION THERAPY VISIT (OUTPATIENT)
Dept: INFUSION THERAPY | Facility: CLINIC | Age: 53
End: 2023-04-19
Attending: INTERNAL MEDICINE
Payer: COMMERCIAL

## 2023-04-19 ENCOUNTER — ONCOLOGY VISIT (OUTPATIENT)
Dept: ONCOLOGY | Facility: CLINIC | Age: 53
End: 2023-04-19
Attending: INTERNAL MEDICINE
Payer: COMMERCIAL

## 2023-04-19 VITALS
DIASTOLIC BLOOD PRESSURE: 77 MMHG | HEART RATE: 82 BPM | SYSTOLIC BLOOD PRESSURE: 114 MMHG | OXYGEN SATURATION: 98 % | RESPIRATION RATE: 18 BRPM

## 2023-04-19 VITALS
WEIGHT: 227.8 LBS | RESPIRATION RATE: 16 BRPM | BODY MASS INDEX: 33.74 KG/M2 | HEIGHT: 69 IN | DIASTOLIC BLOOD PRESSURE: 77 MMHG | SYSTOLIC BLOOD PRESSURE: 109 MMHG | TEMPERATURE: 97.9 F | HEART RATE: 92 BPM | OXYGEN SATURATION: 100 %

## 2023-04-19 DIAGNOSIS — C50.812 MALIGNANT NEOPLASM OF OVERLAPPING SITES OF LEFT BREAST IN FEMALE, ESTROGEN RECEPTOR POSITIVE (H): ICD-10-CM

## 2023-04-19 DIAGNOSIS — Z17.0 MALIGNANT NEOPLASM OF OVERLAPPING SITES OF LEFT BREAST IN FEMALE, ESTROGEN RECEPTOR POSITIVE (H): Primary | ICD-10-CM

## 2023-04-19 DIAGNOSIS — T45.1X5A CHEMOTHERAPY-INDUCED NEUTROPENIA (H): Primary | ICD-10-CM

## 2023-04-19 DIAGNOSIS — D70.1 CHEMOTHERAPY-INDUCED NEUTROPENIA (H): Primary | ICD-10-CM

## 2023-04-19 DIAGNOSIS — Z17.0 MALIGNANT NEOPLASM OF OVERLAPPING SITES OF LEFT BREAST IN FEMALE, ESTROGEN RECEPTOR POSITIVE (H): ICD-10-CM

## 2023-04-19 DIAGNOSIS — C50.812 MALIGNANT NEOPLASM OF OVERLAPPING SITES OF LEFT BREAST IN FEMALE, ESTROGEN RECEPTOR POSITIVE (H): Primary | ICD-10-CM

## 2023-04-19 LAB
ALBUMIN SERPL BCG-MCNC: 4.3 G/DL (ref 3.5–5.2)
ALP SERPL-CCNC: 60 U/L (ref 35–104)
ALT SERPL W P-5'-P-CCNC: 18 U/L (ref 10–35)
ANION GAP SERPL CALCULATED.3IONS-SCNC: 9 MMOL/L (ref 7–15)
AST SERPL W P-5'-P-CCNC: 18 U/L (ref 10–35)
BASOPHILS # BLD AUTO: 0.1 10E3/UL (ref 0–0.2)
BASOPHILS NFR BLD AUTO: 1 %
BILIRUB SERPL-MCNC: 0.3 MG/DL
BUN SERPL-MCNC: 11.5 MG/DL (ref 6–20)
CALCIUM SERPL-MCNC: 9.9 MG/DL (ref 8.6–10)
CHLORIDE SERPL-SCNC: 103 MMOL/L (ref 98–107)
CREAT SERPL-MCNC: 1.03 MG/DL (ref 0.51–0.95)
DEPRECATED HCO3 PLAS-SCNC: 29 MMOL/L (ref 22–29)
EOSINOPHIL # BLD AUTO: 0 10E3/UL (ref 0–0.7)
EOSINOPHIL NFR BLD AUTO: 0 %
ERYTHROCYTE [DISTWIDTH] IN BLOOD BY AUTOMATED COUNT: 12.8 % (ref 10–15)
GFR SERPL CREATININE-BSD FRML MDRD: 66 ML/MIN/1.73M2
GLUCOSE SERPL-MCNC: 104 MG/DL (ref 70–99)
HCT VFR BLD AUTO: 41.5 % (ref 35–47)
HGB BLD-MCNC: 13.5 G/DL (ref 11.7–15.7)
IMM GRANULOCYTES # BLD: 0 10E3/UL
IMM GRANULOCYTES NFR BLD: 1 %
LYMPHOCYTES # BLD AUTO: 1.1 10E3/UL (ref 0.8–5.3)
LYMPHOCYTES NFR BLD AUTO: 17 %
MCH RBC QN AUTO: 28.6 PG (ref 26.5–33)
MCHC RBC AUTO-ENTMCNC: 32.5 G/DL (ref 31.5–36.5)
MCV RBC AUTO: 88 FL (ref 78–100)
MONOCYTES # BLD AUTO: 0.4 10E3/UL (ref 0–1.3)
MONOCYTES NFR BLD AUTO: 6 %
NEUTROPHILS # BLD AUTO: 4.6 10E3/UL (ref 1.6–8.3)
NEUTROPHILS NFR BLD AUTO: 75 %
NRBC # BLD AUTO: 0 10E3/UL
NRBC BLD AUTO-RTO: 0 /100
PLATELET # BLD AUTO: 355 10E3/UL (ref 150–450)
POTASSIUM SERPL-SCNC: 4.5 MMOL/L (ref 3.4–5.3)
PROT SERPL-MCNC: 6.8 G/DL (ref 6.4–8.3)
RBC # BLD AUTO: 4.72 10E6/UL (ref 3.8–5.2)
SODIUM SERPL-SCNC: 141 MMOL/L (ref 136–145)
WBC # BLD AUTO: 6.1 10E3/UL (ref 4–11)

## 2023-04-19 PROCEDURE — 85025 COMPLETE CBC W/AUTO DIFF WBC: CPT | Performed by: INTERNAL MEDICINE

## 2023-04-19 PROCEDURE — 96413 CHEMO IV INFUSION 1 HR: CPT

## 2023-04-19 PROCEDURE — 258N000003 HC RX IP 258 OP 636: Performed by: INTERNAL MEDICINE

## 2023-04-19 PROCEDURE — 250N000011 HC RX IP 250 OP 636: Performed by: INTERNAL MEDICINE

## 2023-04-19 PROCEDURE — 36415 COLL VENOUS BLD VENIPUNCTURE: CPT | Performed by: INTERNAL MEDICINE

## 2023-04-19 PROCEDURE — 99214 OFFICE O/P EST MOD 30 MIN: CPT | Performed by: PHYSICIAN ASSISTANT

## 2023-04-19 PROCEDURE — 96377 APPLICATON ON-BODY INJECTOR: CPT | Mod: XS | Performed by: INTERNAL MEDICINE

## 2023-04-19 PROCEDURE — 96417 CHEMO IV INFUS EACH ADDL SEQ: CPT

## 2023-04-19 PROCEDURE — G0463 HOSPITAL OUTPT CLINIC VISIT: HCPCS | Mod: 25 | Performed by: PHYSICIAN ASSISTANT

## 2023-04-19 PROCEDURE — 96367 TX/PROPH/DG ADDL SEQ IV INF: CPT

## 2023-04-19 PROCEDURE — 96372 THER/PROPH/DIAG INJ SC/IM: CPT | Performed by: INTERNAL MEDICINE

## 2023-04-19 PROCEDURE — 80053 COMPREHEN METABOLIC PANEL: CPT | Performed by: INTERNAL MEDICINE

## 2023-04-19 PROCEDURE — 96375 TX/PRO/DX INJ NEW DRUG ADDON: CPT

## 2023-04-19 RX ORDER — ONDANSETRON 2 MG/ML
8 INJECTION INTRAMUSCULAR; INTRAVENOUS ONCE
Status: COMPLETED | OUTPATIENT
Start: 2023-04-19 | End: 2023-04-19

## 2023-04-19 RX ADMIN — PEGFILGRASTIM 6 MG: KIT SUBCUTANEOUS at 11:41

## 2023-04-19 RX ADMIN — CYCLOPHOSPHAMIDE 1340 MG: 1 INJECTION, POWDER, FOR SOLUTION INTRAVENOUS; ORAL at 11:19

## 2023-04-19 RX ADMIN — ONDANSETRON 8 MG: 2 INJECTION INTRAMUSCULAR; INTRAVENOUS at 09:54

## 2023-04-19 RX ADMIN — SODIUM CHLORIDE 250 ML: 9 INJECTION, SOLUTION INTRAVENOUS at 09:54

## 2023-04-19 RX ADMIN — SODIUM CHLORIDE 168 MG: 9 INJECTION, SOLUTION INTRAVENOUS at 10:19

## 2023-04-19 RX ADMIN — FOSAPREPITANT: 150 INJECTION, POWDER, LYOPHILIZED, FOR SOLUTION INTRAVENOUS at 09:54

## 2023-04-19 ASSESSMENT — PAIN SCALES - GENERAL: PAINLEVEL: NO PAIN (0)

## 2023-04-19 NOTE — PROGRESS NOTES
"Oncology Rooming Note    April 19, 2023 8:56 AM   Yuliya Li is a 52 year old female who presents for:    Chief Complaint   Patient presents with     Oncology Clinic Visit     Malignant neoplasm of overlapping sites of left breast in female, estrogen receptor positive (H)     Initial Vitals: /77   Pulse 92   Temp 97.9  F (36.6  C) (Oral)   Resp 16   Ht 1.753 m (5' 9\")   Wt 103.3 kg (227 lb 12.8 oz)   LMP  (LMP Unknown)   SpO2 100%   BMI 33.64 kg/m   Estimated body mass index is 33.64 kg/m  as calculated from the following:    Height as of this encounter: 1.753 m (5' 9\").    Weight as of this encounter: 103.3 kg (227 lb 12.8 oz). Body surface area is 2.24 meters squared.  No Pain (0) Comment: Data Unavailable   No LMP recorded (lmp unknown). Patient has had a hysterectomy.  Allergies reviewed: Yes  Medications reviewed: Yes    Medications: Medication refills not needed today.  Pharmacy name entered into [facility]:    [id number] [place], [place] - [address] [facility] - [place], [place] - [address], [place] - [address] [facility]-    Clinical concerns: None       Jodi Pina MA              "

## 2023-04-19 NOTE — PATIENT INSTRUCTIONS
Your On-body Neulasta Injector was applied to your arm at 1145.  At approximately 2:45 on 4/20, your On-body Injector will beep to let you know your dose delivery will begin in 2 minutes.  Your medication will be delivered over the next 45 minutes.  You can remove your Injector at 3:45 on 4/20.  Please make sure your Injector has a solid green light or has turned off prior to removing the device.  Please contact your provider at [phone] with questions or concerns.

## 2023-04-19 NOTE — LETTER
"    4/19/2023         RE: Yuliya Li  [address]  [place] [address]        Dear Colleague,    Thank you for referring your patient, Yuliya Li, to the [facility]. Please see a copy of my visit note below.    Oncology Rooming Note    April 19, 2023 8:56 AM   Yuliya Li is a 52 year old female who presents for:    Chief Complaint   Patient presents with     Oncology Clinic Visit     Malignant neoplasm of overlapping sites of left breast in female, estrogen receptor positive (H)     Initial Vitals: /77   Pulse 92   Temp 97.9  F (36.6  C) (Oral)   Resp 16   Ht 1.753 m (5' 9\")   Wt 103.3 kg (227 lb 12.8 oz)   LMP  (LMP Unknown)   SpO2 100%   BMI 33.64 kg/m   Estimated body mass index is 33.64 kg/m  as calculated from the following:    Height as of this encounter: 1.753 m (5' 9\").    Weight as of this encounter: 103.3 kg (227 lb 12.8 oz). Body surface area is 2.24 meters squared.  No Pain (0) Comment: Data Unavailable   No LMP recorded (lmp unknown). Patient has had a hysterectomy.  Allergies reviewed: Yes  Medications reviewed: Yes    Medications: Medication refills not needed today.  Pharmacy name entered into [facility]:    [id number] - [place], [place] - [address] [facility] - [place], [place] - [address] [place], [place] - [address] [address]    Clinical concerns: None       Jodi Pina MA                Oncology/Hematology Visit Note  Apr 19, 2023    Reason for Visit: Follow up of breast cancer    Primary Oncologist: Dr. Watson    Hematologic and Oncologic History:  Yuliya Li is a 52 year old female status post removal of single ovary and hysterectomy who presents with the following oncologic history:  1.  12/23/2022: Mammogram showed possible asymmetry in right breast at 12:00; possible mass in left breast at 3:00.  2. 1/2/2023: Diagnostic bilateral mammogram showed mass in left " breast at 3:00 with 0.4 cm satellite mass in retroareolar breast. Right breast with effacement of possible finding at 12:00 consistent with artifact. Left breast U/S showed mass at 3:00, 11 cm from nipple measuring 1.9 x 1.6 x 1.8 cm; no abnormal axillary lymph nodes.  3. 1/03/2022: Left breast core biopsy of 0.4 cm mass at 3:30 position showed grade 2 invasive ductal carcinoma with lobular features. Biopsy of left breast 1.9 cm mass at 3:00 showed grade 2 invasive ductal carcinoma with lobular features, associated grade 2 DCIS. ER and PA strongly positive at %; HER-2 IHC = 2+; HER-2/crow FISH negative.  4. 1/10/2023: FSH = 36.6 but estradiol = 63.  5.  2/03/2023: Bilateral mastectomies with left axillary sentinel lymph node excision and left chest wall nodule excision with Dr. Clarissa Fontanez.  Pathology showed left breast grade 2 invasive ductal carcinoma with lobular features, multifocal with 2 cm and 0.4 cm tumors, grade 2 DCIS, all margins negative.  Of 3 left axillary sentinel lymph nodes removed, one with 6 mm macrometastasis positive for extranodal extension and one with 1.1 mm micrometastasis, negative for extranodal extension, identified. Left chest wall nodule positive for metastatic breast carcinoma involving edge of tissue fragment. HER-2 FISH repeated, group 4, considered HER-2 negative. Oncotype DX = 20, 9-year risk of distant recurrence of 17% after 5 years of hormone blockade therapy; breast-cancer specific survival of 96.7% treated without chemotherapy.  6. 2/23/2023: PET/CT scan negative for metastatic disease. Small right middle lobe lung nodules, non-FDG avid.    Interval History:  Doing well. After cycle 1 she had significant bone pain x 7 days. She additionally had some facial flushing and insomnia with the steroids that were quite unpleasant. She noted increased urination, glucoses were well controlled despite steroids. She has some pruritis to the hands and genitals, improved with  "Cortisone and lotioning to hands and vagicream to labia. She has a history of oral ulcers, and did notice some irritation, improved with Biotene. No significant nausea, just one episode last week. Some GERD. Eating well despite anorexia, weight initially decreased then normalized. Slept a bit the first week of chemo then felt normal; this cycle she is taking the first week off from work. The breast incision is better healed.     Review of Systems:  A complete review of systems was negative except as noted in the HPI.     Past medical, Surgical, and social history:  Reviewed    Physical Examination:  /77   Pulse 92   Temp 97.9  F (36.6  C) (Oral)   Resp 16   Ht 1.753 m (5' 9\")   Wt 103.3 kg (227 lb 12.8 oz)   LMP  (LMP Unknown)   SpO2 100%   BMI 33.64 kg/m    Wt Readings from Last 10 Encounters:   04/19/23 103.3 kg (227 lb 12.8 oz)   03/27/23 102.5 kg (226 lb)   02/03/23 102.6 kg (226 lb 4.8 oz)   01/25/23 102.5 kg (225 lb 14.4 oz)   01/10/23 99.3 kg (219 lb)   01/10/23 99.3 kg (219 lb)   10/26/22 105.5 kg (232 lb 8 oz)   03/18/22 124.9 kg (275 lb 6.4 oz)   01/12/21 128.4 kg (283 lb)   01/15/20 135.2 kg (298 lb)     General: Pleasant patient in no acute distress.  Skin: Warm and dry. No abnormal ecchymosis or rashes.  Breast: Bilateral mastectomies with expanders.  Not examined today, last visit there was a left incision dehiscence that is not healed per patient  Eyes: Anicteric.   Lungs: Normal work of breathing.  Abdomen: Non-distended.  Extremities: No peripheral edema. Gross movement intact without difficulty.  Mental: Calm, cooperative, appropriate. Mood euthymic.  Neuro: Cranial nerves and coordination grossly intact. Alert and oriented x 3.    Laboratory Data:   CBC, CMP ordered    Assessment and Plan:  Yuliya Li is a 52 year old female with the following issues:  1. Pathologic prognostic stage IA, [id number], grade 2 multifocal invasive ductal carcinoma with lobular features of left " breast overlapping sites, ER positive, [place] positive, HER-2 IHC = 2+; HER-2/crow FISH negative, Oncotype DX = 20  - Okay to proceed with cycle 1 of 4 of Taxotere and Cytoxan, okay for cycle 2 today   - Due to intolerance of dex (rash/flushing/groin discomfort), will decrease to 4 mg doses   - Due to significant bone pain, she is going to take Claritin BID and take APAP alternating with NSAID for the week of chemo  --Overall plan for anastrozole x 10 years and Zoladex (currently premenopausal per estradiol level, s/p single oophorectomy)  --DEXA scan 3/20 normal, worst T-score +1.0  --Radiation oncology referral pending  --Follow up with Dr. Watson next cycle     2.  Family history of breast cancer  - Maternal aunt with breast cancer.  - Genetic consult pending.    3.  Neck and back pain, chronic  - PET scan showed no evidence of distant metastatic disease.     4. Indeterminate right middle lobe pulmonary nodules  --2/23/2023 PET/CT scan showed 2 small indeterminate right middle lobe pulmonary nodules.   --Advised repeat CT chest in 8/2023.    5. Pre-Diabetes  --Continues Ozempic per PCP, monitors blood sugars, went as look as 80s recently    6. Supportive Cares  --Pruritis - continue hydrating lotions and Cortisone PRN  --Urinary frequency - resolved, monitor and consider UA if worsens  --Labial Pruritis - status post vagicream OTC antifungal treatment  --Bone pain - as above  --Nausea - minimal, antiemetics PRN, monitor closely since we are decreasing the dex  --Oral Ulcer - resolved, history of HSV1 cold sores, consider antiviral if recurrent, discussed salt/soda rinses, continue Biotene, aware of Magic Mouthwash if needed    30 minutes spent on the date of the encounter doing chart review, interpretation of tests, patient visit and documentation     La MICHELLE [facility] [place]   [phone]    Chart documentation with Dragon Voice recognition Software. Although reviewed after  completion, some words and grammatical errors may remain.      Again, thank you for allowing me to participate in the care of your patient.        Sincerely,        MAUREEN BARAJAS PA-C

## 2023-04-19 NOTE — PROGRESS NOTES
Infusion Nursing Note:  Yuliya Li presents today for taxotere/cytoxan.    Patient seen by provider today: yes, jose   present during visit today: Not Applicable.    Note: At the end of the cytoxan infusion, patient reported she had some chest heaviness, and she also had it at the beginning of the taxotere infusion, but it resolved on its own. Cytoxan stopped, and symptoms resolved. Discussed with Jose, Cytoxan finished, no changes to future infusion, but continue to monitor for s/s infusion reaction. Patient encouraged to notify RN right away next time if she has similar symptoms. Verbalized understanding.     Intravenous Access:  Peripheral IV placed.    Treatment Conditions:  Lab Results   Component Value Date    HGB 13.5 04/19/2023    WBC 6.1 04/19/2023    ANEUTAUTO 4.6 04/19/2023     04/19/2023      Results reviewed, labs MET treatment parameters, ok to proceed with treatment.      Post Infusion Assessment:  Patient tolerated infusion-SEE NOTE ABOVE.  Blood return noted pre and post infusion.  Site patent and intact, free from redness, edema or discomfort.  No evidence of extravasations.  Access discontinued per protocol.       Discharge Plan:   Discharge instructions reviewed with: Patient.  Patient and/or family verbalized understanding of discharge instructions and all questions answered.  Patient discharged in stable condition accompanied by: self.  Departure Mode: Ambulatory.      Beatriz Oglesby RN    ONPRO  Was placed on patient's: back of left arm.    Was placed at 1145 AM    Podpal used: Yes    ONPRO injector device Lot number: [id number]    Patient education included: what patient can expect after application, what colored lights mean on the device, when to remove device, when and where to call with questions or issues, all patients questions answered and that Neulasta administration will occur at 2:45-3:45pm tomorrow.    Patient tolerated administration well.

## 2023-04-19 NOTE — PROGRESS NOTES
Oncology/Hematology Visit Note  Apr 19, 2023    Reason for Visit: Follow up of breast cancer    Primary Oncologist: Dr. Watson    Hematologic and Oncologic History:  Yuliya Li is a 52 year old female status post removal of single ovary and hysterectomy who presents with the following oncologic history:  1.  12/23/2022: Mammogram showed possible asymmetry in right breast at 12:00; possible mass in left breast at 3:00.  2. 1/2/2023: Diagnostic bilateral mammogram showed mass in left breast at 3:00 with 0.4 cm satellite mass in retroareolar breast. Right breast with effacement of possible finding at 12:00 consistent with artifact. Left breast U/S showed mass at 3:00, 11 cm from nipple measuring 1.9 x 1.6 x 1.8 cm; no abnormal axillary lymph nodes.  3. 1/03/2022: Left breast core biopsy of 0.4 cm mass at 3:30 position showed grade 2 invasive ductal carcinoma with lobular features. Biopsy of left breast 1.9 cm mass at 3:00 showed grade 2 invasive ductal carcinoma with lobular features, associated grade 2 DCIS. ER and NC strongly positive at %; HER-2 IHC = 2+; HER-2/crow FISH negative.  4. 1/10/2023: FSH = 36.6 but estradiol = 63.  5.  2/03/2023: Bilateral mastectomies with left axillary sentinel lymph node excision and left chest wall nodule excision with Dr. Clarissa Fontanez.  Pathology showed left breast grade 2 invasive ductal carcinoma with lobular features, multifocal with 2 cm and 0.4 cm tumors, grade 2 DCIS, all margins negative.  Of 3 left axillary sentinel lymph nodes removed, one with 6 mm macrometastasis positive for extranodal extension and one with 1.1 mm micrometastasis, negative for extranodal extension, identified. Left chest wall nodule positive for metastatic breast carcinoma involving edge of tissue fragment. HER-2 FISH repeated, group 4, considered HER-2 negative. Oncotype DX = 20, 9-year risk of distant recurrence of 17% after 5 years of hormone blockade therapy; breast-cancer specific  "survival of 96.7% treated without chemotherapy.  6. 2/23/2023: PET/CT scan negative for metastatic disease. Small right middle lobe lung nodules, non-FDG avid.    Interval History:  Doing well. After cycle 1 she had significant bone pain x 7 days. She additionally had some facial flushing and insomnia with the steroids that were quite unpleasant. She noted increased urination, glucoses were well controlled despite steroids. She has some pruritis to the hands and genitals, improved with Cortisone and lotioning to hands and vagicream to labia. She has a history of oral ulcers, and did notice some irritation, improved with Biotene. No significant nausea, just one episode last week. Some GERD. Eating well despite anorexia, weight initially decreased then normalized. Slept a bit the first week of chemo then felt normal; this cycle she is taking the first week off from work. The breast incision is better healed.     Review of Systems:  A complete review of systems was negative except as noted in the HPI.     Past medical, Surgical, and social history:  Reviewed    Physical Examination:  /77   Pulse 92   Temp 97.9  F (36.6  C) (Oral)   Resp 16   Ht 1.753 m (5' 9\")   Wt 103.3 kg (227 lb 12.8 oz)   LMP  (LMP Unknown)   SpO2 100%   BMI 33.64 kg/m    Wt Readings from Last 10 Encounters:   04/19/23 103.3 kg (227 lb 12.8 oz)   03/27/23 102.5 kg (226 lb)   02/03/23 102.6 kg (226 lb 4.8 oz)   01/25/23 102.5 kg (225 lb 14.4 oz)   01/10/23 99.3 kg (219 lb)   01/10/23 99.3 kg (219 lb)   10/26/22 105.5 kg (232 lb 8 oz)   03/18/22 124.9 kg (275 lb 6.4 oz)   01/12/21 128.4 kg (283 lb)   01/15/20 135.2 kg (298 lb)     General: Pleasant patient in no acute distress.  Skin: Warm and dry. No abnormal ecchymosis or rashes.  Breast: Bilateral mastectomies with expanders.  Not examined today, last visit there was a left incision dehiscence that is not healed per patient  Eyes: Anicteric.   Lungs: Normal work of " breathing.  Abdomen: Non-distended.  Extremities: No peripheral edema. Gross movement intact without difficulty.  Mental: Calm, cooperative, appropriate. Mood euthymic.  Neuro: Cranial nerves and coordination grossly intact. Alert and oriented x 3.    Laboratory Data:   CBC, CMP ordered    Assessment and Plan:  Yuliya Li is a 52 year old female with the following issues:  1. Pathologic prognostic stage IA, [id number], grade 2 multifocal invasive ductal carcinoma with lobular features of left breast overlapping sites, ER positive, NC positive, HER-2 IHC = 2+; HER-2/crow FISH negative, Oncotype DX = 20  - Okay to proceed with cycle 1 of 4 of Taxotere and Cytoxan, okay for cycle 2 today   - Due to intolerance of dex (rash/flushing/groin discomfort), will decrease to 4 mg doses   - Due to significant bone pain, she is going to take Claritin BID and take APAP alternating with NSAID for the week of chemo  --Overall plan for anastrozole x 10 years and Zoladex (currently premenopausal per estradiol level, s/p single oophorectomy)  --DEXA scan 3/20 normal, worst T-score +1.0  --Radiation oncology referral pending  --Follow up with Dr. Watson next cycle     2.  Family history of breast cancer  - Maternal aunt with breast cancer.  - Genetic consult pending.    3.  Neck and back pain, chronic  - PET scan showed no evidence of distant metastatic disease.     4. Indeterminate right middle lobe pulmonary nodules  --2/23/2023 PET/CT scan showed 2 small indeterminate right middle lobe pulmonary nodules.   --Advised repeat CT chest in 8/2023.    5. Pre-Diabetes  --Continues Ozempic per PCP, monitors blood sugars, went as look as 80s recently    6. Supportive Cares  --Pruritis - continue hydrating lotions and Cortisone PRN  --Urinary frequency - resolved, monitor and consider UA if worsens  --Labial Pruritis - status post vagicream OTC antifungal treatment  --Bone pain - as above  --Nausea - minimal, antiemetics PRN, monitor  closely since we are decreasing the dex  --Oral Ulcer - resolved, history of HSV1 cold sores, consider antiviral if recurrent, discussed salt/soda rinses, continue Biotene, aware of Magic Mouthwash if needed    30 minutes spent on the date of the encounter doing chart review, interpretation of tests, patient visit and documentation     [place] PA-C  [facility]   [phone]    Chart documentation with Dragon Voice recognition Software. Although reviewed after completion, some words and grammatical errors may remain.

## 2023-04-26 DIAGNOSIS — R23.8 SKIN IRRITATION: Primary | ICD-10-CM

## 2023-04-26 RX ORDER — TRIAMCINOLONE ACETONIDE 1 MG/G
CREAM TOPICAL 2 TIMES DAILY
Qty: 45 G | Refills: 1 | Status: SHIPPED | OUTPATIENT
Start: 2023-04-26 | End: 2023-11-01

## 2023-04-28 NOTE — PROGRESS NOTES
[facility]    Hematology/Oncology Established Patient Note      Today's Date: 5/10/2023    Reason for follow-up:  Left breast cancer.    HISTORY OF PRESENT ILLNESS: Yuliya Li is a 52 year old female status post removal of single ovary and hysterectomy who presents with the following oncologic history:  1.  12/23/2022: Mammogram showed possible asymmetry in right breast at 12:00; possible mass in left breast at 3:00.  2. 1/2/2023: Diagnostic bilateral mammogram showed mass in left breast at 3:00 with 0.4 cm satellite mass in retroareolar breast. Right breast with effacement of possible finding at 12:00 consistent with artifact. Left breast U/S showed mass at 3:00, 11 cm from nipple measuring 1.9 x 1.6 x 1.8 cm; no abnormal axillary lymph nodes.  3. 1/03/2022: Left breast core biopsy of 0.4 cm mass at 3:30 position showed grade 2 invasive ductal carcinoma with lobular features. Biopsy of left breast 1.9 cm mass at 3:00 showed grade 2 invasive ductal carcinoma with lobular features, associated grade 2 DCIS. ER and [place] strongly positive at %; HER-2 IHC = 2+; HER-2/crow FISH negative.  4. 1/10/2023: FSH = 36.6 but estradiol = 63.  5.  2/03/2023: Bilateral mastectomies with left axillary sentinel lymph node excision and left chest wall nodule excision with Dr. Clarissa Fontanez.  Pathology showed left breast grade 2 invasive ductal carcinoma with lobular features, multifocal with 2 cm and 0.4 cm tumors, grade 2 DCIS, all margins negative.  Of 3 left axillary sentinel lymph nodes removed, one with 6 mm macrometastasis positive for extranodal extension and one with 1.1 mm micrometastasis, negative for extranodal extension, identified. Left chest wall nodule positive for metastatic breast carcinoma involving edge of tissue fragment. HER-2 FISH repeated, group 4, considered HER-2 negative. Oncotype DX = 20, 9-year risk of distant recurrence of 17% after 5 years of hormone blockade therapy;  breast-cancer specific survival of 96.7% treated without chemotherapy.  6. 2/23/2023: PET/CT scan negative for metastatic disease. Small right middle lobe lung nodules, non-FDG avid.  7. 3/29/2023: Started adjuvant chemotherapy with Taxotere and Cytoxan. Received cycle 2 on 4/19/2023, complicated by allergic reaction with chest heaviness followed by diffuse rash.    INTERVAL HISTORY:  Yuliya reports rash has now resolved from her arms and hands.  No nausea.      REVIEW OF SYSTEMS:   14 point ROS was reviewed and is negative other than as noted above in HPI.       HOME MEDICATIONS:  Current Outpatient Medications   Medication Sig Dispense Refill     acetaminophen (TYLENOL) 500 MG tablet Take 500-1,000 mg by mouth every 6 hours as needed for mild pain       alcohol swab prep pads Use to swab area of injection/amy as directed. 100 each 3     blood glucose (NO BRAND SPECIFIED) test strip Use to test blood sugar 2 times daily or as directed. To accompany: Blood Glucose Monitor Brands: per insurance. 100 strip 6     blood glucose calibration (NO BRAND SPECIFIED) solution To accompany: Blood Glucose Monitor Brands: per insurance. 1 each 1     blood glucose monitoring (NO BRAND SPECIFIED) meter device kit Use to test blood sugar 2 times daily or as directed. Preferred blood glucose meter OR supplies to accompany: Blood Glucose Monitor Brands: per insurance. 1 kit 0     clindamycin (CLEOCIN) 300 MG capsule Take 1 capsule (300 mg) by mouth 3 times daily 21 capsule 0     dexamethasone (DECADRON) 4 MG tablet Take 2 tablets (8 mg) by mouth 2 times daily (with meals) for 3 doses Start evening of Docetaxel infusion and continue for a total of 3 doses. 6 tablet 3     FLUoxetine (PROZAC) 40 MG capsule Take 1 capsule (40 mg) by mouth daily 90 capsule 1     medical cannabis (Patient's own supply) Take 1 Dose by mouth nightly as needed Gummies: 5 mg   Formulary: Red  Location: Unknown    (The purpose of this order is to document that  the patient reports taking medical cannabis.  This is not a prescription, and is not used to certify that the patient has a qualifying medical condition.)       melatonin 3 MG tablet Take 6 mg by mouth nightly as needed for sleep       metFORMIN (GLUCOPHAGE XR) 500 MG 24 hr tablet Take 1 tablet (500 mg) by mouth daily 90 tablet 3     ondansetron (ZOFRAN) 8 MG tablet Take 1 tablet (8 mg) by mouth every 8 hours as needed for nausea (vomiting) 30 tablet 2     oxyCODONE (ROXICODONE) 5 MG tablet Take 1 tablet (5 mg) by mouth every 6 hours as needed for pain 24 tablet 0     prochlorperazine (COMPAZINE) 10 MG tablet Take 1 tablet (10 mg) by mouth every 6 hours as needed for nausea or vomiting 30 tablet 2     Semaglutide, 1 MG/DOSE, (OZEMPIC, 1 MG/DOSE,) 4 MG/3ML pen Inject 1 mg Subcutaneous every 7 days (On Fridays) 9 mL 1     spironolactone (ALDACTONE) 100 MG tablet Take 1 tablet (100 mg) by mouth daily 90 tablet 1     thin (NO BRAND SPECIFIED) lancets Use with lanceting device. To accompany: Blood Glucose Monitor Brands: per insurance. 100 each 6     triamcinolone (KENALOG) 0.1 % external cream Apply topically 2 times daily 45 g 1         ALLERGIES:  Allergies   Allergen Reactions     Keflex [Cephalexin]      Tetracycline      Trazodone      Very sedating-         PAST MEDICAL HISTORY:  Diabetes mellitus type 2  Hyperlipidemia    Gynecologic history:  Age of menarche at 11.  Last menstrual period in 2005 prior to hysterectomy.  G3, P3.  Age of first pregnancy at 29.  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  No use of HRT.  Used OCPs for 4 years.  No IUD use.      PAST SURGICAL HISTORY:  Past Surgical History:   Procedure Laterality Date     CYST REMOVAL       HYSTERECTOMY       HYSTERECTOMY, PAP NO LONGER INDICATED  2005    menorrhagia     INSERT TISSUE EXPANDER BREAST Bilateral 2/3/2023    Procedure: BILATERAL TISSUE EXPANDER;  Surgeon: Jaya Malave MD;  Location: [facility] OR     MASTECTOMY SIMPLE BILATERAL, SENTINEL NODE  BILATERAL, COMBINED Left 2/3/2023    Procedure: Bilateral skin sparing mastectomies with left axillary sentinel lymph node biopsy;  Surgeon: Clarissa Fontanez MD;  Location:  OR         SOCIAL HISTORY:  Social History     Socioeconomic History     Marital status:      Spouse name: Not on file     Number of children: 3     Years of education: Not on file     Highest education level: Not on file   Occupational History     Not on file   Tobacco Use     Smoking status: Former     Packs/day: 0.00     Types: Cigarettes     Quit date: 1999     Years since quittin.4     Smokeless tobacco: Never   Vaping Use     Vaping status: Never Used   Substance and Sexual Activity     Alcohol use: Yes     Comment: Alcoholic Drinks/day: 1 a week     Drug use: Yes     Types: Marijuana     Comment: nightly for sleep (gummy)     Sexual activity: Yes     Partners: Male   Other Topics Concern     Not on file   Social History Narrative     Not on file     Social Determinants of Health     Financial Resource Strain: Low Risk  (10/26/2022)    Overall Financial Resource Strain (CARDIA)      Difficulty of Paying Living Expenses: Not very hard   Food Insecurity: No Food Insecurity (10/26/2022)    Hunger Vital Sign      Worried About Running Out of Food in the Last Year: Never true      Ran Out of Food in the Last Year: Never true   Transportation Needs: No Transportation Needs (10/26/2022)    PRAPARE - Transportation      Lack of Transportation (Medical): No      Lack of Transportation (Non-Medical): No   Physical Activity: Insufficiently Active (10/26/2022)    Exercise Vital Sign      Days of Exercise per Week: 1 day      Minutes of Exercise per Session: 20 min   Stress: Stress Concern Present (10/26/2022)    [place] [employer] - Occupational Stress Questionnaire      Feeling of Stress : Rather much   Social Connections: Moderately Isolated (10/26/2022)    Social Connection and Isolation Panel [NHANES]       "Frequency of Communication with Friends and Family: More than three times a week      Frequency of Social Gatherings with Friends and Family: Once a week      Attends [employer] Services: Never      Active Member of Clubs or Organizations: No      Attends Club or Organization Meetings: Not on file      Marital Status:    Intimate Partner Violence: Not on file   Housing Stability: Low Risk  (10/26/2022)    Housing Stability Vital Sign      Unable to Pay for Housing in the Last Year: No      Number of Places Lived in the Last Year: 1      Unstable Housing in the Last Year: No         FAMILY HISTORY:  Family History   Problem Relation Age of Onset     Diabetes Mother      Atrial fibrillation Father      Alcoholism Father      Melanoma Father      Breast Cancer Maternal Aunt         in her 40's     Cervical Cancer Maternal Aunt      Colon Cancer No family hx of          PHYSICAL EXAM:  Vital signs:  /76   Pulse 103   Temp 98.3  F (36.8  C)   Resp 16   Ht 1.753 m (5' 9\")   Wt 103.2 kg (227 lb 9.6 oz)   LMP  (LMP Unknown)   SpO2 96%   BMI 33.61 kg/m     ECO  GENERAL/CONSTITUTIONAL: No acute distress.  EYES: No erythema or scleral icterus.  ENT/MOUTH: Neck supple.  LYMPH: No cervical, supraclavicular, axillary adenopathy.   RESPIRATORY: Clear to auscultation bilaterally. No crackles or wheezing.   CARDIOVASCULAR: Regular rate and rhythm without murmurs.  GASTROINTESTINAL: No hepatosplenomegaly, masses, or tenderness. No guarding.  No distention.  MUSCULOSKELETAL: Warm and well-perfused, no cyanosis, clubbing, or edema.  NEUROLOGIC: No focal motor deficits. Alert, oriented, answers questions appropriately.  INTEGUMENTARY: Mild follicular scalp rash; no rash elsewhere.  GAIT: Steady, does not use assistive device      LABS:  CBC RESULTS: Recent Labs   Lab Test 05/10/23  1043   WBC 9.2   RBC 4.54   HGB 13.1   HCT 40.0   MCV 88   MCH 28.9   MCHC 32.8   RDW 13.8            PATHOLOGY:  None " new.    IMAGING:  None new.    ASSESSMENT/PLAN:  Yuliya Li is a 52 year old female with the following issues:  1. Pathologic prognostic stage IA, [id number], grade 2 multifocal invasive ductal carcinoma with lobular features of left breast overlapping sites, ER positive, MA positive, HER-2 IHC = 2+; HER-2/crow FISH negative, Oncotype DX = 20  --Yuliya had 2 tumors measuring 2 cm and 0.4 cm and additional left chest wall nodule, but with 2/3 lymph nodes involved, one that was a macrometastasis with extranodal extension and the other with micrometastasis with no extranodal extension.  All areas of tumor were excised.  The repeat HER-2 FISH was interpreted as negative.   --2/23/2023 PET scan showed no evidence for metastatic disease.  --Oncotype DX score 20, with 9-year risk of distant recurrence of 17% after 5 years of hormone blockade therapy; breast-cancer specific survival of 96.7% treated without chemotherapy. Discussed that this would indicate up to 3.3% absolute benefit of chemotherapy.    --She elected to proceed with 4 cycles of Taxotere and Cytoxan every 3 weeks to maximally reduce her risk beyond just hormone blockade therapy alone.  However, she reacted to presumably Taxotere after cycle 2.  I advised switching to Taxol weekly for next 6 weeks and not to proceed with dose dense AC given that her maximum absolute chemo benefit is up to 3.3% and would not justify the potential adverse effects of ddAC. She agrees with this plan of care.  --I reviewed her labs which show normal CBC today.  --She is perimenopausal, so after she completes chemo, I recommended OST + AI with Zoladex every 4 weeks with anastrozole for 10 years of hormone blockade therapy.   In all likelihood, she would probably not need more than a few months to a year of Zoladex. Would plan to repeat FSH and estradiol off Zoladex in the future.  --We talked a bit about abemaciclib (Verzenio) and whether this could benefit her due to the  chest wall nodule that was found and resected at time of surgery.  Even with the expanded indication for adjuvant abemaciclib based on the monarchE trial, her grade 2 and TNM stage of disease would not meet criteria for this drug.   --3/20/2023 DEXA scan showed normal bone density for baseline.  --She will see radiation oncology back for adjuvant radiation therapy after her last cycle of chemo.    2.  Family history of breast cancer  - Maternal aunt with breast cancer.  - I have recommended and referred Yuliya to genetic counseling.      3.  Neck and back pain, chronic  - PET scan showed no evidence of distant metastatic disease.    4. Indeterminate right middle lobe pulmonary nodules  --2/23/2023 PET/CT scan showed 2 small indeterminate right middle lobe pulmonary nodules.   --Advised repeat CT chest in 8/2023.    Louise Watson MD  Hematology/Oncology  [facility]    Total time spent: 30 minutes in patient evaluation, counseling, documentation, and coordination of care.

## 2023-04-30 ENCOUNTER — HOSPITAL ENCOUNTER (EMERGENCY)
Facility: CLINIC | Age: 53
Discharge: HOME | End: 2023-04-30
Attending: EMERGENCY MEDICINE | Admitting: EMERGENCY MEDICINE
Payer: COMMERCIAL

## 2023-04-30 VITALS
DIASTOLIC BLOOD PRESSURE: 87 MMHG | RESPIRATION RATE: 18 BRPM | HEART RATE: 103 BPM | HEIGHT: 69 IN | TEMPERATURE: 98.3 F | BODY MASS INDEX: 33.62 KG/M2 | WEIGHT: 227 LBS | OXYGEN SATURATION: 98 % | SYSTOLIC BLOOD PRESSURE: 129 MMHG

## 2023-04-30 DIAGNOSIS — T78.40XA ALLERGIC REACTION, INITIAL ENCOUNTER: ICD-10-CM

## 2023-04-30 PROCEDURE — 96375 TX/PRO/DX INJ NEW DRUG ADDON: CPT

## 2023-04-30 PROCEDURE — 96374 THER/PROPH/DIAG INJ IV PUSH: CPT

## 2023-04-30 PROCEDURE — 250N000013 HC RX MED GY IP 250 OP 250 PS 637: Performed by: EMERGENCY MEDICINE

## 2023-04-30 PROCEDURE — 250N000012 HC RX MED GY IP 250 OP 636 PS 637: Performed by: EMERGENCY MEDICINE

## 2023-04-30 PROCEDURE — 99284 EMERGENCY DEPT VISIT MOD MDM: CPT | Mod: 25

## 2023-04-30 PROCEDURE — 250N000011 HC RX IP 250 OP 636: Performed by: EMERGENCY MEDICINE

## 2023-04-30 RX ORDER — METHYLPREDNISOLONE SODIUM SUCCINATE 125 MG/2ML
125 INJECTION, POWDER, LYOPHILIZED, FOR SOLUTION INTRAMUSCULAR; INTRAVENOUS ONCE
Status: COMPLETED | OUTPATIENT
Start: 2023-04-30 | End: 2023-04-30

## 2023-04-30 RX ORDER — FAMOTIDINE 20 MG/1
40 TABLET, FILM COATED ORAL ONCE
Status: COMPLETED | OUTPATIENT
Start: 2023-04-30 | End: 2023-04-30

## 2023-04-30 RX ORDER — DIPHENHYDRAMINE HCL 25 MG
50 TABLET ORAL EVERY 6 HOURS
Qty: 24 TABLET | Refills: 0 | Status: SHIPPED | OUTPATIENT
Start: 2023-04-30 | End: 2023-05-02 | Stop reason: DRUGHIGH

## 2023-04-30 RX ORDER — DIPHENHYDRAMINE HCL 25 MG
50 TABLET ORAL EVERY 6 HOURS
Qty: 24 TABLET | Refills: 0 | Status: SHIPPED | OUTPATIENT
Start: 2023-04-30 | End: 2023-04-30

## 2023-04-30 RX ORDER — DIPHENHYDRAMINE HCL 25 MG
25 CAPSULE ORAL ONCE
Status: COMPLETED | OUTPATIENT
Start: 2023-04-30 | End: 2023-04-30

## 2023-04-30 RX ORDER — PREDNISONE 20 MG/1
60 TABLET ORAL ONCE
Status: COMPLETED | OUTPATIENT
Start: 2023-04-30 | End: 2023-04-30

## 2023-04-30 RX ORDER — DIPHENHYDRAMINE HYDROCHLORIDE 50 MG/ML
50 INJECTION INTRAMUSCULAR; INTRAVENOUS ONCE
Status: COMPLETED | OUTPATIENT
Start: 2023-04-30 | End: 2023-04-30

## 2023-04-30 RX ORDER — FAMOTIDINE 40 MG/1
40 TABLET, FILM COATED ORAL DAILY
Qty: 3 TABLET | Refills: 0 | Status: SHIPPED | OUTPATIENT
Start: 2023-04-30 | End: 2023-05-02

## 2023-04-30 RX ORDER — FAMOTIDINE 40 MG/1
40 TABLET, FILM COATED ORAL DAILY
Qty: 3 TABLET | Refills: 0 | Status: SHIPPED | OUTPATIENT
Start: 2023-04-30 | End: 2023-04-30

## 2023-04-30 RX ORDER — PREDNISONE 20 MG/1
40 TABLET ORAL DAILY
Qty: 10 TABLET | Refills: 0 | Status: SHIPPED | OUTPATIENT
Start: 2023-04-30 | End: 2023-05-02 | Stop reason: DRUGHIGH

## 2023-04-30 RX ORDER — PREDNISONE 20 MG/1
40 TABLET ORAL DAILY
Qty: 10 TABLET | Refills: 0 | Status: SHIPPED | OUTPATIENT
Start: 2023-04-30 | End: 2023-04-30

## 2023-04-30 RX ORDER — EPINEPHRINE 0.3 MG/.3ML
0.3 INJECTION SUBCUTANEOUS
Qty: 2 EACH | Refills: 0 | Status: SHIPPED | OUTPATIENT
Start: 2023-04-30

## 2023-04-30 RX ORDER — EPINEPHRINE 0.3 MG/.3ML
0.3 INJECTION SUBCUTANEOUS
Qty: 2 EACH | Refills: 0 | Status: SHIPPED | OUTPATIENT
Start: 2023-04-30 | End: 2023-04-30

## 2023-04-30 RX ADMIN — DIPHENHYDRAMINE HYDROCHLORIDE 50 MG: 50 INJECTION, SOLUTION INTRAMUSCULAR; INTRAVENOUS at 16:44

## 2023-04-30 RX ADMIN — FAMOTIDINE 20 MG: 10 INJECTION INTRAVENOUS at 16:46

## 2023-04-30 RX ADMIN — METHYLPREDNISOLONE SODIUM SUCCINATE 125 MG: 125 INJECTION, POWDER, FOR SOLUTION INTRAMUSCULAR; INTRAVENOUS at 16:44

## 2023-04-30 RX ADMIN — FAMOTIDINE 40 MG: 20 TABLET ORAL at 13:54

## 2023-04-30 RX ADMIN — PREDNISONE 60 MG: 20 TABLET ORAL at 13:54

## 2023-04-30 ASSESSMENT — ACTIVITIES OF DAILY LIVING (ADL): ADLS_ACUITY_SCORE: 35

## 2023-04-30 NOTE — ED PROVIDER NOTES
History     Chief Complaint:  Allergic Reaction       HPI   Yuliya Li is a 52 year old female currently undergoing chemotherapy with her most recent run last week who presents today with allergic reaction.  Shortly after receiving her second round of chemotherapy she developed urticaria and pruritus to her groin region as well as her skin.  This is progressed and now she has developed hives, mild upper lip swelling, right hand swelling.  She had similar pruritus following the first round of chemo but it never progressed to this.  Out of triage she already got a dose of oral prednisone and Benadryl and states that these helped but she is still having significant enough symptoms where she would like additional treatment.  She denies today any sense of shortness of breath or chest heaviness though does note she had some yesterday.  She plans to page her oncologist tomorrow to update them.      Independent Historian:   None - Patient Only    Review of External Notes: none    ROS:  Review of Systems    Allergies:  Keflex [Cephalexin]  Tetracycline  Trazodone     Medications:    diphenhydrAMINE (BENADRYL) 25 MG tablet  EPINEPHrine (ANY BX GENERIC EQUIV) 0.3 MG/0.3ML injection 2-pack  famotidine (PEPCID) 40 MG tablet  predniSONE (DELTASONE) 20 MG tablet  acetaminophen (TYLENOL) 500 MG tablet  alcohol swab prep pads  blood glucose (NO BRAND SPECIFIED) test strip  blood glucose calibration (NO BRAND SPECIFIED) solution  blood glucose monitoring (NO BRAND SPECIFIED) meter device kit  clindamycin (CLEOCIN) 300 MG capsule  dexamethasone (DECADRON) 4 MG tablet  FLUoxetine (PROZAC) 40 MG capsule  medical cannabis (Patient's own supply)  melatonin 3 MG tablet  metFORMIN (GLUCOPHAGE XR) 500 MG 24 hr tablet  ondansetron (ZOFRAN) 8 MG tablet  oxyCODONE (ROXICODONE) 5 MG tablet  prochlorperazine (COMPAZINE) 10 MG tablet  Semaglutide, 1 MG/DOSE, (OZEMPIC, 1 MG/DOSE,) 4 MG/3ML pen  spironolactone (ALDACTONE) 100 MG  "tablet  thin (NO BRAND SPECIFIED) lancets  triamcinolone (KENALOG) 0.1 % external cream        Past Medical History:    History reviewed. No pertinent past medical history.    Past Surgical History:    Past Surgical History:   Procedure Laterality Date     CYST REMOVAL       HYSTERECTOMY       HYSTERECTOMY, PAP NO LONGER INDICATED  2005    menorrhagia     INSERT TISSUE EXPANDER BREAST Bilateral 2/3/2023    Procedure: BILATERAL TISSUE EXPANDER;  Surgeon: Jaya Malave MD;  Location: [facility]     MASTECTOMY SIMPLE BILATERAL, SENTINEL NODE BILATERAL, COMBINED Left 2/3/2023    Procedure: Bilateral skin sparing mastectomies with left axillary sentinel lymph node biopsy;  Surgeon: Clarissa Fontaenz MD;  Location:  OR        Family History:    family history includes Alcoholism in her father; Atrial fibrillation in her father; Breast Cancer in her maternal aunt; Cervical Cancer in her maternal aunt; Diabetes in her mother; Melanoma in her father.    Social History:   reports that she quit smoking about 23 years ago. Her smoking use included cigarettes. She has never used smokeless tobacco. She reports current alcohol use. She reports current drug use. Drug: Marijuana.  PCP: Latrice Perez     Physical Exam     Patient Vitals for the past 24 hrs:   BP Temp Pulse Resp SpO2 Height Weight   04/30/23 1342 129/87 -- 103 18 98 % -- --   04/30/23 1340 -- 98.3  F (36.8  C) -- -- -- 1.753 m (5' 9\") 103 kg (227 lb)        Physical Exam  General/Appearance: appears stated age, well-groomed, appears mildly uncomfortable, alopecia  Eyes: EOMI, no scleral injection, no icterus  ENT: MMM, slight edema to center upper lip  Neck: supple, nl ROM, no stiffness  Cardiovascular: RRR, nl S1S2, no m/r/g, 2+ pulses in all 4 extremities, cap refill <2sec  Respiratory: CTAB, good air movement throughout, no wheezes/rhonchi/rales, no increased WOB, no retractions  GI: abd soft, non-distended, nttp,  no HSM, no rebound, no guarding, nl " BS  MSK: DILL, good tone, no bony abnormality  Skin: warm and well-perfused, scattered urticaria with edema to R thenar eminence, no edema, no ecchymosis, nl turgor  Neuro: GCS 15, alert and oriented, no gross focal neuro deficits  Psych: interacts appropriately  Heme: no petechia, no purpura, no active bleeding        Emergency Department Course     Emergency Department Course & Assessments:    Interventions:  Medications   diphenhydrAMINE (BENADRYL) capsule 25 mg (25 mg Oral Not Given 4/30/23 1355)   famotidine (PEPCID) tablet 40 mg (40 mg Oral $Given 4/30/23 1354)   predniSONE (DELTASONE) tablet 60 mg (60 mg Oral $Given 4/30/23 1354)   methylPREDNISolone sodium succinate (solu-MEDROL) injection 125 mg (125 mg Intravenous $Given 4/30/23 1644)   diphenhydrAMINE (BENADRYL) injection 50 mg (50 mg Intravenous $Given 4/30/23 1644)   famotidine (PEPCID) injection 20 mg (20 mg Intravenous $Given 4/30/23 1646)        Assessments:  1730 Pt still uncomfortable but improvement in swelling of upper lip    Independent Interpretation (X-rays, CTs, rhythm strip):  None    Consultations/Discussion of Management or Tests:  None        Social Determinants of Health affecting care:   None    Disposition:  The patient was discharged to home.     Impression & Plan      Medical Decision Making:  This pt presented with signs/sxs consistent with allergic reaction.  They were given Benadryl, Solumedrol, and Zantac and observed for 2 hours without worsening of their sxs but also without significant improvement of the urticaria.  There was no throat involvement, SOB, CP, lightheadedness/syncope to suggest more-severe allergic reaction and therefore epinephrine was no used.  At this point there are no signs of worsening, the pt is HD stable and without signs of respiratory involvement -- I believe the patient is safe for discharge home.  They were discharged with the recommendation to use Benadryl Q6hrs prn return of allergy sxs and to  emergently return to the ED if they develop lip/tongue/throat involvement, SOB, CP, lightheadedness/syncope, or other new and concerning sxs. Recommended follow-up with PMD in 1-2 days for persistent symptoms.  Pt expresses understanding and agreement with the plan.    Diagnosis:    ICD-10-CM    1. Allergic reaction, initial encounter  T78.40XA            Discharge Medications:  New Prescriptions    DIPHENHYDRAMINE (BENADRYL) 25 MG TABLET    Take 2 tablets (50 mg) by mouth every 6 hours    EPINEPHRINE (ANY BX GENERIC EQUIV) 0.3 MG/0.3ML INJECTION 2-PACK    Inject 0.3 mLs (0.3 mg) into the muscle once as needed for anaphylaxis May repeat one time in 5-15 minutes if response to initial dose is inadequate.    FAMOTIDINE (PEPCID) 40 MG TABLET    Take 1 tablet (40 mg) by mouth daily for 3 days    PREDNISONE (DELTASONE) 20 MG TABLET    Take 2 tablets (40 mg) by mouth daily for 5 days           Siobhan Vieira MD  04/30/23 1739

## 2023-04-30 NOTE — ED TRIAGE NOTES
Pt reports having an allergic reaction to her Chemo treatment on 4/19/23.  Pt reports worsening hives, right hand swelling, itching, and swelling and her lips and mouth starting yesterday.  Reports previous allergic reaction to chemo treatments.  Took benadryl at home states this has not helped.     Triage Assessment     Row Name 04/30/23 4498       Triage Assessment (Adult)    Airway WDL WDL       Respiratory WDL    Respiratory WDL WDL       Skin Circulation/Temperature WDL    Skin Circulation/Temperature WDL WDL       Cardiac WDL    Cardiac WDL WDL       Peripheral/Neurovascular WDL    Peripheral Neurovascular WDL WDL       Cognitive/Neuro/Behavioral WDL    Cognitive/Neuro/Behavioral WDL WDL

## 2023-05-01 ENCOUNTER — VIRTUAL VISIT (OUTPATIENT)
Dept: ONCOLOGY | Facility: CLINIC | Age: 53
End: 2023-05-01
Attending: INTERNAL MEDICINE
Payer: COMMERCIAL

## 2023-05-01 DIAGNOSIS — C50.812 MALIGNANT NEOPLASM OF OVERLAPPING SITES OF LEFT BREAST IN FEMALE, ESTROGEN RECEPTOR POSITIVE (H): Primary | ICD-10-CM

## 2023-05-01 DIAGNOSIS — Z17.0 MALIGNANT NEOPLASM OF OVERLAPPING SITES OF LEFT BREAST IN FEMALE, ESTROGEN RECEPTOR POSITIVE (H): Primary | ICD-10-CM

## 2023-05-01 PROCEDURE — G0463 HOSPITAL OUTPT CLINIC VISIT: HCPCS | Mod: PN,GT | Performed by: PHYSICIAN ASSISTANT

## 2023-05-01 PROCEDURE — 99214 OFFICE O/P EST MOD 30 MIN: CPT | Mod: VID | Performed by: PHYSICIAN ASSISTANT

## 2023-05-01 NOTE — LETTER
5/1/2023         RE: Yuliya Li  [address]  [place] [address]        Dear Colleague,    Thank you for referring your patient, Yulyia Li, to the [facility]. Please see a copy of my visit note below.    Oncology/Hematology Visit Note  May 1, 2023    Reason for Visit: Follow up of breast cancer    Primary Oncologist: Dr. Watson    Hematologic and Oncologic History:  Yuliya Li is a 52 year old female status post removal of single ovary and hysterectomy who presents with the following oncologic history:  1.  12/23/2022: Mammogram showed possible asymmetry in right breast at 12:00; possible mass in left breast at 3:00.  2. 1/2/2023: Diagnostic bilateral mammogram showed mass in left breast at 3:00 with 0.4 cm satellite mass in retroareolar breast. Right breast with effacement of possible finding at 12:00 consistent with artifact. Left breast U/S showed mass at 3:00, 11 cm from nipple measuring 1.9 x 1.6 x 1.8 cm; no abnormal axillary lymph nodes.  3. 1/03/2022: Left breast core biopsy of 0.4 cm mass at 3:30 position showed grade 2 invasive ductal carcinoma with lobular features. Biopsy of left breast 1.9 cm mass at 3:00 showed grade 2 invasive ductal carcinoma with lobular features, associated grade 2 DCIS. ER and MS strongly positive at %; HER-2 IHC = 2+; HER-2/crow FISH negative.  4. 1/10/2023: FSH = 36.6 but estradiol = 63.  5.  2/03/2023: Bilateral mastectomies with left axillary sentinel lymph node excision and left chest wall nodule excision with Dr. Clarissa Fontanez.  Pathology showed left breast grade 2 invasive ductal carcinoma with lobular features, multifocal with 2 cm and 0.4 cm tumors, grade 2 DCIS, all margins negative.  Of 3 left axillary sentinel lymph nodes removed, one with 6 mm macrometastasis positive for extranodal extension and one with 1.1 mm micrometastasis, negative for extranodal extension, identified. Left chest wall nodule  positive for metastatic breast carcinoma involving edge of tissue fragment. HER-2 FISH repeated, group 4, considered HER-2 negative. Oncotype DX = 20, 9-year risk of distant recurrence of 17% after 5 years of hormone blockade therapy; breast-cancer specific survival of 96.7% treated without chemotherapy.  6. 2/23/2023: PET/CT scan negative for metastatic disease. Small right middle lobe lung nodules, non-FDG avid.    Interval History:  After cycle 2, Mrs. GUAJARDO had repeat flushing and pruritis. She then developed extremity and lip swelling and was treated in the ED yesterday for allergic reaction. This is improved today. The bone pain was better this cycle. She did have nausea and vomited once, improved GERD and oral ulcers. Some mild constipation. She has some irritation from bladder leakage, much improved with higher dose steroids.       Review of Systems:  A complete review of systems was negative except as noted in the HPI.     Past medical, Surgical, and social history:  Reviewed    Physical Examination:  LMP  (LMP Unknown)   Wt Readings from Last 10 Encounters:   04/30/23 103 kg (227 lb)   04/19/23 103.3 kg (227 lb 12.8 oz)   03/27/23 102.5 kg (226 lb)   02/03/23 102.6 kg (226 lb 4.8 oz)   01/25/23 102.5 kg (225 lb 14.4 oz)   01/10/23 99.3 kg (219 lb)   01/10/23 99.3 kg (219 lb)   10/26/22 105.5 kg (232 lb 8 oz)   03/18/22 124.9 kg (275 lb 6.4 oz)   01/12/21 128.4 kg (283 lb)     Video Visit - Exam Limited  General: Pleasant patient in no acute distress. Normal work of breathing. No obvious swelling. Left arm prior PIV sites with circumscribed erythematous patches. Normal work-of breathing. Calm, cooperative, appropriate. Mood euthymic. Cranial nerves and coordination grossly intact. Alert and oriented x 3.    Laboratory Data:  None required    Assessment and Plan:  Yuliya Li is a 52 year old female with the following issues:  1. 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, ER positive, [place] positive, HER-2 IHC = 2+; HER-2/crow FISH negative, Oncotype DX = 20  - Status post cycle 2 of 4 Taxotere/Cytoxan   - Cycle 1 had flushing, decreased dex with cycle 2   - Cycle 2 had delayed allergic reaction, currently on steroid course   - Due to pruritis with treatment, she is going to take Benadryl following treatment   - Due to significant bone pain, she is going to take Claritin BID and take APAP alternating with NSAID for the week of chemo  --Overall plan for anastrozole x 10 years and Zoladex (currently premenopausal per estradiol level, s/p single oophorectomy)  --DEXA scan 3/20 normal, worst T-score +1.0  --Radiation oncology referral pending  --Follow up with Dr. Watson next cycle     2.  Family history of breast cancer  - Maternal aunt with breast cancer.  - Genetic consult pending.    3.  Neck and back pain, chronic  - PET scan showed no evidence of distant metastatic disease.     4. Indeterminate right middle lobe pulmonary nodules  --2/23/2023 PET/CT scan showed 2 small indeterminate right middle lobe pulmonary nodules.   --Advised repeat CT chest in 8/2023.    5. Pre-Diabetes  --Continues Ozempic per PCP, monitors blood sugars, increased to 230 yesterday with steroids but now normal    6. Supportive Cares  --Labial Pruritis - continue Kenalog cream BID, likely secondary to urinary incontinence at baseline with chemo irritation  --Bone pain - as above  --Nausea - minimal, antiemetics PRN  --Oral Ulcer - resolved, history of HSV1 cold sores, consider antiviral if recurrent, discussed salt/soda rinses, continue Biotene, aware of Magic Mouthwash if needed    30 minutes spent on the date of the encounter doing chart review, interpretation of tests, patient visit and documentation     La Holly PA-C  [facility] [place]   [phone]    Chart documentation with Dragon Voice recognition Software.  Although reviewed after completion, some words and grammatical errors may remain.    Virtual Visit Details    Type of service:  Video Visit     Originating Location (pt. Location): Home  Distant Location (provider location):  On-site  Platform used for Video Visit: Mary Anne        Again, thank you for allowing me to participate in the care of your patient.        Sincerely,        MAUREEN BARAJAS PA-C

## 2023-05-01 NOTE — PROGRESS NOTES
Oncology/Hematology Visit Note  May 1, 2023    Reason for Visit: Follow up of breast cancer    Primary Oncologist: Dr. Watson    Hematologic and Oncologic History:  Yuliya Li is a 52 year old female status post removal of single ovary and hysterectomy who presents with the following oncologic history:  1.  12/23/2022: Mammogram showed possible asymmetry in right breast at 12:00; possible mass in left breast at 3:00.  2. 1/2/2023: Diagnostic bilateral mammogram showed mass in left breast at 3:00 with 0.4 cm satellite mass in retroareolar breast. Right breast with effacement of possible finding at 12:00 consistent with artifact. Left breast U/S showed mass at 3:00, 11 cm from nipple measuring 1.9 x 1.6 x 1.8 cm; no abnormal axillary lymph nodes.  3. 1/03/2022: Left breast core biopsy of 0.4 cm mass at 3:30 position showed grade 2 invasive ductal carcinoma with lobular features. Biopsy of left breast 1.9 cm mass at 3:00 showed grade 2 invasive ductal carcinoma with lobular features, associated grade 2 DCIS. ER and ID strongly positive at %; HER-2 IHC = 2+; HER-2/crow FISH negative.  4. 1/10/2023: FSH = 36.6 but estradiol = 63.  5.  2/03/2023: Bilateral mastectomies with left axillary sentinel lymph node excision and left chest wall nodule excision with Dr. Clarissa Fontanez.  Pathology showed left breast grade 2 invasive ductal carcinoma with lobular features, multifocal with 2 cm and 0.4 cm tumors, grade 2 DCIS, all margins negative.  Of 3 left axillary sentinel lymph nodes removed, one with 6 mm macrometastasis positive for extranodal extension and one with 1.1 mm micrometastasis, negative for extranodal extension, identified. Left chest wall nodule positive for metastatic breast carcinoma involving edge of tissue fragment. HER-2 FISH repeated, group 4, considered HER-2 negative. Oncotype DX = 20, 9-year risk of distant recurrence of 17% after 5 years of hormone blockade therapy; breast-cancer specific  survival of 96.7% treated without chemotherapy.  6. 2/23/2023: PET/CT scan negative for metastatic disease. Small right middle lobe lung nodules, non-FDG avid.    Interval History:  After cycle 2, Mrs. GUAJARDO had repeat flushing and pruritis. She then developed extremity and lip swelling and was treated in the ED yesterday for allergic reaction. This is improved today. The bone pain was better this cycle. She did have nausea and vomited once, improved GERD and oral ulcers. Some mild constipation. She has some irritation from bladder leakage, much improved with higher dose steroids.       Review of Systems:  A complete review of systems was negative except as noted in the HPI.     Past medical, Surgical, and social history:  Reviewed    Physical Examination:  LMP  (LMP Unknown)   Wt Readings from Last 10 Encounters:   04/30/23 103 kg (227 lb)   04/19/23 103.3 kg (227 lb 12.8 oz)   03/27/23 102.5 kg (226 lb)   02/03/23 102.6 kg (226 lb 4.8 oz)   01/25/23 102.5 kg (225 lb 14.4 oz)   01/10/23 99.3 kg (219 lb)   01/10/23 99.3 kg (219 lb)   10/26/22 105.5 kg (232 lb 8 oz)   03/18/22 124.9 kg (275 lb 6.4 oz)   01/12/21 128.4 kg (283 lb)     Video Visit - Exam Limited  General: Pleasant patient in no acute distress. Normal work of breathing. No obvious swelling. Left arm prior PIV sites with circumscribed erythematous patches. Normal work-of breathing. Calm, cooperative, appropriate. Mood euthymic. Cranial nerves and coordination grossly intact. Alert and oriented x 3.    Laboratory Data:  None required    Assessment and Plan:  Yuliya Li is a 52 year old female with the following issues:  1. Pathologic prognostic stage IA, [id number], grade 2 multifocal invasive ductal carcinoma with lobular features of left breast overlapping sites, ER positive, [place] positive, HER-2 IHC = 2+; HER-2/crow FISH negative, Oncotype DX = 20  - Status post cycle 2 of 4 Taxotere/Cytoxan   - Cycle 1 had flushing, decreased dex with cycle 2   -  Cycle 2 had delayed allergic reaction, currently on steroid course   - Due to pruritis with treatment, she is going to take Benadryl following treatment   - Due to significant bone pain, she is going to take Claritin BID and take APAP alternating with NSAID for the week of chemo  --Overall plan for anastrozole x 10 years and Zoladex (currently premenopausal per estradiol level, s/p single oophorectomy)  --DEXA scan 3/20 normal, worst T-score +1.0  --Radiation oncology referral pending  --Follow up with Dr. Watson next cycle     2.  Family history of breast cancer  - Maternal aunt with breast cancer.  - Genetic consult pending.    3.  Neck and back pain, chronic  - PET scan showed no evidence of distant metastatic disease.     4. Indeterminate right middle lobe pulmonary nodules  --2/23/2023 PET/CT scan showed 2 small indeterminate right middle lobe pulmonary nodules.   --Advised repeat CT chest in 8/2023.    5. Pre-Diabetes  --Continues Ozempic per PCP, monitors blood sugars, increased to 230 yesterday with steroids but now normal    6. Supportive Cares  --Labial Pruritis - continue Kenalog cream BID, likely secondary to urinary incontinence at baseline with chemo irritation  --Bone pain - as above  --Nausea - minimal, antiemetics PRN  --Oral Ulcer - resolved, history of HSV1 cold sores, consider antiviral if recurrent, discussed salt/soda rinses, continue Biotene, aware of Magic Mouthwash if needed    30 minutes spent on the date of the encounter doing chart review, interpretation of tests, patient visit and documentation     La Holly PA-C  [facility]   [phone]    Chart documentation with Dragon Voice recognition Software. Although reviewed after completion, some words and grammatical errors may remain.    Virtual Visit Details    Type of service:  Video Visit     Originating Location (pt. Location): Home  Distant Location (provider location):  On-site  Platform used for Video  Visit: Mary Anne

## 2023-05-01 NOTE — NURSING NOTE
Is the patient currently in the state of [place]? YES    Visit mode:VIDEO    If the visit is dropped, the patient can be reconnected by: VIDEO VISIT: Text to cell phone: [phone]    Will anyone else be joining the visit? NO      How would you like to obtain your AVS? MyChart    Are changes needed to the allergy or medication list? YES: Please update ALLERGIES pt had anaphalaxis reaction to Docetaxel Please add med     Reason for visit: Video Visit (Follow up )

## 2023-05-01 NOTE — LETTER
5/1/2023         RE: Yuliya Li  [address]  [place] [address]        Dear Colleague,    Thank you for referring your patient, Yuliya Li, to the [facility]. Please see a copy of my visit note below.    Oncology/Hematology Visit Note  May 1, 2023    Reason for Visit: Follow up of breast cancer    Primary Oncologist: Dr. Watson    Hematologic and Oncologic History:  Yuliya Li is a 52 year old female status post removal of single ovary and hysterectomy who presents with the following oncologic history:  1.  12/23/2022: Mammogram showed possible asymmetry in right breast at 12:00; possible mass in left breast at 3:00.  2. 1/2/2023: Diagnostic bilateral mammogram showed mass in left breast at 3:00 with 0.4 cm satellite mass in retroareolar breast. Right breast with effacement of possible finding at 12:00 consistent with artifact. Left breast U/S showed mass at 3:00, 11 cm from nipple measuring 1.9 x 1.6 x 1.8 cm; no abnormal axillary lymph nodes.  3. 1/03/2022: Left breast core biopsy of 0.4 cm mass at 3:30 position showed grade 2 invasive ductal carcinoma with lobular features. Biopsy of left breast 1.9 cm mass at 3:00 showed grade 2 invasive ductal carcinoma with lobular features, associated grade 2 DCIS. ER and IL strongly positive at %; HER-2 IHC = 2+; HER-2/crow FISH negative.  4. 1/10/2023: FSH = 36.6 but estradiol = 63.  5.  2/03/2023: Bilateral mastectomies with left axillary sentinel lymph node excision and left chest wall nodule excision with Dr. Clarissa Fontanez.  Pathology showed left breast grade 2 invasive ductal carcinoma with lobular features, multifocal with 2 cm and 0.4 cm tumors, grade 2 DCIS, all margins negative.  Of 3 left axillary sentinel lymph nodes removed, one with 6 mm macrometastasis positive for extranodal extension and one with 1.1 mm micrometastasis, negative for extranodal extension, identified. Left chest wall nodule  positive for metastatic breast carcinoma involving edge of tissue fragment. HER-2 FISH repeated, group 4, considered HER-2 negative. Oncotype DX = 20, 9-year risk of distant recurrence of 17% after 5 years of hormone blockade therapy; breast-cancer specific survival of 96.7% treated without chemotherapy.  6. 2/23/2023: PET/CT scan negative for metastatic disease. Small right middle lobe lung nodules, non-FDG avid.    Interval History:  After cycle 2, Mrs. GUAJARDO had repeat flushing and pruritis. She then developed extremity and lip swelling and was treated in the ED yesterday for allergic reaction. This is improved today. The bone pain was better this cycle. She did have nausea and vomited once, improved GERD and oral ulcers. Some mild constipation. She has some irritation from bladder leakage, much improved with higher dose steroids.       Review of Systems:  A complete review of systems was negative except as noted in the HPI.     Past medical, Surgical, and social history:  Reviewed    Physical Examination:  LMP  (LMP Unknown)   Wt Readings from Last 10 Encounters:   04/30/23 103 kg (227 lb)   04/19/23 103.3 kg (227 lb 12.8 oz)   03/27/23 102.5 kg (226 lb)   02/03/23 102.6 kg (226 lb 4.8 oz)   01/25/23 102.5 kg (225 lb 14.4 oz)   01/10/23 99.3 kg (219 lb)   01/10/23 99.3 kg (219 lb)   10/26/22 105.5 kg (232 lb 8 oz)   03/18/22 124.9 kg (275 lb 6.4 oz)   01/12/21 128.4 kg (283 lb)     Video Visit - Exam Limited  General: Pleasant patient in no acute distress. Normal work of breathing. No obvious swelling. Left arm prior PIV sites with circumscribed erythematous patches. Normal work-of breathing. Calm, cooperative, appropriate. Mood euthymic. Cranial nerves and coordination grossly intact. Alert and oriented x 3.    Laboratory Data:  None required    Assessment and Plan:  Yuliya Li is a 52 year old female with the following issues:  1. Pathologic prognostic stage IA, [id number], grade 2 multifocal invasive  ductal carcinoma with lobular features of left breast overlapping sites, ER positive, NJ positive, HER-2 IHC = 2+; HER-2/crow FISH negative, Oncotype DX = 20  - Status post cycle 2 of 4 Taxotere/Cytoxan   - Cycle 1 had flushing, decreased dex with cycle 2   - Cycle 2 had delayed allergic reaction, currently on steroid course   - Due to pruritis with treatment, she is going to take Benadryl following treatment   - Due to significant bone pain, she is going to take Claritin BID and take APAP alternating with NSAID for the week of chemo  --Overall plan for anastrozole x 10 years and Zoladex (currently premenopausal per estradiol level, s/p single oophorectomy)  --DEXA scan 3/20 normal, worst T-score +1.0  --Radiation oncology referral pending  --Follow up with Dr. Watson next cycle     2.  Family history of breast cancer  - Maternal aunt with breast cancer.  - Genetic consult pending.    3.  Neck and back pain, chronic  - PET scan showed no evidence of distant metastatic disease.     4. Indeterminate right middle lobe pulmonary nodules  --2/23/2023 PET/CT scan showed 2 small indeterminate right middle lobe pulmonary nodules.   --Advised repeat CT chest in 8/2023.    5. Pre-Diabetes  --Continues Ozempic per PCP, monitors blood sugars, increased to 230 yesterday with steroids but now normal    6. Supportive Cares  --Labial Pruritis - continue Kenalog cream BID, likely secondary to urinary incontinence at baseline with chemo irritation  --Bone pain - as above  --Nausea - minimal, antiemetics PRN  --Oral Ulcer - resolved, history of HSV1 cold sores, consider antiviral if recurrent, discussed salt/soda rinses, continue Biotene, aware of Magic Mouthwash if needed    30 minutes spent on the date of the encounter doing chart review, interpretation of tests, patient visit and documentation     [place] PA-C  [facility] [place]   [phone]    Chart documentation with Dragon Voice recognition Software.  Although reviewed after completion, some words and grammatical errors may remain.    Virtual Visit Details    Type of service:  Video Visit     Originating Location (pt. Location): Home  Distant Location (provider location):  On-site  Platform used for Video Visit: Mary Anne        Again, thank you for allowing me to participate in the care of your patient.        Sincerely,        MAUREEN BARAJAS PA-C

## 2023-05-02 ENCOUNTER — TELEPHONE (OUTPATIENT)
Dept: ONCOLOGY | Facility: CLINIC | Age: 53
End: 2023-05-02
Payer: COMMERCIAL

## 2023-05-02 ENCOUNTER — APPOINTMENT (OUTPATIENT)
Dept: GENERAL RADIOLOGY | Facility: CLINIC | Age: 53
End: 2023-05-02
Attending: PHYSICIAN ASSISTANT
Payer: COMMERCIAL

## 2023-05-02 ENCOUNTER — HOSPITAL ENCOUNTER (EMERGENCY)
Facility: CLINIC | Age: 53
Discharge: HOME | End: 2023-05-02
Attending: PHYSICIAN ASSISTANT | Admitting: PHYSICIAN ASSISTANT
Payer: COMMERCIAL

## 2023-05-02 VITALS
TEMPERATURE: 98.4 F | RESPIRATION RATE: 16 BRPM | SYSTOLIC BLOOD PRESSURE: 133 MMHG | OXYGEN SATURATION: 98 % | DIASTOLIC BLOOD PRESSURE: 96 MMHG | HEART RATE: 84 BPM

## 2023-05-02 DIAGNOSIS — T78.40XD ALLERGIC REACTION, SUBSEQUENT ENCOUNTER: ICD-10-CM

## 2023-05-02 LAB
ALBUMIN UR-MCNC: NEGATIVE MG/DL
ANION GAP SERPL CALCULATED.3IONS-SCNC: 13 MMOL/L (ref 7–15)
APPEARANCE UR: CLEAR
BASOPHILS # BLD MANUAL: 0 10E3/UL (ref 0–0.2)
BASOPHILS NFR BLD MANUAL: 0 %
BILIRUB UR QL STRIP: NEGATIVE
BUN SERPL-MCNC: 18.3 MG/DL (ref 6–20)
CALCIUM SERPL-MCNC: 9.3 MG/DL (ref 8.6–10)
CHLORIDE SERPL-SCNC: 102 MMOL/L (ref 98–107)
COLOR UR AUTO: NORMAL
CREAT SERPL-MCNC: 0.88 MG/DL (ref 0.51–0.95)
DEPRECATED HCO3 PLAS-SCNC: 24 MMOL/L (ref 22–29)
EOSINOPHIL # BLD MANUAL: 0 10E3/UL (ref 0–0.7)
EOSINOPHIL NFR BLD MANUAL: 0 %
ERYTHROCYTE [DISTWIDTH] IN BLOOD BY AUTOMATED COUNT: 13.4 % (ref 10–15)
GFR SERPL CREATININE-BSD FRML MDRD: 79 ML/MIN/1.73M2
GLUCOSE SERPL-MCNC: 152 MG/DL (ref 70–99)
GLUCOSE UR STRIP-MCNC: NEGATIVE MG/DL
HCT VFR BLD AUTO: 38.6 % (ref 35–47)
HGB BLD-MCNC: 12.4 G/DL (ref 11.7–15.7)
HGB UR QL STRIP: NEGATIVE
KETONES UR STRIP-MCNC: NEGATIVE MG/DL
LEUKOCYTE ESTERASE UR QL STRIP: NEGATIVE
LYMPHOCYTES # BLD MANUAL: 1.3 10E3/UL (ref 0.8–5.3)
LYMPHOCYTES NFR BLD MANUAL: 4 %
MCH RBC QN AUTO: 28.7 PG (ref 26.5–33)
MCHC RBC AUTO-ENTMCNC: 32.1 G/DL (ref 31.5–36.5)
MCV RBC AUTO: 89 FL (ref 78–100)
METAMYELOCYTES # BLD MANUAL: 0.3 10E3/UL
METAMYELOCYTES NFR BLD MANUAL: 1 %
MONOCYTES # BLD MANUAL: 1 10E3/UL (ref 0–1.3)
MONOCYTES NFR BLD MANUAL: 3 %
MYELOCYTES # BLD MANUAL: 0.3 10E3/UL
MYELOCYTES NFR BLD MANUAL: 1 %
NEUTROPHILS # BLD MANUAL: 29.4 10E3/UL (ref 1.6–8.3)
NEUTROPHILS NFR BLD MANUAL: 91 %
NITRATE UR QL: NEGATIVE
PH UR STRIP: 6 [PH] (ref 5–7)
PLAT MORPH BLD: ABNORMAL
PLATELET # BLD AUTO: 366 10E3/UL (ref 150–450)
POTASSIUM SERPL-SCNC: 4.2 MMOL/L (ref 3.4–5.3)
RBC # BLD AUTO: 4.32 10E6/UL (ref 3.8–5.2)
RBC MORPH BLD: ABNORMAL
SODIUM SERPL-SCNC: 139 MMOL/L (ref 136–145)
SP GR UR STRIP: 1.02 (ref 1–1.03)
UROBILINOGEN UR STRIP-MCNC: NORMAL MG/DL
WBC # BLD AUTO: 32.3 10E3/UL (ref 4–11)

## 2023-05-02 PROCEDURE — 96376 TX/PRO/DX INJ SAME DRUG ADON: CPT

## 2023-05-02 PROCEDURE — 96375 TX/PRO/DX INJ NEW DRUG ADDON: CPT

## 2023-05-02 PROCEDURE — 81003 URINALYSIS AUTO W/O SCOPE: CPT | Performed by: PHYSICIAN ASSISTANT

## 2023-05-02 PROCEDURE — 36415 COLL VENOUS BLD VENIPUNCTURE: CPT | Performed by: EMERGENCY MEDICINE

## 2023-05-02 PROCEDURE — 250N000011 HC RX IP 250 OP 636: Performed by: EMERGENCY MEDICINE

## 2023-05-02 PROCEDURE — 85007 BL SMEAR W/DIFF WBC COUNT: CPT | Performed by: PHYSICIAN ASSISTANT

## 2023-05-02 PROCEDURE — 80048 BASIC METABOLIC PNL TOTAL CA: CPT | Performed by: PHYSICIAN ASSISTANT

## 2023-05-02 PROCEDURE — 250N000011 HC RX IP 250 OP 636: Performed by: PHYSICIAN ASSISTANT

## 2023-05-02 PROCEDURE — 99284 EMERGENCY DEPT VISIT MOD MDM: CPT | Mod: 25

## 2023-05-02 PROCEDURE — 85027 COMPLETE CBC AUTOMATED: CPT | Performed by: PHYSICIAN ASSISTANT

## 2023-05-02 PROCEDURE — 96374 THER/PROPH/DIAG INJ IV PUSH: CPT

## 2023-05-02 PROCEDURE — 71046 X-RAY EXAM CHEST 2 VIEWS: CPT

## 2023-05-02 PROCEDURE — 250N000013 HC RX MED GY IP 250 OP 250 PS 637: Performed by: PHYSICIAN ASSISTANT

## 2023-05-02 RX ORDER — PREDNISONE 20 MG/1
40 TABLET ORAL DAILY
Qty: 6 TABLET | Refills: 0 | Status: SHIPPED | OUTPATIENT
Start: 2023-05-02 | End: 2023-05-10

## 2023-05-02 RX ORDER — DIPHENHYDRAMINE HYDROCHLORIDE 50 MG/ML
25 INJECTION INTRAMUSCULAR; INTRAVENOUS ONCE
Status: COMPLETED | OUTPATIENT
Start: 2023-05-02 | End: 2023-05-02

## 2023-05-02 RX ORDER — FAMOTIDINE 40 MG/1
40 TABLET, FILM COATED ORAL DAILY
Qty: 3 TABLET | Refills: 0 | Status: SHIPPED | OUTPATIENT
Start: 2023-05-02 | End: 2023-05-10

## 2023-05-02 RX ORDER — LORATADINE 10 MG/1
10 TABLET ORAL ONCE
Status: COMPLETED | OUTPATIENT
Start: 2023-05-02 | End: 2023-05-02

## 2023-05-02 RX ORDER — DIPHENHYDRAMINE HCL 25 MG
25 CAPSULE ORAL ONCE
Status: DISCONTINUED | OUTPATIENT
Start: 2023-05-02 | End: 2023-05-02

## 2023-05-02 RX ORDER — DIPHENHYDRAMINE HCL 25 MG
50 CAPSULE ORAL EVERY 6 HOURS PRN
Qty: 30 CAPSULE | Refills: 0 | Status: SHIPPED | OUTPATIENT
Start: 2023-05-02 | End: 2024-08-19

## 2023-05-02 RX ORDER — LORATADINE 10 MG/1
10 TABLET ORAL DAILY
Status: DISCONTINUED | OUTPATIENT
Start: 2023-05-03 | End: 2023-05-02

## 2023-05-02 RX ORDER — METHYLPREDNISOLONE SODIUM SUCCINATE 125 MG/2ML
125 INJECTION, POWDER, LYOPHILIZED, FOR SOLUTION INTRAMUSCULAR; INTRAVENOUS ONCE
Status: COMPLETED | OUTPATIENT
Start: 2023-05-02 | End: 2023-05-02

## 2023-05-02 RX ADMIN — METHYLPREDNISOLONE SODIUM SUCCINATE 125 MG: 125 INJECTION, POWDER, FOR SOLUTION INTRAMUSCULAR; INTRAVENOUS at 19:21

## 2023-05-02 RX ADMIN — FAMOTIDINE 20 MG: 10 INJECTION INTRAVENOUS at 20:03

## 2023-05-02 RX ADMIN — DIPHENHYDRAMINE HYDROCHLORIDE 25 MG: 50 INJECTION, SOLUTION INTRAMUSCULAR; INTRAVENOUS at 21:36

## 2023-05-02 RX ADMIN — LORATADINE 10 MG: 10 TABLET ORAL at 21:38

## 2023-05-02 RX ADMIN — DIPHENHYDRAMINE HYDROCHLORIDE 25 MG: 50 INJECTION, SOLUTION INTRAMUSCULAR; INTRAVENOUS at 19:19

## 2023-05-02 ASSESSMENT — ENCOUNTER SYMPTOMS: ROS SKIN COMMENTS: (+) SWELLING

## 2023-05-02 ASSESSMENT — ACTIVITIES OF DAILY LIVING (ADL): ADLS_ACUITY_SCORE: 35

## 2023-05-02 NOTE — ED TRIAGE NOTES
Patient coming in complaining of allergic reaction to her chemo meds. Took two benadryl at 3 pm and prescribed home meds. Patient endorses chest tightness and dry scratchy throat. States tongue is itchy. Denies tongue swelling      Triage Assessment     Row Name 05/02/23 3235       Triage Assessment (Adult)    Airway WDL WDL       Respiratory WDL    Respiratory WDL WDL       Skin Circulation/Temperature WDL    Skin Circulation/Temperature WDL WDL       Cardiac WDL    Cardiac WDL WDL       Peripheral/Neurovascular WDL    Peripheral Neurovascular WDL WDL       Cognitive/Neuro/Behavioral WDL    Cognitive/Neuro/Behavioral WDL WDL

## 2023-05-02 NOTE — TELEPHONE ENCOUNTER
Spoke with uYliya to review symptoms she is reporting in [employer] message.  Hives are worsening but overall she feels better. She reports taking prednisone 40mg and benadryl 50mg prn and pepcid.      She has a slightly itchy tongue. Denies shortness of breath and difficulty swallowing.     Per Dr Watson, ok to continue meds as above but change benadryl to 50mg every 6 hours.     11:53 AM  Left VM with above information and sent via [employer] as well.    Peyton Quintana RN

## 2023-05-03 NOTE — DISCHARGE INSTRUCTIONS
Continue prednisone and Pepcid as prescribed.  Continue Benadryl every 6 hours as discussed.  Let your oncology team know that you were seen in the emergency department.  Consider trying other home remedies for itching including oatmeal baths, hydrocortisone cream, and calamine lotion.  For any new or worsening symptoms including chest pain, shortness of breath, wheezing, vomiting, or any other emergent concern, present back to the emergency department. If you use your epi-pen, return to emergency department.

## 2023-05-03 NOTE — ED PROVIDER NOTES
History     Chief Complaint:  Allergic Reaction       HPI   Yuliya Li is a 52 year old female with a history of breast cancer and type 2 diabetes who presents with an allergic reaction to her chemotherapy drug. The patient reports facial swelling and hives that has radiated to her left arm, right knee, and back of her head. She notes that her oncologist plans to switch her medications, but her symptoms are still present weeks after her last dosage of the drug on 04/19/23. She denies difficulty breathing, wheezing, cough, chest pain, vomiting, or GI symptoms.  Patient was seen here in the emergency department 2 days ago and was given IV medications.  She has been taking prednisone, famotidine, and Benadryl since original ED visit without improvement.    Independent Historian:   None - Patient Only    Review of External Notes: ED 04/30/2023     ROS:  Review of Systems   Skin: Positive for rash.        (+) Swelling       Allergies:  Keflex  Tetracycline  Trazodone     Medications:    Decadron  Benadryl  Epinephrine  Pepcid  Prozac  Zofran  Deltasone  Compazine  Metformin  Spirolactin    Past Medical History:    Breast cancer  Type 2 diabetes  Depression  Obesity    Past Surgical History:    Cyst removal  Hysterectomy x2  Mastectomy    Family History:    Father: Alcoholism, AFIB, Melanoma  Mother: Diabetes    Social History:  Patient reports that she quit smoking about 23 years ago. Her smoking use included cigarettes. She has never used smokeless tobacco. She reports current alcohol use. She reports current drug use. Drug: Marijuana.  Patient arrives by private vehicle alone  PCP: Latrice Perez     Physical Exam     Patient Vitals for the past 24 hrs:   BP Temp Temp src Pulse Resp SpO2   05/02/23 2223 -- -- -- 84 16 98 %   05/02/23 2222 (!) 133/96 -- -- 85 14 --   05/02/23 1857 (!) 149/94 98.4  F (36.9  C) Temporal 99 20 99 %        Physical Exam  Constitutional: Alert, attentive, GCS 15  HENT:     Nose: Nose normal.    Mouth/Throat: Oropharynx is clear, mucous membranes are moist. No drooling.  Eyes: EOM are normal.   CV: regular rate and rhythm; no murmurs, rubs or gallups  Chest: Effort normal and breath sounds normal. No wheezing or stridor.  GI:  There is no tenderness. No distension. Normal bowel sounds  MSK: Normal range of motion.   Neurological: Alert, attentive  Skin: Skin is warm and dry. Erythematous wheals on posterior scalp, right arm, and left arm. Mild erythema of chest. No oral mucosa involvement.    Emergency Department Course     Imaging:  Chest XR,  PA & LAT   Final Result   IMPRESSION: Negative chest.  The lungs are clear and there are no pleural effusions. No pneumonic consolidation. Normal heart size.         Report per radiology    Laboratory:  Labs Ordered and Resulted from Time of ED Arrival to Time of ED Departure   BASIC METABOLIC PANEL - Abnormal       Result Value    Sodium 139      Potassium 4.2      Chloride 102      Carbon Dioxide (CO2) 24      Anion Gap 13      Urea Nitrogen 18.3      Creatinine 0.88      Calcium 9.3      Glucose 152 (*)     GFR Estimate 79     CBC WITH PLATELETS AND DIFFERENTIAL - Abnormal    WBC Count 32.3 (*)     RBC Count 4.32      Hemoglobin 12.4      Hematocrit 38.6      MCV 89      MCH 28.7      MCHC 32.1      RDW 13.4      Platelet Count 366     DIFFERENTIAL - Abnormal    % Neutrophils 91      % Lymphocytes 4      % Monocytes 3      % Eosinophils 0      % Basophils 0      % Metamyelocytes 1      % Myelocytes 1      Absolute Neutrophils 29.4 (*)     Absolute Lymphocytes 1.3      Absolute Monocytes 1.0      Absolute Eosinophils 0.0      Absolute Basophils 0.0      Absolute Metamyelocytes 0.3 (*)     Absolute Myelocytes 0.3 (*)     RBC Morphology Confirmed RBC Indices      Platelet Assessment        Value: Automated Count Confirmed. Platelet morphology is normal.   UA MACROSCOPIC WITH REFLEX TO MICRO AND CULTURE - Normal    Color Urine Light Yellow       Appearance Urine Clear      Glucose Urine Negative      Bilirubin Urine Negative      Ketones Urine Negative      Specific Gravity Urine 1.016      Blood Urine Negative      pH Urine 6.0      Protein Albumin Urine Negative      Urobilinogen Urine Normal      Nitrite Urine Negative      Leukocyte Esterase Urine Negative            Emergency Department Course & Assessments:      Interventions:  Medications   diphenhydrAMINE (BENADRYL) injection 25 mg (25 mg Intravenous $Given 5/2/23 1919)   methylPREDNISolone sodium succinate (solu-MEDROL) injection 125 mg (125 mg Intravenous $Given 5/2/23 1921)   famotidine (PEPCID) injection 20 mg (20 mg Intravenous $Given 5/2/23 2003)   diphenhydrAMINE (BENADRYL) injection 25 mg (25 mg Intravenous $Given 5/2/23 2136)   loratadine (CLARITIN) tablet 10 mg (10 mg Oral $Given 5/2/23 2138)        Assessments:  1936 I obtained history and examined the patient as noted above  1954 I rechecked the patient and explained findings  2200 I rechecked patient and updated her on findings.    Independent Interpretation (X-rays, CTs, rhythm strip):  None    Consultations/Discussion of Management or Tests:  None        Social Determinants of Health affecting care:   None    Disposition:  The patient was discharged to home.     Impression & Plan      Medical Decision Making:  Patient is a well-appearing 52-year-old female who presents with ongoing allergic rash related to her chemotherapy drug with last dose on 04/19/2023.  She is afebrile, normotensive, not hypoxic.  Physical examination reveals pruritic and erythematous raised wheals on posterior scalp, left arm, and right arm. No evidence of respiratory distress, wheezing, or GI upset on exam.  Patient was given IV steroid, Benadryl, Pepcid, and oral Claritin. Patient's symptoms are consistent with urticaria related to chemotherapy drug. Patient was observed for several hours in the department. Her rash did not worsen and she did not develop  concerning symptoms such as respiratory distress or GI upset.  Interestingly, rash appeared to worsen on her right forearm but then improved. There is no evidence of serious infection such as cellulitis, abscess, necrotizing fasciitis, SJS, or TEN.  There is significant leukocytosis at 32.  I considered underlying infection and evaluated both UA and chest x-ray which were negative.  I suspect this elevation is related to her daily steroid and Neulasta.   At this time, patient's symptoms are stable and not progressing.  I will discharge her home with several more days of steroid, Pepcid and Benadryl.  I recommend she let her oncology team know that she was seen here and to return to the ED for any new or worsening symptoms.  Patient already has EpiPen at home.  Questions were answered.  Patient expressed understanding of plan and is ready for discharge.    Diagnosis:    ICD-10-CM    1. Allergic reaction, subsequent encounter  T78.40XD            Discharge Medications:  Discharge Medication List as of 5/2/2023 10:18 PM      START taking these medications    Details   diphenhydrAMINE (BENADRYL) 25 MG capsule Take 2 capsules (50 mg) by mouth every 6 hours as needed for itching or allergies, Disp-30 capsule, R-0, E-Prescribe                Scribe Disclosure:  I, Elia Ambrocio, am serving as a scribe at 7:44 PM on 5/2/2023 to document services personally performed by Terese Mcintosh PA-C based on my observations and the provider's statements to me.   5/2/2023   Terese Mcintosh PA-C [place]brueck, Emily, PA-C  05/02/23 2440

## 2023-05-07 ENCOUNTER — HEALTH MAINTENANCE LETTER (OUTPATIENT)
Age: 53
End: 2023-05-07

## 2023-05-09 DIAGNOSIS — E11.9 TYPE 2 DIABETES MELLITUS WITHOUT COMPLICATION, WITHOUT LONG-TERM CURRENT USE OF INSULIN (H): ICD-10-CM

## 2023-05-10 ENCOUNTER — ONCOLOGY VISIT (OUTPATIENT)
Dept: ONCOLOGY | Facility: CLINIC | Age: 53
End: 2023-05-10
Attending: INTERNAL MEDICINE
Payer: COMMERCIAL

## 2023-05-10 ENCOUNTER — LAB (OUTPATIENT)
Dept: INFUSION THERAPY | Facility: CLINIC | Age: 53
End: 2023-05-10
Attending: INTERNAL MEDICINE
Payer: COMMERCIAL

## 2023-05-10 VITALS
DIASTOLIC BLOOD PRESSURE: 76 MMHG | TEMPERATURE: 98.3 F | SYSTOLIC BLOOD PRESSURE: 106 MMHG | WEIGHT: 227.6 LBS | OXYGEN SATURATION: 96 % | HEART RATE: 103 BPM | BODY MASS INDEX: 33.71 KG/M2 | RESPIRATION RATE: 16 BRPM | HEIGHT: 69 IN

## 2023-05-10 DIAGNOSIS — Z17.0 MALIGNANT NEOPLASM OF OVERLAPPING SITES OF LEFT BREAST IN FEMALE, ESTROGEN RECEPTOR POSITIVE (H): Primary | ICD-10-CM

## 2023-05-10 DIAGNOSIS — R91.8 PULMONARY NODULES: ICD-10-CM

## 2023-05-10 DIAGNOSIS — C50.812 MALIGNANT NEOPLASM OF OVERLAPPING SITES OF LEFT BREAST IN FEMALE, ESTROGEN RECEPTOR POSITIVE (H): Primary | ICD-10-CM

## 2023-05-10 LAB
ALBUMIN SERPL BCG-MCNC: 3.8 G/DL (ref 3.5–5.2)
ALP SERPL-CCNC: 64 U/L (ref 35–104)
ALT SERPL W P-5'-P-CCNC: 14 U/L (ref 10–35)
AST SERPL W P-5'-P-CCNC: 18 U/L (ref 10–35)
BASOPHILS # BLD AUTO: 0 10E3/UL (ref 0–0.2)
BASOPHILS NFR BLD AUTO: 0 %
BILIRUB DIRECT SERPL-MCNC: <0.2 MG/DL (ref 0–0.3)
BILIRUB SERPL-MCNC: 0.2 MG/DL
EOSINOPHIL # BLD AUTO: 0 10E3/UL (ref 0–0.7)
EOSINOPHIL NFR BLD AUTO: 0 %
ERYTHROCYTE [DISTWIDTH] IN BLOOD BY AUTOMATED COUNT: 13.8 % (ref 10–15)
HCT VFR BLD AUTO: 40 % (ref 35–47)
HGB BLD-MCNC: 13.1 G/DL (ref 11.7–15.7)
IMM GRANULOCYTES # BLD: 0.1 10E3/UL
IMM GRANULOCYTES NFR BLD: 1 %
LYMPHOCYTES # BLD AUTO: 1.4 10E3/UL (ref 0.8–5.3)
LYMPHOCYTES NFR BLD AUTO: 15 %
MCH RBC QN AUTO: 28.9 PG (ref 26.5–33)
MCHC RBC AUTO-ENTMCNC: 32.8 G/DL (ref 31.5–36.5)
MCV RBC AUTO: 88 FL (ref 78–100)
MONOCYTES # BLD AUTO: 0.8 10E3/UL (ref 0–1.3)
MONOCYTES NFR BLD AUTO: 9 %
NEUTROPHILS # BLD AUTO: 6.8 10E3/UL (ref 1.6–8.3)
NEUTROPHILS NFR BLD AUTO: 75 %
NRBC # BLD AUTO: 0 10E3/UL
NRBC BLD AUTO-RTO: 0 /100
PLATELET # BLD AUTO: 309 10E3/UL (ref 150–450)
PROT SERPL-MCNC: 6.4 G/DL (ref 6.4–8.3)
RBC # BLD AUTO: 4.54 10E6/UL (ref 3.8–5.2)
WBC # BLD AUTO: 9.2 10E3/UL (ref 4–11)

## 2023-05-10 PROCEDURE — 82040 ASSAY OF SERUM ALBUMIN: CPT | Performed by: INTERNAL MEDICINE

## 2023-05-10 PROCEDURE — 85025 COMPLETE CBC W/AUTO DIFF WBC: CPT | Performed by: INTERNAL MEDICINE

## 2023-05-10 PROCEDURE — G0463 HOSPITAL OUTPT CLINIC VISIT: HCPCS | Mod: 25 | Performed by: INTERNAL MEDICINE

## 2023-05-10 PROCEDURE — 99214 OFFICE O/P EST MOD 30 MIN: CPT | Performed by: INTERNAL MEDICINE

## 2023-05-10 PROCEDURE — 36415 COLL VENOUS BLD VENIPUNCTURE: CPT

## 2023-05-10 PROCEDURE — 96375 TX/PRO/DX INJ NEW DRUG ADDON: CPT

## 2023-05-10 PROCEDURE — 96413 CHEMO IV INFUSION 1 HR: CPT

## 2023-05-10 PROCEDURE — 258N000003 HC RX IP 258 OP 636: Performed by: INTERNAL MEDICINE

## 2023-05-10 PROCEDURE — 250N000011 HC RX IP 250 OP 636: Performed by: INTERNAL MEDICINE

## 2023-05-10 PROCEDURE — 96367 TX/PROPH/DG ADDL SEQ IV INF: CPT

## 2023-05-10 RX ORDER — PROCHLORPERAZINE MALEATE 10 MG
10 TABLET ORAL EVERY 6 HOURS PRN
Qty: 30 TABLET | Refills: 2 | Status: SHIPPED | OUTPATIENT
Start: 2023-05-10 | End: 2023-06-16

## 2023-05-10 RX ORDER — DIPHENHYDRAMINE HCL 50 MG
50 CAPSULE ORAL ONCE
Status: CANCELLED
Start: 2023-05-10

## 2023-05-10 RX ORDER — EPINEPHRINE 1 MG/ML
0.3 INJECTION, SOLUTION, CONCENTRATE INTRAVENOUS EVERY 5 MIN PRN
Status: CANCELLED | OUTPATIENT
Start: 2023-05-10

## 2023-05-10 RX ORDER — HEPARIN SODIUM (PORCINE) LOCK FLUSH IV SOLN 100 UNIT/ML 100 UNIT/ML
5 SOLUTION INTRAVENOUS
Status: DISCONTINUED | OUTPATIENT
Start: 2023-05-10 | End: 2023-05-10 | Stop reason: HOSPADM

## 2023-05-10 RX ORDER — HEPARIN SODIUM (PORCINE) LOCK FLUSH IV SOLN 100 UNIT/ML 100 UNIT/ML
5 SOLUTION INTRAVENOUS
Status: CANCELLED | OUTPATIENT
Start: 2023-05-10

## 2023-05-10 RX ORDER — DIPHENHYDRAMINE HYDROCHLORIDE 50 MG/ML
50 INJECTION INTRAMUSCULAR; INTRAVENOUS
Status: CANCELLED
Start: 2023-05-10

## 2023-05-10 RX ORDER — ALBUTEROL SULFATE 90 UG/1
1-2 AEROSOL, METERED RESPIRATORY (INHALATION)
Status: CANCELLED
Start: 2023-05-10

## 2023-05-10 RX ORDER — ALBUTEROL SULFATE 0.83 MG/ML
2.5 SOLUTION RESPIRATORY (INHALATION)
Status: CANCELLED | OUTPATIENT
Start: 2023-05-10

## 2023-05-10 RX ORDER — HEPARIN SODIUM,PORCINE 10 UNIT/ML
5 VIAL (ML) INTRAVENOUS
Status: CANCELLED | OUTPATIENT
Start: 2023-05-10

## 2023-05-10 RX ORDER — MEPERIDINE HYDROCHLORIDE 25 MG/ML
25 INJECTION INTRAMUSCULAR; INTRAVENOUS; SUBCUTANEOUS EVERY 30 MIN PRN
Status: CANCELLED | OUTPATIENT
Start: 2023-05-10

## 2023-05-10 RX ORDER — LORAZEPAM 2 MG/ML
0.5 INJECTION INTRAMUSCULAR EVERY 4 HOURS PRN
Status: CANCELLED | OUTPATIENT
Start: 2023-05-10

## 2023-05-10 RX ADMIN — SODIUM CHLORIDE 250 ML: 9 INJECTION, SOLUTION INTRAVENOUS at 11:55

## 2023-05-10 RX ADMIN — SODIUM CHLORIDE 179 MG: 9 INJECTION, SOLUTION INTRAVENOUS at 12:54

## 2023-05-10 RX ADMIN — DEXAMETHASONE SODIUM PHOSPHATE: 10 INJECTION, SOLUTION INTRAMUSCULAR; INTRAVENOUS at 11:59

## 2023-05-10 RX ADMIN — DIPHENHYDRAMINE HYDROCHLORIDE 50 MG: 50 INJECTION, SOLUTION INTRAMUSCULAR; INTRAVENOUS at 12:18

## 2023-05-10 RX ADMIN — FAMOTIDINE 20 MG: 10 INJECTION, SOLUTION INTRAVENOUS at 11:55

## 2023-05-10 ASSESSMENT — PAIN SCALES - GENERAL: PAINLEVEL: NO PAIN (0)

## 2023-05-10 NOTE — LETTER
5/10/2023         RE: Yuliya Li  [address]  [place] [address]        Dear Colleague,    Thank you for referring your patient, Yuliya Li, to the [facility]. Please see a copy of my visit note below.    [facility]    Hematology/Oncology Established Patient Note      Today's Date: 5/10/2023    Reason for follow-up:  Left breast cancer.    HISTORY OF PRESENT ILLNESS: Yuliya Li is a 52 year old female status post removal of single ovary and hysterectomy who presents with the following oncologic history:  1.  12/23/2022: Mammogram showed possible asymmetry in right breast at 12:00; possible mass in left breast at 3:00.  2. 1/2/2023: Diagnostic bilateral mammogram showed mass in left breast at 3:00 with 0.4 cm satellite mass in retroareolar breast. Right breast with effacement of possible finding at 12:00 consistent with artifact. Left breast U/S showed mass at 3:00, 11 cm from nipple measuring 1.9 x 1.6 x 1.8 cm; no abnormal axillary lymph nodes.  3. 1/03/2022: Left breast core biopsy of 0.4 cm mass at 3:30 position showed grade 2 invasive ductal carcinoma with lobular features. Biopsy of left breast 1.9 cm mass at 3:00 showed grade 2 invasive ductal carcinoma with lobular features, associated grade 2 DCIS. ER and IL strongly positive at %; HER-2 IHC = 2+; HER-2/crow FISH negative.  4. 1/10/2023: FSH = 36.6 but estradiol = 63.  5.  2/03/2023: Bilateral mastectomies with left axillary sentinel lymph node excision and left chest wall nodule excision with Dr. Clarissa Fontanez.  Pathology showed left breast grade 2 invasive ductal carcinoma with lobular features, multifocal with 2 cm and 0.4 cm tumors, grade 2 DCIS, all margins negative.  Of 3 left axillary sentinel lymph nodes removed, one with 6 mm macrometastasis positive for extranodal extension and one with 1.1 mm micrometastasis, negative for extranodal extension, identified. Left  chest wall nodule positive for metastatic breast carcinoma involving edge of tissue fragment. HER-2 FISH repeated, group 4, considered HER-2 negative. Oncotype DX = 20, 9-year risk of distant recurrence of 17% after 5 years of hormone blockade therapy; breast-cancer specific survival of 96.7% treated without chemotherapy.  6. 2/23/2023: PET/CT scan negative for metastatic disease. Small right middle lobe lung nodules, non-FDG avid.  7. 3/29/2023: Started adjuvant chemotherapy with Taxotere and Cytoxan. Received cycle 2 on 4/19/2023, complicated by allergic reaction with chest heaviness followed by diffuse rash.    INTERVAL HISTORY:  Yuliya reports rash has now resolved from her arms and hands.  No nausea.      REVIEW OF SYSTEMS:   14 point ROS was reviewed and is negative other than as noted above in HPI.       HOME MEDICATIONS:  Current Outpatient Medications   Medication Sig Dispense Refill     acetaminophen (TYLENOL) 500 MG tablet Take 500-1,000 mg by mouth every 6 hours as needed for mild pain       alcohol swab prep pads Use to swab area of injection/amy as directed. 100 each 3     blood glucose (NO BRAND SPECIFIED) test strip Use to test blood sugar 2 times daily or as directed. To accompany: Blood Glucose Monitor Brands: per insurance. 100 strip 6     blood glucose calibration (NO BRAND SPECIFIED) solution To accompany: Blood Glucose Monitor Brands: per insurance. 1 each 1     blood glucose monitoring (NO BRAND SPECIFIED) meter device kit Use to test blood sugar 2 times daily or as directed. Preferred blood glucose meter OR supplies to accompany: Blood Glucose Monitor Brands: per insurance. 1 kit 0     clindamycin (CLEOCIN) 300 MG capsule Take 1 capsule (300 mg) by mouth 3 times daily 21 capsule 0     dexamethasone (DECADRON) 4 MG tablet Take 2 tablets (8 mg) by mouth 2 times daily (with meals) for 3 doses Start evening of Docetaxel infusion and continue for a total of 3 doses. 6 tablet 3     FLUoxetine  (PROZAC) 40 MG capsule Take 1 capsule (40 mg) by mouth daily 90 capsule 1     medical cannabis (Patient's own supply) Take 1 Dose by mouth nightly as needed Gummies: 5 mg   Formulary: Red  Location: Unknown    (The purpose of this order is to document that the patient reports taking medical cannabis.  This is not a prescription, and is not used to certify that the patient has a qualifying medical condition.)       melatonin 3 MG tablet Take 6 mg by mouth nightly as needed for sleep       metFORMIN (GLUCOPHAGE XR) 500 MG 24 hr tablet Take 1 tablet (500 mg) by mouth daily 90 tablet 3     ondansetron (ZOFRAN) 8 MG tablet Take 1 tablet (8 mg) by mouth every 8 hours as needed for nausea (vomiting) 30 tablet 2     oxyCODONE (ROXICODONE) 5 MG tablet Take 1 tablet (5 mg) by mouth every 6 hours as needed for pain 24 tablet 0     prochlorperazine (COMPAZINE) 10 MG tablet Take 1 tablet (10 mg) by mouth every 6 hours as needed for nausea or vomiting 30 tablet 2     Semaglutide, 1 MG/DOSE, (OZEMPIC, 1 MG/DOSE,) 4 MG/3ML pen Inject 1 mg Subcutaneous every 7 days (On Fridays) 9 mL 1     spironolactone (ALDACTONE) 100 MG tablet Take 1 tablet (100 mg) by mouth daily 90 tablet 1     thin (NO BRAND SPECIFIED) lancets Use with lanceting device. To accompany: Blood Glucose Monitor Brands: per insurance. 100 each 6     triamcinolone (KENALOG) 0.1 % external cream Apply topically 2 times daily 45 g 1         ALLERGIES:  Allergies   Allergen Reactions     Keflex [Cephalexin]      Tetracycline      Trazodone      Very sedating-         PAST MEDICAL HISTORY:  Diabetes mellitus type 2  Hyperlipidemia    Gynecologic history:  Age of menarche at 11.  Last menstrual period in 2005 prior to hysterectomy.  G3, P3.  Age of first pregnancy at 29.  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  No use of HRT.  Used OCPs for 4 years.  No IUD use.      PAST SURGICAL HISTORY:  Past Surgical History:   Procedure Laterality Date     CYST REMOVAL       HYSTERECTOMY        HYSTERECTOMY, PAP NO LONGER INDICATED      menorrhagia     INSERT TISSUE EXPANDER BREAST Bilateral 2/3/2023    Procedure: BILATERAL TISSUE EXPANDER;  Surgeon: Jaya Malave MD;  Location: [facility] OR     MASTECTOMY SIMPLE BILATERAL, SENTINEL NODE BILATERAL, COMBINED Left 2/3/2023    Procedure: Bilateral skin sparing mastectomies with left axillary sentinel lymph node biopsy;  Surgeon: Clarissa Fontanez MD;  Location:  OR         SOCIAL HISTORY:  Social History     Socioeconomic History     Marital status:      Spouse name: Not on file     Number of children: 3     Years of education: Not on file     Highest education level: Not on file   Occupational History     Not on file   Tobacco Use     Smoking status: Former     Packs/day: 0.00     Types: Cigarettes     Quit date: 1999     Years since quittin.4     Smokeless tobacco: Never   Vaping Use     Vaping status: Never Used   Substance and Sexual Activity     Alcohol use: Yes     Comment: Alcoholic Drinks/day: 1 a week     Drug use: Yes     Types: Marijuana     Comment: nightly for sleep (gummy)     Sexual activity: Yes     Partners: Male   Other Topics Concern     Not on file   Social History Narrative     Not on file     Social Determinants of Health     Financial Resource Strain: Low Risk  (10/26/2022)    Overall Financial Resource Strain (CARDIA)      Difficulty of Paying Living Expenses: Not very hard   Food Insecurity: No Food Insecurity (10/26/2022)    Hunger Vital Sign      Worried About Running Out of Food in the Last Year: Never true      Ran Out of Food in the Last Year: Never true   Transportation Needs: No Transportation Needs (10/26/2022)    PRAPARE - Transportation      Lack of Transportation (Medical): No      Lack of Transportation (Non-Medical): No   Physical Activity: Insufficiently Active (10/26/2022)    Exercise Vital Sign      Days of Exercise per Week: 1 day      Minutes of Exercise per Session: 20 min   Stress: Stress  "Concern Present (10/26/2022)    [place] [employer] - Occupational Stress Questionnaire      Feeling of Stress : Rather much   Social Connections: Moderately Isolated (10/26/2022)    Social Connection and Isolation Panel [NHANES]      Frequency of Communication with Friends and Family: More than three times a week      Frequency of Social Gatherings with Friends and Family: Once a week      Attends Spiritism Services: Never      Active Member of Clubs or Organizations: No      Attends Club or Organization Meetings: Not on file      Marital Status:    Intimate Partner Violence: Not on file   Housing Stability: Low Risk  (10/26/2022)    Housing Stability Vital Sign      Unable to Pay for Housing in the Last Year: No      Number of Places Lived in the Last Year: 1      Unstable Housing in the Last Year: No         FAMILY HISTORY:  Family History   Problem Relation Age of Onset     Diabetes Mother      Atrial fibrillation Father      Alcoholism Father      Melanoma Father      Breast Cancer Maternal Aunt         in her 40's     Cervical Cancer Maternal Aunt      Colon Cancer No family hx of          PHYSICAL EXAM:  Vital signs:  /76   Pulse 103   Temp 98.3  F (36.8  C)   Resp 16   Ht 1.753 m (5' 9\")   Wt 103.2 kg (227 lb 9.6 oz)   LMP  (LMP Unknown)   SpO2 96%   BMI 33.61 kg/m     ECO  GENERAL/CONSTITUTIONAL: No acute distress.  EYES: No erythema or scleral icterus.  ENT/MOUTH: Neck supple.  LYMPH: No cervical, supraclavicular, axillary adenopathy.   RESPIRATORY: Clear to auscultation bilaterally. No crackles or wheezing.   CARDIOVASCULAR: Regular rate and rhythm without murmurs.  GASTROINTESTINAL: No hepatosplenomegaly, masses, or tenderness. No guarding.  No distention.  MUSCULOSKELETAL: Warm and well-perfused, no cyanosis, clubbing, or edema.  NEUROLOGIC: No focal motor deficits. Alert, oriented, answers questions appropriately.  INTEGUMENTARY: Mild follicular scalp rash; " no rash elsewhere.  GAIT: Steady, does not use assistive device      LABS:  CBC RESULTS: Recent Labs   Lab Test 05/10/23  1043   WBC 9.2   RBC 4.54   HGB 13.1   HCT 40.0   MCV 88   MCH 28.9   MCHC 32.8   RDW 13.8            PATHOLOGY:  None new.    IMAGING:  None new.    ASSESSMENT/PLAN:  Yuliya Li is a 52 year old female with the following issues:  1. Pathologic prognostic stage IA, qW0y-T0y-N4, grade 2 multifocal invasive ductal carcinoma with lobular features of left breast overlapping sites, ER positive, ID positive, HER-2 IHC = 2+; HER-2/crow FISH negative, Oncotype DX = 20  --Yuliya had 2 tumors measuring 2 cm and 0.4 cm and additional left chest wall nodule, but with 2/3 lymph nodes involved, one that was a macrometastasis with extranodal extension and the other with micrometastasis with no extranodal extension.  All areas of tumor were excised.  The repeat HER-2 FISH was interpreted as negative.   --2/23/2023 PET scan showed no evidence for metastatic disease.  --Oncotype DX score 20, with 9-year risk of distant recurrence of 17% after 5 years of hormone blockade therapy; breast-cancer specific survival of 96.7% treated without chemotherapy. Discussed that this would indicate up to 3.3% absolute benefit of chemotherapy.    --She elected to proceed with 4 cycles of Taxotere and Cytoxan every 3 weeks to maximally reduce her risk beyond just hormone blockade therapy alone.  However, she reacted to presumably Taxotere after cycle 2.  I advised switching to Taxol weekly for next 6 weeks and not to proceed with dose dense AC given that her maximum absolute chemo benefit is up to 3.3% and would not justify the potential adverse effects of ddAC. She agrees with this plan of care.  --I reviewed her labs which show normal CBC today.  --She is perimenopausal, so after she completes chemo, I recommended OST + AI with Zoladex every 4 weeks with anastrozole for 10 years of hormone blockade therapy.   In all  "likelihood, she would probably not need more than a few months to a year of Zoladex. Would plan to repeat FSH and estradiol off Zoladex in the future.  --We talked a bit about abemaciclib (Verzenio) and whether this could benefit her due to the chest wall nodule that was found and resected at time of surgery.  Even with the expanded indication for adjuvant abemaciclib based on the monarchE trial, her grade 2 and TNM stage of disease would not meet criteria for this drug.   --3/20/2023 DEXA scan showed normal bone density for baseline.  --She will see radiation oncology back for adjuvant radiation therapy after her last cycle of chemo.    2.  Family history of breast cancer  - Maternal aunt with breast cancer.  - I have recommended and referred Yuliya to genetic counseling.      3.  Neck and back pain, chronic  - PET scan showed no evidence of distant metastatic disease.    4. Indeterminate right middle lobe pulmonary nodules  --2/23/2023 PET/CT scan showed 2 small indeterminate right middle lobe pulmonary nodules.   --Advised repeat CT chest in 8/2023.    Louise Watson MD  Hematology/Oncology  [facility]    Total time spent: 30 minutes in patient evaluation, counseling, documentation, and coordination of care.    Oncology Rooming Note    May 10, 2023 10:49 AM   Yuliya Li is a 52 year old female who presents for:    Chief Complaint   Patient presents with     Oncology Clinic Visit     Initial Vitals: LMP  (LMP Unknown)  Estimated body mass index is 33.52 kg/m  as calculated from the following:    Height as of 4/30/23: 1.753 m (5' 9\").    Weight as of 4/30/23: 103 kg (227 lb). There is no height or weight on file to calculate BSA.  Data Unavailable Comment: Data Unavailable   No LMP recorded (lmp unknown). Patient has had a hysterectomy.  Allergies reviewed: Yes  Medications reviewed: Yes    Medications: Medication refills not needed today.  Pharmacy name entered into [employer]:    [id number] [place], [place] - [address] [facility], [place] - [address] [place] - [address] [facility]  [id number] - [place], [place] - [address] [facility] [address]    Clinical concerns:  doctor was notified.      Guillermina Jennings MA                Again, thank you for allowing me to participate in the care of your patient.        Sincerely,        Louise Watson MD

## 2023-05-10 NOTE — PATIENT INSTRUCTIONS
Arrange for Taxol weekly through 6/14/2023 with lab draw weekly.  Arrange for another RTC MD on or prior to 6/14/2023.

## 2023-05-10 NOTE — PROGRESS NOTES
Infusion Nursing Note:  Yuliya Li presents today for C1D1 Taxol.    Patient seen by provider today: Yes: Dr. Watson   present during visit today: Not Applicable.    Note: Reviewed Taxol and possible side effects with patient, and gave her a handout from [facility].    In-basket message sent to Peyton MATHIS for Dr. Watson regarding a port placement now that the patient is receiving weekly taxol. Patient stated she would think about it. Patient has skin discoloration on her left arm from her last taxotere infusion, and is aware it could be permanent.       Intravenous Access:  Peripheral IV placed.    Treatment Conditions:  Lab Results   Component Value Date    HGB 13.1 05/10/2023    WBC 9.2 05/10/2023    ANEU 29.4 (H) 05/02/2023    ANEUTAUTO 6.8 05/10/2023     05/10/2023      Lab Results   Component Value Date     05/02/2023    POTASSIUM 4.2 05/02/2023    CR 0.88 05/02/2023    CHASE 9.3 05/02/2023    BILITOTAL 0.2 05/10/2023    ALBUMIN 3.8 05/10/2023    ALT 14 05/10/2023    AST 18 05/10/2023     Results reviewed, labs MET treatment parameters, ok to proceed with treatment.      Post Infusion Assessment:  Patient tolerated infusion without incident.  Blood return noted pre and post infusion.  Site patent and intact, free from redness, edema or discomfort.  No evidence of extravasations.  Access discontinued per protocol.       Discharge Plan:   Discharge instructions reviewed with: Patient and Family.  Patient and/or family verbalized understanding of discharge instructions and all questions answered.  AVS to patient via [facility] [employer].  Patient will return 5/17/23 for next appointment.   Patient discharged in stable condition accompanied by: .  Departure Mode: Ambulatory.      Teri Colin RN

## 2023-05-10 NOTE — PROGRESS NOTES
"Oncology Rooming Note    May 10, 2023 10:49 AM   Yuliya Li is a 52 year old female who presents for:    Chief Complaint   Patient presents with     Oncology Clinic Visit     Initial Vitals: LMP  (LMP Unknown)  Estimated body mass index is 33.52 kg/m  as calculated from the following:    Height as of 4/30/23: 1.753 m (5' 9\").    Weight as of 4/30/23: 103 kg (227 lb). There is no height or weight on file to calculate BSA.  Data Unavailable Comment: Data Unavailable   No LMP recorded (lmp unknown). Patient has had a hysterectomy.  Allergies reviewed: Yes  Medications reviewed: Yes    Medications: Medication refills not needed today.  Pharmacy name entered into [employer]:    [id number] - [place], [place] - [address] [facility] - [place], [place] - [address] [place], [place] - [address] [id number] [place], [place] - [address] [facility] [address]    Clinical concerns:  doctor was notified.      Guillermina Jennings MA            "

## 2023-05-11 RX ORDER — SEMAGLUTIDE 1.34 MG/ML
1 INJECTION, SOLUTION SUBCUTANEOUS
Qty: 9 ML | Refills: 1 | Status: SHIPPED | OUTPATIENT
Start: 2023-05-11 | End: 2023-11-01

## 2023-05-12 ENCOUNTER — DOCUMENTATION ONLY (OUTPATIENT)
Dept: ONCOLOGY | Facility: CLINIC | Age: 53
End: 2023-05-12
Payer: COMMERCIAL

## 2023-05-12 ENCOUNTER — TELEPHONE (OUTPATIENT)
Dept: PEDIATRICS | Facility: CLINIC | Age: 53
End: 2023-05-12
Payer: COMMERCIAL

## 2023-05-12 NOTE — NURSING NOTE
Spoke with pt regarding port placement. She is fine getting one but doesn't really wish to. Stated that is just fine. If she changes her mind, she will let us know.     Peyton Quintana RN

## 2023-05-12 NOTE — TELEPHONE ENCOUNTER
Prior Authorization Retail Medication Request    Medication/Dose: Semaglutide, 1 MG/DOSE, (OZEMPIC, 1 MG/DOSE,) 4 MG/3ML pen  ICD code (if different than what is on RX):  E11.9  Previously Tried and Failed:  na  Rationale:  na    Insurance Name:  [employer]  Insurance ID:  [id number]      Pharmacy Information (if different than what is on RX)  Name:    Phone:

## 2023-05-14 NOTE — TELEPHONE ENCOUNTER
Prior Authorization Approval    Authorization Effective Date: 4/13/2023  Authorization Expiration Date: 5/12/2024  Medication: Ozempic - [place] Approved  Approved Dose/Quantity: 1 month  Reference #: CMM KEY [id number]   Insurance Company: [employer] - Phone [phone] Fax [phone]  Expected CoPay:       CoPay Card Available:      Foundation Assistance Needed:    Which Pharmacy is filling the prescription (Not needed for infusion/clinic administered):    Pharmacy Notified:    Patient Notified:

## 2023-05-15 RX ORDER — DIPHENHYDRAMINE HYDROCHLORIDE 50 MG/ML
50 INJECTION INTRAMUSCULAR; INTRAVENOUS
Status: CANCELLED
Start: 2023-05-17

## 2023-05-15 RX ORDER — ALBUTEROL SULFATE 90 UG/1
1-2 AEROSOL, METERED RESPIRATORY (INHALATION)
Status: CANCELLED
Start: 2023-05-17

## 2023-05-15 RX ORDER — HEPARIN SODIUM (PORCINE) LOCK FLUSH IV SOLN 100 UNIT/ML 100 UNIT/ML
5 SOLUTION INTRAVENOUS
Status: CANCELLED | OUTPATIENT
Start: 2023-05-17

## 2023-05-15 RX ORDER — DIPHENHYDRAMINE HCL 50 MG
50 CAPSULE ORAL ONCE
Status: CANCELLED
Start: 2023-05-17

## 2023-05-15 RX ORDER — LORAZEPAM 2 MG/ML
0.5 INJECTION INTRAMUSCULAR EVERY 4 HOURS PRN
Status: CANCELLED | OUTPATIENT
Start: 2023-05-17

## 2023-05-15 RX ORDER — EPINEPHRINE 1 MG/ML
0.3 INJECTION, SOLUTION, CONCENTRATE INTRAVENOUS EVERY 5 MIN PRN
Status: CANCELLED | OUTPATIENT
Start: 2023-05-17

## 2023-05-15 RX ORDER — HEPARIN SODIUM,PORCINE 10 UNIT/ML
5 VIAL (ML) INTRAVENOUS
Status: CANCELLED | OUTPATIENT
Start: 2023-05-17

## 2023-05-15 RX ORDER — ALBUTEROL SULFATE 0.83 MG/ML
2.5 SOLUTION RESPIRATORY (INHALATION)
Status: CANCELLED | OUTPATIENT
Start: 2023-05-17

## 2023-05-15 RX ORDER — MEPERIDINE HYDROCHLORIDE 25 MG/ML
25 INJECTION INTRAMUSCULAR; INTRAVENOUS; SUBCUTANEOUS EVERY 30 MIN PRN
Status: CANCELLED | OUTPATIENT
Start: 2023-05-17

## 2023-05-16 NOTE — PROGRESS NOTES
[facility]    Hematology/Oncology Established Patient Note      Today's Date: 5/31/2023    Reason for follow-up:  Left breast cancer.    HISTORY OF PRESENT ILLNESS: Yuliya Li is a 52 year old female status post removal of single ovary and hysterectomy who presents with the following oncologic history:  1.  12/23/2022: Mammogram showed possible asymmetry in right breast at 12:00; possible mass in left breast at 3:00.  2. 1/2/2023: Diagnostic bilateral mammogram showed mass in left breast at 3:00 with 0.4 cm satellite mass in retroareolar breast. Right breast with effacement of possible finding at 12:00 consistent with artifact. Left breast U/S showed mass at 3:00, 11 cm from nipple measuring 1.9 x 1.6 x 1.8 cm; no abnormal axillary lymph nodes.  3. 1/03/2022: Left breast core biopsy of 0.4 cm mass at 3:30 position showed grade 2 invasive ductal carcinoma with lobular features. Biopsy of left breast 1.9 cm mass at 3:00 showed grade 2 invasive ductal carcinoma with lobular features, associated grade 2 DCIS. ER and CA strongly positive at %; HER-2 IHC = 2+; HER-2/crow FISH negative.  4. 1/10/2023: FSH = 36.6 but estradiol = 63.  5.  2/03/2023: Bilateral mastectomies with left axillary sentinel lymph node excision and left chest wall nodule excision with Dr. Clarissa Fontanez.  Pathology showed left breast grade 2 invasive ductal carcinoma with lobular features, multifocal with 2 cm and 0.4 cm tumors, grade 2 DCIS, all margins negative.  Of 3 left axillary sentinel lymph nodes removed, one with 6 mm macrometastasis positive for extranodal extension and one with 1.1 mm micrometastasis, negative for extranodal extension, identified. Left chest wall nodule positive for metastatic breast carcinoma involving edge of tissue fragment. HER-2 FISH repeated, group 4, considered HER-2 negative. Oncotype DX = 20, 9-year risk of distant recurrence of 17% after 5 years of hormone blockade therapy;  breast-cancer specific survival of 96.7% treated without chemotherapy.  6. 2/23/2023: PET/CT scan negative for metastatic disease. Small right middle lobe lung nodules, non-FDG avid.  7. 3/29/2023: Started adjuvant chemotherapy with Taxotere and Cytoxan. Received cycle 2 on 4/19/2023, complicated by allergic reaction with chest heaviness followed by diffuse rash. Then switched to weekly paclitaxel for 6 weeks.    INTERVAL HISTORY:  Yuliya reports she had an episode of chest tightness and abrupt dyspnea when she stepped outside with exposure to pollen. She was seen in ED with workup negative for cardiac issue and negative for PE.  She had an episode of nausea with emesis about 2 weeks ago.  She has been controlling her rash flares with Benadryl 50 mg twice daily. She reports hot flashes.      REVIEW OF SYSTEMS:   14 point ROS was reviewed and is negative other than as noted above in HPI.       HOME MEDICATIONS:  Current Outpatient Medications   Medication Sig Dispense Refill     acetaminophen (TYLENOL) 500 MG tablet Take 500-1,000 mg by mouth every 6 hours as needed for mild pain       alcohol swab prep pads Use to swab area of injection/amy as directed. 100 each 3     blood glucose (NO BRAND SPECIFIED) test strip Use to test blood sugar 2 times daily or as directed. To accompany: Blood Glucose Monitor Brands: per insurance. 100 strip 6     blood glucose calibration (NO BRAND SPECIFIED) solution To accompany: Blood Glucose Monitor Brands: per insurance. 1 each 1     blood glucose monitoring (NO BRAND SPECIFIED) meter device kit Use to test blood sugar 2 times daily or as directed. Preferred blood glucose meter OR supplies to accompany: Blood Glucose Monitor Brands: per insurance. 1 kit 0     dexamethasone (DECADRON) 4 MG tablet Take 2 tablets (8 mg) by mouth 2 times daily (with meals) for 3 doses Start evening of Docetaxel infusion and continue for a total of 3 doses. 6 tablet 3     diphenhydrAMINE (BENADRYL) 25 MG  capsule Take 2 capsules (50 mg) by mouth every 6 hours as needed for itching or allergies 30 capsule 0     EPINEPHrine (ANY BX GENERIC EQUIV) 0.3 MG/0.3ML injection 2-pack Inject 0.3 mLs (0.3 mg) into the muscle once as needed for anaphylaxis May repeat one time in 5-15 minutes if response to initial dose is inadequate. 2 each 0     FLUoxetine (PROZAC) 40 MG capsule Take 1 capsule (40 mg) by mouth daily 90 capsule 1     medical cannabis (Patient's own supply) Take 1 Dose by mouth nightly as needed Gummies: 5 mg   Formulary: Red  Location: Unknown    (The purpose of this order is to document that the patient reports taking medical cannabis.  This is not a prescription, and is not used to certify that the patient has a qualifying medical condition.)       melatonin 3 MG tablet Take 6 mg by mouth nightly as needed for sleep       metFORMIN (GLUCOPHAGE XR) 500 MG 24 hr tablet Take 1 tablet (500 mg) by mouth daily 90 tablet 3     ondansetron (ZOFRAN) 8 MG tablet Take 1 tablet (8 mg) by mouth every 8 hours as needed for nausea (vomiting) 30 tablet 2     prochlorperazine (COMPAZINE) 10 MG tablet Take 1 tablet (10 mg) by mouth every 6 hours as needed for nausea or vomiting 30 tablet 2     prochlorperazine (COMPAZINE) 10 MG tablet Take 1 tablet (10 mg) by mouth every 6 hours as needed for nausea or vomiting 30 tablet 2     Semaglutide, 1 MG/DOSE, (OZEMPIC, 1 MG/DOSE,) 4 MG/3ML pen Inject 1 mg Subcutaneous every 7 days (On Fridays) 9 mL 1     spironolactone (ALDACTONE) 100 MG tablet Take 1 tablet (100 mg) by mouth daily 90 tablet 1     thin (NO BRAND SPECIFIED) lancets Use with lanceting device. To accompany: Blood Glucose Monitor Brands: per insurance. 100 each 6     triamcinolone (KENALOG) 0.1 % external cream Apply topically 2 times daily 45 g 1         ALLERGIES:  Allergies   Allergen Reactions     Docetaxel Anaphylaxis, Hives, Itching and Shortness Of Breath     Keflex [Cephalexin]      Tetracycline      Trazodone       Very sedating-         PAST MEDICAL HISTORY:  Diabetes mellitus type 2  Hyperlipidemia    Gynecologic history:  Age of menarche at 11.  Last menstrual period in  prior to hysterectomy.  G3, P3.  Age of first pregnancy at 29.  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  No use of HRT.  Used OCPs for 4 years.  No IUD use.      PAST SURGICAL HISTORY:  Past Surgical History:   Procedure Laterality Date     CYST REMOVAL       HYSTERECTOMY       HYSTERECTOMY, PAP NO LONGER INDICATED      menorrhagia     INSERT TISSUE EXPANDER BREAST Bilateral 2/3/2023    Procedure: BILATERAL TISSUE EXPANDER;  Surgeon: Jaya Malave MD;  Location:  OR     MASTECTOMY SIMPLE BILATERAL, SENTINEL NODE BILATERAL, COMBINED Left 2/3/2023    Procedure: Bilateral skin sparing mastectomies with left axillary sentinel lymph node biopsy;  Surgeon: Clarissa Fontanez MD;  Location:  OR         SOCIAL HISTORY:  Social History     Socioeconomic History     Marital status:      Spouse name: Not on file     Number of children: 3     Years of education: Not on file     Highest education level: Not on file   Occupational History     Not on file   Tobacco Use     Smoking status: Former     Packs/day: 0.00     Types: Cigarettes     Quit date: 1999     Years since quittin.5     Smokeless tobacco: Never   Vaping Use     Vaping status: Never Used   Substance and Sexual Activity     Alcohol use: Yes     Comment: Alcoholic Drinks/day: 1 a week     Drug use: Yes     Types: Marijuana     Comment: nightly for sleep (gummy)     Sexual activity: Yes     Partners: Male   Other Topics Concern     Not on file   Social History Narrative     Not on file     Social Determinants of Health     Financial Resource Strain: Low Risk  (10/26/2022)    Overall Financial Resource Strain (CARDIA)      Difficulty of Paying Living Expenses: Not very hard   Food Insecurity: No Food Insecurity (10/26/2022)    Hunger Vital Sign      Worried About Running Out of Food  in the Last Year: Never true      Ran Out of Food in the Last Year: Never true   Transportation Needs: No Transportation Needs (10/26/2022)    PRAPARE - Transportation      Lack of Transportation (Medical): No      Lack of Transportation (Non-Medical): No   Physical Activity: Insufficiently Active (10/26/2022)    Exercise Vital Sign      Days of Exercise per Week: 1 day      Minutes of Exercise per Session: 20 min   Stress: Stress Concern Present (10/26/2022)    [employer] [place] [employer] - Occupational Stress Questionnaire      Feeling of Stress : Rather much   Social Connections: Moderately Isolated (10/26/2022)    Social Connection and Isolation Panel [NHANES]      Frequency of Communication with Friends and Family: More than three times a week      Frequency of Social Gatherings with Friends and Family: Once a week      Attends Protestant Services: Never      Active Member of Clubs or Organizations: No      Attends Club or Organization Meetings: Not on file      Marital Status:    Intimate Partner Violence: Not on file   Housing Stability: Low Risk  (10/26/2022)    Housing Stability Vital Sign      Unable to Pay for Housing in the Last Year: No      Number of Places Lived in the Last Year: 1      Unstable Housing in the Last Year: No         FAMILY HISTORY:  Family History   Problem Relation Age of Onset     Diabetes Mother      Atrial fibrillation Father      Alcoholism Father      Melanoma Father      Breast Cancer Maternal Aunt         in her 40's     Cervical Cancer Maternal Aunt      Colon Cancer No family hx of          PHYSICAL EXAM:  Vital signs:  /79   Pulse 100   Temp 98.5  F (36.9  C) (Oral)   Resp 16   Wt 104.5 kg (230 lb 6.4 oz)   LMP  (LMP Unknown)   SpO2 96%   BMI 34.01 kg/m     ECO  GENERAL/CONSTITUTIONAL: No acute distress.  EYES: No erythema or scleral icterus.  ENT/MOUTH: Neck supple.  LYMPH: No cervical, supraclavicular, axillary adenopathy.   RESPIRATORY:  Clear to auscultation bilaterally. No crackles or wheezing.   CARDIOVASCULAR: Regular rate and rhythm without murmurs.  GASTROINTESTINAL: No hepatosplenomegaly, masses, or tenderness. No guarding.  No distention.  MUSCULOSKELETAL: Warm and well-perfused, no cyanosis, clubbing, or edema.  NEUROLOGIC: No focal motor deficits. Alert, oriented, answers questions appropriately.  INTEGUMENTARY: No rashes or jaundice.  GAIT: Steady, does not use assistive device      LABS:  CBC RESULTS: Recent Labs   Lab Test 05/31/23  0822   WBC 4.2   RBC 4.16   HGB 12.4   HCT 37.7   MCV 91   MCH 29.8   MCHC 32.9   RDW 15.2*            PATHOLOGY:  None new.    IMAGING:  None new.    ASSESSMENT/PLAN:  Yuliya Li is a 52 year old female with the following issues:  1. Pathologic prognostic stage IA, [id number], grade 2 multifocal invasive ductal carcinoma with lobular features of left breast overlapping sites, ER positive, OK positive, HER-2 IHC = 2+; HER-2/crow FISH negative, Oncotype DX = 20  --Yuliya had 2 tumors measuring 2 cm and 0.4 cm and additional left chest wall nodule, but with 2/3 lymph nodes involved, one that was a macrometastasis with extranodal extension and the other with micrometastasis with no extranodal extension.  All areas of tumor were excised.  The repeat HER-2 FISH was interpreted as negative.   --2/23/2023 PET scan showed no evidence for metastatic disease.  --Oncotype DX score 20, with 9-year risk of distant recurrence of 17% after 5 years of hormone blockade therapy; breast-cancer specific survival of 96.7% treated without chemotherapy. Discussed that this would indicate up to 3.3% absolute benefit of chemotherapy.    --She elected to proceed with 4 cycles of Taxotere and Cytoxan every 3 weeks to maximally reduce her risk beyond just hormone blockade therapy alone.  However, she reacted to presumably Taxotere after cycle 2.   --Therefore, she was switched to Taxol weekly for planned 6 weeks.  Will not  proceed with dose dense AC given that her maximum absolute chemo benefit is up to 3.3% and would not justify the potential adverse effects of ddAC. She agrees with this plan of care.  --I reviewed her labs which show normal CBC today with her ANC normal at 2.7. She may proceed with Taxol today.  --She is perimenopausal, so after she completes chemo, I recommended OST + AI with Zoladex every 4 weeks with anastrozole for 10 years of hormone blockade therapy.   In all likelihood, she would probably not need more than a few months to a year of Zoladex. Would plan to repeat FSH and estradiol off Zoladex in the future. She agrees to Zoladex + anastrozole, to commence about 1 week after completion of chemo.  --We previously talked about abemaciclib (Verzenio) and whether this could benefit her due to the chest wall nodule that was found and resected at time of surgery.  Even with the expanded indication for adjuvant abemaciclib based on the monarchE trial, her grade 2 and TNM stage of disease would not meet criteria for this drug.   --3/20/2023 DEXA scan showed normal bone density for baseline.  --She will see radiation oncology back for adjuvant radiation therapy after her last cycle of chemo.    2.  Family history of breast cancer  - Maternal aunt with breast cancer.  - I have recommended and referred Yuliya to genetic counseling.      3.  Neck and back pain, chronic  - PET scan showed no evidence of distant metastatic disease.    4. Indeterminate right middle lobe pulmonary nodules  --2/23/2023 PET/CT scan showed 2 small indeterminate right middle lobe pulmonary nodules.   --Advised repeat 6-month interval CT chest in 8/2023.    5. Hot flashes  --Offered medication such as venlafaxine to alleviate hot flashes.  She would like to hold off on new medication for now.    6. Rash  --Related to taxane chemo.  --She will continue Benadryl 50 mg twice daily throughout her remaining treatment.    7. Seasonal allergy  --Increased  pollen outside currently.  --I advised she start Claritin or Allegra daily.    Louise Watson MD  Hematology/Oncology  [facility]    Total time spent: 30 minutes in patient evaluation, counseling, documentation, and coordination of care.

## 2023-05-17 ENCOUNTER — LAB (OUTPATIENT)
Dept: INFUSION THERAPY | Facility: CLINIC | Age: 53
End: 2023-05-17
Attending: INTERNAL MEDICINE
Payer: COMMERCIAL

## 2023-05-17 VITALS
DIASTOLIC BLOOD PRESSURE: 77 MMHG | SYSTOLIC BLOOD PRESSURE: 117 MMHG | TEMPERATURE: 97.8 F | HEART RATE: 104 BPM | OXYGEN SATURATION: 97 % | RESPIRATION RATE: 16 BRPM | HEIGHT: 69 IN | BODY MASS INDEX: 33.62 KG/M2 | WEIGHT: 227 LBS

## 2023-05-17 DIAGNOSIS — C50.812 MALIGNANT NEOPLASM OF OVERLAPPING SITES OF LEFT BREAST IN FEMALE, ESTROGEN RECEPTOR POSITIVE (H): Primary | ICD-10-CM

## 2023-05-17 DIAGNOSIS — Z17.0 MALIGNANT NEOPLASM OF OVERLAPPING SITES OF LEFT BREAST IN FEMALE, ESTROGEN RECEPTOR POSITIVE (H): Primary | ICD-10-CM

## 2023-05-17 DIAGNOSIS — T50.905D ADVERSE EFFECT OF DRUG, SUBSEQUENT ENCOUNTER: ICD-10-CM

## 2023-05-17 LAB
BASOPHILS # BLD AUTO: 0.1 10E3/UL (ref 0–0.2)
BASOPHILS NFR BLD AUTO: 1 %
EOSINOPHIL # BLD AUTO: 0.1 10E3/UL (ref 0–0.7)
EOSINOPHIL NFR BLD AUTO: 2 %
ERYTHROCYTE [DISTWIDTH] IN BLOOD BY AUTOMATED COUNT: 14.4 % (ref 10–15)
HCT VFR BLD AUTO: 37.9 % (ref 35–47)
HGB BLD-MCNC: 12.3 G/DL (ref 11.7–15.7)
IMM GRANULOCYTES # BLD: 0.1 10E3/UL
IMM GRANULOCYTES NFR BLD: 1 %
LYMPHOCYTES # BLD AUTO: 1.7 10E3/UL (ref 0.8–5.3)
LYMPHOCYTES NFR BLD AUTO: 19 %
MCH RBC QN AUTO: 29.4 PG (ref 26.5–33)
MCHC RBC AUTO-ENTMCNC: 32.5 G/DL (ref 31.5–36.5)
MCV RBC AUTO: 91 FL (ref 78–100)
MONOCYTES # BLD AUTO: 0.4 10E3/UL (ref 0–1.3)
MONOCYTES NFR BLD AUTO: 5 %
NEUTROPHILS # BLD AUTO: 6.3 10E3/UL (ref 1.6–8.3)
NEUTROPHILS NFR BLD AUTO: 72 %
NRBC # BLD AUTO: 0 10E3/UL
NRBC BLD AUTO-RTO: 0 /100
PLATELET # BLD AUTO: 357 10E3/UL (ref 150–450)
RBC # BLD AUTO: 4.18 10E6/UL (ref 3.8–5.2)
WBC # BLD AUTO: 8.5 10E3/UL (ref 4–11)

## 2023-05-17 PROCEDURE — 85025 COMPLETE CBC W/AUTO DIFF WBC: CPT | Performed by: INTERNAL MEDICINE

## 2023-05-17 PROCEDURE — 258N000003 HC RX IP 258 OP 636: Performed by: INTERNAL MEDICINE

## 2023-05-17 PROCEDURE — 96375 TX/PRO/DX INJ NEW DRUG ADDON: CPT

## 2023-05-17 PROCEDURE — 36415 COLL VENOUS BLD VENIPUNCTURE: CPT | Performed by: INTERNAL MEDICINE

## 2023-05-17 PROCEDURE — 250N000011 HC RX IP 250 OP 636: Performed by: INTERNAL MEDICINE

## 2023-05-17 PROCEDURE — 96413 CHEMO IV INFUSION 1 HR: CPT

## 2023-05-17 RX ORDER — PREDNISONE 20 MG/1
40 TABLET ORAL DAILY
Qty: 10 TABLET | Refills: 0 | Status: SHIPPED | OUTPATIENT
Start: 2023-05-17 | End: 2023-05-24

## 2023-05-17 RX ORDER — NICOTINE POLACRILEX 4 MG/1
20 GUM, CHEWING ORAL DAILY
COMMUNITY
End: 2023-11-01

## 2023-05-17 RX ORDER — FAMOTIDINE 20 MG/1
20 TABLET, FILM COATED ORAL DAILY
Qty: 30 TABLET | Refills: 1 | Status: SHIPPED | OUTPATIENT
Start: 2023-05-17 | End: 2023-10-06

## 2023-05-17 RX ORDER — DIPHENHYDRAMINE HCL 25 MG
50 TABLET ORAL EVERY 6 HOURS PRN
Qty: 120 TABLET | Refills: 1 | Status: SHIPPED | OUTPATIENT
Start: 2023-05-17

## 2023-05-17 RX ADMIN — DIPHENHYDRAMINE HYDROCHLORIDE 50 MG: 50 INJECTION, SOLUTION INTRAMUSCULAR; INTRAVENOUS at 09:52

## 2023-05-17 RX ADMIN — FAMOTIDINE 20 MG: 10 INJECTION, SOLUTION INTRAVENOUS at 10:07

## 2023-05-17 RX ADMIN — DEXAMETHASONE SODIUM PHOSPHATE: 10 INJECTION, SOLUTION INTRAMUSCULAR; INTRAVENOUS at 09:37

## 2023-05-17 RX ADMIN — SODIUM CHLORIDE 179 MG: 9 INJECTION, SOLUTION INTRAVENOUS at 10:18

## 2023-05-17 RX ADMIN — SODIUM CHLORIDE 250 ML: 9 INJECTION, SOLUTION INTRAVENOUS at 09:37

## 2023-05-17 ASSESSMENT — PAIN SCALES - GENERAL: PAINLEVEL: NO PAIN (0)

## 2023-05-17 NOTE — PROGRESS NOTES
Infusion Nursing Note:  Yuliya Li presents today for C2D1 Taxol.    Patient seen by provider today: No   present during visit today: Not Applicable.    Note: Patient reports feeling well overall since first taxol last week. She does have some remaining itching and hives (flareups in her bilateral arms and hands) related to severe reaction to taxotere. Patient is requesting an extension on prednisone until itching and hives are resolved, and also requesting benadryl and pepcid can be sent via a prescription so it will be covered by insurance. Discussed with BERT Abbott, who sent new prescriptions for patient to her local pharmacy. Patient denies nausea, bowel concerns or peripheral neuropathy. Ice applied to hands and feet during taxol infusion per request.      Intravenous Access:  Peripheral IV placed.    Treatment Conditions:  Lab Results   Component Value Date    HGB 12.3 05/17/2023    WBC 8.5 05/17/2023    ANEU 29.4 (H) 05/02/2023    ANEUTAUTO 6.3 05/17/2023     05/17/2023      Results reviewed, labs MET treatment parameters, ok to proceed with treatment.      Post Infusion Assessment:  Patient tolerated infusion without incident.  Blood return noted pre and post infusion.  Site patent and intact, free from redness, edema or discomfort.  No evidence of extravasations.  Access discontinued per protocol.       Discharge Plan:   Prescription refills given for predisone, benadryl and pepcid.  Discharge instructions reviewed with: Patient.  Patient and/or family verbalized understanding of discharge instructions and all questions answered.  AVS to patient via [employer].  Patient will return 5/24/23 for next appointment.   Patient discharged in stable condition accompanied by: self.  Departure Mode: Ambulatory.  Face to Face time: 10 minutes.      Sanjana Baker RN

## 2023-05-17 NOTE — PROGRESS NOTES
Medical Assistant Note:  Yuliya GUAJARDO Mellemilio presents today for blood draw.    Patient seen by provider today: No.   present during visit today: Not Applicable.    Concerns: No Concerns.    Procedure:  Lab draw site: rac, Needle type: bf, Gauge: 23.    Post Assessment:  Labs drawn without difficulty: Yes.    Discharge Plan:  Departure Mode: Ambulatory.    Face to Face Time: 5 min.    Alicia Schneider, CMA

## 2023-05-22 RX ORDER — LORAZEPAM 2 MG/ML
0.5 INJECTION INTRAMUSCULAR EVERY 4 HOURS PRN
Status: CANCELLED | OUTPATIENT
Start: 2023-05-24

## 2023-05-22 RX ORDER — HEPARIN SODIUM,PORCINE 10 UNIT/ML
5 VIAL (ML) INTRAVENOUS
Status: CANCELLED | OUTPATIENT
Start: 2023-05-24

## 2023-05-22 RX ORDER — MEPERIDINE HYDROCHLORIDE 25 MG/ML
25 INJECTION INTRAMUSCULAR; INTRAVENOUS; SUBCUTANEOUS EVERY 30 MIN PRN
Status: CANCELLED | OUTPATIENT
Start: 2023-05-24

## 2023-05-22 RX ORDER — DIPHENHYDRAMINE HCL 25 MG
50 CAPSULE ORAL
Status: CANCELLED
Start: 2023-05-24

## 2023-05-22 RX ORDER — ONDANSETRON 2 MG/ML
8 INJECTION INTRAMUSCULAR; INTRAVENOUS ONCE
Status: CANCELLED | OUTPATIENT
Start: 2023-05-24

## 2023-05-22 RX ORDER — ALBUTEROL SULFATE 0.83 MG/ML
2.5 SOLUTION RESPIRATORY (INHALATION)
Status: CANCELLED | OUTPATIENT
Start: 2023-05-24

## 2023-05-22 RX ORDER — DIPHENHYDRAMINE HYDROCHLORIDE 50 MG/ML
50 INJECTION INTRAMUSCULAR; INTRAVENOUS
Status: CANCELLED
Start: 2023-05-24

## 2023-05-22 RX ORDER — HEPARIN SODIUM (PORCINE) LOCK FLUSH IV SOLN 100 UNIT/ML 100 UNIT/ML
5 SOLUTION INTRAVENOUS
Status: CANCELLED | OUTPATIENT
Start: 2023-05-24

## 2023-05-22 RX ORDER — EPINEPHRINE 1 MG/ML
0.3 INJECTION, SOLUTION, CONCENTRATE INTRAVENOUS EVERY 5 MIN PRN
Status: CANCELLED | OUTPATIENT
Start: 2023-05-24

## 2023-05-22 RX ORDER — ALBUTEROL SULFATE 90 UG/1
1-2 AEROSOL, METERED RESPIRATORY (INHALATION)
Status: CANCELLED
Start: 2023-05-24

## 2023-05-23 RX ORDER — SEMAGLUTIDE 1.34 MG/ML
INJECTION, SOLUTION SUBCUTANEOUS
OUTPATIENT
Start: 2023-05-23

## 2023-05-24 ENCOUNTER — HOSPITAL ENCOUNTER (OUTPATIENT)
Dept: CT IMAGING | Facility: CLINIC | Age: 53
Discharge: HOME | End: 2023-05-24
Attending: PHYSICIAN ASSISTANT | Admitting: PHYSICIAN ASSISTANT
Payer: COMMERCIAL

## 2023-05-24 ENCOUNTER — LAB (OUTPATIENT)
Dept: INFUSION THERAPY | Facility: CLINIC | Age: 53
End: 2023-05-24
Attending: INTERNAL MEDICINE
Payer: COMMERCIAL

## 2023-05-24 VITALS
TEMPERATURE: 98.2 F | HEART RATE: 105 BPM | SYSTOLIC BLOOD PRESSURE: 127 MMHG | DIASTOLIC BLOOD PRESSURE: 87 MMHG | OXYGEN SATURATION: 97 % | RESPIRATION RATE: 20 BRPM | BODY MASS INDEX: 33.51 KG/M2 | WEIGHT: 227 LBS

## 2023-05-24 DIAGNOSIS — C50.812 MALIGNANT NEOPLASM OF OVERLAPPING SITES OF LEFT BREAST IN FEMALE, ESTROGEN RECEPTOR POSITIVE (H): ICD-10-CM

## 2023-05-24 DIAGNOSIS — Z17.0 MALIGNANT NEOPLASM OF OVERLAPPING SITES OF LEFT BREAST IN FEMALE, ESTROGEN RECEPTOR POSITIVE (H): ICD-10-CM

## 2023-05-24 DIAGNOSIS — Z17.0 MALIGNANT NEOPLASM OF OVERLAPPING SITES OF LEFT BREAST IN FEMALE, ESTROGEN RECEPTOR POSITIVE (H): Primary | ICD-10-CM

## 2023-05-24 DIAGNOSIS — R07.9 CHEST PAIN, UNSPECIFIED TYPE: ICD-10-CM

## 2023-05-24 DIAGNOSIS — C50.812 MALIGNANT NEOPLASM OF OVERLAPPING SITES OF LEFT BREAST IN FEMALE, ESTROGEN RECEPTOR POSITIVE (H): Primary | ICD-10-CM

## 2023-05-24 DIAGNOSIS — R07.9 CHEST PAIN, UNSPECIFIED TYPE: Primary | ICD-10-CM

## 2023-05-24 LAB
BASOPHILS # BLD AUTO: 0 10E3/UL (ref 0–0.2)
BASOPHILS NFR BLD AUTO: 0 %
EOSINOPHIL # BLD AUTO: 0.3 10E3/UL (ref 0–0.7)
EOSINOPHIL NFR BLD AUTO: 6 %
ERYTHROCYTE [DISTWIDTH] IN BLOOD BY AUTOMATED COUNT: 14.8 % (ref 10–15)
HCT VFR BLD AUTO: 39.2 % (ref 35–47)
HGB BLD-MCNC: 12.4 G/DL (ref 11.7–15.7)
IMM GRANULOCYTES # BLD: 0 10E3/UL
IMM GRANULOCYTES NFR BLD: 1 %
LYMPHOCYTES # BLD AUTO: 0.9 10E3/UL (ref 0.8–5.3)
LYMPHOCYTES NFR BLD AUTO: 17 %
MCH RBC QN AUTO: 28.7 PG (ref 26.5–33)
MCHC RBC AUTO-ENTMCNC: 31.6 G/DL (ref 31.5–36.5)
MCV RBC AUTO: 91 FL (ref 78–100)
MONOCYTES # BLD AUTO: 0.2 10E3/UL (ref 0–1.3)
MONOCYTES NFR BLD AUTO: 4 %
NEUTROPHILS # BLD AUTO: 4 10E3/UL (ref 1.6–8.3)
NEUTROPHILS NFR BLD AUTO: 72 %
NRBC # BLD AUTO: 0 10E3/UL
NRBC BLD AUTO-RTO: 0 /100
PLATELET # BLD AUTO: 329 10E3/UL (ref 150–450)
RBC # BLD AUTO: 4.32 10E6/UL (ref 3.8–5.2)
TROPONIN T SERPL HS-MCNC: <6 NG/L
WBC # BLD AUTO: 5.5 10E3/UL (ref 4–11)

## 2023-05-24 PROCEDURE — 85025 COMPLETE CBC W/AUTO DIFF WBC: CPT | Performed by: INTERNAL MEDICINE

## 2023-05-24 PROCEDURE — 93010 ELECTROCARDIOGRAM REPORT: CPT | Performed by: PHYSICIAN ASSISTANT

## 2023-05-24 PROCEDURE — 71275 CT ANGIOGRAPHY CHEST: CPT

## 2023-05-24 PROCEDURE — 258N000003 HC RX IP 258 OP 636: Performed by: INTERNAL MEDICINE

## 2023-05-24 PROCEDURE — 250N000009 HC RX 250: Performed by: PHYSICIAN ASSISTANT

## 2023-05-24 PROCEDURE — 84484 ASSAY OF TROPONIN QUANT: CPT | Performed by: PHYSICIAN ASSISTANT

## 2023-05-24 PROCEDURE — 36415 COLL VENOUS BLD VENIPUNCTURE: CPT

## 2023-05-24 PROCEDURE — 250N000011 HC RX IP 250 OP 636: Performed by: INTERNAL MEDICINE

## 2023-05-24 PROCEDURE — 96413 CHEMO IV INFUSION 1 HR: CPT

## 2023-05-24 PROCEDURE — 250N000011 HC RX IP 250 OP 636: Performed by: PHYSICIAN ASSISTANT

## 2023-05-24 PROCEDURE — 96375 TX/PRO/DX INJ NEW DRUG ADDON: CPT

## 2023-05-24 PROCEDURE — 36415 COLL VENOUS BLD VENIPUNCTURE: CPT | Performed by: INTERNAL MEDICINE

## 2023-05-24 RX ORDER — ONDANSETRON 2 MG/ML
8 INJECTION INTRAMUSCULAR; INTRAVENOUS ONCE
Status: COMPLETED | OUTPATIENT
Start: 2023-05-24 | End: 2023-05-24

## 2023-05-24 RX ORDER — IOPAMIDOL 755 MG/ML
76 INJECTION, SOLUTION INTRAVASCULAR ONCE
Status: COMPLETED | OUTPATIENT
Start: 2023-05-24 | End: 2023-05-24

## 2023-05-24 RX ADMIN — PACLITAXEL 179 MG: 6 INJECTION, SOLUTION INTRAVENOUS at 11:42

## 2023-05-24 RX ADMIN — IOPAMIDOL 76 ML: 755 INJECTION, SOLUTION INTRAVENOUS at 10:47

## 2023-05-24 RX ADMIN — SODIUM CHLORIDE 250 ML: 9 INJECTION, SOLUTION INTRAVENOUS at 11:07

## 2023-05-24 RX ADMIN — SODIUM CHLORIDE 89 ML: 9 INJECTION, SOLUTION INTRAVENOUS at 10:56

## 2023-05-24 RX ADMIN — ONDANSETRON 8 MG: 2 INJECTION INTRAMUSCULAR; INTRAVENOUS at 11:07

## 2023-05-24 ASSESSMENT — PAIN SCALES - GENERAL: PAINLEVEL: NO PAIN (0)

## 2023-05-24 NOTE — PROGRESS NOTES
Infusion Nursing Note:  Yuliya Li presents today for C3D1 taxol.    Patient seen by provider today: No   present during visit today: Not Applicable.    Note: Upon admission patient stated that she has been having chest pain with increased shortness of breath. She went camping last weekend and had to stop several times to catch her breath when they were taking down the tent. Slight cough. Discussed with La, EKG, troponin and CT scan to r/o PE were ordered. All workup negative, proceeded with taxol without complications.      Intravenous Access:  Labs drawn without difficulty.  Peripheral IV placed.    Treatment Conditions:  Lab Results   Component Value Date    HGB 12.4 05/24/2023    WBC 5.5 05/24/2023    ANEU 29.4 (H) 05/02/2023    ANEUTAUTO 4.0 05/24/2023     05/24/2023      Results reviewed, labs MET treatment parameters, ok to proceed with treatment.      Post Infusion Assessment:  Patient tolerated infusion without incident.  Blood return noted pre and post infusion.  Site patent and intact, free from redness, edema or discomfort.  No evidence of extravasations.  Access discontinued per protocol.       Discharge Plan:   AVS to patient via [employer].  Patient will return as prev rose for next appointment.   Patient discharged in stable condition accompanied by: .  Departure Mode: Ambulatory.      Francisco Short RN

## 2023-05-30 DIAGNOSIS — F32.5 MAJOR DEPRESSIVE DISORDER IN FULL REMISSION, UNSPECIFIED WHETHER RECURRENT (H): ICD-10-CM

## 2023-05-30 DIAGNOSIS — L70.8 OTHER ACNE: ICD-10-CM

## 2023-05-30 RX ORDER — HEPARIN SODIUM (PORCINE) LOCK FLUSH IV SOLN 100 UNIT/ML 100 UNIT/ML
5 SOLUTION INTRAVENOUS
Status: CANCELLED | OUTPATIENT
Start: 2023-05-31

## 2023-05-30 RX ORDER — ALBUTEROL SULFATE 0.83 MG/ML
2.5 SOLUTION RESPIRATORY (INHALATION)
Status: CANCELLED | OUTPATIENT
Start: 2023-05-31

## 2023-05-30 RX ORDER — LORAZEPAM 2 MG/ML
0.5 INJECTION INTRAMUSCULAR EVERY 4 HOURS PRN
Status: CANCELLED | OUTPATIENT
Start: 2023-05-31

## 2023-05-30 RX ORDER — ONDANSETRON 2 MG/ML
8 INJECTION INTRAMUSCULAR; INTRAVENOUS ONCE
Status: CANCELLED | OUTPATIENT
Start: 2023-05-31

## 2023-05-30 RX ORDER — MEPERIDINE HYDROCHLORIDE 25 MG/ML
25 INJECTION INTRAMUSCULAR; INTRAVENOUS; SUBCUTANEOUS EVERY 30 MIN PRN
Status: CANCELLED | OUTPATIENT
Start: 2023-05-31

## 2023-05-30 RX ORDER — DIPHENHYDRAMINE HYDROCHLORIDE 50 MG/ML
50 INJECTION INTRAMUSCULAR; INTRAVENOUS
Status: CANCELLED
Start: 2023-05-31

## 2023-05-30 RX ORDER — ALBUTEROL SULFATE 90 UG/1
1-2 AEROSOL, METERED RESPIRATORY (INHALATION)
Status: CANCELLED
Start: 2023-05-31

## 2023-05-30 RX ORDER — DIPHENHYDRAMINE HCL 25 MG
50 CAPSULE ORAL
Status: CANCELLED
Start: 2023-05-31

## 2023-05-30 RX ORDER — HEPARIN SODIUM,PORCINE 10 UNIT/ML
5 VIAL (ML) INTRAVENOUS
Status: CANCELLED | OUTPATIENT
Start: 2023-05-31

## 2023-05-30 RX ORDER — EPINEPHRINE 1 MG/ML
0.3 INJECTION, SOLUTION, CONCENTRATE INTRAVENOUS EVERY 5 MIN PRN
Status: CANCELLED | OUTPATIENT
Start: 2023-05-31

## 2023-05-31 ENCOUNTER — LAB (OUTPATIENT)
Dept: INFUSION THERAPY | Facility: CLINIC | Age: 53
End: 2023-05-31
Attending: INTERNAL MEDICINE
Payer: COMMERCIAL

## 2023-05-31 ENCOUNTER — ONCOLOGY VISIT (OUTPATIENT)
Dept: ONCOLOGY | Facility: CLINIC | Age: 53
End: 2023-05-31
Attending: INTERNAL MEDICINE
Payer: COMMERCIAL

## 2023-05-31 VITALS
HEART RATE: 100 BPM | TEMPERATURE: 98.5 F | BODY MASS INDEX: 34.01 KG/M2 | WEIGHT: 230.4 LBS | RESPIRATION RATE: 16 BRPM | OXYGEN SATURATION: 96 % | DIASTOLIC BLOOD PRESSURE: 79 MMHG | SYSTOLIC BLOOD PRESSURE: 115 MMHG

## 2023-05-31 DIAGNOSIS — R11.0 NAUSEA: ICD-10-CM

## 2023-05-31 DIAGNOSIS — R21 RASH: ICD-10-CM

## 2023-05-31 DIAGNOSIS — C50.812 MALIGNANT NEOPLASM OF OVERLAPPING SITES OF LEFT BREAST IN FEMALE, ESTROGEN RECEPTOR POSITIVE (H): Primary | ICD-10-CM

## 2023-05-31 DIAGNOSIS — Z17.0 MALIGNANT NEOPLASM OF OVERLAPPING SITES OF LEFT BREAST IN FEMALE, ESTROGEN RECEPTOR POSITIVE (H): Primary | ICD-10-CM

## 2023-05-31 DIAGNOSIS — N95.1 MENOPAUSAL SYNDROME (HOT FLASHES): ICD-10-CM

## 2023-05-31 LAB
ALBUMIN SERPL BCG-MCNC: 3.8 G/DL (ref 3.5–5.2)
ALP SERPL-CCNC: 59 U/L (ref 35–104)
ALT SERPL W P-5'-P-CCNC: 23 U/L (ref 10–35)
AST SERPL W P-5'-P-CCNC: 18 U/L (ref 10–35)
BASOPHILS # BLD AUTO: 0 10E3/UL (ref 0–0.2)
BASOPHILS NFR BLD AUTO: 1 %
BILIRUB DIRECT SERPL-MCNC: <0.2 MG/DL (ref 0–0.3)
BILIRUB SERPL-MCNC: 0.2 MG/DL
EOSINOPHIL # BLD AUTO: 0.1 10E3/UL (ref 0–0.7)
EOSINOPHIL NFR BLD AUTO: 3 %
ERYTHROCYTE [DISTWIDTH] IN BLOOD BY AUTOMATED COUNT: 15.2 % (ref 10–15)
HCT VFR BLD AUTO: 37.7 % (ref 35–47)
HGB BLD-MCNC: 12.4 G/DL (ref 11.7–15.7)
IMM GRANULOCYTES # BLD: 0 10E3/UL
IMM GRANULOCYTES NFR BLD: 1 %
LYMPHOCYTES # BLD AUTO: 1 10E3/UL (ref 0.8–5.3)
LYMPHOCYTES NFR BLD AUTO: 24 %
MCH RBC QN AUTO: 29.8 PG (ref 26.5–33)
MCHC RBC AUTO-ENTMCNC: 32.9 G/DL (ref 31.5–36.5)
MCV RBC AUTO: 91 FL (ref 78–100)
MONOCYTES # BLD AUTO: 0.3 10E3/UL (ref 0–1.3)
MONOCYTES NFR BLD AUTO: 8 %
NEUTROPHILS # BLD AUTO: 2.7 10E3/UL (ref 1.6–8.3)
NEUTROPHILS NFR BLD AUTO: 63 %
NRBC # BLD AUTO: 0 10E3/UL
NRBC BLD AUTO-RTO: 0 /100
PLATELET # BLD AUTO: 308 10E3/UL (ref 150–450)
PROT SERPL-MCNC: 6.2 G/DL (ref 6.4–8.3)
RBC # BLD AUTO: 4.16 10E6/UL (ref 3.8–5.2)
WBC # BLD AUTO: 4.2 10E3/UL (ref 4–11)

## 2023-05-31 PROCEDURE — G0463 HOSPITAL OUTPT CLINIC VISIT: HCPCS | Mod: 25 | Performed by: INTERNAL MEDICINE

## 2023-05-31 PROCEDURE — G0463 HOSPITAL OUTPT CLINIC VISIT: HCPCS | Performed by: INTERNAL MEDICINE

## 2023-05-31 PROCEDURE — 258N000003 HC RX IP 258 OP 636: Performed by: INTERNAL MEDICINE

## 2023-05-31 PROCEDURE — 250N000011 HC RX IP 250 OP 636: Performed by: INTERNAL MEDICINE

## 2023-05-31 PROCEDURE — 96375 TX/PRO/DX INJ NEW DRUG ADDON: CPT

## 2023-05-31 PROCEDURE — 96413 CHEMO IV INFUSION 1 HR: CPT

## 2023-05-31 PROCEDURE — 85025 COMPLETE CBC W/AUTO DIFF WBC: CPT | Performed by: INTERNAL MEDICINE

## 2023-05-31 PROCEDURE — 99214 OFFICE O/P EST MOD 30 MIN: CPT | Performed by: INTERNAL MEDICINE

## 2023-05-31 PROCEDURE — 96367 TX/PROPH/DG ADDL SEQ IV INF: CPT

## 2023-05-31 PROCEDURE — 80076 HEPATIC FUNCTION PANEL: CPT | Performed by: INTERNAL MEDICINE

## 2023-05-31 PROCEDURE — 36415 COLL VENOUS BLD VENIPUNCTURE: CPT | Performed by: INTERNAL MEDICINE

## 2023-05-31 RX ORDER — ANASTROZOLE 1 MG/1
1 TABLET ORAL DAILY
Qty: 90 TABLET | Refills: 3 | Status: SHIPPED | OUTPATIENT
Start: 2023-05-31 | End: 2023-12-28 | Stop reason: SINTOL

## 2023-05-31 RX ORDER — ONDANSETRON 2 MG/ML
8 INJECTION INTRAMUSCULAR; INTRAVENOUS ONCE
Status: COMPLETED | OUTPATIENT
Start: 2023-05-31 | End: 2023-05-31

## 2023-05-31 RX ADMIN — SODIUM CHLORIDE 250 ML: 9 INJECTION, SOLUTION INTRAVENOUS at 10:05

## 2023-05-31 RX ADMIN — DIPHENHYDRAMINE HYDROCHLORIDE 50 MG: 50 INJECTION, SOLUTION INTRAMUSCULAR; INTRAVENOUS at 10:05

## 2023-05-31 RX ADMIN — SODIUM CHLORIDE 179 MG: 9 INJECTION, SOLUTION INTRAVENOUS at 10:33

## 2023-05-31 RX ADMIN — FAMOTIDINE 20 MG: 10 INJECTION, SOLUTION INTRAVENOUS at 10:05

## 2023-05-31 RX ADMIN — ONDANSETRON 8 MG: 2 INJECTION INTRAMUSCULAR; INTRAVENOUS at 10:05

## 2023-05-31 ASSESSMENT — PAIN SCALES - GENERAL: PAINLEVEL: NO PAIN (0)

## 2023-05-31 NOTE — LETTER
5/31/2023         RE: Yuliya Li  [address]  [place] [address]        Dear Colleague,    Thank you for referring your patient, Yuliya Li, to the [facility]. Please see a copy of my visit note below.    [facility]    Hematology/Oncology Established Patient Note      Today's Date: 5/31/2023    Reason for follow-up:  Left breast cancer.    HISTORY OF PRESENT ILLNESS: Yuliya Li is a 52 year old female status post removal of single ovary and hysterectomy who presents with the following oncologic history:  1.  12/23/2022: Mammogram showed possible asymmetry in right breast at 12:00; possible mass in left breast at 3:00.  2. 1/2/2023: Diagnostic bilateral mammogram showed mass in left breast at 3:00 with 0.4 cm satellite mass in retroareolar breast. Right breast with effacement of possible finding at 12:00 consistent with artifact. Left breast U/S showed mass at 3:00, 11 cm from nipple measuring 1.9 x 1.6 x 1.8 cm; no abnormal axillary lymph nodes.  3. 1/03/2022: Left breast core biopsy of 0.4 cm mass at 3:30 position showed grade 2 invasive ductal carcinoma with lobular features. Biopsy of left breast 1.9 cm mass at 3:00 showed grade 2 invasive ductal carcinoma with lobular features, associated grade 2 DCIS. ER and NH strongly positive at %; HER-2 IHC = 2+; HER-2/crow FISH negative.  4. 1/10/2023: FSH = 36.6 but estradiol = 63.  5.  2/03/2023: Bilateral mastectomies with left axillary sentinel lymph node excision and left chest wall nodule excision with Dr. Clarissa Fontanez.  Pathology showed left breast grade 2 invasive ductal carcinoma with lobular features, multifocal with 2 cm and 0.4 cm tumors, grade 2 DCIS, all margins negative.  Of 3 left axillary sentinel lymph nodes removed, one with 6 mm macrometastasis positive for extranodal extension and one with 1.1 mm micrometastasis, negative for extranodal extension, identified. Left  chest wall nodule positive for metastatic breast carcinoma involving edge of tissue fragment. HER-2 FISH repeated, group 4, considered HER-2 negative. Oncotype DX = 20, 9-year risk of distant recurrence of 17% after 5 years of hormone blockade therapy; breast-cancer specific survival of 96.7% treated without chemotherapy.  6. 2/23/2023: PET/CT scan negative for metastatic disease. Small right middle lobe lung nodules, non-FDG avid.  7. 3/29/2023: Started adjuvant chemotherapy with Taxotere and Cytoxan. Received cycle 2 on 4/19/2023, complicated by allergic reaction with chest heaviness followed by diffuse rash. Then switched to weekly paclitaxel for 6 weeks.    INTERVAL HISTORY:  Yuliya reports she had an episode of chest tightness and abrupt dyspnea when she stepped outside with exposure to pollen. She was seen in ED with workup negative for cardiac issue and negative for PE.  She had an episode of nausea with emesis about 2 weeks ago.  She has been controlling her rash flares with Benadryl 50 mg twice daily. She reports hot flashes.      REVIEW OF SYSTEMS:   14 point ROS was reviewed and is negative other than as noted above in HPI.       HOME MEDICATIONS:  Current Outpatient Medications   Medication Sig Dispense Refill     acetaminophen (TYLENOL) 500 MG tablet Take 500-1,000 mg by mouth every 6 hours as needed for mild pain       alcohol swab prep pads Use to swab area of injection/amy as directed. 100 each 3     blood glucose (NO BRAND SPECIFIED) test strip Use to test blood sugar 2 times daily or as directed. To accompany: Blood Glucose Monitor Brands: per insurance. 100 strip 6     blood glucose calibration (NO BRAND SPECIFIED) solution To accompany: Blood Glucose Monitor Brands: per insurance. 1 each 1     blood glucose monitoring (NO BRAND SPECIFIED) meter device kit Use to test blood sugar 2 times daily or as directed. Preferred blood glucose meter OR supplies to accompany: Blood Glucose Monitor Brands:  per insurance. 1 kit 0     dexamethasone (DECADRON) 4 MG tablet Take 2 tablets (8 mg) by mouth 2 times daily (with meals) for 3 doses Start evening of Docetaxel infusion and continue for a total of 3 doses. 6 tablet 3     diphenhydrAMINE (BENADRYL) 25 MG capsule Take 2 capsules (50 mg) by mouth every 6 hours as needed for itching or allergies 30 capsule 0     EPINEPHrine (ANY BX GENERIC EQUIV) 0.3 MG/0.3ML injection 2-pack Inject 0.3 mLs (0.3 mg) into the muscle once as needed for anaphylaxis May repeat one time in 5-15 minutes if response to initial dose is inadequate. 2 each 0     FLUoxetine (PROZAC) 40 MG capsule Take 1 capsule (40 mg) by mouth daily 90 capsule 1     medical cannabis (Patient's own supply) Take 1 Dose by mouth nightly as needed Gummies: 5 mg   Formulary: Red  Location: Unknown    (The purpose of this order is to document that the patient reports taking medical cannabis.  This is not a prescription, and is not used to certify that the patient has a qualifying medical condition.)       melatonin 3 MG tablet Take 6 mg by mouth nightly as needed for sleep       metFORMIN (GLUCOPHAGE XR) 500 MG 24 hr tablet Take 1 tablet (500 mg) by mouth daily 90 tablet 3     ondansetron (ZOFRAN) 8 MG tablet Take 1 tablet (8 mg) by mouth every 8 hours as needed for nausea (vomiting) 30 tablet 2     prochlorperazine (COMPAZINE) 10 MG tablet Take 1 tablet (10 mg) by mouth every 6 hours as needed for nausea or vomiting 30 tablet 2     prochlorperazine (COMPAZINE) 10 MG tablet Take 1 tablet (10 mg) by mouth every 6 hours as needed for nausea or vomiting 30 tablet 2     Semaglutide, 1 MG/DOSE, (OZEMPIC, 1 MG/DOSE,) 4 MG/3ML pen Inject 1 mg Subcutaneous every 7 days (On Fridays) 9 mL 1     spironolactone (ALDACTONE) 100 MG tablet Take 1 tablet (100 mg) by mouth daily 90 tablet 1     thin (NO BRAND SPECIFIED) lancets Use with lanceting device. To accompany: Blood Glucose Monitor Brands: per insurance. 100 each 6      triamcinolone (KENALOG) 0.1 % external cream Apply topically 2 times daily 45 g 1         ALLERGIES:  Allergies   Allergen Reactions     Docetaxel Anaphylaxis, Hives, Itching and Shortness Of Breath     Keflex [Cephalexin]      Tetracycline      Trazodone      Very sedating-         PAST MEDICAL HISTORY:  Diabetes mellitus type 2  Hyperlipidemia    Gynecologic history:  Age of menarche at 11.  Last menstrual period in  prior to hysterectomy.  G3, P3.  Age of first pregnancy at 29.  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  No use of HRT.  Used OCPs for 4 years.  No IUD use.      PAST SURGICAL HISTORY:  Past Surgical History:   Procedure Laterality Date     CYST REMOVAL       HYSTERECTOMY       HYSTERECTOMY, PAP NO LONGER INDICATED      menorrhagia     INSERT TISSUE EXPANDER BREAST Bilateral 2/3/2023    Procedure: BILATERAL TISSUE EXPANDER;  Surgeon: Jaya Malave MD;  Location: [facility] OR     MASTECTOMY SIMPLE BILATERAL, SENTINEL NODE BILATERAL, COMBINED Left 2/3/2023    Procedure: Bilateral skin sparing mastectomies with left axillary sentinel lymph node biopsy;  Surgeon: Clarissa Fontanez MD;  Location:  OR         SOCIAL HISTORY:  Social History     Socioeconomic History     Marital status:      Spouse name: Not on file     Number of children: 3     Years of education: Not on file     Highest education level: Not on file   Occupational History     Not on file   Tobacco Use     Smoking status: Former     Packs/day: 0.00     Types: Cigarettes     Quit date: 1999     Years since quittin.5     Smokeless tobacco: Never   Vaping Use     Vaping status: Never Used   Substance and Sexual Activity     Alcohol use: Yes     Comment: Alcoholic Drinks/day: 1 a week     Drug use: Yes     Types: Marijuana     Comment: nightly for sleep (gummy)     Sexual activity: Yes     Partners: Male   Other Topics Concern     Not on file   Social History Narrative     Not on file     Social Determinants of Health      Financial Resource Strain: Low Risk  (10/26/2022)    Overall Financial Resource Strain (CARDIA)      Difficulty of Paying Living Expenses: Not very hard   Food Insecurity: No Food Insecurity (10/26/2022)    Hunger Vital Sign      Worried About Running Out of Food in the Last Year: Never true      Ran Out of Food in the Last Year: Never true   Transportation Needs: No Transportation Needs (10/26/2022)    PRAPARE - Transportation      Lack of Transportation (Medical): No      Lack of Transportation (Non-Medical): No   Physical Activity: Insufficiently Active (10/26/2022)    Exercise Vital Sign      Days of Exercise per Week: 1 day      Minutes of Exercise per Session: 20 min   Stress: Stress Concern Present (10/26/2022)    [employer] - Occupational Stress Questionnaire      Feeling of Stress : Rather much   Social Connections: Moderately Isolated (10/26/2022)    Social Connection and Isolation Panel [NHANES]      Frequency of Communication with Friends and Family: More than three times a week      Frequency of Social Gatherings with Friends and Family: Once a week      Attends Worship Services: Never      Active Member of Clubs or Organizations: No      Attends Club or Organization Meetings: Not on file      Marital Status:    Intimate Partner Violence: Not on file   Housing Stability: Low Risk  (10/26/2022)    Housing Stability Vital Sign      Unable to Pay for Housing in the Last Year: No      Number of Places Lived in the Last Year: 1      Unstable Housing in the Last Year: No         FAMILY HISTORY:  Family History   Problem Relation Age of Onset     Diabetes Mother      Atrial fibrillation Father      Alcoholism Father      Melanoma Father      Breast Cancer Maternal Aunt         in her 40's     Cervical Cancer Maternal Aunt      Colon Cancer No family hx of          PHYSICAL EXAM:  Vital signs:  /79   Pulse 100   Temp 98.5  F (36.9  C) (Oral)   Resp 16   Wt  104.5 kg (230 lb 6.4 oz)   LMP  (LMP Unknown)   SpO2 96%   BMI 34.01 kg/m     ECO  GENERAL/CONSTITUTIONAL: No acute distress.  EYES: No erythema or scleral icterus.  ENT/MOUTH: Neck supple.  LYMPH: No cervical, supraclavicular, axillary adenopathy.   RESPIRATORY: Clear to auscultation bilaterally. No crackles or wheezing.   CARDIOVASCULAR: Regular rate and rhythm without murmurs.  GASTROINTESTINAL: No hepatosplenomegaly, masses, or tenderness. No guarding.  No distention.  MUSCULOSKELETAL: Warm and well-perfused, no cyanosis, clubbing, or edema.  NEUROLOGIC: No focal motor deficits. Alert, oriented, answers questions appropriately.  INTEGUMENTARY: No rashes or jaundice.  GAIT: Steady, does not use assistive device      LABS:  CBC RESULTS: Recent Labs   Lab Test 23  0822   WBC 4.2   RBC 4.16   HGB 12.4   HCT 37.7   MCV 91   MCH 29.8   MCHC 32.9   RDW 15.2*            PATHOLOGY:  None new.    IMAGING:  None new.    ASSESSMENT/PLAN:  Yuliya Li is a 52 year old female with the following issues:  1. Pathologic prognostic stage IA, [id number], grade 2 multifocal invasive ductal carcinoma with lobular features of left breast overlapping sites, ER positive, NH positive, HER-2 IHC = 2+; HER-2/crow FISH negative, Oncotype DX = 20  --Yuliya had 2 tumors measuring 2 cm and 0.4 cm and additional left chest wall nodule, but with 2/3 lymph nodes involved, one that was a macrometastasis with extranodal extension and the other with micrometastasis with no extranodal extension.  All areas of tumor were excised.  The repeat HER-2 FISH was interpreted as negative.   --2023 PET scan showed no evidence for metastatic disease.  --Oncotype DX score 20, with 9-year risk of distant recurrence of 17% after 5 years of hormone blockade therapy; breast-cancer specific survival of 96.7% treated without chemotherapy. Discussed that this would indicate up to 3.3% absolute benefit of chemotherapy.    --She elected to  proceed with 4 cycles of Taxotere and Cytoxan every 3 weeks to maximally reduce her risk beyond just hormone blockade therapy alone.  However, she reacted to presumably Taxotere after cycle 2.   --Therefore, she was switched to Taxol weekly for planned 6 weeks.  Will not proceed with dose dense AC given that her maximum absolute chemo benefit is up to 3.3% and would not justify the potential adverse effects of ddAC. She agrees with this plan of care.  --I reviewed her labs which show normal CBC today with her ANC normal at 2.7. She may proceed with Taxol today.  --She is perimenopausal, so after she completes chemo, I recommended OST + AI with Zoladex every 4 weeks with anastrozole for 10 years of hormone blockade therapy.   In all likelihood, she would probably not need more than a few months to a year of Zoladex. Would plan to repeat FSH and estradiol off Zoladex in the future. She agrees to Zoladex + anastrozole, to commence about 1 week after completion of chemo.  --We previously talked about abemaciclib (Verzenio) and whether this could benefit her due to the chest wall nodule that was found and resected at time of surgery.  Even with the expanded indication for adjuvant abemaciclib based on the monarchE trial, her grade 2 and TNM stage of disease would not meet criteria for this drug.   --3/20/2023 DEXA scan showed normal bone density for baseline.  --She will see radiation oncology back for adjuvant radiation therapy after her last cycle of chemo.    2.  Family history of breast cancer  - Maternal aunt with breast cancer.  - I have recommended and referred Yuliya to genetic counseling.      3.  Neck and back pain, chronic  - PET scan showed no evidence of distant metastatic disease.    4. Indeterminate right middle lobe pulmonary nodules  --2/23/2023 PET/CT scan showed 2 small indeterminate right middle lobe pulmonary nodules.   --Advised repeat 6-month interval CT chest in 8/2023.    5. Hot flashes  --Offered  "medication such as venlafaxine to alleviate hot flashes.  She would like to hold off on new medication for now.    6. Rash  --Related to taxane chemo.  --She will continue Benadryl 50 mg twice daily throughout her remaining treatment.    7. Seasonal allergy  --Increased pollen outside currently.  --I advised she start Claritin or Allegra daily.    Louise Watson MD  Hematology/Oncology  [facility]    Total time spent: 30 minutes in patient evaluation, counseling, documentation, and coordination of care.    Oncology Rooming Note    May 31, 2023 8:41 AM   Yuliya Li is a 52 year old female who presents for:    Chief Complaint   Patient presents with     Oncology Clinic Visit     Initial Vitals: /79   Pulse 100   Temp 98.5  F (36.9  C) (Oral)   Resp 16   Wt 104.5 kg (230 lb 6.4 oz)   LMP  (LMP Unknown)   SpO2 96%   BMI 34.01 kg/m   Estimated body mass index is 34.01 kg/m  as calculated from the following:    Height as of 5/17/23: 1.753 m (5' 9.02\").    Weight as of this encounter: 104.5 kg (230 lb 6.4 oz). Body surface area is 2.26 meters squared.  No Pain (0) Comment: Data Unavailable   No LMP recorded (lmp unknown). Patient has had a hysterectomy.  Allergies reviewed: Yes  Medications reviewed: Yes    Medications: Medication refills not needed today.  Pharmacy name entered into [employer]:    [id number] [place], [place] - [address] [facility] - [facility] [address] [place] [address] PHARMACY [facility] [address] DR[id number] [facility] [address] [facility] [place] [address] [place]    Clinical concerns:  doctor was notified.      Alicia Schneider [facility]                Again, thank you for allowing me to participate in the care of your patient.        Sincerely,        Louise Watson MD    "

## 2023-05-31 NOTE — PROGRESS NOTES
Infusion Nursing Note:  Yuliya Li presents today for C4D1 taxol  Patient seen by provider today: Yes: Dr. Watson   present during visit today: Not Applicable.    Note: No new concerns today after seeing Dr. Watson. Ice to hands and feet during taxol.      Intravenous Access:  Peripheral IV placed.    Treatment Conditions:  Lab Results   Component Value Date    HGB 12.4 05/31/2023    WBC 4.2 05/31/2023    ANEU 29.4 (H) 05/02/2023    ANEUTAUTO 2.7 05/31/2023     05/31/2023      Lab Results   Component Value Date     05/02/2023    POTASSIUM 4.2 05/02/2023    CR 0.88 05/02/2023    CHASE 9.3 05/02/2023    BILITOTAL 0.2 05/31/2023    ALBUMIN 3.8 05/31/2023    ALT 23 05/31/2023    AST 18 05/31/2023     Results reviewed, labs MET treatment parameters, ok to proceed with treatment.      Post Infusion Assessment:  Patient tolerated infusion without incident.  Blood return noted pre and post infusion.  Site patent and intact, free from redness, edema or discomfort.  No evidence of extravasations.  Access discontinued per protocol.       Discharge Plan:   AVS to patient via MYCHART.  Patient will return as prev rose for next appointment.   Patient discharged in stable condition accompanied by: .  Departure Mode: Ambulatory.      Francisco Short RN

## 2023-05-31 NOTE — PROGRESS NOTES
Medical Assistant Note:  Yuliya Li presents today for lab  draw.    Patient seen by provider today: No.   present during visit today: Not Applicable.    Concerns: No Concerns.    Procedure:  Lab draw site: RAC, Needle type: BF, Gauge: 21. Gauze and coban applied    Post Assessment:  Labs drawn without difficulty: Yes.    Discharge Plan:  Departure Mode: Ambulatory.    Face to Face Time: 5.    Jazzmine Chapa CMA

## 2023-05-31 NOTE — PATIENT INSTRUCTIONS
ANDREE SCHMITT 6/22.  Zoladex injection every 4 weeks starting 6/22.  Start anastrozole 1 mg orally daily on 6/23.

## 2023-05-31 NOTE — PROGRESS NOTES
"Oncology Rooming Note    May 31, 2023 8:41 AM   Yuliya Li is a 52 year old female who presents for:    Chief Complaint   Patient presents with     Oncology Clinic Visit     Initial Vitals: /79   Pulse 100   Temp 98.5  F (36.9  C) (Oral)   Resp 16   Wt 104.5 kg (230 lb 6.4 oz)   LMP  (LMP Unknown)   SpO2 96%   BMI 34.01 kg/m   Estimated body mass index is 34.01 kg/m  as calculated from the following:    Height as of 5/17/23: 1.753 m (5' 9.02\").    Weight as of this encounter: 104.5 kg (230 lb 6.4 oz). Body surface area is 2.26 meters squared.  No Pain (0) Comment: Data Unavailable   No LMP recorded (lmp unknown). Patient has had a hysterectomy.  Allergies reviewed: Yes  Medications reviewed: Yes    Medications: Medication refills not needed today.  Pharmacy name entered into [employer]:    [id number] - [place], [place] - [address] [facility] - [place], [place] - [address] [place] [address] PHARMACY [place], [place] - [address] DRUG STORE [id number] [place], [place] - [address] [facility]    Clinical concerns:  doctor was notified.      Alicia Schneider [facility]            "

## 2023-06-01 RX ORDER — FLUOXETINE 40 MG/1
CAPSULE ORAL
Qty: 90 CAPSULE | Refills: 0 | Status: SHIPPED | OUTPATIENT
Start: 2023-06-01 | End: 2023-08-30

## 2023-06-01 RX ORDER — SPIRONOLACTONE 100 MG/1
TABLET, FILM COATED ORAL
Qty: 90 TABLET | Refills: 1 | Status: SHIPPED | OUTPATIENT
Start: 2023-06-01 | End: 2023-11-01

## 2023-06-01 NOTE — TELEPHONE ENCOUNTER
Routing request to MA/TC pool to contact patient for updated questionnaire.   Questionnaire(s) not current: PHQ-9    Visit is up to date. RN will issue one time 90 day alena refill. Please assist with updating questionnaire.   Ernie GARNETT RN 6/1/2023 at 8:44 AM

## 2023-06-01 NOTE — TELEPHONE ENCOUNTER
1st attempt at contacting patient, sent [employer] ([employer])t message with PHQ-9 attached.     Maile Fonseca, CMA

## 2023-06-02 DIAGNOSIS — R21 RASH: Primary | ICD-10-CM

## 2023-06-02 RX ORDER — PREDNISONE 20 MG/1
40 TABLET ORAL DAILY
Qty: 10 TABLET | Refills: 0 | Status: SHIPPED | OUTPATIENT
Start: 2023-06-02 | End: 2023-06-07

## 2023-06-05 RX ORDER — HEPARIN SODIUM,PORCINE 10 UNIT/ML
5 VIAL (ML) INTRAVENOUS
Status: CANCELLED | OUTPATIENT
Start: 2023-06-07

## 2023-06-05 RX ORDER — MEPERIDINE HYDROCHLORIDE 25 MG/ML
25 INJECTION INTRAMUSCULAR; INTRAVENOUS; SUBCUTANEOUS EVERY 30 MIN PRN
Status: CANCELLED | OUTPATIENT
Start: 2023-06-07

## 2023-06-05 RX ORDER — LORAZEPAM 2 MG/ML
0.5 INJECTION INTRAMUSCULAR EVERY 4 HOURS PRN
Status: CANCELLED | OUTPATIENT
Start: 2023-06-07

## 2023-06-05 RX ORDER — ALBUTEROL SULFATE 0.83 MG/ML
2.5 SOLUTION RESPIRATORY (INHALATION)
Status: CANCELLED | OUTPATIENT
Start: 2023-06-07

## 2023-06-05 RX ORDER — DIPHENHYDRAMINE HYDROCHLORIDE 50 MG/ML
50 INJECTION INTRAMUSCULAR; INTRAVENOUS
Status: CANCELLED
Start: 2023-06-07

## 2023-06-05 RX ORDER — ONDANSETRON 2 MG/ML
8 INJECTION INTRAMUSCULAR; INTRAVENOUS ONCE
Status: CANCELLED | OUTPATIENT
Start: 2023-06-07

## 2023-06-05 RX ORDER — DIPHENHYDRAMINE HCL 25 MG
50 CAPSULE ORAL
Status: CANCELLED
Start: 2023-06-07

## 2023-06-05 RX ORDER — HEPARIN SODIUM (PORCINE) LOCK FLUSH IV SOLN 100 UNIT/ML 100 UNIT/ML
5 SOLUTION INTRAVENOUS
Status: CANCELLED | OUTPATIENT
Start: 2023-06-07

## 2023-06-05 RX ORDER — EPINEPHRINE 1 MG/ML
0.3 INJECTION, SOLUTION, CONCENTRATE INTRAVENOUS EVERY 5 MIN PRN
Status: CANCELLED | OUTPATIENT
Start: 2023-06-07

## 2023-06-05 RX ORDER — ALBUTEROL SULFATE 90 UG/1
1-2 AEROSOL, METERED RESPIRATORY (INHALATION)
Status: CANCELLED
Start: 2023-06-07

## 2023-06-07 ENCOUNTER — LAB (OUTPATIENT)
Dept: INFUSION THERAPY | Facility: CLINIC | Age: 53
End: 2023-06-07
Attending: INTERNAL MEDICINE
Payer: COMMERCIAL

## 2023-06-07 VITALS
RESPIRATION RATE: 18 BRPM | DIASTOLIC BLOOD PRESSURE: 85 MMHG | SYSTOLIC BLOOD PRESSURE: 126 MMHG | WEIGHT: 227.3 LBS | HEIGHT: 69 IN | OXYGEN SATURATION: 97 % | HEART RATE: 106 BPM | TEMPERATURE: 97.8 F | BODY MASS INDEX: 33.67 KG/M2

## 2023-06-07 DIAGNOSIS — C50.812 MALIGNANT NEOPLASM OF OVERLAPPING SITES OF LEFT BREAST IN FEMALE, ESTROGEN RECEPTOR POSITIVE (H): Primary | ICD-10-CM

## 2023-06-07 DIAGNOSIS — Z17.0 MALIGNANT NEOPLASM OF OVERLAPPING SITES OF LEFT BREAST IN FEMALE, ESTROGEN RECEPTOR POSITIVE (H): Primary | ICD-10-CM

## 2023-06-07 DIAGNOSIS — R21 RASH: ICD-10-CM

## 2023-06-07 LAB
BASOPHILS # BLD AUTO: 0 10E3/UL (ref 0–0.2)
BASOPHILS NFR BLD AUTO: 0 %
EOSINOPHIL # BLD AUTO: 0 10E3/UL (ref 0–0.7)
EOSINOPHIL NFR BLD AUTO: 0 %
ERYTHROCYTE [DISTWIDTH] IN BLOOD BY AUTOMATED COUNT: 15.4 % (ref 10–15)
HCT VFR BLD AUTO: 39.7 % (ref 35–47)
HGB BLD-MCNC: 13 G/DL (ref 11.7–15.7)
IMM GRANULOCYTES # BLD: 0.1 10E3/UL
IMM GRANULOCYTES NFR BLD: 1 %
LYMPHOCYTES # BLD AUTO: 0.8 10E3/UL (ref 0.8–5.3)
LYMPHOCYTES NFR BLD AUTO: 10 %
MCH RBC QN AUTO: 30 PG (ref 26.5–33)
MCHC RBC AUTO-ENTMCNC: 32.7 G/DL (ref 31.5–36.5)
MCV RBC AUTO: 92 FL (ref 78–100)
MONOCYTES # BLD AUTO: 0.2 10E3/UL (ref 0–1.3)
MONOCYTES NFR BLD AUTO: 2 %
NEUTROPHILS # BLD AUTO: 7.5 10E3/UL (ref 1.6–8.3)
NEUTROPHILS NFR BLD AUTO: 87 %
NRBC # BLD AUTO: 0 10E3/UL
NRBC BLD AUTO-RTO: 0 /100
PLATELET # BLD AUTO: 422 10E3/UL (ref 150–450)
RBC # BLD AUTO: 4.33 10E6/UL (ref 3.8–5.2)
WBC # BLD AUTO: 8.6 10E3/UL (ref 4–11)

## 2023-06-07 PROCEDURE — 250N000011 HC RX IP 250 OP 636: Performed by: INTERNAL MEDICINE

## 2023-06-07 PROCEDURE — 96413 CHEMO IV INFUSION 1 HR: CPT

## 2023-06-07 PROCEDURE — 258N000003 HC RX IP 258 OP 636: Performed by: INTERNAL MEDICINE

## 2023-06-07 PROCEDURE — 36415 COLL VENOUS BLD VENIPUNCTURE: CPT | Performed by: INTERNAL MEDICINE

## 2023-06-07 PROCEDURE — 96367 TX/PROPH/DG ADDL SEQ IV INF: CPT

## 2023-06-07 PROCEDURE — 85025 COMPLETE CBC W/AUTO DIFF WBC: CPT | Performed by: INTERNAL MEDICINE

## 2023-06-07 PROCEDURE — 96375 TX/PRO/DX INJ NEW DRUG ADDON: CPT

## 2023-06-07 RX ORDER — ONDANSETRON 2 MG/ML
8 INJECTION INTRAMUSCULAR; INTRAVENOUS ONCE
Status: COMPLETED | OUTPATIENT
Start: 2023-06-07 | End: 2023-06-07

## 2023-06-07 RX ORDER — PREDNISONE 20 MG/1
40 TABLET ORAL DAILY
Qty: 10 TABLET | Refills: 0 | Status: SHIPPED | OUTPATIENT
Start: 2023-06-07 | End: 2023-06-07

## 2023-06-07 RX ORDER — PREDNISONE 20 MG/1
40 TABLET ORAL DAILY
Qty: 20 TABLET | Refills: 0 | Status: SHIPPED | OUTPATIENT
Start: 2023-06-07 | End: 2023-08-01

## 2023-06-07 RX ADMIN — ONDANSETRON 8 MG: 2 INJECTION INTRAMUSCULAR; INTRAVENOUS at 11:11

## 2023-06-07 RX ADMIN — PACLITAXEL 179 MG: 6 INJECTION, SOLUTION INTRAVENOUS at 11:52

## 2023-06-07 RX ADMIN — SODIUM CHLORIDE 250 ML: 9 INJECTION, SOLUTION INTRAVENOUS at 11:11

## 2023-06-07 RX ADMIN — DIPHENHYDRAMINE HYDROCHLORIDE 50 MG: 50 INJECTION, SOLUTION INTRAMUSCULAR; INTRAVENOUS at 11:17

## 2023-06-07 RX ADMIN — FAMOTIDINE 20 MG: 10 INJECTION, SOLUTION INTRAVENOUS at 11:15

## 2023-06-07 NOTE — PROGRESS NOTES
Infusion Nursing Note:  Yuliya Li presents today for C5D1 Taxol.    Patient seen by provider today: No   present during visit today: Not Applicable.    Note: Patient started taking Prednisone with C3 x 2 days following chemo d/t rash/hives.  Patient states after the last cycle the hives/itchiness never really went away and she took the Prednisone 40mg every day. She is also taking Benadryl 50mg Q 4 hours and Pepcid 20mg Daily.  Dr. Watson and Peyton aware.  Ok to proceed with chemo today.  Per Dr. Watson, ok to continue current Benadryl and Pepcid regimen, but decrease Prednisone dosing to 40mg daily x 5 days (Peyton will send for new script).  Patient instructed to call our office if symptoms worsen or itchiness doesn't resolve after completion of Prednisone.  Pt verbalizes understanding and agrees with plan.     Patient held her home dose of pepcid and benadryl this morning.    Patient uses ice to hands/feet during Taxol infusion.      Intravenous Access:  Peripheral IV placed.    Treatment Conditions:  Lab Results   Component Value Date    HGB 13.0 06/07/2023    WBC 8.6 06/07/2023    ANEU 29.4 (H) 05/02/2023    ANEUTAUTO 7.5 06/07/2023     06/07/2023      Results reviewed, labs MET treatment parameters, ok to proceed with treatment.      Post Infusion Assessment:  Patient tolerated infusion without incident.  Blood return noted pre and post infusion.  Site patent and intact, free from redness, edema or discomfort.  No evidence of extravasations.  Access discontinued per protocol.       Discharge Plan:   Prescription refills given for Prednisone.  Discharge instructions reviewed with: Patient and Family.  Patient and/or family verbalized understanding of discharge instructions and all questions answered.  AVS to patient via GetNotes.  Patient will return 6/14/23 for next appointment.   Patient discharged in stable condition accompanied by: .  Departure Mode: Ambulatory.      Greg Haywood,  RN

## 2023-06-11 RX ORDER — ALBUTEROL SULFATE 0.83 MG/ML
2.5 SOLUTION RESPIRATORY (INHALATION)
Status: CANCELLED | OUTPATIENT
Start: 2023-06-14

## 2023-06-11 RX ORDER — ALBUTEROL SULFATE 90 UG/1
1-2 AEROSOL, METERED RESPIRATORY (INHALATION)
Status: CANCELLED
Start: 2023-06-14

## 2023-06-11 RX ORDER — HEPARIN SODIUM (PORCINE) LOCK FLUSH IV SOLN 100 UNIT/ML 100 UNIT/ML
5 SOLUTION INTRAVENOUS
Status: CANCELLED | OUTPATIENT
Start: 2023-06-14

## 2023-06-11 RX ORDER — MEPERIDINE HYDROCHLORIDE 25 MG/ML
25 INJECTION INTRAMUSCULAR; INTRAVENOUS; SUBCUTANEOUS EVERY 30 MIN PRN
Status: CANCELLED | OUTPATIENT
Start: 2023-06-14

## 2023-06-11 RX ORDER — HEPARIN SODIUM,PORCINE 10 UNIT/ML
5 VIAL (ML) INTRAVENOUS
Status: CANCELLED | OUTPATIENT
Start: 2023-06-14

## 2023-06-11 RX ORDER — EPINEPHRINE 1 MG/ML
0.3 INJECTION, SOLUTION, CONCENTRATE INTRAVENOUS EVERY 5 MIN PRN
Status: CANCELLED | OUTPATIENT
Start: 2023-06-14

## 2023-06-11 RX ORDER — LORAZEPAM 2 MG/ML
0.5 INJECTION INTRAMUSCULAR EVERY 4 HOURS PRN
Status: CANCELLED | OUTPATIENT
Start: 2023-06-14

## 2023-06-11 RX ORDER — DIPHENHYDRAMINE HCL 25 MG
50 CAPSULE ORAL
Status: CANCELLED
Start: 2023-06-14

## 2023-06-11 RX ORDER — ONDANSETRON 2 MG/ML
8 INJECTION INTRAMUSCULAR; INTRAVENOUS ONCE
Status: CANCELLED | OUTPATIENT
Start: 2023-06-14

## 2023-06-11 RX ORDER — DIPHENHYDRAMINE HYDROCHLORIDE 50 MG/ML
50 INJECTION INTRAMUSCULAR; INTRAVENOUS
Status: CANCELLED
Start: 2023-06-14

## 2023-06-14 ENCOUNTER — LAB (OUTPATIENT)
Dept: INFUSION THERAPY | Facility: CLINIC | Age: 53
End: 2023-06-14
Attending: INTERNAL MEDICINE
Payer: COMMERCIAL

## 2023-06-14 VITALS
DIASTOLIC BLOOD PRESSURE: 84 MMHG | WEIGHT: 226.2 LBS | BODY MASS INDEX: 33.39 KG/M2 | SYSTOLIC BLOOD PRESSURE: 124 MMHG | TEMPERATURE: 97.4 F | HEART RATE: 108 BPM | OXYGEN SATURATION: 100 % | RESPIRATION RATE: 16 BRPM

## 2023-06-14 DIAGNOSIS — Z17.0 MALIGNANT NEOPLASM OF OVERLAPPING SITES OF LEFT BREAST IN FEMALE, ESTROGEN RECEPTOR POSITIVE (H): Primary | ICD-10-CM

## 2023-06-14 DIAGNOSIS — C50.812 MALIGNANT NEOPLASM OF OVERLAPPING SITES OF LEFT BREAST IN FEMALE, ESTROGEN RECEPTOR POSITIVE (H): Primary | ICD-10-CM

## 2023-06-14 LAB
BASOPHILS # BLD AUTO: 0 10E3/UL (ref 0–0.2)
BASOPHILS NFR BLD AUTO: 1 %
EOSINOPHIL # BLD AUTO: 0 10E3/UL (ref 0–0.7)
EOSINOPHIL NFR BLD AUTO: 0 %
ERYTHROCYTE [DISTWIDTH] IN BLOOD BY AUTOMATED COUNT: 15.6 % (ref 10–15)
HCT VFR BLD AUTO: 41.6 % (ref 35–47)
HGB BLD-MCNC: 13.6 G/DL (ref 11.7–15.7)
HOLD SPECIMEN: NORMAL
IMM GRANULOCYTES # BLD: 0.1 10E3/UL
IMM GRANULOCYTES NFR BLD: 1 %
LYMPHOCYTES # BLD AUTO: 0.6 10E3/UL (ref 0.8–5.3)
LYMPHOCYTES NFR BLD AUTO: 8 %
MCH RBC QN AUTO: 30 PG (ref 26.5–33)
MCHC RBC AUTO-ENTMCNC: 32.7 G/DL (ref 31.5–36.5)
MCV RBC AUTO: 92 FL (ref 78–100)
MONOCYTES # BLD AUTO: 0.2 10E3/UL (ref 0–1.3)
MONOCYTES NFR BLD AUTO: 2 %
NEUTROPHILS # BLD AUTO: 6.9 10E3/UL (ref 1.6–8.3)
NEUTROPHILS NFR BLD AUTO: 88 %
NRBC # BLD AUTO: 0 10E3/UL
NRBC BLD AUTO-RTO: 0 /100
PLATELET # BLD AUTO: 509 10E3/UL (ref 150–450)
RBC # BLD AUTO: 4.54 10E6/UL (ref 3.8–5.2)
WBC # BLD AUTO: 7.9 10E3/UL (ref 4–11)

## 2023-06-14 PROCEDURE — 36415 COLL VENOUS BLD VENIPUNCTURE: CPT | Performed by: INTERNAL MEDICINE

## 2023-06-14 PROCEDURE — 250N000011 HC RX IP 250 OP 636: Performed by: INTERNAL MEDICINE

## 2023-06-14 PROCEDURE — 96413 CHEMO IV INFUSION 1 HR: CPT

## 2023-06-14 PROCEDURE — 258N000003 HC RX IP 258 OP 636: Performed by: INTERNAL MEDICINE

## 2023-06-14 PROCEDURE — 96375 TX/PRO/DX INJ NEW DRUG ADDON: CPT

## 2023-06-14 PROCEDURE — 85025 COMPLETE CBC W/AUTO DIFF WBC: CPT | Performed by: INTERNAL MEDICINE

## 2023-06-14 PROCEDURE — 96367 TX/PROPH/DG ADDL SEQ IV INF: CPT

## 2023-06-14 RX ORDER — ONDANSETRON 2 MG/ML
8 INJECTION INTRAMUSCULAR; INTRAVENOUS ONCE
Status: COMPLETED | OUTPATIENT
Start: 2023-06-14 | End: 2023-06-14

## 2023-06-14 RX ADMIN — SODIUM CHLORIDE 250 ML: 9 INJECTION, SOLUTION INTRAVENOUS at 11:13

## 2023-06-14 RX ADMIN — SODIUM CHLORIDE 179 MG: 9 INJECTION, SOLUTION INTRAVENOUS at 11:41

## 2023-06-14 RX ADMIN — DIPHENHYDRAMINE HYDROCHLORIDE 50 MG: 50 INJECTION, SOLUTION INTRAMUSCULAR; INTRAVENOUS at 11:23

## 2023-06-14 RX ADMIN — FAMOTIDINE 20 MG: 10 INJECTION, SOLUTION INTRAVENOUS at 11:13

## 2023-06-14 RX ADMIN — ONDANSETRON 8 MG: 2 INJECTION INTRAMUSCULAR; INTRAVENOUS at 11:18

## 2023-06-14 ASSESSMENT — PAIN SCALES - GENERAL: PAINLEVEL: NO PAIN (0)

## 2023-06-14 NOTE — PROGRESS NOTES
Medical Assistant Note:  Yuliya Li presents today for lab draw.    Patient seen by provider today: No.   present during visit today: Not Applicable.    Concerns: No Concerns.    Procedure:  Lab draw site: [facility], Needle type: BF, Gauge: 23. Gauze and coban applied  Post Assessment:  Labs drawn without difficulty: Yes.    Discharge Plan:  Departure Mode: Ambulatory.    Face to Face Time: 5.    Jazzmine Chapa CMA

## 2023-06-14 NOTE — PROGRESS NOTES
Infusion Nursing Note:  Yuliya Li presents today for C6D1 Taxol.    Patient seen by provider today: No   present during visit today: Not Applicable.    Note: Patient is excited today as this is her last Taxol. Patient denies any new medical concerns.      Intravenous Access:  Peripheral IV placed.    Treatment Conditions:  Lab Results   Component Value Date    HGB 13.6 06/14/2023    WBC 7.9 06/14/2023    ANEU 29.4 (H) 05/02/2023    ANEUTAUTO 6.9 06/14/2023     (H) 06/14/2023      Results reviewed, labs MET treatment parameters, ok to proceed with treatment.      Post Infusion Assessment:  Patient tolerated infusion without incident.  Blood return noted pre and post infusion.  Site patent and intact, free from redness, edema or discomfort.  No evidence of extravasations.  Access discontinued per protocol.       Discharge Plan:   Discharge instructions reviewed with: Patient.  Patient and/or family verbalized understanding of discharge instructions and all questions answered.  AVS to patient via [employer].  Patient will return 6/22/23 for next appointment.   Patient discharged in stable condition accompanied by: .  Departure Mode: Ambulatory.      Teri Colin RN

## 2023-06-15 ENCOUNTER — TELEPHONE (OUTPATIENT)
Dept: ONCOLOGY | Facility: CLINIC | Age: 53
End: 2023-06-15

## 2023-06-15 ENCOUNTER — ONCOLOGY VISIT (OUTPATIENT)
Dept: ONCOLOGY | Facility: CLINIC | Age: 53
End: 2023-06-15
Attending: INTERNAL MEDICINE
Payer: COMMERCIAL

## 2023-06-15 VITALS
OXYGEN SATURATION: 97 % | WEIGHT: 226.8 LBS | BODY MASS INDEX: 33.48 KG/M2 | RESPIRATION RATE: 16 BRPM | HEART RATE: 112 BPM | TEMPERATURE: 98.1 F | SYSTOLIC BLOOD PRESSURE: 109 MMHG | DIASTOLIC BLOOD PRESSURE: 78 MMHG

## 2023-06-15 DIAGNOSIS — R23.8 SKIN IRRITATION: Primary | ICD-10-CM

## 2023-06-15 PROCEDURE — 99212 OFFICE O/P EST SF 10 MIN: CPT | Performed by: NURSE PRACTITIONER

## 2023-06-15 PROCEDURE — G0463 HOSPITAL OUTPT CLINIC VISIT: HCPCS | Performed by: NURSE PRACTITIONER

## 2023-06-15 ASSESSMENT — PAIN SCALES - GENERAL: PAINLEVEL: MILD PAIN (2)

## 2023-06-15 NOTE — PROGRESS NOTES
Short note    S.  Patient has bilateral yesterday in the left hand  Today patient reports she noticed some mild swelling in the right hand area where the blood was drawn.  Denies redness or pain.  Patient reports she put ice which helps with the swelling.  Denies swelling of the arm denies fever chills sweats     ROS: 14 point ROS neg other than the symptoms noted above in the HPI.      Physical exam  Vital signs stable  Patient alert and oriented  Left hand-mild discoloration noted.  Not significant swelling note , no rash, no lumps or bumps noted ,nontender to touch      Hand swelling at the area of blood draw yesterday  Noted after blood draw yesterday  -Mild swelling, no erythema nontender to touch skin rash no significant swelling, no lumps or bumps noted  For now advised patient to use ibuprofen and ice pack  Advised patient to call our clinic in the event of pain swelling swelling of the arm skin rash fevers chills sweats or any changes in symptoms      SHIELA Bruner CNP

## 2023-06-15 NOTE — TELEPHONE ENCOUNTER
Patient is calling today concerned about swelling to her left hand. She had a blood draw yesterday on that hand. Per patient, obtaining blood was a bit challenging. She noticed swelling yesterday evening. Swelling continues today. She is able to make a fist and is having mild discomfort. Area is red and warm to touch. Denies CP, SOB, fever or chills. She tried cold packing with no changes. She would like this further evaluated.     Writer scheduled appointment with VANESSA for this morning for further eval. Will alert care team.     Cinthia Soria RN, BSN, PHN, OCN  [facility]

## 2023-06-15 NOTE — PROGRESS NOTES
"Oncology Rooming Note    Denise 15, 2023 10:05 AM   Yuliya Li is a 52 year old female who presents for:    Chief Complaint   Patient presents with     Oncology Clinic Visit     Initial Vitals: /78   Pulse 112   Temp 98.1  F (36.7  C) (Oral)   Resp 16   Wt 102.9 kg (226 lb 12.8 oz)   LMP  (LMP Unknown)   SpO2 97%   BMI 33.48 kg/m   Estimated body mass index is 33.48 kg/m  as calculated from the following:    Height as of 6/7/23: 1.753 m (5' 9.02\").    Weight as of this encounter: 102.9 kg (226 lb 12.8 oz). Body surface area is 2.24 meters squared.  Mild Pain (2) Comment: Data Unavailable   No LMP recorded (lmp unknown). Patient has had a hysterectomy.  Allergies reviewed: Yes  Medications reviewed: Yes    Medications: Medication refills not needed today.  Pharmacy name entered into Conduit:    [id number] [place] [facility] [place], [place] - [address] [facility] - [place], [place] - [address] [place] [address] PHARMACY [place], [place] - [address] [id number] [place], [place] - [address] [facility]    Clinical concerns:  NP was notified.      Alicia Schneider [facility]            "

## 2023-06-15 NOTE — LETTER
"    6/15/2023         RE: Yuliya Li  [address]  [place] [address]        Dear Colleague,    Thank you for referring your patient, Yuliya Li, to the [facility]. Please see a copy of my visit note below.    Oncology Rooming Note    Denise 15, 2023 10:05 AM   Yuliya Li is a 52 year old female who presents for:    Chief Complaint   Patient presents with     Oncology Clinic Visit     Initial Vitals: /78   Pulse 112   Temp 98.1  F (36.7  C) (Oral)   Resp 16   Wt 102.9 kg (226 lb 12.8 oz)   LMP  (LMP Unknown)   SpO2 97%   BMI 33.48 kg/m   Estimated body mass index is 33.48 kg/m  as calculated from the following:    Height as of 6/7/23: 1.753 m (5' 9.02\").    Weight as of this encounter: 102.9 kg (226 lb 12.8 oz). Body surface area is 2.24 meters squared.  Mild Pain (2) Comment: Data Unavailable   No LMP recorded (lmp unknown). Patient has had a hysterectomy.  Allergies reviewed: Yes  Medications reviewed: Yes    Medications: Medication refills not needed today.  Pharmacy name entered into codesy:    [id number] [place] [facility] - [place], [place] - [address] [facility] - [place], [place] - [address] [place], [place] - [address] DRUG ST[id number] [place], [place] - [address] [facility] [address] [place]    Clinical concerns:  NP was notified.      Alicia Schneider [facility]              Short note    S.  Patient has bilateral yesterday in the left hand  Today patient reports she noticed some mild swelling in the right hand area where the blood was drawn.  Denies redness or pain.  Patient reports she put ice which helps with the swelling.  Denies swelling of the arm denies fever chills sweats     ROS: 14 point ROS neg other than the symptoms noted above in the HPI.      Physical exam  Vital signs stable  Patient alert and oriented  Left hand-mild discoloration noted.  Not significant swelling " note , no rash, no lumps or bumps noted ,nontender to touch      Hand swelling at the area of blood draw yesterday  Noted after blood draw yesterday  -Mild swelling, no erythema nontender to touch skin rash no significant swelling, no lumps or bumps noted  For now advised patient to use ibuprofen and ice pack  Advised patient to call our clinic in the event of pain swelling swelling of the arm skin rash fevers chills sweats or any changes in symptoms      SHIELA Bruner CNP        Again, thank you for allowing me to participate in the care of your patient.        Sincerely,        SHIELA Bruner CNP

## 2023-06-16 NOTE — PROGRESS NOTES
[facility]    Hematology/Oncology Established Patient Note      Today's Date: 6/22/2023    Reason for follow-up:  Left breast cancer.    HISTORY OF PRESENT ILLNESS: Yuliya Li is a 52 year old female status post removal of single ovary and hysterectomy who presents with the following oncologic history:  1.  12/23/2022: Mammogram showed possible asymmetry in right breast at 12:00; possible mass in left breast at 3:00.  2. 1/2/2023: Diagnostic bilateral mammogram showed mass in left breast at 3:00 with 0.4 cm satellite mass in retroareolar breast. Right breast with effacement of possible finding at 12:00 consistent with artifact. Left breast U/S showed mass at 3:00, 11 cm from nipple measuring 1.9 x 1.6 x 1.8 cm; no abnormal axillary lymph nodes.  3. 1/03/2022: Left breast core biopsy of 0.4 cm mass at 3:30 position showed grade 2 invasive ductal carcinoma with lobular features. Biopsy of left breast 1.9 cm mass at 3:00 showed grade 2 invasive ductal carcinoma with lobular features, associated grade 2 DCIS. ER and MD strongly positive at %; HER-2 IHC = 2+; HER-2/crow FISH negative.  4. 1/10/2023: FSH = 36.6 but estradiol = 63.  5.  2/03/2023: Bilateral mastectomies with left axillary sentinel lymph node excision and left chest wall nodule excision with Dr. Clarissa Fontanez.  Pathology showed left breast grade 2 invasive ductal carcinoma with lobular features, multifocal with 2 cm and 0.4 cm tumors, grade 2 DCIS, all margins negative.  Of 3 left axillary sentinel lymph nodes removed, one with 6 mm macrometastasis positive for extranodal extension and one with 1.1 mm micrometastasis, negative for extranodal extension, identified. Left chest wall nodule positive for metastatic breast carcinoma involving edge of tissue fragment. HER-2 FISH repeated, group 4, considered HER-2 negative. Oncotype DX = 20, 9-year risk of distant recurrence of 17% after 5 years of hormone blockade therapy;  breast-cancer specific survival of 96.7% treated without chemotherapy.  6. 2/23/2023: PET/CT scan negative for metastatic disease. Small right middle lobe lung nodules, non-FDG avid.  7. 3/29/2023: Started adjuvant chemotherapy with Taxotere and Cytoxan. Received cycle 2 on 4/19/2023, complicated by allergic reaction with chest heaviness followed by diffuse rash. Then switched to weekly paclitaxel for 6 weeks with completion on 6/14/2023.  8. 6/22/2023: Start of Zoladex every 4 weeks with anastrozole.    INTERVAL HISTORY:  Yuliya reports she took her last prednisone.  Her rash has improved.      REVIEW OF SYSTEMS:   14 point ROS was reviewed and is negative other than as noted above in HPI.       HOME MEDICATIONS:  Current Outpatient Medications   Medication Sig Dispense Refill     acetaminophen (TYLENOL) 500 MG tablet Take 500-1,000 mg by mouth every 6 hours as needed for mild pain       alcohol swab prep pads Use to swab area of injection/amy as directed. 100 each 3     anastrozole (ARIMIDEX) 1 MG tablet Take 1 tablet (1 mg) by mouth daily 90 tablet 3     blood glucose (NO BRAND SPECIFIED) test strip Use to test blood sugar 2 times daily or as directed. To accompany: Blood Glucose Monitor Brands: per insurance. 100 strip 6     blood glucose calibration (NO BRAND SPECIFIED) solution To accompany: Blood Glucose Monitor Brands: per insurance. 1 each 1     blood glucose monitoring (NO BRAND SPECIFIED) meter device kit Use to test blood sugar 2 times daily or as directed. Preferred blood glucose meter OR supplies to accompany: Blood Glucose Monitor Brands: per insurance. 1 kit 0     diphenhydrAMINE (BENADRYL) 25 MG capsule Take 2 capsules (50 mg) by mouth every 6 hours as needed for itching or allergies 30 capsule 0     diphenhydrAMINE (BENADRYL) 25 MG tablet Take 2 tablets (50 mg) by mouth every 6 hours as needed for itching or allergies 120 tablet 1     EPINEPHrine (ANY BX GENERIC EQUIV) 0.3 MG/0.3ML injection  2-pack Inject 0.3 mLs (0.3 mg) into the muscle once as needed for anaphylaxis May repeat one time in 5-15 minutes if response to initial dose is inadequate. 2 each 0     famotidine (PEPCID) 20 MG tablet Take 1 tablet (20 mg) by mouth daily 30 tablet 1     FLUoxetine (PROZAC) 40 MG capsule TAKE 1 CAPSULE BY MOUTH EVERY DAY 90 capsule 0     medical cannabis (Patient's own supply) Take 1 Dose by mouth nightly as needed Gummies: 5 mg   Formulary: Red  Location: Unknown    (The purpose of this order is to document that the patient reports taking medical cannabis.  This is not a prescription, and is not used to certify that the patient has a qualifying medical condition.)       melatonin 3 MG tablet Take 6 mg by mouth nightly as needed for sleep       metFORMIN (GLUCOPHAGE XR) 500 MG 24 hr tablet Take 1 tablet (500 mg) by mouth daily 90 tablet 3     omeprazole 20 MG tablet Take 20 mg by mouth daily       ondansetron (ZOFRAN) 8 MG tablet Take 1 tablet (8 mg) by mouth every 8 hours as needed for nausea (vomiting) 30 tablet 2     predniSONE (DELTASONE) 20 MG tablet Take 2 tablets (40 mg) by mouth daily For 5  days after each Taxol infusion. 20 tablet 0     prochlorperazine (COMPAZINE) 10 MG tablet Take 1 tablet (10 mg) by mouth every 6 hours as needed for nausea or vomiting 30 tablet 2     Semaglutide, 1 MG/DOSE, (OZEMPIC, 1 MG/DOSE,) 4 MG/3ML pen Inject 1 mg Subcutaneous every 7 days (On Fridays) 9 mL 1     spironolactone (ALDACTONE) 100 MG tablet TAKE 1 TABLET BY MOUTH EVERY DAY 90 tablet 1     thin (NO BRAND SPECIFIED) lancets Use with lanceting device. To accompany: Blood Glucose Monitor Brands: per insurance. 100 each 6     triamcinolone (KENALOG) 0.1 % external cream Apply topically 2 times daily 45 g 1         ALLERGIES:  Allergies   Allergen Reactions     Docetaxel Anaphylaxis, Hives, Itching and Shortness Of Breath     Keflex [Cephalexin]      Tetracycline      Trazodone      Very sedating-         PAST MEDICAL  HISTORY:  Diabetes mellitus type 2  Hyperlipidemia    Gynecologic history:  Age of menarche at 11.  Last menstrual period in  prior to hysterectomy.  G3, P3.  Age of first pregnancy at 29.  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  No use of HRT.  Used OCPs for 4 years.  No IUD use.      PAST SURGICAL HISTORY:  Past Surgical History:   Procedure Laterality Date     CYST REMOVAL       HYSTERECTOMY       HYSTERECTOMY, PAP NO LONGER INDICATED      menorrhagia     INSERT TISSUE EXPANDER BREAST Bilateral 2/3/2023    Procedure: BILATERAL TISSUE EXPANDER;  Surgeon: Jaya Malave MD;  Location: [facility] OR     MASTECTOMY SIMPLE BILATERAL, SENTINEL NODE BILATERAL, COMBINED Left 2/3/2023    Procedure: Bilateral skin sparing mastectomies with left axillary sentinel lymph node biopsy;  Surgeon: Clarissa Fontanez MD;  Location:  OR         SOCIAL HISTORY:  Social History     Socioeconomic History     Marital status:      Spouse name: Not on file     Number of children: 3     Years of education: Not on file     Highest education level: Not on file   Occupational History     Not on file   Tobacco Use     Smoking status: Former     Packs/day: 0.00     Types: Cigarettes     Quit date: 1999     Years since quittin.6     Smokeless tobacco: Never   Vaping Use     Vaping status: Never Used   Substance and Sexual Activity     Alcohol use: Yes     Comment: Alcoholic Drinks/day: 1 a week     Drug use: Yes     Types: Marijuana     Comment: nightly for sleep (gummy)     Sexual activity: Yes     Partners: Male   Other Topics Concern     Not on file   Social History Narrative     Not on file     Social Determinants of Health     Financial Resource Strain: Low Risk  (10/26/2022)    Overall Financial Resource Strain (CARDIA)      Difficulty of Paying Living Expenses: Not very hard   Food Insecurity: No Food Insecurity (10/26/2022)    Hunger Vital Sign      Worried About Running Out of Food in the Last Year: Never true      Ran  Out of Food in the Last Year: Never true   Transportation Needs: No Transportation Needs (10/26/2022)    PRAPARE - Transportation      Lack of Transportation (Medical): No      Lack of Transportation (Non-Medical): No   Physical Activity: Insufficiently Active (10/26/2022)    Exercise Vital Sign      Days of Exercise per Week: 1 day      Minutes of Exercise per Session: 20 min   Stress: Stress Concern Present (10/26/2022)    [employer] - Occupational Stress Questionnaire      Feeling of Stress : Rather much   Social Connections: Moderately Isolated (10/26/2022)    Social Connection and Isolation Panel [NHANES]      Frequency of Communication with Friends and Family: More than three times a week      Frequency of Social Gatherings with Friends and Family: Once a week      Attends Quaker Services: Never      Active Member of Clubs or Organizations: No      Attends Club or Organization Meetings: Not on file      Marital Status:    Intimate Partner Violence: Not At Risk (2023)    Humiliation, Afraid, Rape, and Kick questionnaire      Fear of Current or Ex-Partner: No      Emotionally Abused: No      Physically Abused: No      Sexually Abused: No   Housing Stability: Low Risk  (10/26/2022)    Housing Stability Vital Sign      Unable to Pay for Housing in the Last Year: No      Number of Places Lived in the Last Year: 1      Unstable Housing in the Last Year: No         FAMILY HISTORY:  Family History   Problem Relation Age of Onset     Diabetes Mother      Atrial fibrillation Father      Alcoholism Father      Melanoma Father      Breast Cancer Maternal Aunt         in her 40's     Cervical Cancer Maternal Aunt      Colon Cancer No family hx of          PHYSICAL EXAM:  Vital signs:  /89   Pulse 113   Temp 98.1  F (36.7  C) (Oral)   Resp 16   Wt 102.5 kg (226 lb)   LMP  (LMP Unknown)   SpO2 97%   BMI 33.36 kg/m     ECO  GENERAL/CONSTITUTIONAL: No acute  distress.  EYES: No erythema or scleral icterus.  ENT/MOUTH: Neck supple.  LYMPH: No cervical, supraclavicular, axillary adenopathy.   BREAST: Bilateral breasts surgically absent with expanders in place with no new masses.  RESPIRATORY: No audible cough or wheezing.   GASTROINTESTINAL: No hepatosplenomegaly, masses, or tenderness. No guarding.  No distention.  MUSCULOSKELETAL: Warm and well-perfused, no cyanosis, clubbing, or edema.  NEUROLOGIC: No focal motor deficits. Alert, oriented, answers questions appropriately.  INTEGUMENTARY: Mild erythematous rash at upper central chest.  GAIT: Steady, does not use assistive device      LABS:  CBC RESULTS: Recent Labs   Lab Test 06/14/23  0933   WBC 7.9   RBC 4.54   HGB 13.6   HCT 41.6   MCV 92   MCH 30.0   MCHC 32.7   RDW 15.6*   *         PATHOLOGY:  None new.    IMAGING:  None new.    ASSESSMENT/PLAN:  Yuliya Li is a 52 year old female with the following issues:  1. Pathologic prognostic stage IA, [id number], grade 2 multifocal invasive ductal carcinoma with lobular features of left breast overlapping sites, ER positive, UT positive, HER-2 IHC = 2+; HER-2/crow FISH negative, Oncotype DX = 20  --Yuliya had 2 tumors measuring 2 cm and 0.4 cm and additional left chest wall nodule, but with 2/3 lymph nodes involved, one that was a macrometastasis with extranodal extension and the other with micrometastasis with no extranodal extension.  All areas of tumor were excised.  The repeat HER-2 FISH was interpreted as negative.   --2/23/2023 PET scan showed no evidence for metastatic disease.  --Oncotype DX score 20, with 9-year risk of distant recurrence of 17% after 5 years of hormone blockade therapy; breast-cancer specific survival of 96.7% treated without chemotherapy. Discussed that this would indicate up to 3.3% absolute benefit of chemotherapy.    --She elected to proceed with 4 cycles of Taxotere and Cytoxan every 3 weeks to maximally reduce her risk beyond  just hormone blockade therapy alone.  However, she reacted to Taxotere after cycle 2. Therefore, she was switched to Taxol weekly for 6 weeks, completed 6/14/2023.  We did not proceed with dose dense AC given that her maximum absolute chemo benefit is up to 3.3% and would not justify the potential adverse effects of ddAC.  --She is perimenopausal. I recommended OST + AI with Zoladex every 4 weeks with anastrozole for 10 years of hormone blockade therapy.  She starts today 6/22/2023.  Would plan to repeat FSH and estradiol in 6 months.  --3/20/2023 DEXA scan showed normal bone density for baseline.  --She will see radiation oncology back for adjuvant radiation therapy with appointment next week for mapping.  --She has no clinical evidence for recurrent breast cancer by physical exam from today.    2.  Family history of breast cancer  - Maternal aunt with breast cancer.  - I have recommended and referred Yuliya to genetic counseling.      3. Indeterminate right middle lobe pulmonary nodules  --2/23/2023 PET/CT scan showed 2 small indeterminate right middle lobe pulmonary nodules.   --5/24/2023 CT chest showed stable right lung nodules.  --Will repeat chest CT in 11/2023.    4. Hot flashes  --Offered medication such as venlafaxine to alleviate hot flashes.  She would like to hold off on new medication for now.    5. Rash  --Related to taxane chemo, now resolved since completion of chemo.    Return in 3 months.    Louise Watson MD  Hematology/Oncology  [facility]    Total time spent: 30 minutes in chart review, patient evaluation, counseling, documentation, test and/or medication/prescription orders, and coordination of care.

## 2023-06-22 ENCOUNTER — ONCOLOGY VISIT (OUTPATIENT)
Dept: ONCOLOGY | Facility: CLINIC | Age: 53
End: 2023-06-22
Attending: INTERNAL MEDICINE
Payer: COMMERCIAL

## 2023-06-22 ENCOUNTER — INFUSION THERAPY VISIT (OUTPATIENT)
Dept: INFUSION THERAPY | Facility: CLINIC | Age: 53
End: 2023-06-22
Attending: INTERNAL MEDICINE
Payer: COMMERCIAL

## 2023-06-22 VITALS
RESPIRATION RATE: 16 BRPM | TEMPERATURE: 98.1 F | BODY MASS INDEX: 33.36 KG/M2 | SYSTOLIC BLOOD PRESSURE: 128 MMHG | OXYGEN SATURATION: 97 % | WEIGHT: 226 LBS | HEART RATE: 113 BPM | DIASTOLIC BLOOD PRESSURE: 89 MMHG

## 2023-06-22 VITALS
SYSTOLIC BLOOD PRESSURE: 128 MMHG | DIASTOLIC BLOOD PRESSURE: 89 MMHG | TEMPERATURE: 98.1 F | RESPIRATION RATE: 16 BRPM | OXYGEN SATURATION: 97 % | HEART RATE: 113 BPM

## 2023-06-22 DIAGNOSIS — Z17.0 MALIGNANT NEOPLASM OF OVERLAPPING SITES OF LEFT BREAST IN FEMALE, ESTROGEN RECEPTOR POSITIVE (H): Primary | ICD-10-CM

## 2023-06-22 DIAGNOSIS — N95.1 MENOPAUSAL SYNDROME (HOT FLASHES): ICD-10-CM

## 2023-06-22 DIAGNOSIS — R91.8 PULMONARY NODULES: ICD-10-CM

## 2023-06-22 DIAGNOSIS — R21 RASH: ICD-10-CM

## 2023-06-22 DIAGNOSIS — C50.812 MALIGNANT NEOPLASM OF OVERLAPPING SITES OF LEFT BREAST IN FEMALE, ESTROGEN RECEPTOR POSITIVE (H): Primary | ICD-10-CM

## 2023-06-22 PROCEDURE — 99214 OFFICE O/P EST MOD 30 MIN: CPT | Performed by: INTERNAL MEDICINE

## 2023-06-22 PROCEDURE — G0463 HOSPITAL OUTPT CLINIC VISIT: HCPCS | Mod: 25 | Performed by: INTERNAL MEDICINE

## 2023-06-22 PROCEDURE — 250N000011 HC RX IP 250 OP 636: Mod: JZ | Performed by: INTERNAL MEDICINE

## 2023-06-22 PROCEDURE — 96402 CHEMO HORMON ANTINEOPL SQ/IM: CPT

## 2023-06-22 PROCEDURE — G0463 HOSPITAL OUTPT CLINIC VISIT: HCPCS | Performed by: INTERNAL MEDICINE

## 2023-06-22 RX ADMIN — GOSERELIN ACETATE 3.6 MG: 3.6 IMPLANT SUBCUTANEOUS at 09:13

## 2023-06-22 NOTE — PROGRESS NOTES
Infusion Nursing Note:  Yuliya Li presents today for Zoladex injection.    Patient seen by provider today: Yes: Dr. Watson   present during visit today: Not Applicable.    Note: First dose given. R lower abdomen.      Intravenous Access:  No Intravenous access/labs at this visit.    Treatment Conditions:  Not Applicable.      Post Infusion Assessment:  Patient tolerated injection without incident.  Site patent and intact, free from redness, edema or discomfort.       Discharge Plan:   Patient and/or family verbalized understanding of discharge instructions and all questions answered.  AVS to patient via [employer].  Patient will return 7/20/23 for next appointment.   Patient discharged in stable condition accompanied by: self.  Departure Mode: Ambulatory.      Sherrell Morataya RN

## 2023-06-22 NOTE — LETTER
6/22/2023         RE: Yuliya Li  [address]  [place] [address]        Dear Colleague,    Thank you for referring your patient, Yuliya Li, to the [facility]. Please see a copy of my visit note below.    [facility]    Hematology/Oncology Established Patient Note      Today's Date: 6/22/2023    Reason for follow-up:  Left breast cancer.    HISTORY OF PRESENT ILLNESS: Yuliya Li is a 52 year old female status post removal of single ovary and hysterectomy who presents with the following oncologic history:  1.  12/23/2022: Mammogram showed possible asymmetry in right breast at 12:00; possible mass in left breast at 3:00.  2. 1/2/2023: Diagnostic bilateral mammogram showed mass in left breast at 3:00 with 0.4 cm satellite mass in retroareolar breast. Right breast with effacement of possible finding at 12:00 consistent with artifact. Left breast U/S showed mass at 3:00, 11 cm from nipple measuring 1.9 x 1.6 x 1.8 cm; no abnormal axillary lymph nodes.  3. 1/03/2022: Left breast core biopsy of 0.4 cm mass at 3:30 position showed grade 2 invasive ductal carcinoma with lobular features. Biopsy of left breast 1.9 cm mass at 3:00 showed grade 2 invasive ductal carcinoma with lobular features, associated grade 2 DCIS. ER and [place] strongly positive at %; HER-2 IHC = 2+; HER-2/crow FISH negative.  4. 1/10/2023: FSH = 36.6 but estradiol = 63.  5.  2/03/2023: Bilateral mastectomies with left axillary sentinel lymph node excision and left chest wall nodule excision with Dr. Clarissa Fontanez.  Pathology showed left breast grade 2 invasive ductal carcinoma with lobular features, multifocal with 2 cm and 0.4 cm tumors, grade 2 DCIS, all margins negative.  Of 3 left axillary sentinel lymph nodes removed, one with 6 mm macrometastasis positive for extranodal extension and one with 1.1 mm micrometastasis, negative for extranodal extension, identified. Left  chest wall nodule positive for metastatic breast carcinoma involving edge of tissue fragment. HER-2 FISH repeated, group 4, considered HER-2 negative. Oncotype DX = 20, 9-year risk of distant recurrence of 17% after 5 years of hormone blockade therapy; breast-cancer specific survival of 96.7% treated without chemotherapy.  6. 2/23/2023: PET/CT scan negative for metastatic disease. Small right middle lobe lung nodules, non-FDG avid.  7. 3/29/2023: Started adjuvant chemotherapy with Taxotere and Cytoxan. Received cycle 2 on 4/19/2023, complicated by allergic reaction with chest heaviness followed by diffuse rash. Then switched to weekly paclitaxel for 6 weeks with completion on 6/14/2023.  8. 6/22/2023: Start of Zoladex every 4 weeks with anastrozole.    INTERVAL HISTORY:  Yuliya reports she took her last prednisone.  Her rash has improved.      REVIEW OF SYSTEMS:   14 point ROS was reviewed and is negative other than as noted above in HPI.       HOME MEDICATIONS:  Current Outpatient Medications   Medication Sig Dispense Refill     acetaminophen (TYLENOL) 500 MG tablet Take 500-1,000 mg by mouth every 6 hours as needed for mild pain       alcohol swab prep pads Use to swab area of injection/amy as directed. 100 each 3     anastrozole (ARIMIDEX) 1 MG tablet Take 1 tablet (1 mg) by mouth daily 90 tablet 3     blood glucose (NO BRAND SPECIFIED) test strip Use to test blood sugar 2 times daily or as directed. To accompany: Blood Glucose Monitor Brands: per insurance. 100 strip 6     blood glucose calibration (NO BRAND SPECIFIED) solution To accompany: Blood Glucose Monitor Brands: per insurance. 1 each 1     blood glucose monitoring (NO BRAND SPECIFIED) meter device kit Use to test blood sugar 2 times daily or as directed. Preferred blood glucose meter OR supplies to accompany: Blood Glucose Monitor Brands: per insurance. 1 kit 0     diphenhydrAMINE (BENADRYL) 25 MG capsule Take 2 capsules (50 mg) by mouth every 6 hours  as needed for itching or allergies 30 capsule 0     diphenhydrAMINE (BENADRYL) 25 MG tablet Take 2 tablets (50 mg) by mouth every 6 hours as needed for itching or allergies 120 tablet 1     EPINEPHrine (ANY BX GENERIC EQUIV) 0.3 MG/0.3ML injection 2-pack Inject 0.3 mLs (0.3 mg) into the muscle once as needed for anaphylaxis May repeat one time in 5-15 minutes if response to initial dose is inadequate. 2 each 0     famotidine (PEPCID) 20 MG tablet Take 1 tablet (20 mg) by mouth daily 30 tablet 1     FLUoxetine (PROZAC) 40 MG capsule TAKE 1 CAPSULE BY MOUTH EVERY DAY 90 capsule 0     medical cannabis (Patient's own supply) Take 1 Dose by mouth nightly as needed Gummies: 5 mg   Formulary: Red  Location: Unknown    (The purpose of this order is to document that the patient reports taking medical cannabis.  This is not a prescription, and is not used to certify that the patient has a qualifying medical condition.)       melatonin 3 MG tablet Take 6 mg by mouth nightly as needed for sleep       metFORMIN (GLUCOPHAGE XR) 500 MG 24 hr tablet Take 1 tablet (500 mg) by mouth daily 90 tablet 3     omeprazole 20 MG tablet Take 20 mg by mouth daily       ondansetron (ZOFRAN) 8 MG tablet Take 1 tablet (8 mg) by mouth every 8 hours as needed for nausea (vomiting) 30 tablet 2     predniSONE (DELTASONE) 20 MG tablet Take 2 tablets (40 mg) by mouth daily For 5  days after each Taxol infusion. 20 tablet 0     prochlorperazine (COMPAZINE) 10 MG tablet Take 1 tablet (10 mg) by mouth every 6 hours as needed for nausea or vomiting 30 tablet 2     Semaglutide, 1 MG/DOSE, (OZEMPIC, 1 MG/DOSE,) 4 MG/3ML pen Inject 1 mg Subcutaneous every 7 days (On Fridays) 9 mL 1     spironolactone (ALDACTONE) 100 MG tablet TAKE 1 TABLET BY MOUTH EVERY DAY 90 tablet 1     thin (NO BRAND SPECIFIED) lancets Use with lanceting device. To accompany: Blood Glucose Monitor Brands: per insurance. 100 each 6     triamcinolone (KENALOG) 0.1 % external cream Apply  topically 2 times daily 45 g 1         ALLERGIES:  Allergies   Allergen Reactions     Docetaxel Anaphylaxis, Hives, Itching and Shortness Of Breath     Keflex [Cephalexin]      Tetracycline      Trazodone      Very sedating-         PAST MEDICAL HISTORY:  Diabetes mellitus type 2  Hyperlipidemia    Gynecologic history:  Age of menarche at 11.  Last menstrual period in  prior to hysterectomy.  G3, P3.  Age of first pregnancy at 29.  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  No use of HRT.  Used OCPs for 4 years.  No IUD use.      PAST SURGICAL HISTORY:  Past Surgical History:   Procedure Laterality Date     CYST REMOVAL       HYSTERECTOMY       HYSTERECTOMY, PAP NO LONGER INDICATED      menorrhagia     INSERT TISSUE EXPANDER BREAST Bilateral 2/3/2023    Procedure: BILATERAL TISSUE EXPANDER;  Surgeon: Jaya Malave MD;  Location: [facility] OR     MASTECTOMY SIMPLE BILATERAL, SENTINEL NODE BILATERAL, COMBINED Left 2/3/2023    Procedure: Bilateral skin sparing mastectomies with left axillary sentinel lymph node biopsy;  Surgeon: Clarissa Fontanez MD;  Location:  OR         SOCIAL HISTORY:  Social History     Socioeconomic History     Marital status:      Spouse name: Not on file     Number of children: 3     Years of education: Not on file     Highest education level: Not on file   Occupational History     Not on file   Tobacco Use     Smoking status: Former     Packs/day: 0.00     Types: Cigarettes     Quit date: 1999     Years since quittin.6     Smokeless tobacco: Never   Vaping Use     Vaping status: Never Used   Substance and Sexual Activity     Alcohol use: Yes     Comment: Alcoholic Drinks/day: 1 a week     Drug use: Yes     Types: Marijuana     Comment: nightly for sleep (gummy)     Sexual activity: Yes     Partners: Male   Other Topics Concern     Not on file   Social History Narrative     Not on file     Social Determinants of Health     Financial Resource Strain: Low Risk  (10/26/2022)     Overall Financial Resource Strain (CARDIA)      Difficulty of Paying Living Expenses: Not very hard   Food Insecurity: No Food Insecurity (10/26/2022)    Hunger Vital Sign      Worried About Running Out of Food in the Last Year: Never true      Ran Out of Food in the Last Year: Never true   Transportation Needs: No Transportation Needs (10/26/2022)    PRAPARE - Transportation      Lack of Transportation (Medical): No      Lack of Transportation (Non-Medical): No   Physical Activity: Insufficiently Active (10/26/2022)    Exercise Vital Sign      Days of Exercise per Week: 1 day      Minutes of Exercise per Session: 20 min   Stress: Stress Concern Present (10/26/2022)    [employer] - Occupational Stress Questionnaire      Feeling of Stress : Rather much   Social Connections: Moderately Isolated (10/26/2022)    Social Connection and Isolation Panel [NHANES]      Frequency of Communication with Friends and Family: More than three times a week      Frequency of Social Gatherings with Friends and Family: Once a week      Attends Hoahaoism Services: Never      Active Member of Clubs or Organizations: No      Attends Club or Organization Meetings: Not on file      Marital Status:    Intimate Partner Violence: Not At Risk (5/31/2023)    Humiliation, Afraid, Rape, and Kick questionnaire      Fear of Current or Ex-Partner: No      Emotionally Abused: No      Physically Abused: No      Sexually Abused: No   Housing Stability: Low Risk  (10/26/2022)    Housing Stability Vital Sign      Unable to Pay for Housing in the Last Year: No      Number of Places Lived in the Last Year: 1      Unstable Housing in the Last Year: No         FAMILY HISTORY:  Family History   Problem Relation Age of Onset     Diabetes Mother      Atrial fibrillation Father      Alcoholism Father      Melanoma Father      Breast Cancer Maternal Aunt         in her 40's     Cervical Cancer Maternal Aunt      Colon Cancer No  family hx of          PHYSICAL EXAM:  Vital signs:  /89   Pulse 113   Temp 98.1  F (36.7  C) (Oral)   Resp 16   Wt 102.5 kg (226 lb)   LMP  (LMP Unknown)   SpO2 97%   BMI 33.36 kg/m     ECO  GENERAL/CONSTITUTIONAL: No acute distress.  EYES: No erythema or scleral icterus.  ENT/MOUTH: Neck supple.  LYMPH: No cervical, supraclavicular, axillary adenopathy.   BREAST: Bilateral breasts surgically absent with expanders in place with no new masses.  RESPIRATORY: No audible cough or wheezing.   GASTROINTESTINAL: No hepatosplenomegaly, masses, or tenderness. No guarding.  No distention.  MUSCULOSKELETAL: Warm and well-perfused, no cyanosis, clubbing, or edema.  NEUROLOGIC: No focal motor deficits. Alert, oriented, answers questions appropriately.  INTEGUMENTARY: Mild erythematous rash at upper central chest.  GAIT: Steady, does not use assistive device      LABS:  CBC RESULTS: Recent Labs   Lab Test 23  0933   WBC 7.9   RBC 4.54   HGB 13.6   HCT 41.6   MCV 92   MCH 30.0   MCHC 32.7   RDW 15.6*   *         PATHOLOGY:  None new.    IMAGING:  None new.    ASSESSMENT/PLAN:  Yuliya Li is a 52 year old female with the following issues:  1. Pathologic prognostic stage IA, [id number]-W2h-O4, grade 2 multifocal invasive ductal carcinoma with lobular features of left breast overlapping sites, ER positive, MO positive, HER-2 IHC = 2+; HER-2/crow FISH negative, Oncotype DX = 20  --Yuliya had 2 tumors measuring 2 cm and 0.4 cm and additional left chest wall nodule, but with 2/3 lymph nodes involved, one that was a macrometastasis with extranodal extension and the other with micrometastasis with no extranodal extension.  All areas of tumor were excised.  The repeat HER-2 FISH was interpreted as negative.   --2023 PET scan showed no evidence for metastatic disease.  --Oncotype DX score 20, with 9-year risk of distant recurrence of 17% after 5 years of hormone blockade therapy; breast-cancer specific  survival of 96.7% treated without chemotherapy. Discussed that this would indicate up to 3.3% absolute benefit of chemotherapy.    --She elected to proceed with 4 cycles of Taxotere and Cytoxan every 3 weeks to maximally reduce her risk beyond just hormone blockade therapy alone.  However, she reacted to Taxotere after cycle 2. Therefore, she was switched to Taxol weekly for 6 weeks, completed 6/14/2023.  We did not proceed with dose dense AC given that her maximum absolute chemo benefit is up to 3.3% and would not justify the potential adverse effects of ddAC.  --She is perimenopausal. I recommended OST + AI with Zoladex every 4 weeks with anastrozole for 10 years of hormone blockade therapy.  She starts today 6/22/2023.  Would plan to repeat FSH and estradiol in 6 months.  --3/20/2023 DEXA scan showed normal bone density for baseline.  --She will see radiation oncology back for adjuvant radiation therapy with appointment next week for mapping.  --She has no clinical evidence for recurrent breast cancer by physical exam from today.    2.  Family history of breast cancer  - Maternal aunt with breast cancer.  - I have recommended and referred Yuliya to genetic counseling.      3. Indeterminate right middle lobe pulmonary nodules  --2/23/2023 PET/CT scan showed 2 small indeterminate right middle lobe pulmonary nodules.   --5/24/2023 CT chest showed stable right lung nodules.  --Will repeat chest CT in 11/2023.    4. Hot flashes  --Offered medication such as venlafaxine to alleviate hot flashes.  She would like to hold off on new medication for now.    5. Rash  --Related to taxane chemo, now resolved since completion of chemo.    Return in 3 months.    Louise Watson MD  Hematology/Oncology  [facility]    Total time spent: 30 minutes in chart review, patient evaluation, counseling, documentation, test and/or medication/prescription orders, and coordination of care.    Oncology Rooming  "Note    June 22, 2023 9:23 AM   Yuliya Li is a 52 year old female who presents for:    Chief Complaint   Patient presents with     Oncology Clinic Visit     Initial Vitals: /89   Pulse 113   Temp 98.1  F (36.7  C) (Oral)   Resp 16   Wt 102.5 kg (226 lb)   LMP  (LMP Unknown)   SpO2 97%   BMI 33.36 kg/m   Estimated body mass index is 33.36 kg/m  as calculated from the following:    Height as of 6/7/23: 1.753 m (5' 9.02\").    Weight as of this encounter: 102.5 kg (226 lb). Body surface area is 2.23 meters squared.  Data Unavailable Comment: Data Unavailable   No LMP recorded (lmp unknown). Patient has had a hysterectomy.  Allergies reviewed: Yes  Medications reviewed: Yes    Medications: Medication refills not needed today.  Pharmacy name entered into [employer]:    [id number] [place], [place] - [address] [facility] [place], [place] - [address] [place] [address] PHARMACY [facility] [address] [place] [address] DRUG STORE #[id number] [facility] [address] AT [facility] OF [place] & [facility]    Clinical concerns: no      Pema Oviedo [facility]                Again, thank you for allowing me to participate in the care of your patient.        Sincerely,        Louise Watson MD    "

## 2023-06-22 NOTE — PROGRESS NOTES
"Oncology Rooming Note    June 22, 2023 9:23 AM   Yuliya Li is a 52 year old female who presents for:    Chief Complaint   Patient presents with     Oncology Clinic Visit     Initial Vitals: /89   Pulse 113   Temp 98.1  F (36.7  C) (Oral)   Resp 16   Wt 102.5 kg (226 lb)   LMP  (LMP Unknown)   SpO2 97%   BMI 33.36 kg/m   Estimated body mass index is 33.36 kg/m  as calculated from the following:    Height as of 6/7/23: 1.753 m (5' 9.02\").    Weight as of this encounter: 102.5 kg (226 lb). Body surface area is 2.23 meters squared.  Data Unavailable Comment: Data Unavailable   No LMP recorded (lmp unknown). Patient has had a hysterectomy.  Allergies reviewed: Yes  Medications reviewed: Yes    Medications: Medication refills not needed today.  Pharmacy name entered into [employer]:    [id number] - [place], [place] - [address] [facility] - [place], [place] - [address] [place] [address] PHARMACY [facility] [address] DRUG STORE #[id number] [place], [place] - [address] AT [facility] OF [place] & [facility] [facility]    Clinical concerns: no      Pema Oviedo CMA            "

## 2023-07-20 ENCOUNTER — ALLIED HEALTH/NURSE VISIT (OUTPATIENT)
Dept: INFUSION THERAPY | Facility: CLINIC | Age: 53
End: 2023-07-20
Attending: INTERNAL MEDICINE
Payer: COMMERCIAL

## 2023-07-20 VITALS
OXYGEN SATURATION: 98 % | TEMPERATURE: 98 F | SYSTOLIC BLOOD PRESSURE: 119 MMHG | DIASTOLIC BLOOD PRESSURE: 83 MMHG | HEART RATE: 112 BPM | RESPIRATION RATE: 16 BRPM

## 2023-07-20 DIAGNOSIS — C50.812 MALIGNANT NEOPLASM OF OVERLAPPING SITES OF LEFT BREAST IN FEMALE, ESTROGEN RECEPTOR POSITIVE (H): Primary | ICD-10-CM

## 2023-07-20 DIAGNOSIS — Z17.0 MALIGNANT NEOPLASM OF OVERLAPPING SITES OF LEFT BREAST IN FEMALE, ESTROGEN RECEPTOR POSITIVE (H): Primary | ICD-10-CM

## 2023-07-20 PROCEDURE — 96402 CHEMO HORMON ANTINEOPL SQ/IM: CPT

## 2023-07-20 PROCEDURE — 250N000011 HC RX IP 250 OP 636: Mod: JZ | Performed by: INTERNAL MEDICINE

## 2023-07-20 RX ADMIN — GOSERELIN ACETATE 3.6 MG: 3.6 IMPLANT SUBCUTANEOUS at 10:26

## 2023-07-20 ASSESSMENT — PAIN SCALES - GENERAL: PAINLEVEL: NO PAIN (0)

## 2023-07-20 NOTE — PROGRESS NOTES
Infusion Nursing Note:  Yuliya Li presents today for Zoladex.    Patient seen by provider today: No   present during visit today: Not Applicable.    Note: N/A.      Intravenous Access:  No Intravenous access/labs at this visit.    Treatment Conditions:  Not Applicable.      Post Infusion Assessment:  Patient tolerated injection without incident.  Site patent and intact, free from redness, edema or discomfort.       Discharge Plan:   [facility]  Patient will return 8/17/23 for next appointment.   Patient discharged in stable condition accompanied by: self.  Departure Mode: Ambulatory.      Teri Colin RN'

## 2023-08-01 ENCOUNTER — VIRTUAL VISIT (OUTPATIENT)
Dept: ONCOLOGY | Facility: CLINIC | Age: 53
End: 2023-08-01
Attending: PHYSICIAN ASSISTANT
Payer: COMMERCIAL

## 2023-08-01 DIAGNOSIS — R21 RASH: ICD-10-CM

## 2023-08-01 DIAGNOSIS — E11.9 TYPE 2 DIABETES MELLITUS WITHOUT COMPLICATION, WITHOUT LONG-TERM CURRENT USE OF INSULIN (H): ICD-10-CM

## 2023-08-01 DIAGNOSIS — R21 RASH: Primary | ICD-10-CM

## 2023-08-01 PROCEDURE — 99212 OFFICE O/P EST SF 10 MIN: CPT | Performed by: PHYSICIAN ASSISTANT

## 2023-08-01 RX ORDER — HYDROXYZINE HYDROCHLORIDE 25 MG/1
25-50 TABLET, FILM COATED ORAL 3 TIMES DAILY PRN
Qty: 30 TABLET | Refills: 1 | Status: SHIPPED | OUTPATIENT
Start: 2023-08-01

## 2023-08-01 RX ORDER — PREDNISONE 20 MG/1
40 TABLET ORAL DAILY
Qty: 30 TABLET | Refills: 1 | Status: SHIPPED | OUTPATIENT
Start: 2023-08-01 | End: 2023-11-01

## 2023-08-01 NOTE — PROGRESS NOTES
Patient called with ongoing, intermittent pruritus hives. The current exacerbation is on her chest. However, each episode occurs in various spot. She has no further allergic symptoms such as dyspnea. She is currently undergoing radiation to the chest. She continues Pepcid, Claritin, and Benadryl PRN. Will repeat prednisone 40 mg PO daily PRN for hives and trial Atarax PRN for pruritus.

## 2023-08-17 ENCOUNTER — ALLIED HEALTH/NURSE VISIT (OUTPATIENT)
Dept: INFUSION THERAPY | Facility: CLINIC | Age: 53
End: 2023-08-17
Attending: INTERNAL MEDICINE
Payer: COMMERCIAL

## 2023-08-17 VITALS
TEMPERATURE: 98.3 F | HEART RATE: 87 BPM | SYSTOLIC BLOOD PRESSURE: 105 MMHG | OXYGEN SATURATION: 96 % | RESPIRATION RATE: 16 BRPM | DIASTOLIC BLOOD PRESSURE: 77 MMHG

## 2023-08-17 DIAGNOSIS — C50.812 MALIGNANT NEOPLASM OF OVERLAPPING SITES OF LEFT BREAST IN FEMALE, ESTROGEN RECEPTOR POSITIVE (H): Primary | ICD-10-CM

## 2023-08-17 DIAGNOSIS — Z17.0 MALIGNANT NEOPLASM OF OVERLAPPING SITES OF LEFT BREAST IN FEMALE, ESTROGEN RECEPTOR POSITIVE (H): Primary | ICD-10-CM

## 2023-08-17 PROCEDURE — 96402 CHEMO HORMON ANTINEOPL SQ/IM: CPT

## 2023-08-17 PROCEDURE — 250N000011 HC RX IP 250 OP 636: Mod: JZ | Performed by: INTERNAL MEDICINE

## 2023-08-17 RX ADMIN — GOSERELIN ACETATE 3.6 MG: 3.6 IMPLANT SUBCUTANEOUS at 10:10

## 2023-08-17 ASSESSMENT — PAIN SCALES - GENERAL: PAINLEVEL: NO PAIN (0)

## 2023-08-17 NOTE — PROGRESS NOTES
Infusion Nursing Note:  Yuliya Li presents today for zoladex injection.    Patient seen by provider today: No   present during visit today: Not Applicable.    Note: N/A.      Intravenous Access:  No Intravenous access/labs at this visit.    Treatment Conditions:  Not Applicable.      Post Infusion Assessment:  Patient tolerated injection without incident.       Discharge Plan:   Discharge instructions reviewed with: Patient.  Patient and/or family verbalized understanding of discharge instructions and all questions answered.  Patient discharged in stable condition accompanied by: self.  Departure Mode: Ambulatory.      Flakita Garcia RN

## 2023-08-18 ENCOUNTER — TRANSFERRED RECORDS (OUTPATIENT)
Dept: HEALTH INFORMATION MANAGEMENT | Facility: CLINIC | Age: 53
End: 2023-08-18
Payer: COMMERCIAL

## 2023-08-27 DIAGNOSIS — F32.5 MAJOR DEPRESSIVE DISORDER IN FULL REMISSION, UNSPECIFIED WHETHER RECURRENT (H): ICD-10-CM

## 2023-08-30 RX ORDER — FLUOXETINE 40 MG/1
CAPSULE ORAL
Qty: 90 CAPSULE | Refills: 0 | Status: SHIPPED | OUTPATIENT
Start: 2023-08-30 | End: 2023-11-01

## 2023-08-30 NOTE — TELEPHONE ENCOUNTER
Medication is being filled for 1 time refill only due to:  Patient needs to be seen because ANNUAL VISIT DUE OCTOBER 2023 OR BEFORE NEXT REFILL .  Prescription approved per [facility] Refill Protocol.  ROUTED TO  FOR SCHEDULING  Ileana Fish RN, BSN  [facility]

## 2023-09-08 NOTE — PROGRESS NOTES
[facility]    Hematology/Oncology Established Patient Note      Today's Date: 9/18/2023    Reason for follow-up:  Left breast cancer.    HISTORY OF PRESENT ILLNESS: Yuliya Li is a 52 year old female status post removal of single ovary and hysterectomy who presents with the following oncologic history:  1.  12/23/2022: Mammogram showed possible asymmetry in right breast at 12:00; possible mass in left breast at 3:00.  2. 1/2/2023: Diagnostic bilateral mammogram showed mass in left breast at 3:00 with 0.4 cm satellite mass in retroareolar breast. Right breast with effacement of possible finding at 12:00 consistent with artifact. Left breast U/S showed mass at 3:00, 11 cm from nipple measuring 1.9 x 1.6 x 1.8 cm; no abnormal axillary lymph nodes.  3. 1/03/2022: Left breast core biopsy of 0.4 cm mass at 3:30 position showed grade 2 invasive ductal carcinoma with lobular features. Biopsy of left breast 1.9 cm mass at 3:00 showed grade 2 invasive ductal carcinoma with lobular features, associated grade 2 DCIS. ER and GA strongly positive at %; HER-2 IHC = 2+; HER-2/crow FISH negative.  4. 1/10/2023: FSH = 36.6 but estradiol = 63.  5.  2/03/2023: Bilateral mastectomies with left axillary sentinel lymph node excision and left chest wall nodule excision with Dr. Clarissa Fontanez.  Pathology showed left breast grade 2 invasive ductal carcinoma with lobular features, multifocal with 2 cm and 0.4 cm tumors, grade 2 DCIS, all margins negative.  Of 3 left axillary sentinel lymph nodes removed, one with 6 mm macrometastasis positive for extranodal extension and one with 1.1 mm micrometastasis, negative for extranodal extension, identified. Left chest wall nodule positive for metastatic breast carcinoma involving edge of tissue fragment. HER-2 FISH repeated, group 4, considered HER-2 negative. Oncotype DX = 20, 9-year risk of distant recurrence of 17% after 5 years of hormone blockade therapy;  breast-cancer specific survival of 96.7% treated without chemotherapy.  6. 2/23/2023: PET/CT scan negative for metastatic disease. Small right middle lobe lung nodules, non-FDG avid.  7. 3/29/2023: Started adjuvant chemotherapy with Taxotere and Cytoxan. Received cycle 2 on 4/19/2023, complicated by allergic reaction with chest heaviness followed by diffuse rash. Then switched to weekly paclitaxel for 6 weeks with completion on 6/14/2023.  8. 6/22/2023: Start of Zoladex every 4 weeks with anastrozole.  9. 7/17/2023-8/18/2023: Completed adjuvant radiation therapy.    INTERVAL HISTORY:  Yuliya reports tolerating radiation well.  However, she has noticed bilateral knee stiffness after starting anastrozole. She is still having intermittent hives for which she takes Benadryl.      REVIEW OF SYSTEMS:   14 point ROS was reviewed and is negative other than as noted above in HPI.       HOME MEDICATIONS:  Current Outpatient Medications   Medication Sig Dispense Refill    acetaminophen (TYLENOL) 500 MG tablet Take 500-1,000 mg by mouth every 6 hours as needed for mild pain      alcohol swab prep pads Use to swab area of injection/amy as directed. 100 each 3    anastrozole (ARIMIDEX) 1 MG tablet Take 1 tablet (1 mg) by mouth daily 90 tablet 3    blood glucose (NO BRAND SPECIFIED) test strip Use to test blood sugar 2 times daily or as directed. To accompany: Blood Glucose Monitor Brands: per insurance. 100 strip 6    blood glucose calibration (NO BRAND SPECIFIED) solution To accompany: Blood Glucose Monitor Brands: per insurance. 1 each 1    blood glucose monitoring (NO BRAND SPECIFIED) meter device kit Use to test blood sugar 2 times daily or as directed. Preferred blood glucose meter OR supplies to accompany: Blood Glucose Monitor Brands: per insurance. 1 kit 0    diphenhydrAMINE (BENADRYL) 25 MG capsule Take 2 capsules (50 mg) by mouth every 6 hours as needed for itching or allergies 30 capsule 0    diphenhydrAMINE  (BENADRYL) 25 MG tablet Take 2 tablets (50 mg) by mouth every 6 hours as needed for itching or allergies 120 tablet 1    EPINEPHrine (ANY BX GENERIC EQUIV) 0.3 MG/0.3ML injection 2-pack Inject 0.3 mLs (0.3 mg) into the muscle once as needed for anaphylaxis May repeat one time in 5-15 minutes if response to initial dose is inadequate. 2 each 0    famotidine (PEPCID) 20 MG tablet Take 1 tablet (20 mg) by mouth daily 30 tablet 1    FLUoxetine (PROZAC) 40 MG capsule TAKE 1 CAPSULE BY MOUTH EVERY DAY 90 capsule 0    hydrOXYzine (ATARAX) 25 MG tablet Take 1-2 tablets (25-50 mg) by mouth 3 times daily as needed for itching 30 tablet 1    medical cannabis (Patient's own supply) Take 1 Dose by mouth nightly as needed Gummies: 5 mg   Formulary: Red  Location: Unknown    (The purpose of this order is to document that the patient reports taking medical cannabis.  This is not a prescription, and is not used to certify that the patient has a qualifying medical condition.)      melatonin 3 MG tablet Take 6 mg by mouth nightly as needed for sleep      metFORMIN (GLUCOPHAGE XR) 500 MG 24 hr tablet Take 1 tablet (500 mg) by mouth daily 90 tablet 3    omeprazole 20 MG tablet Take 20 mg by mouth daily      ondansetron (ZOFRAN) 8 MG tablet Take 1 tablet (8 mg) by mouth every 8 hours as needed for nausea (vomiting) 30 tablet 2    predniSONE (DELTASONE) 20 MG tablet Take 2 tablets (40 mg) by mouth daily For 5  days after each Taxol infusion. 30 tablet 1    prochlorperazine (COMPAZINE) 10 MG tablet Take 1 tablet (10 mg) by mouth every 6 hours as needed for nausea or vomiting 30 tablet 2    Semaglutide, 1 MG/DOSE, (OZEMPIC, 1 MG/DOSE,) 4 MG/3ML pen Inject 1 mg Subcutaneous every 7 days (On Fridays) 9 mL 1    spironolactone (ALDACTONE) 100 MG tablet TAKE 1 TABLET BY MOUTH EVERY DAY 90 tablet 1    thin (NO BRAND SPECIFIED) lancets Use with lanceting device. To accompany: Blood Glucose Monitor Brands: per insurance. 100 each 6    triamcinolone  (KENALOG) 0.1 % external cream Apply topically 2 times daily 45 g 1         ALLERGIES:  Allergies   Allergen Reactions    Docetaxel Anaphylaxis, Hives, Itching and Shortness Of Breath    Keflex [Cephalexin]     Tetracycline     Trazodone      Very sedating-         PAST MEDICAL HISTORY:  Diabetes mellitus type 2  Hyperlipidemia    Gynecologic history:  Age of menarche at 11.  Last menstrual period in  prior to hysterectomy.  G3, P3.  Age of first pregnancy at 29.  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  No use of HRT.  Used OCPs for 4 years.  No IUD use.      PAST SURGICAL HISTORY:  Past Surgical History:   Procedure Laterality Date    CYST REMOVAL      HYSTERECTOMY      HYSTERECTOMY, PAP NO LONGER INDICATED      menorrhagia    INSERT TISSUE EXPANDER BREAST Bilateral 2/3/2023    Procedure: BILATERAL TISSUE EXPANDER;  Surgeon: Jaya Malave MD;  Location:  OR    MASTECTOMY SIMPLE BILATERAL, SENTINEL NODE BILATERAL, COMBINED Left 2/3/2023    Procedure: Bilateral skin sparing mastectomies with left axillary sentinel lymph node biopsy;  Surgeon: Clarissa oFntanez MD;  Location:  OR         SOCIAL HISTORY:  Social History     Socioeconomic History    Marital status:      Spouse name: Not on file    Number of children: 3    Years of education: Not on file    Highest education level: Not on file   Occupational History    Not on file   Tobacco Use    Smoking status: Former     Packs/day: 0.00     Types: Cigarettes     Quit date: 1999     Years since quittin.8    Smokeless tobacco: Never   Vaping Use    Vaping Use: Never used   Substance and Sexual Activity    Alcohol use: Yes     Comment: Alcoholic Drinks/day: 1 a week    Drug use: Yes     Types: Marijuana     Comment: nightly for sleep (gummy)    Sexual activity: Yes     Partners: Male   Other Topics Concern    Not on file   Social History Narrative    Not on file     Social Determinants of Health     Financial Resource Strain: Low Risk   (10/26/2022)    Overall Financial Resource Strain (CARDIA)     Difficulty of Paying Living Expenses: Not very hard   Food Insecurity: No Food Insecurity (10/26/2022)    Hunger Vital Sign     Worried About Running Out of Food in the Last Year: Never true     Ran Out of Food in the Last Year: Never true   Transportation Needs: No Transportation Needs (10/26/2022)    PRAPARE - Transportation     Lack of Transportation (Medical): No     Lack of Transportation (Non-Medical): No   Physical Activity: Insufficiently Active (10/26/2022)    Exercise Vital Sign     Days of Exercise per Week: 1 day     Minutes of Exercise per Session: 20 min   Stress: Stress Concern Present (10/26/2022)    [employer] - Occupational Stress Questionnaire     Feeling of Stress : Rather much   Social Connections: Moderately Isolated (10/26/2022)    Social Connection and Isolation Panel [NHANES]     Frequency of Communication with Friends and Family: More than three times a week     Frequency of Social Gatherings with Friends and Family: Once a week     Attends Temple Services: Never     Active Member of Clubs or Organizations: No     Attends Club or Organization Meetings: Not on file     Marital Status:    Intimate Partner Violence: Not At Risk (5/31/2023)    Humiliation, Afraid, Rape, and Kick questionnaire     Fear of Current or Ex-Partner: No     Emotionally Abused: No     Physically Abused: No     Sexually Abused: No   Housing Stability: Low Risk  (10/26/2022)    Housing Stability Vital Sign     Unable to Pay for Housing in the Last Year: No     Number of Places Lived in the Last Year: 1     Unstable Housing in the Last Year: No         FAMILY HISTORY:  Family History   Problem Relation Age of Onset    Diabetes Mother     Atrial fibrillation Father     Alcoholism Father     Melanoma Father     Breast Cancer Maternal Aunt         in her 40's    Cervical Cancer Maternal Aunt     Colon Cancer No family hx of  "         PHYSICAL EXAM:  Vital signs:  /75   Pulse 92   Resp 18   Ht 1.753 m (5' 9\")   Wt 105.1 kg (231 lb 9.6 oz)   LMP  (LMP Unknown)   SpO2 98%   BMI 34.20 kg/m     ECO  GENERAL/CONSTITUTIONAL: No acute distress.  EYES: No erythema or scleral icterus.  LYMPH: No cervical, supraclavicular, axillary adenopathy.   BREAST: Bilateral breasts surgically absent with expanders in place with no new masses. Mild hyperpigmentation over left chest wall.  RESPIRATORY: No audible cough or wheezing.   GASTROINTESTINAL: No hepatosplenomegaly, masses, or tenderness. No guarding.  No distention.  MUSCULOSKELETAL: Warm and well-perfused, no cyanosis, clubbing, or edema.  NEUROLOGIC: No focal motor deficits. Alert, oriented, answers questions appropriately.  INTEGUMENTARY: Mild erythematous rash at upper central chest.  GAIT: Steady, does not use assistive device      LABS:  CBC RESULTS: Recent Labs   Lab Test 23  0933   WBC 7.9   RBC 4.54   HGB 13.6   HCT 41.6   MCV 92   MCH 30.0   MCHC 32.7   RDW 15.6*   *       PATHOLOGY:  None new.    IMAGING:  None new.    ASSESSMENT/PLAN:  Yuliya Li is a 52 year old female with the following issues:  1. Pathologic prognostic stage IA, [id number], grade 2 multifocal invasive ductal carcinoma with lobular features of left breast overlapping sites, ER positive, NV positive, HER-2 IHC = 2+; HER-2/crow FISH negative, Oncotype DX = 20  --Yuliya had 2 tumors measuring 2 cm and 0.4 cm and additional left chest wall nodule, but with 2/3 lymph nodes involved, one that was a macrometastasis with extranodal extension and the other with micrometastasis with no extranodal extension.  All areas of tumor were excised.  The repeat HER-2 FISH was interpreted as negative.   --2023 PET scan showed no evidence for metastatic disease.  --Oncotype DX score 20, with 9-year risk of distant recurrence of 17% after 5 years of hormone blockade therapy; breast-cancer specific " survival of 96.7% treated without chemotherapy. Discussed that this would indicate up to 3.3% absolute benefit of chemotherapy.    --She elected to proceed with 4 cycles of Taxotere and Cytoxan every 3 weeks to maximally reduce her risk beyond just hormone blockade therapy alone.  However, she reacted to Taxotere after cycle 2. Therefore, she was switched to Taxol weekly for 6 weeks, completed 6/14/2023.  We did not proceed with dose dense AC given that her maximum absolute chemo benefit is up to 3.3% and would not justify the potential adverse effects of ddAC.  --She is perimenopausal and started OST + AI with Zoladex every 4 weeks with anastrozole on 6/22/2023.   --Completed adjuvant radiation therapy 8/18/2023.  --Would plan to repeat FSH and estradiol prior to next visit to see if we can discontinue Zoladex.  --3/20/2023 DEXA scan showed normal bone density for baseline. Plan to repeat DEXA in 2025.  --She has no clinical evidence for recurrent breast cancer by physical exam from today.    2.  Family history of breast cancer  - Maternal aunt with breast cancer.  - I have recommended and referred Yuliya to genetic counseling.  She will call Nalini to set up blood draw.    3. Indeterminate right middle lobe pulmonary nodules  --2/23/2023 PET/CT scan showed 2 small indeterminate right middle lobe pulmonary nodules.   --5/24/2023 CT chest showed stable right lung nodules.  --Will repeat chest CT in 12/2023.    4. Hot flashes  --Offered medication such as venlafaxine to alleviate hot flashes.  She would like to hold off on new medication for now.    5. Intermittent hives  --She has upcoming appointment to see allergist.  --She plans to see a dermatologist as well.    Return in 3 months with chest CT and labs for FSH and estradiol.    Louise Watson MD  Hematology/Oncology  [facility]    Total time spent today: 30 minutes in chart review, patient evaluation, counseling, documentation, test  and/or medication/prescription orders, and coordination of care.

## 2023-09-18 ENCOUNTER — ALLIED HEALTH/NURSE VISIT (OUTPATIENT)
Dept: INFUSION THERAPY | Facility: CLINIC | Age: 53
End: 2023-09-18
Attending: INTERNAL MEDICINE
Payer: COMMERCIAL

## 2023-09-18 ENCOUNTER — ONCOLOGY VISIT (OUTPATIENT)
Dept: ONCOLOGY | Facility: CLINIC | Age: 53
End: 2023-09-18
Attending: INTERNAL MEDICINE
Payer: COMMERCIAL

## 2023-09-18 VITALS
HEART RATE: 92 BPM | DIASTOLIC BLOOD PRESSURE: 75 MMHG | OXYGEN SATURATION: 98 % | SYSTOLIC BLOOD PRESSURE: 110 MMHG | HEIGHT: 69 IN | WEIGHT: 231.6 LBS | BODY MASS INDEX: 34.3 KG/M2 | RESPIRATION RATE: 18 BRPM

## 2023-09-18 DIAGNOSIS — C50.812 MALIGNANT NEOPLASM OF OVERLAPPING SITES OF LEFT BREAST IN FEMALE, ESTROGEN RECEPTOR POSITIVE (H): Primary | ICD-10-CM

## 2023-09-18 DIAGNOSIS — R91.8 PULMONARY NODULES: ICD-10-CM

## 2023-09-18 DIAGNOSIS — Z17.0 MALIGNANT NEOPLASM OF OVERLAPPING SITES OF LEFT BREAST IN FEMALE, ESTROGEN RECEPTOR POSITIVE (H): Primary | ICD-10-CM

## 2023-09-18 DIAGNOSIS — N95.1 MENOPAUSAL SYNDROME (HOT FLASHES): ICD-10-CM

## 2023-09-18 PROCEDURE — 96402 CHEMO HORMON ANTINEOPL SQ/IM: CPT

## 2023-09-18 PROCEDURE — G0463 HOSPITAL OUTPT CLINIC VISIT: HCPCS | Mod: 25 | Performed by: INTERNAL MEDICINE

## 2023-09-18 PROCEDURE — 250N000011 HC RX IP 250 OP 636: Mod: JZ | Performed by: INTERNAL MEDICINE

## 2023-09-18 PROCEDURE — 99214 OFFICE O/P EST MOD 30 MIN: CPT | Performed by: INTERNAL MEDICINE

## 2023-09-18 RX ADMIN — GOSERELIN ACETATE 3.6 MG: 3.6 IMPLANT SUBCUTANEOUS at 10:37

## 2023-09-18 ASSESSMENT — PAIN SCALES - GENERAL: PAINLEVEL: NO PAIN (0)

## 2023-09-18 NOTE — PROGRESS NOTES
Infusion Nursing Note:  Yuliya Li presents today for Zoladex injection.    Patient seen by provider today: Yes: Dr. Watson   present during visit today: Not Applicable.    Note: Pt reports no new health changes or concerns. Injection given to L side or abdomen per pt request.      Intravenous Access:  No Intravenous access/labs at this visit.    Treatment Conditions:  Not Applicable.      Post Infusion Assessment:  Patient tolerated injection without incident.  Site patent and intact, free from redness, edema or discomfort.  Access discontinued per protocol.       Discharge Plan:   Patient declined prescription refills.  Discharge instructions reviewed with: Patient.  Patient and/or family verbalized understanding of discharge instructions and all questions answered.  AVS to patient via [employer].  Patient will return  next month for appointment.   was notified and writer requested more appts for this pt.  Patient discharged in stable condition accompanied by: self.  Departure Mode: Ambulatory.    Elisabeth Ye RN   
Statement Selected

## 2023-09-18 NOTE — PATIENT INSTRUCTIONS
Arrange for Zoladex every 4 weeks.  2.   RTC MD in 3 months with lab draw and chest CT prior to visit.

## 2023-09-18 NOTE — PROGRESS NOTES
"Oncology Rooming Note    September 18, 2023 9:58 AM   Yuliya Li is a 53 year old female who presents for:    Chief Complaint   Patient presents with    Oncology Clinic Visit     Initial Vitals: Resp 18   Ht 1.753 m (5' 9\")   Wt 105.1 kg (231 lb 9.6 oz)   LMP  (LMP Unknown)   BMI 34.20 kg/m   Estimated body mass index is 34.2 kg/m  as calculated from the following:    Height as of this encounter: 1.753 m (5' 9\").    Weight as of this encounter: 105.1 kg (231 lb 9.6 oz). Body surface area is 2.26 meters squared.  No Pain (0) Comment: Data Unavailable   No LMP recorded (lmp unknown). Patient has had a hysterectomy.  Allergies reviewed: Yes  Medications reviewed: Yes    Medications: Medication refills not needed today.  Pharmacy name entered into [facility]:    [id number] [place], [place] - [address] [facility] - [place], [place] - [address] [place] [address] PHARMACY [facility] [address] [id number]  [id number] [place], [place] - [address] [facility] [address] [place]            "

## 2023-09-18 NOTE — LETTER
9/18/2023         RE: Yuliya Li  [address]  [place] [address]        Dear Colleague,    Thank you for referring your patient, Yuliya Li, to the [facility]. Please see a copy of my visit note below.    [facility]    Hematology/Oncology Established Patient Note      Today's Date: 9/18/2023    Reason for follow-up:  Left breast cancer.    HISTORY OF PRESENT ILLNESS: Yuliya Li is a 52 year old female status post removal of single ovary and hysterectomy who presents with the following oncologic history:  1.  12/23/2022: Mammogram showed possible asymmetry in right breast at 12:00; possible mass in left breast at 3:00.  2. 1/2/2023: Diagnostic bilateral mammogram showed mass in left breast at 3:00 with 0.4 cm satellite mass in retroareolar breast. Right breast with effacement of possible finding at 12:00 consistent with artifact. Left breast U/S showed mass at 3:00, 11 cm from nipple measuring 1.9 x 1.6 x 1.8 cm; no abnormal axillary lymph nodes.  3. 1/03/2022: Left breast core biopsy of 0.4 cm mass at 3:30 position showed grade 2 invasive ductal carcinoma with lobular features. Biopsy of left breast 1.9 cm mass at 3:00 showed grade 2 invasive ductal carcinoma with lobular features, associated grade 2 DCIS. ER and WV strongly positive at %; HER-2 IHC = 2+; HER-2/crow FISH negative.  4. 1/10/2023: FSH = 36.6 but estradiol = 63.  5.  2/03/2023: Bilateral mastectomies with left axillary sentinel lymph node excision and left chest wall nodule excision with Dr. Clarissa Fontanez.  Pathology showed left breast grade 2 invasive ductal carcinoma with lobular features, multifocal with 2 cm and 0.4 cm tumors, grade 2 DCIS, all margins negative.  Of 3 left axillary sentinel lymph nodes removed, one with 6 mm macrometastasis positive for extranodal extension and one with 1.1 mm micrometastasis, negative for extranodal extension, identified. Left  chest wall nodule positive for metastatic breast carcinoma involving edge of tissue fragment. HER-2 FISH repeated, group 4, considered HER-2 negative. Oncotype DX = 20, 9-year risk of distant recurrence of 17% after 5 years of hormone blockade therapy; breast-cancer specific survival of 96.7% treated without chemotherapy.  6. 2/23/2023: PET/CT scan negative for metastatic disease. Small right middle lobe lung nodules, non-FDG avid.  7. 3/29/2023: Started adjuvant chemotherapy with Taxotere and Cytoxan. Received cycle 2 on 4/19/2023, complicated by allergic reaction with chest heaviness followed by diffuse rash. Then switched to weekly paclitaxel for 6 weeks with completion on 6/14/2023.  8. 6/22/2023: Start of Zoladex every 4 weeks with anastrozole.  9. 7/17/2023-8/18/2023: Completed adjuvant radiation therapy.    INTERVAL HISTORY:  Yuliya reports tolerating radiation well.  However, she has noticed bilateral knee stiffness after starting anastrozole. She is still having intermittent hives for which she takes Benadryl.      REVIEW OF SYSTEMS:   14 point ROS was reviewed and is negative other than as noted above in HPI.       HOME MEDICATIONS:  Current Outpatient Medications   Medication Sig Dispense Refill     acetaminophen (TYLENOL) 500 MG tablet Take 500-1,000 mg by mouth every 6 hours as needed for mild pain       alcohol swab prep pads Use to swab area of injection/amy as directed. 100 each 3     anastrozole (ARIMIDEX) 1 MG tablet Take 1 tablet (1 mg) by mouth daily 90 tablet 3     blood glucose (NO BRAND SPECIFIED) test strip Use to test blood sugar 2 times daily or as directed. To accompany: Blood Glucose Monitor Brands: per insurance. 100 strip 6     blood glucose calibration (NO BRAND SPECIFIED) solution To accompany: Blood Glucose Monitor Brands: per insurance. 1 each 1     blood glucose monitoring (NO BRAND SPECIFIED) meter device kit Use to test blood sugar 2 times daily or as directed. Preferred blood  glucose meter OR supplies to accompany: Blood Glucose Monitor Brands: per insurance. 1 kit 0     diphenhydrAMINE (BENADRYL) 25 MG capsule Take 2 capsules (50 mg) by mouth every 6 hours as needed for itching or allergies 30 capsule 0     diphenhydrAMINE (BENADRYL) 25 MG tablet Take 2 tablets (50 mg) by mouth every 6 hours as needed for itching or allergies 120 tablet 1     EPINEPHrine (ANY BX GENERIC EQUIV) 0.3 MG/0.3ML injection 2-pack Inject 0.3 mLs (0.3 mg) into the muscle once as needed for anaphylaxis May repeat one time in 5-15 minutes if response to initial dose is inadequate. 2 each 0     famotidine (PEPCID) 20 MG tablet Take 1 tablet (20 mg) by mouth daily 30 tablet 1     FLUoxetine (PROZAC) 40 MG capsule TAKE 1 CAPSULE BY MOUTH EVERY DAY 90 capsule 0     hydrOXYzine (ATARAX) 25 MG tablet Take 1-2 tablets (25-50 mg) by mouth 3 times daily as needed for itching 30 tablet 1     medical cannabis (Patient's own supply) Take 1 Dose by mouth nightly as needed Gummies: 5 mg   Formulary: Red  Location: Unknown    (The purpose of this order is to document that the patient reports taking medical cannabis.  This is not a prescription, and is not used to certify that the patient has a qualifying medical condition.)       melatonin 3 MG tablet Take 6 mg by mouth nightly as needed for sleep       metFORMIN (GLUCOPHAGE XR) 500 MG 24 hr tablet Take 1 tablet (500 mg) by mouth daily 90 tablet 3     omeprazole 20 MG tablet Take 20 mg by mouth daily       ondansetron (ZOFRAN) 8 MG tablet Take 1 tablet (8 mg) by mouth every 8 hours as needed for nausea (vomiting) 30 tablet 2     predniSONE (DELTASONE) 20 MG tablet Take 2 tablets (40 mg) by mouth daily For 5  days after each Taxol infusion. 30 tablet 1     prochlorperazine (COMPAZINE) 10 MG tablet Take 1 tablet (10 mg) by mouth every 6 hours as needed for nausea or vomiting 30 tablet 2     Semaglutide, 1 MG/DOSE, (OZEMPIC, 1 MG/DOSE,) 4 MG/3ML pen Inject 1 mg Subcutaneous every 7  days (On ) 9 mL 1     spironolactone (ALDACTONE) 100 MG tablet TAKE 1 TABLET BY MOUTH EVERY DAY 90 tablet 1     thin (NO BRAND SPECIFIED) lancets Use with lanceting device. To accompany: Blood Glucose Monitor Brands: per insurance. 100 each 6     triamcinolone (KENALOG) 0.1 % external cream Apply topically 2 times daily 45 g 1         ALLERGIES:  Allergies   Allergen Reactions     Docetaxel Anaphylaxis, Hives, Itching and Shortness Of Breath     Keflex [Cephalexin]      Tetracycline      Trazodone      Very sedating-         PAST MEDICAL HISTORY:  Diabetes mellitus type 2  Hyperlipidemia    Gynecologic history:  Age of menarche at 11.  Last menstrual period in  prior to hysterectomy.  G3, P3.  Age of first pregnancy at 29.  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  No use of HRT.  Used OCPs for 4 years.  No IUD use.      PAST SURGICAL HISTORY:  Past Surgical History:   Procedure Laterality Date     CYST REMOVAL       HYSTERECTOMY       HYSTERECTOMY, PAP NO LONGER INDICATED      menorrhagia     INSERT TISSUE EXPANDER BREAST Bilateral 2/3/2023    Procedure: BILATERAL TISSUE EXPANDER;  Surgeon: Jaya Malave MD;  Location: [facility] OR     MASTECTOMY SIMPLE BILATERAL, SENTINEL NODE BILATERAL, COMBINED Left 2/3/2023    Procedure: Bilateral skin sparing mastectomies with left axillary sentinel lymph node biopsy;  Surgeon: Clarissa Fontanez MD;  Location:  OR         SOCIAL HISTORY:  Social History     Socioeconomic History     Marital status:      Spouse name: Not on file     Number of children: 3     Years of education: Not on file     Highest education level: Not on file   Occupational History     Not on file   Tobacco Use     Smoking status: Former     Packs/day: 0.00     Types: Cigarettes     Quit date: 1999     Years since quittin.8     Smokeless tobacco: Never   Vaping Use     Vaping Use: Never used   Substance and Sexual Activity     Alcohol use: Yes     Comment: Alcoholic Drinks/day: 1 a  week     Drug use: Yes     Types: Marijuana     Comment: nightly for sleep (gummy)     Sexual activity: Yes     Partners: Male   Other Topics Concern     Not on file   Social History Narrative     Not on file     Social Determinants of Health     Financial Resource Strain: Low Risk  (10/26/2022)    Overall Financial Resource Strain (CARDIA)      Difficulty of Paying Living Expenses: Not very hard   Food Insecurity: No Food Insecurity (10/26/2022)    Hunger Vital Sign      Worried About Running Out of Food in the Last Year: Never true      Ran Out of Food in the Last Year: Never true   Transportation Needs: No Transportation Needs (10/26/2022)    PRAPARE - Transportation      Lack of Transportation (Medical): No      Lack of Transportation (Non-Medical): No   Physical Activity: Insufficiently Active (10/26/2022)    Exercise Vital Sign      Days of Exercise per Week: 1 day      Minutes of Exercise per Session: 20 min   Stress: Stress Concern Present (10/26/2022)    [employer] [place] [place] [employer] - Occupational Stress Questionnaire      Feeling of Stress : Rather much   Social Connections: Moderately Isolated (10/26/2022)    Social Connection and Isolation Panel [NHANES]      Frequency of Communication with Friends and Family: More than three times a week      Frequency of Social Gatherings with Friends and Family: Once a week      Attends Sikhism Services: Never      Active Member of Clubs or Organizations: No      Attends Club or Organization Meetings: Not on file      Marital Status:    Intimate Partner Violence: Not At Risk (5/31/2023)    Humiliation, Afraid, Rape, and Kick questionnaire      Fear of Current or Ex-Partner: No      Emotionally Abused: No      Physically Abused: No      Sexually Abused: No   Housing Stability: Low Risk  (10/26/2022)    Housing Stability Vital Sign      Unable to Pay for Housing in the Last Year: No      Number of Places Lived in the Last Year: 1      Unstable  "Housing in the Last Year: No         FAMILY HISTORY:  Family History   Problem Relation Age of Onset     Diabetes Mother      Atrial fibrillation Father      Alcoholism Father      Melanoma Father      Breast Cancer Maternal Aunt         in her 40's     Cervical Cancer Maternal Aunt      Colon Cancer No family hx of          PHYSICAL EXAM:  Vital signs:  /75   Pulse 92   Resp 18   Ht 1.753 m (5' 9\")   Wt 105.1 kg (231 lb 9.6 oz)   LMP  (LMP Unknown)   SpO2 98%   BMI 34.20 kg/m     ECO  GENERAL/CONSTITUTIONAL: No acute distress.  EYES: No erythema or scleral icterus.  LYMPH: No cervical, supraclavicular, axillary adenopathy.   BREAST: Bilateral breasts surgically absent with expanders in place with no new masses. Mild hyperpigmentation over left chest wall.  RESPIRATORY: No audible cough or wheezing.   GASTROINTESTINAL: No hepatosplenomegaly, masses, or tenderness. No guarding.  No distention.  MUSCULOSKELETAL: Warm and well-perfused, no cyanosis, clubbing, or edema.  NEUROLOGIC: No focal motor deficits. Alert, oriented, answers questions appropriately.  INTEGUMENTARY: Mild erythematous rash at upper central chest.  GAIT: Steady, does not use assistive device      LABS:  CBC RESULTS: Recent Labs   Lab Test 23  0933   WBC 7.9   RBC 4.54   HGB 13.6   HCT 41.6   MCV 92   MCH 30.0   MCHC 32.7   RDW 15.6*   *       PATHOLOGY:  None new.    IMAGING:  None new.    ASSESSMENT/PLAN:  Yuliya Li is a 52 year old female with the following issues:  1. Pathologic prognostic stage IA, [id number], grade 2 multifocal invasive ductal carcinoma with lobular features of left breast overlapping sites, ER positive, NC positive, HER-2 IHC = 2+; HER-2/crow FISH negative, Oncotype DX = 20  --Yuliya had 2 tumors measuring 2 cm and 0.4 cm and additional left chest wall nodule, but with 2/3 lymph nodes involved, one that was a macrometastasis with extranodal extension and the other with micrometastasis with " no extranodal extension.  All areas of tumor were excised.  The repeat HER-2 FISH was interpreted as negative.   --2/23/2023 PET scan showed no evidence for metastatic disease.  --Oncotype DX score 20, with 9-year risk of distant recurrence of 17% after 5 years of hormone blockade therapy; breast-cancer specific survival of 96.7% treated without chemotherapy. Discussed that this would indicate up to 3.3% absolute benefit of chemotherapy.    --She elected to proceed with 4 cycles of Taxotere and Cytoxan every 3 weeks to maximally reduce her risk beyond just hormone blockade therapy alone.  However, she reacted to Taxotere after cycle 2. Therefore, she was switched to Taxol weekly for 6 weeks, completed 6/14/2023.  We did not proceed with dose dense AC given that her maximum absolute chemo benefit is up to 3.3% and would not justify the potential adverse effects of ddAC.  --She is perimenopausal and started OST + AI with Zoladex every 4 weeks with anastrozole on 6/22/2023.   --Completed adjuvant radiation therapy 8/18/2023.  --Would plan to repeat FSH and estradiol prior to next visit to see if we can discontinue Zoladex.  --3/20/2023 DEXA scan showed normal bone density for baseline. Plan to repeat DEXA in 2025.  --She has no clinical evidence for recurrent breast cancer by physical exam from today.    2.  Family history of breast cancer  - Maternal aunt with breast cancer.  - I have recommended and referred Yuliya to genetic counseling.  She will call Nalini to set up blood draw.    3. Indeterminate right middle lobe pulmonary nodules  --2/23/2023 PET/CT scan showed 2 small indeterminate right middle lobe pulmonary nodules.   --5/24/2023 CT chest showed stable right lung nodules.  --Will repeat chest CT in 12/2023.    4. Hot flashes  --Offered medication such as venlafaxine to alleviate hot flashes.  She would like to hold off on new medication for now.    5. Intermittent hives  --She has upcoming appointment to see  "allergist.  --She plans to see a dermatologist as well.    Return in 3 months with chest CT and labs for FSH and estradiol.    Louise Watson MD  Hematology/Oncology  [facility]    Total time spent today: 30 minutes in chart review, patient evaluation, counseling, documentation, test and/or medication/prescription orders, and coordination of care.     Oncology Rooming Note    September 18, 2023 9:58 AM   Yuliya Li is a 53 year old female who presents for:    Chief Complaint   Patient presents with     Oncology Clinic Visit     Initial Vitals: Resp 18   Ht 1.753 m (5' 9\")   Wt 105.1 kg (231 lb 9.6 oz)   LMP  (LMP Unknown)   BMI 34.20 kg/m   Estimated body mass index is 34.2 kg/m  as calculated from the following:    Height as of this encounter: 1.753 m (5' 9\").    Weight as of this encounter: 105.1 kg (231 lb 9.6 oz). Body surface area is 2.26 meters squared.  No Pain (0) Comment: Data Unavailable   No LMP recorded (lmp unknown). Patient has had a hysterectomy.  Allergies reviewed: Yes  Medications reviewed: Yes    Medications: Medication refills not needed today.  Pharmacy name entered into [employer]:    [id number] - [place], [place] - [address] [facility] - [place], [place] - [address] [place] [address] PHARMACY [place], [place] - [address] [facility] [place] [address] DRUG STORE [id number] [place], [place] - [address] [facility] [address] [place]              Again, thank you for allowing me to participate in the care of your patient.        Sincerely,        Louise Watson MD  "

## 2023-10-06 DIAGNOSIS — C50.812 MALIGNANT NEOPLASM OF OVERLAPPING SITES OF LEFT BREAST IN FEMALE, ESTROGEN RECEPTOR POSITIVE (H): ICD-10-CM

## 2023-10-06 DIAGNOSIS — T50.905D ADVERSE EFFECT OF DRUG, SUBSEQUENT ENCOUNTER: ICD-10-CM

## 2023-10-06 DIAGNOSIS — Z17.0 MALIGNANT NEOPLASM OF OVERLAPPING SITES OF LEFT BREAST IN FEMALE, ESTROGEN RECEPTOR POSITIVE (H): ICD-10-CM

## 2023-10-06 RX ORDER — FAMOTIDINE 20 MG/1
20 TABLET, FILM COATED ORAL DAILY
Qty: 30 TABLET | Refills: 1 | Status: SHIPPED | OUTPATIENT
Start: 2023-10-06 | End: 2023-11-01

## 2023-10-08 ENCOUNTER — HEALTH MAINTENANCE LETTER (OUTPATIENT)
Age: 53
End: 2023-10-08

## 2023-10-12 ENCOUNTER — ALLIED HEALTH/NURSE VISIT (OUTPATIENT)
Dept: INFUSION THERAPY | Facility: CLINIC | Age: 53
End: 2023-10-12
Attending: INTERNAL MEDICINE
Payer: COMMERCIAL

## 2023-10-12 VITALS
DIASTOLIC BLOOD PRESSURE: 85 MMHG | SYSTOLIC BLOOD PRESSURE: 127 MMHG | TEMPERATURE: 98.8 F | RESPIRATION RATE: 16 BRPM | OXYGEN SATURATION: 99 % | HEART RATE: 101 BPM

## 2023-10-12 DIAGNOSIS — C50.812 MALIGNANT NEOPLASM OF OVERLAPPING SITES OF LEFT BREAST IN FEMALE, ESTROGEN RECEPTOR POSITIVE (H): Primary | ICD-10-CM

## 2023-10-12 DIAGNOSIS — Z17.0 MALIGNANT NEOPLASM OF OVERLAPPING SITES OF LEFT BREAST IN FEMALE, ESTROGEN RECEPTOR POSITIVE (H): Primary | ICD-10-CM

## 2023-10-12 PROCEDURE — 96402 CHEMO HORMON ANTINEOPL SQ/IM: CPT

## 2023-10-12 PROCEDURE — 250N000011 HC RX IP 250 OP 636: Mod: JZ | Performed by: INTERNAL MEDICINE

## 2023-10-12 RX ADMIN — GOSERELIN ACETATE 3.6 MG: 3.6 IMPLANT SUBCUTANEOUS at 09:37

## 2023-10-12 ASSESSMENT — PAIN SCALES - GENERAL: PAINLEVEL: MODERATE PAIN (5)

## 2023-10-12 NOTE — PROGRESS NOTES
Infusion Nursing Note:  Yuliya GUAJARDO Jen presents today for Zoladex.    Patient seen by provider today: No   present during visit today: Not Applicable.    Note: N/A.      Intravenous Access:  No Intravenous access/labs at this visit.    Treatment Conditions:  Not Applicable.      Post Infusion Assessment:  Patient tolerated injection without incident.  Site patent and intact, free from redness, edema or discomfort.       Discharge Plan:   [facility]  Patient will return 11/9/23 for next appointment.   Patient discharged in stable condition accompanied by: self.  Departure Mode: Ambulatory.      Teri Colin RN

## 2023-10-16 ENCOUNTER — OFFICE VISIT (OUTPATIENT)
Dept: ALLERGY | Facility: CLINIC | Age: 53
End: 2023-10-16
Payer: COMMERCIAL

## 2023-10-16 VITALS
WEIGHT: 231 LBS | OXYGEN SATURATION: 96 % | BODY MASS INDEX: 34.21 KG/M2 | HEIGHT: 69 IN | RESPIRATION RATE: 16 BRPM | HEART RATE: 98 BPM

## 2023-10-16 DIAGNOSIS — E55.9 VITAMIN D DEFICIENCY: ICD-10-CM

## 2023-10-16 DIAGNOSIS — E55.9 VITAMIN D DEFICIENCY: Primary | ICD-10-CM

## 2023-10-16 DIAGNOSIS — L50.8 CHRONIC AUTOIMMUNE URTICARIA: Primary | ICD-10-CM

## 2023-10-16 LAB
TSH SERPL DL<=0.005 MIU/L-ACNC: 1.96 UIU/ML (ref 0.3–4.2)
VIT D+METAB SERPL-MCNC: 16 NG/ML (ref 20–50)

## 2023-10-16 PROCEDURE — 36415 COLL VENOUS BLD VENIPUNCTURE: CPT | Performed by: ALLERGY & IMMUNOLOGY

## 2023-10-16 PROCEDURE — 82306 VITAMIN D 25 HYDROXY: CPT | Performed by: ALLERGY & IMMUNOLOGY

## 2023-10-16 PROCEDURE — 99203 OFFICE O/P NEW LOW 30 MIN: CPT | Performed by: ALLERGY & IMMUNOLOGY

## 2023-10-16 PROCEDURE — 84443 ASSAY THYROID STIM HORMONE: CPT | Performed by: ALLERGY & IMMUNOLOGY

## 2023-10-16 RX ORDER — ERGOCALCIFEROL 1.25 MG/1
50000 CAPSULE, LIQUID FILLED ORAL WEEKLY
Qty: 8 CAPSULE | Refills: 0 | Status: SHIPPED | OUTPATIENT
Start: 2023-10-16

## 2023-10-16 NOTE — PATIENT INSTRUCTIONS
Chronic autoimmune hives    Cetirizine 10 mg ( up two tabs twice daily)    Reassess every so often    85% of patients resolve within 2 years

## 2023-10-16 NOTE — LETTER
10/16/2023         RE: Yuliya Li  [address]  [place] [address]        Dear Colleague,    Thank you for referring your patient, Yuliya Li, to the [facility]. Please see a copy of my visit note below.          Subjective  Yuliya is a 53 year old, presenting for the following health issues:  Hives (Since may, chemo medications possibly)    HPI     Chief complaint:  Hives    History of Present Illness:  This is a pleasant 53 year old woman that is here today to discuss hives.  Patient has a history of breast cancer and underwent chemotherapy with Taxotere, cytoxan and neulasta in the spring.  After first treatment, she states she had body aches and more adverse effects.  After the second treatment she felt some heaviness in her chest.  She states a couple days later she developed hives.  She states since that time now she has had hives.  She states that he switched her therapy to another chemotherapy regimen medication.  She is then finished her chemotherapy and is currently on anastrozole.  She states that she also finished radiation in July and August.  She states she continues to have frequent hives.  They started small red bumps especially on her hands.  She states if she uses hydrocortisone that seems to help.  She has used Benadryl as well as hydroxyzine but she states that hydroxyzine makes her very sleepy.  She has had swelling of her lips.  She did end up in the emergency room twice with the symptoms.  No bruising to the hives.  No increased joint swelling or belly pain with the hives.  She does not take any over-the-counter supplements but does use ibuprofen frequently.  She states the week that the hives occurred she did have COVID prior to starting that chemotherapy regimen.  She has had hives previously with Keflex and minocycline.    Past medical history: Mastectomy, , hysterectomy    Social history: Former smoker, lives in an  "apartment with a cat    Family history: Negative for asthma and allergies          Review of Systems   Constitutional, HEENT, cardiovascular, pulmonary, GI, , musculoskeletal, neuro, skin, endocrine and psych systems are negative, except as otherwise noted.      Objective   Pulse 98   Resp 16   Ht 1.753 m (5' 9\")   Wt 104.8 kg (231 lb)   LMP  (LMP Unknown)   SpO2 96%   BMI 34.11 kg/m    Body mass index is 34.11 kg/m .  Physical Exam     Gen: Pleasant female not in acute distress  HEENT: Eyes no erythema of the bulbar or palpebral conjunctiva, no edema. Ears: No deformities or lesions. Nose: No congestion,  Mouth: Throat clear, no lip or tongue edema.   Neck: No masses lesions or swelling  Respiratory: No coughing with breathing, no retractions  Lymph: No visible supraclavicular or cervical lymphadenopathy  Skin: small hives on the dorsal surfaces of both hands  Psych: Alert and appropriate for age      Impression report and plan:    1.  Chronic urticaria    The patient has chronic urticaria.  Chronic urticaria is not due to a specific allergen.  Recommend systemic work-up including TSH, , vitamin D level. .Many patients with chronic urticaria resolve within a few years.  Reviewed exacerbating and concerning signs of chronic urticaria.  Reviewed previous CBC with differential and CMP.  Recommended cetirizine up to 2 tablets twice daily.  Stated that this often does resolve within a few years.  She will notify me if symptoms are not well controlled.  Follow-up as needed. I don't think this was a drug allergy to the chemotherapy given the timeline.                    Again, thank you for allowing me to participate in the care of your patient.        Sincerely,        Alena CHAVIRA MD  "

## 2023-10-16 NOTE — PROGRESS NOTES
Best Dwyer is a 53 year old, presenting for the following health issues:  Hives (Since may, chemo medications possibly)    HPI     Chief complaint:  Hives    History of Present Illness:  This is a pleasant 53 year old woman that is here today to discuss hives.  Patient has a history of breast cancer and underwent chemotherapy with Taxotere, cytoxan and neulasta in the spring.  After first treatment, she states she had body aches and more adverse effects.  After the second treatment she felt some heaviness in her chest.  She states a couple days later she developed hives.  She states since that time now she has had hives.  She states that he switched her therapy to another chemotherapy regimen medication.  She is then finished her chemotherapy and is currently on anastrozole.  She states that she also finished radiation in July and August.  She states she continues to have frequent hives.  They started small red bumps especially on her hands.  She states if she uses hydrocortisone that seems to help.  She has used Benadryl as well as hydroxyzine but she states that hydroxyzine makes her very sleepy.  She has had swelling of her lips.  She did end up in the emergency room twice with the symptoms.  No bruising to the hives.  No increased joint swelling or belly pain with the hives.  She does not take any over-the-counter supplements but does use ibuprofen frequently.  She states the week that the hives occurred she did have COVID prior to starting that chemotherapy regimen.  She has had hives previously with Keflex and minocycline.    Past medical history: Mastectomy, , hysterectomy    Social history: Former smoker, lives in an apartment with a cat    Family history: Negative for asthma and allergies          Review of Systems   Constitutional, HEENT, cardiovascular, pulmonary, GI, , musculoskeletal, neuro, skin, endocrine and psych systems are negative, except as otherwise noted.     "  Objective    Pulse 98   Resp 16   Ht 1.753 m (5' 9\")   Wt 104.8 kg (231 lb)   LMP  (LMP Unknown)   SpO2 96%   BMI 34.11 kg/m    Body mass index is 34.11 kg/m .  Physical Exam     Gen: Pleasant female not in acute distress  HEENT: Eyes no erythema of the bulbar or palpebral conjunctiva, no edema. Ears: No deformities or lesions. Nose: No congestion,  Mouth: Throat clear, no lip or tongue edema.   Neck: No masses lesions or swelling  Respiratory: No coughing with breathing, no retractions  Lymph: No visible supraclavicular or cervical lymphadenopathy  Skin: small hives on the dorsal surfaces of both hands  Psych: Alert and appropriate for age      Impression report and plan:    1.  Chronic urticaria    The patient has chronic urticaria.  Chronic urticaria is not due to a specific allergen.  Recommend systemic work-up including TSH, , vitamin D level. .Many patients with chronic urticaria resolve within a few years.  Reviewed exacerbating and concerning signs of chronic urticaria.  Reviewed previous CBC with differential and CMP.  Recommended cetirizine up to 2 tablets twice daily.  Stated that this often does resolve within a few years.  She will notify me if symptoms are not well controlled.  Follow-up as needed. I don't think this was a drug allergy to the chemotherapy given the timeline.                  "

## 2023-10-25 ASSESSMENT — ENCOUNTER SYMPTOMS
ARTHRALGIAS: 0
WEAKNESS: 0
CHILLS: 0
EYE PAIN: 0
FREQUENCY: 0
PALPITATIONS: 0
HEARTBURN: 0
DIARRHEA: 0
PARESTHESIAS: 0
ABDOMINAL PAIN: 0
NERVOUS/ANXIOUS: 0
BREAST MASS: 0
JOINT SWELLING: 0
HEADACHES: 0
SHORTNESS OF BREATH: 0
COUGH: 0
MYALGIAS: 0
NAUSEA: 0
HEMATOCHEZIA: 0
DYSURIA: 0
SORE THROAT: 0
DIZZINESS: 0
CONSTIPATION: 0
HEMATURIA: 0
FEVER: 0

## 2023-11-01 ENCOUNTER — OFFICE VISIT (OUTPATIENT)
Dept: PEDIATRICS | Facility: CLINIC | Age: 53
End: 2023-11-01
Payer: COMMERCIAL

## 2023-11-01 VITALS
HEART RATE: 88 BPM | BODY MASS INDEX: 33.64 KG/M2 | WEIGHT: 227.1 LBS | DIASTOLIC BLOOD PRESSURE: 79 MMHG | OXYGEN SATURATION: 97 % | SYSTOLIC BLOOD PRESSURE: 115 MMHG | HEIGHT: 69 IN | TEMPERATURE: 97.7 F

## 2023-11-01 DIAGNOSIS — E11.9 TYPE 2 DIABETES MELLITUS WITHOUT COMPLICATION, WITHOUT LONG-TERM CURRENT USE OF INSULIN (H): ICD-10-CM

## 2023-11-01 DIAGNOSIS — Z00.00 ROUTINE GENERAL MEDICAL EXAMINATION AT A HEALTH CARE FACILITY: Primary | ICD-10-CM

## 2023-11-01 DIAGNOSIS — Z90.13 S/P MASTECTOMY, BILATERAL: ICD-10-CM

## 2023-11-01 DIAGNOSIS — L70.8 OTHER ACNE: ICD-10-CM

## 2023-11-01 DIAGNOSIS — Z17.0 MALIGNANT NEOPLASM OF OVERLAPPING SITES OF LEFT BREAST IN FEMALE, ESTROGEN RECEPTOR POSITIVE (H): ICD-10-CM

## 2023-11-01 DIAGNOSIS — L50.8 CHRONIC URTICARIA: ICD-10-CM

## 2023-11-01 DIAGNOSIS — G47.33 OBSTRUCTIVE SLEEP APNEA SYNDROME: ICD-10-CM

## 2023-11-01 DIAGNOSIS — C50.812 MALIGNANT NEOPLASM OF OVERLAPPING SITES OF LEFT BREAST IN FEMALE, ESTROGEN RECEPTOR POSITIVE (H): ICD-10-CM

## 2023-11-01 DIAGNOSIS — F32.5 MAJOR DEPRESSIVE DISORDER IN FULL REMISSION, UNSPECIFIED WHETHER RECURRENT (H): ICD-10-CM

## 2023-11-01 DIAGNOSIS — R91.8 PULMONARY NODULES: ICD-10-CM

## 2023-11-01 DIAGNOSIS — Z12.11 SCREEN FOR COLON CANCER: ICD-10-CM

## 2023-11-01 LAB
CHOLEST SERPL-MCNC: 190 MG/DL
CREAT UR-MCNC: 29.5 MG/DL
ESTRADIOL SERPL-MCNC: 6 PG/ML
FSH SERPL IRP2-ACNC: 15.5 MIU/ML
HBA1C MFR BLD: 5.2 % (ref 0–5.6)
HDLC SERPL-MCNC: 44 MG/DL
LDLC SERPL CALC-MCNC: 113 MG/DL
MICROALBUMIN UR-MCNC: <12 MG/L
MICROALBUMIN/CREAT UR: NORMAL MG/G{CREAT}
NONHDLC SERPL-MCNC: 146 MG/DL
TRIGL SERPL-MCNC: 164 MG/DL
VIT D+METAB SERPL-MCNC: 27 NG/ML (ref 20–50)

## 2023-11-01 PROCEDURE — 82570 ASSAY OF URINE CREATININE: CPT | Performed by: STUDENT IN AN ORGANIZED HEALTH CARE EDUCATION/TRAINING PROGRAM

## 2023-11-01 PROCEDURE — 82306 VITAMIN D 25 HYDROXY: CPT | Performed by: ALLERGY & IMMUNOLOGY

## 2023-11-01 PROCEDURE — 99214 OFFICE O/P EST MOD 30 MIN: CPT | Mod: 25 | Performed by: STUDENT IN AN ORGANIZED HEALTH CARE EDUCATION/TRAINING PROGRAM

## 2023-11-01 PROCEDURE — 82043 UR ALBUMIN QUANTITATIVE: CPT | Performed by: STUDENT IN AN ORGANIZED HEALTH CARE EDUCATION/TRAINING PROGRAM

## 2023-11-01 PROCEDURE — 36415 COLL VENOUS BLD VENIPUNCTURE: CPT | Performed by: STUDENT IN AN ORGANIZED HEALTH CARE EDUCATION/TRAINING PROGRAM

## 2023-11-01 PROCEDURE — 80061 LIPID PANEL: CPT | Performed by: STUDENT IN AN ORGANIZED HEALTH CARE EDUCATION/TRAINING PROGRAM

## 2023-11-01 PROCEDURE — 82670 ASSAY OF TOTAL ESTRADIOL: CPT | Performed by: STUDENT IN AN ORGANIZED HEALTH CARE EDUCATION/TRAINING PROGRAM

## 2023-11-01 PROCEDURE — 99396 PREV VISIT EST AGE 40-64: CPT | Mod: GC | Performed by: STUDENT IN AN ORGANIZED HEALTH CARE EDUCATION/TRAINING PROGRAM

## 2023-11-01 PROCEDURE — 83001 ASSAY OF GONADOTROPIN (FSH): CPT | Performed by: STUDENT IN AN ORGANIZED HEALTH CARE EDUCATION/TRAINING PROGRAM

## 2023-11-01 PROCEDURE — 83036 HEMOGLOBIN GLYCOSYLATED A1C: CPT | Performed by: STUDENT IN AN ORGANIZED HEALTH CARE EDUCATION/TRAINING PROGRAM

## 2023-11-01 RX ORDER — SEMAGLUTIDE 1.34 MG/ML
1 INJECTION, SOLUTION SUBCUTANEOUS
Qty: 9 ML | Refills: 1 | Status: SHIPPED | OUTPATIENT
Start: 2023-11-01 | End: 2024-05-03

## 2023-11-01 RX ORDER — SPIRONOLACTONE 100 MG/1
100 TABLET, FILM COATED ORAL DAILY
Qty: 90 TABLET | Refills: 3 | Status: SHIPPED | OUTPATIENT
Start: 2023-11-01

## 2023-11-01 RX ORDER — FLUOXETINE 40 MG/1
40 CAPSULE ORAL DAILY
Qty: 90 CAPSULE | Refills: 3 | Status: SHIPPED | OUTPATIENT
Start: 2023-11-01

## 2023-11-01 RX ORDER — METFORMIN HCL 500 MG
500 TABLET, EXTENDED RELEASE 24 HR ORAL DAILY
Qty: 90 TABLET | Refills: 3 | Status: SHIPPED | OUTPATIENT
Start: 2023-11-01 | End: 2023-11-02

## 2023-11-01 ASSESSMENT — PAIN SCALES - GENERAL: PAINLEVEL: NO PAIN (0)

## 2023-11-01 ASSESSMENT — ENCOUNTER SYMPTOMS
FEVER: 0
HEMATURIA: 0
SORE THROAT: 0
FREQUENCY: 0
DYSURIA: 0
SHORTNESS OF BREATH: 0
CONSTIPATION: 0
CHILLS: 0
DIZZINESS: 0
NERVOUS/ANXIOUS: 0
HEADACHES: 0
MYALGIAS: 0
PALPITATIONS: 0
WEAKNESS: 0
HEARTBURN: 0
DIARRHEA: 0
EYE PAIN: 0
PARESTHESIAS: 0
NAUSEA: 0
ABDOMINAL PAIN: 0
HEMATOCHEZIA: 0
BREAST MASS: 0
JOINT SWELLING: 0
ARTHRALGIAS: 0
COUGH: 0

## 2023-11-01 ASSESSMENT — PATIENT HEALTH QUESTIONNAIRE - PHQ9: SUM OF ALL RESPONSES TO PHQ QUESTIONS 1-9: 8

## 2023-11-01 NOTE — PROGRESS NOTES
SUBJECTIVE:   CC: Yuliya is an 53 year old who presents for preventive health visit.       11/1/2023     9:49 AM   Additional Questions   Roomed by mf   Accompanied by self       Healthy Habits:     Getting at least 3 servings of Calcium per day:  NO    Bi-annual eye exam:  Yes    Dental care twice a year:  Yes    Sleep apnea or symptoms of sleep apnea:  Excessive snoring and Sleep apnea    Diet:  Regular (no restrictions)    Frequency of exercise:  1 day/week    Duration of exercise:  Less than 15 minutes    Taking medications regularly:  Yes    Medication side effects:  None    Additional concerns today:  No    # T2DM  Lab Results   Component Value Date    A1C 5.4 10/26/2022    A1C 5.9 03/18/2022    A1C 6.9 12/27/2021    A1C 6.9 01/12/2021     # HLD    # Weight  Wt Readings from Last 4 Encounters:   11/01/23 103 kg (227 lb 1.6 oz)   10/16/23 104.8 kg (231 lb)   09/18/23 105.1 kg (231 lb 9.6 oz)   06/22/23 102.5 kg (226 lb)     Acne - on spirolactone     DM2 - semaglutide 0.5m weekly, metformin. A1c in March 5.9%, statin. 100-120 in the morning, at times as low as 79 (shakes) happening maybe 3 times this year.     MDD - stable on fluoxetine. Following with therapy.       10/26/2022     8:41 AM 6/1/2023    12:45 PM 11/1/2023     9:54 AM   PHQ   PHQ-9 Total Score 4 5 8   Q9: Thoughts of better off dead/self-harm past 2 weeks Not at all Not at all Not at all      Hx breast cancer - Spring of 2023, s/p mastectomy (b/l). underwent chemotherapy and is currently on anastrozole. Radiation in July and August. Follows with oncology, next appt in December - of note, they are following small lung nodules with serial chest CT's.  Genetic counseling recommended.     WESLEY - using CPAP intermittently     Chronic urticaria - follows with allergy, on cetirizine. Has as needed prednisone (40mg), working well. Once a day about each month.     Colon cancer screening, mammogram     Social History     Tobacco Use    Smoking status:  Former     Packs/day: 0     Types: Cigarettes     Quit date: 1999     Years since quittin.0    Smokeless tobacco: Never   Substance Use Topics    Alcohol use: Yes     Comment: Alcoholic Drinks/day: 1 a week             10/25/2023     9:22 AM   Alcohol Use   Prescreen: >3 drinks/day or >7 drinks/week? No     Reviewed orders with patient.  Reviewed health maintenance and updated orders accordingly - Yes    Breast Cancer Screening:    FHS-7:       10/26/2022     8:47 AM 2022     3:02 PM   Breast CA Risk Assessment (FHS-7)   Did any of your first-degree relatives have breast or ovarian cancer? No No   Did any of your relatives have bilateral breast cancer? No No   Did any man in your family have breast cancer? No No   Did any woman in your family have breast and ovarian cancer? Yes Yes   Did any woman in your family have breast cancer before age 50 y? Yes No   Do you have 2 or more relatives with breast and/or ovarian cancer? No No   Do you have 2 or more relatives with breast and/or bowel cancer? No No     Pertinent mammograms are reviewed under the imaging tab.    History of abnormal Pap smear: Status post benign hysterectomy. Health Maintenance and Surgical History updated.     Reviewed and updated as needed this visit by clinical staff   Tobacco  Allergies  Meds   Med Hx  Surg Hx           Reviewed and updated as needed this visit by Provider   Tobacco   Meds   Med Hx  Surg Hx            Review of Systems   Constitutional:  Negative for chills and fever.   HENT:  Negative for congestion, ear pain, hearing loss and sore throat.    Eyes:  Negative for pain and visual disturbance.   Respiratory:  Negative for cough and shortness of breath.    Cardiovascular:  Negative for chest pain, palpitations and peripheral edema.   Gastrointestinal:  Negative for abdominal pain, constipation, diarrhea, heartburn, hematochezia and nausea.   Breasts:  Negative for tenderness, breast mass and discharge.  "  Genitourinary:  Negative for dysuria, frequency, genital sores, hematuria, pelvic pain, urgency, vaginal bleeding and vaginal discharge.   Musculoskeletal:  Negative for arthralgias, joint swelling and myalgias.   Skin:  Negative for rash.   Neurological:  Negative for dizziness, weakness, headaches and paresthesias.   Psychiatric/Behavioral:  Negative for mood changes. The patient is not nervous/anxious.      OBJECTIVE:   /79   Pulse 88   Temp 97.7  F (36.5  C)   Ht 1.75 m (5' 8.9\")   Wt 103 kg (227 lb 1.6 oz)   LMP  (LMP Unknown)   SpO2 97%   BMI 33.64 kg/m      Physical Exam  GENERAL: healthy, alert and no distress  EYES: Eyes grossly normal to inspection, PERRL and conjunctivae and sclerae normal  HENT: ear canals and TM's normal, nose and mouth without ulcers or lesions  NECK: no adenopathy, no asymmetry, masses, or scars and thyroid normal to palpation  RESP: lungs clear to auscultation - no rales, rhonchi or wheezes  CV: regular rate and rhythm, normal S1 S2, no S3 or S4, no murmur, click or rub, no peripheral edema and peripheral pulses strong  ABDOMEN: soft, nontender, no hepatosplenomegaly, no masses and bowel sounds normal  MS: no gross musculoskeletal defects noted, no edema  SKIN: no suspicious lesions or rashes  NEURO: Normal strength and tone, mentation intact and speech normal  PSYCH: mentation appears normal, affect normal/bright    ASSESSMENT/PLAN:   Yuliya was seen today for physical.  Diagnoses and all orders for this visit:    Routine general medical examination at a health care facility  This has been quite the year for Yuliya - she has undergone treatment for breast cancer, her father passed, and a friend just recently passed as well.  Despite this, she has great support and has been doing an exceptional job taking care of herself.  We will get a few labs today, we will see her back next year she follows very closely with oncology at this time.  Largely to date with vaccines, " declines a flu shot and COVID booster today.    Type 2 diabetes mellitus without complication, without long-term current use of insulin (H)  Well-controlled type 2 diabetes, A1c is typically under 6.  Blood glucoses range from 100-120, very rare lows around 80 which she is symptomatic from.  We will recheck A1c today.  Given her stability on metformin and Ozempic as well as her frequent follow-up with oncology, do feel it reasonable to check an A1c in 6 months and see her at next year's visit.  -     HEMOGLOBIN A1C; Future  -     Lipid panel reflex to direct LDL Non-fasting; Future  -     Albumin Random Urine Quantitative with Creat Ratio; Future  -     metFORMIN (GLUCOPHAGE XR) 500 MG 24 hr tablet; Take 1 tablet (500 mg) by mouth daily  -     Semaglutide, 1 MG/DOSE, (OZEMPIC, 1 MG/DOSE,) 4 MG/3ML pen; Inject 1 mg Subcutaneous every 7 days (On Fridays)  -     Hemoglobin A1c; Future  -     HEMOGLOBIN A1C  -     Lipid panel reflex to direct LDL Non-fasting  -     Albumin Random Urine Quantitative with Creat Ratio    Malignant neoplasm of overlapping sites of left breast in female, estrogen receptor positive (H)  Diagnosed with breast cancer in early 2023, status post bilateral mastectomy, chemotherapy, and radiation.  Following closely with oncology who was also following small pulmonary nodules.  She does plan to have reconstructive surgery later this year.  -     Follicle stimulating hormone  -     Estradiol    Major depressive disorder in full remission, unspecified whether recurrent (H24)  Mood has been fairly stable, continues to follow closely with a therapist, no desire to change medication dosing at this time, no safety concerns.  -     FLUoxetine (PROZAC) 40 MG capsule; Take 1 capsule (40 mg) by mouth daily      10/26/2022     8:41 AM 6/1/2023    12:45 PM 11/1/2023     9:54 AM   PHQ   PHQ-9 Total Score 4 5 8   Q9: Thoughts of better off dead/self-harm past 2 weeks Not at all Not at all Not at all     "    Obstructive sleep apnea syndrome  Using CPAP fairly regularly, there has been a recall on her machine.  Given her significant weight loss, do feel it reasonable to have a repeat sleep study sometime this year.  -     Adult Sleep Eval & Management  Referral; Future    Other acne  -     spironolactone (ALDACTONE) 100 MG tablet; Take 1 tablet (100 mg) by mouth daily    Screen for colon cancer  -     Colonoscopy Screening  Referral; Future    COUNSELING:  Reviewed preventive health counseling, as reflected in patient instructions       Healthy diet/nutrition       Vision screening       Osteoporosis prevention/bone health       Colorectal Cancer Screening    BMI:   Estimated body mass index is 33.64 kg/m  as calculated from the following:    Height as of this encounter: 1.75 m (5' 8.9\").    Weight as of this encounter: 103 kg (227 lb 1.6 oz).   Weight management plan: Discussed healthy diet and exercise guidelines      She reports that she quit smoking about 24 years ago. Her smoking use included cigarettes. She has never used smokeless tobacco.      Latrice Perez MD  [facility]    -------    Physician Attestation   I, Joe Moran MD, saw this patient and agree with the findings and plan of care as documented in the note.      Items personally reviewed/procedural attestation: vitals and labs.    Joe Moran MD    "

## 2023-11-01 NOTE — PROGRESS NOTES
Acne - on spirolactone     DM2 - semaglutide 0.5m weekly, A1c in March 5.9%, statin. 100-120 in the morning, at times as low as 79 (shakes) happening maybe 3 times this year.     MDD - stable on fluoxetine. Following with therapy.     Hx breast cancer - Spring of 2023, s/p mastectomy (b/l). underwent chemotherapy and is currently on anastrozole. Radiation in July and August. Follows with oncology, next appt in December - of note, they are following small lung nodules with serial chest CT's.  Genetic counseling recommended.     WESLEY - using CPAP intermittently     Chronic urticaria - follows with allergy, on cetirizine. Has as needed prednisone (40mg), working well. Once a day about each month.     Colon cancer screening, mammogram

## 2023-11-02 PROBLEM — T45.1X5A CHEMOTHERAPY-INDUCED NEUTROPENIA (H): Status: RESOLVED | Noted: 2023-03-23 | Resolved: 2023-11-02

## 2023-11-02 PROBLEM — Z90.13 S/P MASTECTOMY, BILATERAL: Status: ACTIVE | Noted: 2023-11-02

## 2023-11-02 PROBLEM — L50.8 CHRONIC URTICARIA: Status: ACTIVE | Noted: 2023-11-02

## 2023-11-02 PROBLEM — R91.8 PULMONARY NODULES: Status: ACTIVE | Noted: 2023-11-02

## 2023-11-02 PROBLEM — D70.1 CHEMOTHERAPY-INDUCED NEUTROPENIA (H): Status: RESOLVED | Noted: 2023-03-23 | Resolved: 2023-11-02

## 2023-11-02 RX ORDER — ATORVASTATIN CALCIUM 10 MG/1
10 TABLET, FILM COATED ORAL DAILY
Qty: 90 TABLET | Refills: 3 | Status: SHIPPED | OUTPATIENT
Start: 2023-11-02

## 2023-11-02 NOTE — RESULT ENCOUNTER NOTE
Rolando Dwyer,     It was nice to see you yesterday with Dr. Perez. A few labs for your review.     A1c looks really good. I'd actually recommend that we stop your metformin since things look so good. Let me know if you disagree but for now I'm going to remove it from your medication list. I think we can keep our plan to recheck your A1c in 6 months -> put it on your calendar for May 2024.    You're not going to be surprised but I am going to recommend that we restart your cholesterol medication. Ideally your LDL/bad cholesterol is <70. I sent this to your pharmacy and we can recheck your cholesterol in about 2-3 months. I put in fasting labs for January 2024.    Your urine tests were normal.     Please let me know if you have any questions,   E. Ephraim Moran MD  Internal Medicine-Pediatrics

## 2023-11-08 ENCOUNTER — HOSPITAL ENCOUNTER (OUTPATIENT)
Facility: CLINIC | Age: 53
End: 2023-11-08
Attending: INTERNAL MEDICINE | Admitting: INTERNAL MEDICINE
Payer: COMMERCIAL

## 2023-11-08 ENCOUNTER — TELEPHONE (OUTPATIENT)
Dept: GASTROENTEROLOGY | Facility: CLINIC | Age: 53
End: 2023-11-08
Payer: COMMERCIAL

## 2023-11-08 DIAGNOSIS — Z12.11 SPECIAL SCREENING FOR MALIGNANT NEOPLASMS, COLON: Primary | ICD-10-CM

## 2023-11-08 NOTE — TELEPHONE ENCOUNTER
"Endoscopy Scheduling Screen    Have you had a positive Covid test in the last 14 days?  No    Are you active on MyChart?   Yes    What insurance is in the chart?  Other:  [employer]     Ordering/Referring Provider: ZEYNEP CHIN    (If ordering provider performs procedure, schedule with ordering provider unless otherwise instructed. )    BMI: Estimated body mass index is 33.64 kg/m  as calculated from the following:    Height as of 11/1/23: 1.75 m (5' 8.9\").    Weight as of 11/1/23: 103 kg (227 lb 1.6 oz).     Sedation Ordered  moderate sedation.   If patient BMI > 50 do not schedule in [facility].    If patient BMI > 45 do not schedule at [facility].    Are you taking methadone or Suboxone?  No    Are you taking any prescription medications for pain 3 or more times per week?   No    Do you have a history of malignant hyperthermia or adverse reaction to anesthesia?  No    (Females) Are you currently pregnant?   No     Have you been diagnosed or told you have pulmonary hypertension?   No    Do you have an LVAD?  No    Have you been told you have moderate to severe sleep apnea?  Yes (RN Review required for scheduling unless scheduling in Hospital.) - CPAP     Have you been told you have COPD, asthma, or any other lung disease?  No    Do you have any heart conditions?  No     Have you ever had an organ transplant?   No    Have you ever had or are you awaiting a heart or lung transplant?   No    Have you had a stroke or transient ischemic attack (TIA aka \"mini stroke\" in the last 6 months?   No    Have you been diagnosed with or been told you have cirrhosis of the liver?   No    Are you currently on dialysis?   No    Do you need assistance transferring?   No    BMI: Estimated body mass index is 33.64 kg/m  as calculated from the following:    Height as of 11/1/23: 1.75 m (5' 8.9\").    Weight as of 11/1/23: 103 kg (227 lb 1.6 oz).     Is patients BMI > 40 and scheduling location [facility]?  No    Do you take an injectable " medication for weight loss or diabetes (excluding insulin)? - OZEMPIC   Yes, hold time can be up to 7 days. Please check with you prescribing provider for recommendation.     Do you take the medication Naltrexone?  No    Do you take blood thinners?  No       Prep   Are you currently on dialysis or do you have chronic kidney disease?  No    Do you have a diagnosis of diabetes?  Yes (Golytely Prep)    Do you have a diagnosis of cystic fibrosis (CF)?  No    On a regular basis do you go 3 -5 days between bowel movements?  Yes (Extended Prep)    BMI > 40?  No    Preferred Pharmacy:    [id number] - [place], [place] - [address]  [address] [place] [address]  Phone: [phone] Fax: [phone]    Final Scheduling Details   Colonoscopy prep sent?  Golytely Extended Prep    Procedure scheduled  Colonoscopy    Surgeon:  JURAEZ      Date of procedure:  02/14/2024     Pre-OP / PAC:   No - Not required for this site.    Location  [facility] - Per order.    Sedation   Moderate Sedation - Per order.      Patient Reminders:   You will receive a call from a Nurse to review instructions and health history.  This assessment must be completed prior to your procedure.  Failure to complete the Nurse assessment may result in the procedure being cancelled.      On the day of your procedure, please designate an adult(s) who can drive you home stay with you for the next 24 hours. The medicines used in the exam will make you sleepy. You will not be able to drive.      You cannot take public transportation, ride share services, or non-medical taxi service without a responsible caregiver.  Medical transport services are allowed with the requirement that a responsible caregiver will receive you at your destination.  We require that drivers and caregivers are confirmed prior to your procedure.

## 2023-11-09 ENCOUNTER — ALLIED HEALTH/NURSE VISIT (OUTPATIENT)
Dept: INFUSION THERAPY | Facility: CLINIC | Age: 53
End: 2023-11-09
Attending: INTERNAL MEDICINE
Payer: COMMERCIAL

## 2023-11-09 VITALS
RESPIRATION RATE: 18 BRPM | DIASTOLIC BLOOD PRESSURE: 74 MMHG | HEART RATE: 101 BPM | OXYGEN SATURATION: 98 % | TEMPERATURE: 97.8 F | SYSTOLIC BLOOD PRESSURE: 107 MMHG

## 2023-11-09 DIAGNOSIS — C50.812 MALIGNANT NEOPLASM OF OVERLAPPING SITES OF LEFT BREAST IN FEMALE, ESTROGEN RECEPTOR POSITIVE (H): Primary | ICD-10-CM

## 2023-11-09 DIAGNOSIS — Z17.0 MALIGNANT NEOPLASM OF OVERLAPPING SITES OF LEFT BREAST IN FEMALE, ESTROGEN RECEPTOR POSITIVE (H): Primary | ICD-10-CM

## 2023-11-09 PROCEDURE — 96402 CHEMO HORMON ANTINEOPL SQ/IM: CPT

## 2023-11-09 PROCEDURE — 250N000011 HC RX IP 250 OP 636: Mod: JZ | Performed by: INTERNAL MEDICINE

## 2023-11-09 RX ADMIN — GOSERELIN ACETATE 3.6 MG: 3.6 IMPLANT SUBCUTANEOUS at 13:54

## 2023-11-09 ASSESSMENT — PAIN SCALES - GENERAL: PAINLEVEL: NO PAIN (0)

## 2023-11-24 DIAGNOSIS — E11.9 TYPE 2 DIABETES MELLITUS WITHOUT COMPLICATION, WITHOUT LONG-TERM CURRENT USE OF INSULIN (H): ICD-10-CM

## 2023-11-26 RX ORDER — METFORMIN HCL 500 MG
500 TABLET, EXTENDED RELEASE 24 HR ORAL DAILY
Qty: 90 TABLET | Refills: 1 | Status: SHIPPED | OUTPATIENT
Start: 2023-11-26 | End: 2024-05-17

## 2023-12-01 NOTE — PROGRESS NOTES
[facility]    Hematology/Oncology Established Patient Note      Today's Date: 12/7/2023    Reason for follow-up:  Left breast cancer.    HISTORY OF PRESENT ILLNESS: Yuliya Li is a 53 year old female status post removal of single ovary and hysterectomy who presents with the following oncologic history:  1.  12/23/2022: Mammogram showed possible asymmetry in right breast at 12:00; possible mass in left breast at 3:00.  2. 1/2/2023: Diagnostic bilateral mammogram showed mass in left breast at 3:00 with 0.4 cm satellite mass in retroareolar breast. Right breast with effacement of possible finding at 12:00 consistent with artifact. Left breast U/S showed mass at 3:00, 11 cm from nipple measuring 1.9 x 1.6 x 1.8 cm; no abnormal axillary lymph nodes.  3. 1/03/2022: Left breast core biopsy of 0.4 cm mass at 3:30 position showed grade 2 invasive ductal carcinoma with lobular features. Biopsy of left breast 1.9 cm mass at 3:00 showed grade 2 invasive ductal carcinoma with lobular features, associated grade 2 DCIS. ER and TX strongly positive at %; HER-2 IHC = 2+; HER-2/crow FISH negative.  4. 1/10/2023: FSH = 36.6 but estradiol = 63.  5.  2/03/2023: Bilateral mastectomies with left axillary sentinel lymph node excision and left chest wall nodule excision with Dr. Clarissa Fontanez.  Pathology showed left breast grade 2 invasive ductal carcinoma with lobular features, multifocal with 2 cm and 0.4 cm tumors, grade 2 DCIS, all margins negative.  Of 3 left axillary sentinel lymph nodes removed, one with 6 mm macrometastasis positive for extranodal extension and one with 1.1 mm micrometastasis, negative for extranodal extension, identified. Left chest wall nodule positive for metastatic breast carcinoma involving edge of tissue fragment. HER-2 FISH repeated, group 4, considered HER-2 negative. Oncotype DX = 20, 9-year risk of distant recurrence of 17% after 5 years of hormone blockade therapy;  breast-cancer specific survival of 96.7% treated without chemotherapy.  6. 2/23/2023: PET/CT scan negative for metastatic disease. Small right middle lobe lung nodules, non-FDG avid.  7. 3/29/2023: Started adjuvant chemotherapy with Taxotere and Cytoxan. Received cycle 2 on 4/19/2023, complicated by allergic reaction with chest heaviness followed by diffuse rash. Then switched to weekly paclitaxel for 6 weeks with completion on 6/14/2023.  8. 6/22/2023: Start of Zoladex every 4 weeks with anastrozole.  9. 7/17/2023-8/18/2023: Completed adjuvant radiation therapy.    INTERVAL HISTORY:  Yuliya reports she has been experiencing more prominent joint stiffness and aching for 2-7 days after each Zoladex injection. She is still having intermittent hives for which she takes Benadryl.      REVIEW OF SYSTEMS:   14 point ROS was reviewed and is negative other than as noted above in HPI.       HOME MEDICATIONS:  Current Outpatient Medications   Medication Sig Dispense Refill    alcohol swab prep pads Use to swab area of injection/amy as directed. 100 each 3    anastrozole (ARIMIDEX) 1 MG tablet Take 1 tablet (1 mg) by mouth daily 90 tablet 3    atorvastatin (LIPITOR) 10 MG tablet Take 1 tablet (10 mg) by mouth daily 90 tablet 3    blood glucose (NO BRAND SPECIFIED) test strip Use to test blood sugar 2 times daily or as directed. To accompany: Blood Glucose Monitor Brands: per insurance. 100 strip 6    blood glucose calibration (NO BRAND SPECIFIED) solution To accompany: Blood Glucose Monitor Brands: per insurance. 1 each 1    blood glucose monitoring (NO BRAND SPECIFIED) meter device kit Use to test blood sugar 2 times daily or as directed. Preferred blood glucose meter OR supplies to accompany: Blood Glucose Monitor Brands: per insurance. 1 kit 0    diphenhydrAMINE (BENADRYL) 25 MG capsule Take 2 capsules (50 mg) by mouth every 6 hours as needed for itching or allergies 30 capsule 0    diphenhydrAMINE (BENADRYL) 25 MG tablet  Take 2 tablets (50 mg) by mouth every 6 hours as needed for itching or allergies 120 tablet 1    EPINEPHrine (ANY BX GENERIC EQUIV) 0.3 MG/0.3ML injection 2-pack Inject 0.3 mLs (0.3 mg) into the muscle once as needed for anaphylaxis May repeat one time in 5-15 minutes if response to initial dose is inadequate. (Patient not taking: Reported on 11/1/2023) 2 each 0    FLUoxetine (PROZAC) 40 MG capsule Take 1 capsule (40 mg) by mouth daily 90 capsule 3    hydrOXYzine (ATARAX) 25 MG tablet Take 1-2 tablets (25-50 mg) by mouth 3 times daily as needed for itching 30 tablet 1    medical cannabis (Patient's own supply) Take 1 Dose by mouth nightly as needed Gummies: 5 mg   Formulary: Red  Location: Unknown    (The purpose of this order is to document that the patient reports taking medical cannabis.  This is not a prescription, and is not used to certify that the patient has a qualifying medical condition.)      melatonin 3 MG tablet Take 6 mg by mouth nightly as needed for sleep      metFORMIN (GLUCOPHAGE XR) 500 MG 24 hr tablet TAKE 1 TABLET BY MOUTH EVERY DAY 90 tablet 1    Semaglutide, 1 MG/DOSE, (OZEMPIC, 1 MG/DOSE,) 4 MG/3ML pen Inject 1 mg Subcutaneous every 7 days (On Fridays) 9 mL 1    spironolactone (ALDACTONE) 100 MG tablet Take 1 tablet (100 mg) by mouth daily 90 tablet 3    thin (NO BRAND SPECIFIED) lancets Use with lanceting device. To accompany: Blood Glucose Monitor Brands: per insurance. 100 each 6    vitamin D2 (ERGOCALCIFEROL) 15083 units (1250 mcg) capsule Take 1 capsule (50,000 Units) by mouth once a week 8 capsule 0         ALLERGIES:  Allergies   Allergen Reactions    Docetaxel Anaphylaxis, Hives, Itching and Shortness Of Breath    Keflex [Cephalexin]     Tetracycline     Trazodone      Very sedating-         PAST MEDICAL HISTORY:  Diabetes mellitus type 2  Hyperlipidemia    Gynecologic history:  Age of menarche at 11.  Last menstrual period in 2005 prior to hysterectomy.  G3, P3.  Age of first  pregnancy at 29.  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  No use of HRT.  Used OCPs for 4 years.  No IUD use.      PAST SURGICAL HISTORY:  Past Surgical History:   Procedure Laterality Date    CYST REMOVAL      HYSTERECTOMY      HYSTERECTOMY, PAP NO LONGER INDICATED      menorrhagia    INSERT TISSUE EXPANDER BREAST Bilateral 2/3/2023    Procedure: BILATERAL TISSUE EXPANDER;  Surgeon: Jaya Malave MD;  Location:  OR    MASTECTOMY SIMPLE BILATERAL, SENTINEL NODE BILATERAL, COMBINED Left 2/3/2023    Procedure: Bilateral skin sparing mastectomies with left axillary sentinel lymph node biopsy;  Surgeon: Clarissa Fontanez MD;  Location:  OR         SOCIAL HISTORY:  Social History     Socioeconomic History    Marital status:      Spouse name: Not on file    Number of children: 3    Years of education: Not on file    Highest education level: Not on file   Occupational History    Not on file   Tobacco Use    Smoking status: Former     Packs/day: 0     Types: Cigarettes     Quit date: 1999     Years since quittin.0    Smokeless tobacco: Never   Vaping Use    Vaping Use: Never used   Substance and Sexual Activity    Alcohol use: Yes     Comment: Alcoholic Drinks/day: 1 a week    Drug use: Yes     Types: Marijuana     Comment: nightly for sleep (gummy)    Sexual activity: Yes     Partners: Male   Other Topics Concern    Not on file   Social History Narrative    Not on file     Social Determinants of Health     Financial Resource Strain: Low Risk  (10/25/2023)    Financial Resource Strain     Within the past 12 months, have you or your family members you live with been unable to get utilities (heat, electricity) when it was really needed?: No   Food Insecurity: Low Risk  (10/25/2023)    Food Insecurity     Within the past 12 months, did you worry that your food would run out before you got money to buy more?: No     Within the past 12 months, did the food you bought just not last and you didn t have  money to get more?: No   Transportation Needs: Low Risk  (10/25/2023)    Transportation Needs     Within the past 12 months, has lack of transportation kept you from medical appointments, getting your medicines, non-medical meetings or appointments, work, or from getting things that you need?: No   Physical Activity: Insufficiently Active (10/26/2022)    Exercise Vital Sign     Days of Exercise per Week: 1 day     Minutes of Exercise per Session: 20 min   Stress: Stress Concern Present (10/26/2022)    [employer] [place] [employer] - Occupational Stress Questionnaire     Feeling of Stress : Rather much   Social Connections: Moderately Isolated (10/26/2022)    Social Connection and Isolation Panel [NHANES]     Frequency of Communication with Friends and Family: More than three times a week     Frequency of Social Gatherings with Friends and Family: Once a week     Attends Worship Services: Never     Active Member of Clubs or Organizations: No     Attends Club or Organization Meetings: Not on file     Marital Status:    Interpersonal Safety: Low Risk  (11/1/2023)    Interpersonal Safety     Do you feel physically and emotionally safe where you currently live?: Yes     Within the past 12 months, have you been hit, slapped, kicked or otherwise physically hurt by someone?: No     Within the past 12 months, have you been humiliated or emotionally abused in other ways by your partner or ex-partner?: No   Housing Stability: Low Risk  (10/25/2023)    Housing Stability     Do you have housing? : Yes     Are you worried about losing your housing?: No         FAMILY HISTORY:  Family History   Problem Relation Age of Onset    Diabetes Mother     Atrial fibrillation Father     Alcoholism Father     Melanoma Father     Breast Cancer Maternal Aunt         in her 40's    Cervical Cancer Maternal Aunt     Colon Cancer No family hx of          PHYSICAL EXAM:  Vital signs:  /79   Pulse 103   Temp 97.8  F  (36.6  C) (Oral)   Resp 16   Wt 103 kg (227 lb)   LMP  (LMP Unknown)   SpO2 96%   BMI 33.62 kg/m     ECO  GENERAL/CONSTITUTIONAL: No acute distress.  EYES: No erythema or scleral icterus.  LYMPH: No cervical, supraclavicular, axillary adenopathy.   BREAST: Bilateral breasts surgically absent with expanders in place with no new chest wall masses. Mild hyperpigmentation over left chest wall.  RESPIRATORY: No audible cough or wheezing.   GASTROINTESTINAL: No hepatosplenomegaly, masses, or tenderness. No guarding.  No distention.  MUSCULOSKELETAL: Warm and well-perfused, no cyanosis, clubbing, or edema.  NEUROLOGIC: No focal motor deficits. Alert, oriented, answers questions appropriately.  INTEGUMENTARY: No rash.  GAIT: Steady, does not use assistive device      LABS:  CBC RESULTS: Recent Labs   Lab Test 23  0933   WBC 7.9   RBC 4.54   HGB 13.6   HCT 41.6   MCV 92   MCH 30.0   MCHC 32.7   RDW 15.6*   *     2023  FSH = 15.5  Estradiol = 6  Vitamin D = 27    ASSESSMENT/PLAN:  Yuliya Li is a 53 year old female with the following issues:  1. Pathologic prognostic stage IA, [id number], grade 2 multifocal invasive ductal carcinoma with lobular features of left breast overlapping sites, ER positive, NJ positive, HER-2 IHC = 2+; HER-2/crow FISH negative, Oncotype DX = 20  --Yuliya had 2 tumors measuring 2 cm and 0.4 cm and additional left chest wall nodule, but with 2/3 lymph nodes involved, one that was a macrometastasis with extranodal extension and the other with micrometastasis with no extranodal extension.  All areas of tumor were excised.  The repeat HER-2 FISH was interpreted as negative.   --2023 PET scan showed no evidence for metastatic disease.  --Oncotype DX score 20, with 9-year risk of distant recurrence of 17% after 5 years of hormone blockade therapy; breast-cancer specific survival of 96.7% treated without chemotherapy.  --She elected to proceed with 4 cycles of Taxotere  and Cytoxan every 3 weeks to maximally reduce her risk beyond just hormone blockade therapy alone.  However, she reacted to Taxotere after cycle 2. Therefore, she was switched to Taxol weekly for 6 weeks, completed 6/14/2023.  We did not proceed with dose dense AC given that her maximum absolute chemo benefit is up to 3.3% and would not justify the potential adverse effects of ddAC.  --She is perimenopausal and started OST + AI with Zoladex every 4 weeks with anastrozole on 6/22/2023.   --Completed adjuvant radiation therapy 8/18/2023.  --Reviewed that her FSH and estradiol are pending from today.  --Would plan to repeat FSH and estradiol prior to next visit to see if we can discontinue Zoladex.  --3/20/2023 DEXA scan showed normal bone density for baseline. Plan to repeat DEXA in 2025.  --She has no clinical evidence for recurrent breast cancer by physical exam from today.    2.  Family history of breast cancer  - Maternal aunt with breast cancer.  - I have recommended and referred Yuliya to genetic counseling.  She will call N[facility] to set up blood draw.    3. Indeterminate right middle lobe pulmonary nodules  --2/23/2023 PET/CT scan showed 2 small indeterminate right middle lobe pulmonary nodules.   --5/24/2023 CT chest showed stable right lung nodules.  --Reviewed 12/4/2023 chest CT showed stable pulmonary nodule in the right middle lobe. The smaller of the two nodules described previously is no longer  demonstrated today.  --Repeat chest CT in 6 month, then a year after that if nodule is stable.    4. Hot flashes  --Mild and tolerable.  No medication intervention needed.    5. Intermittent hives  --She saw an allergist. Etiology unknown, but could be due to prior docetaxel. She switched from Benadryl to Zyrtec.  --She plans to see a dermatologist as well.    6. Vitamin D deficiency  --She is on vitamin D supplementation.    7. Polyarthralgia  --Related to Zoladex + anastrozole.  --Discussed she could try tart  cherry juice as well as acupuncture, exercise, stretching.    Return in 3-4 months.    Louise Watson MD  Hematology/Oncology  [facility]    Total time spent today: 30 minutes in chart review, patient evaluation, counseling, documentation, test and/or medication/prescription orders, and coordination of care.

## 2023-12-04 ENCOUNTER — HOSPITAL ENCOUNTER (OUTPATIENT)
Dept: CT IMAGING | Facility: CLINIC | Age: 53
Discharge: HOME | End: 2023-12-04
Attending: INTERNAL MEDICINE | Admitting: INTERNAL MEDICINE
Payer: COMMERCIAL

## 2023-12-04 DIAGNOSIS — Z17.0 MALIGNANT NEOPLASM OF OVERLAPPING SITES OF LEFT BREAST IN FEMALE, ESTROGEN RECEPTOR POSITIVE (H): ICD-10-CM

## 2023-12-04 DIAGNOSIS — R91.8 PULMONARY NODULES: ICD-10-CM

## 2023-12-04 DIAGNOSIS — C50.812 MALIGNANT NEOPLASM OF OVERLAPPING SITES OF LEFT BREAST IN FEMALE, ESTROGEN RECEPTOR POSITIVE (H): ICD-10-CM

## 2023-12-04 PROCEDURE — 71250 CT THORAX DX C-: CPT

## 2023-12-07 ENCOUNTER — LAB (OUTPATIENT)
Dept: INFUSION THERAPY | Facility: CLINIC | Age: 53
End: 2023-12-07
Attending: INTERNAL MEDICINE
Payer: COMMERCIAL

## 2023-12-07 ENCOUNTER — ONCOLOGY VISIT (OUTPATIENT)
Dept: ONCOLOGY | Facility: CLINIC | Age: 53
End: 2023-12-07
Attending: INTERNAL MEDICINE
Payer: COMMERCIAL

## 2023-12-07 VITALS
WEIGHT: 227 LBS | BODY MASS INDEX: 33.62 KG/M2 | HEART RATE: 103 BPM | SYSTOLIC BLOOD PRESSURE: 109 MMHG | DIASTOLIC BLOOD PRESSURE: 79 MMHG | TEMPERATURE: 97.8 F | OXYGEN SATURATION: 96 % | RESPIRATION RATE: 16 BRPM

## 2023-12-07 DIAGNOSIS — Z17.0 MALIGNANT NEOPLASM OF OVERLAPPING SITES OF LEFT BREAST IN FEMALE, ESTROGEN RECEPTOR POSITIVE (H): ICD-10-CM

## 2023-12-07 DIAGNOSIS — C50.812 MALIGNANT NEOPLASM OF OVERLAPPING SITES OF LEFT BREAST IN FEMALE, ESTROGEN RECEPTOR POSITIVE (H): ICD-10-CM

## 2023-12-07 DIAGNOSIS — Z17.0 MALIGNANT NEOPLASM OF OVERLAPPING SITES OF LEFT BREAST IN FEMALE, ESTROGEN RECEPTOR POSITIVE (H): Primary | ICD-10-CM

## 2023-12-07 DIAGNOSIS — N95.1 MENOPAUSAL SYNDROME (HOT FLASHES): ICD-10-CM

## 2023-12-07 DIAGNOSIS — C50.812 MALIGNANT NEOPLASM OF OVERLAPPING SITES OF LEFT BREAST IN FEMALE, ESTROGEN RECEPTOR POSITIVE (H): Primary | ICD-10-CM

## 2023-12-07 DIAGNOSIS — R91.8 PULMONARY NODULES: ICD-10-CM

## 2023-12-07 DIAGNOSIS — R21 RASH: Primary | ICD-10-CM

## 2023-12-07 LAB
ALBUMIN SERPL BCG-MCNC: 4.3 G/DL (ref 3.5–5.2)
ALP SERPL-CCNC: 82 U/L (ref 40–150)
ALT SERPL W P-5'-P-CCNC: 19 U/L (ref 0–50)
ANION GAP SERPL CALCULATED.3IONS-SCNC: 10 MMOL/L (ref 7–15)
AST SERPL W P-5'-P-CCNC: 21 U/L (ref 0–45)
BASOPHILS # BLD AUTO: 0 10E3/UL (ref 0–0.2)
BASOPHILS NFR BLD AUTO: 0 %
BILIRUB DIRECT SERPL-MCNC: <0.2 MG/DL (ref 0–0.3)
BILIRUB SERPL-MCNC: 0.2 MG/DL
BUN SERPL-MCNC: 17 MG/DL (ref 6–20)
CALCIUM SERPL-MCNC: 9.6 MG/DL (ref 8.6–10)
CANCER AG15-3 SERPL-ACNC: 15 U/ML
CHLORIDE SERPL-SCNC: 103 MMOL/L (ref 98–107)
CREAT SERPL-MCNC: 0.89 MG/DL (ref 0.51–0.95)
DEPRECATED HCO3 PLAS-SCNC: 24 MMOL/L (ref 22–29)
EGFRCR SERPLBLD CKD-EPI 2021: 78 ML/MIN/1.73M2
EOSINOPHIL # BLD AUTO: 0.1 10E3/UL (ref 0–0.7)
EOSINOPHIL NFR BLD AUTO: 1 %
ERYTHROCYTE [DISTWIDTH] IN BLOOD BY AUTOMATED COUNT: 12.4 % (ref 10–15)
ESTRADIOL SERPL-MCNC: <5 PG/ML
GLUCOSE SERPL-MCNC: 107 MG/DL (ref 70–99)
HCT VFR BLD AUTO: 41.4 % (ref 35–47)
HGB BLD-MCNC: 13.8 G/DL (ref 11.7–15.7)
IMM GRANULOCYTES # BLD: 0 10E3/UL
IMM GRANULOCYTES NFR BLD: 0 %
LYMPHOCYTES # BLD AUTO: 1 10E3/UL (ref 0.8–5.3)
LYMPHOCYTES NFR BLD AUTO: 19 %
MCH RBC QN AUTO: 28.5 PG (ref 26.5–33)
MCHC RBC AUTO-ENTMCNC: 33.3 G/DL (ref 31.5–36.5)
MCV RBC AUTO: 85 FL (ref 78–100)
MONOCYTES # BLD AUTO: 0.4 10E3/UL (ref 0–1.3)
MONOCYTES NFR BLD AUTO: 7 %
NEUTROPHILS # BLD AUTO: 3.8 10E3/UL (ref 1.6–8.3)
NEUTROPHILS NFR BLD AUTO: 73 %
NRBC # BLD AUTO: 0 10E3/UL
NRBC BLD AUTO-RTO: 0 /100
PLATELET # BLD AUTO: 296 10E3/UL (ref 150–450)
POTASSIUM SERPL-SCNC: 4.3 MMOL/L (ref 3.4–5.3)
PROT SERPL-MCNC: 6.9 G/DL (ref 6.4–8.3)
RBC # BLD AUTO: 4.85 10E6/UL (ref 3.8–5.2)
SODIUM SERPL-SCNC: 137 MMOL/L (ref 135–145)
WBC # BLD AUTO: 5.3 10E3/UL (ref 4–11)

## 2023-12-07 PROCEDURE — 82670 ASSAY OF TOTAL ESTRADIOL: CPT | Performed by: INTERNAL MEDICINE

## 2023-12-07 PROCEDURE — 84450 TRANSFERASE (AST) (SGOT): CPT | Performed by: INTERNAL MEDICINE

## 2023-12-07 PROCEDURE — 99214 OFFICE O/P EST MOD 30 MIN: CPT | Performed by: INTERNAL MEDICINE

## 2023-12-07 PROCEDURE — 85025 COMPLETE CBC W/AUTO DIFF WBC: CPT | Performed by: INTERNAL MEDICINE

## 2023-12-07 PROCEDURE — 36415 COLL VENOUS BLD VENIPUNCTURE: CPT

## 2023-12-07 PROCEDURE — 82248 BILIRUBIN DIRECT: CPT | Performed by: INTERNAL MEDICINE

## 2023-12-07 PROCEDURE — 99213 OFFICE O/P EST LOW 20 MIN: CPT | Mod: 25 | Performed by: INTERNAL MEDICINE

## 2023-12-07 PROCEDURE — 86300 IMMUNOASSAY TUMOR CA 15-3: CPT | Performed by: INTERNAL MEDICINE

## 2023-12-07 PROCEDURE — 96402 CHEMO HORMON ANTINEOPL SQ/IM: CPT

## 2023-12-07 PROCEDURE — 250N000011 HC RX IP 250 OP 636: Mod: JZ | Performed by: INTERNAL MEDICINE

## 2023-12-07 RX ADMIN — GOSERELIN ACETATE 3.6 MG: 3.6 IMPLANT SUBCUTANEOUS at 10:18

## 2023-12-07 ASSESSMENT — PAIN SCALES - GENERAL: PAINLEVEL: MILD PAIN (3)

## 2023-12-07 NOTE — LETTER
12/7/2023         RE: Yuliya Li  [address]  [place] [address]        Dear Colleague,    Thank you for referring your patient, Yuliya Li, to the [facility]. Please see a copy of my visit note below.    [facility]    Hematology/Oncology Established Patient Note      Today's Date: 12/7/2023    Reason for follow-up:  Left breast cancer.    HISTORY OF PRESENT ILLNESS: Yuliya Li is a 53 year old female status post removal of single ovary and hysterectomy who presents with the following oncologic history:  1.  12/23/2022: Mammogram showed possible asymmetry in right breast at 12:00; possible mass in left breast at 3:00.  2. 1/2/2023: Diagnostic bilateral mammogram showed mass in left breast at 3:00 with 0.4 cm satellite mass in retroareolar breast. Right breast with effacement of possible finding at 12:00 consistent with artifact. Left breast U/S showed mass at 3:00, 11 cm from nipple measuring 1.9 x 1.6 x 1.8 cm; no abnormal axillary lymph nodes.  3. 1/03/2022: Left breast core biopsy of 0.4 cm mass at 3:30 position showed grade 2 invasive ductal carcinoma with lobular features. Biopsy of left breast 1.9 cm mass at 3:00 showed grade 2 invasive ductal carcinoma with lobular features, associated grade 2 DCIS. ER and AL strongly positive at %; HER-2 IHC = 2+; HER-2/crow FISH negative.  4. 1/10/2023: FSH = 36.6 but estradiol = 63.  5.  2/03/2023: Bilateral mastectomies with left axillary sentinel lymph node excision and left chest wall nodule excision with Dr. Clarissa Fontanez.  Pathology showed left breast grade 2 invasive ductal carcinoma with lobular features, multifocal with 2 cm and 0.4 cm tumors, grade 2 DCIS, all margins negative.  Of 3 left axillary sentinel lymph nodes removed, one with 6 mm macrometastasis positive for extranodal extension and one with 1.1 mm micrometastasis, negative for extranodal extension, identified. Left  chest wall nodule positive for metastatic breast carcinoma involving edge of tissue fragment. HER-2 FISH repeated, group 4, considered HER-2 negative. Oncotype DX = 20, 9-year risk of distant recurrence of 17% after 5 years of hormone blockade therapy; breast-cancer specific survival of 96.7% treated without chemotherapy.  6. 2/23/2023: PET/CT scan negative for metastatic disease. Small right middle lobe lung nodules, non-FDG avid.  7. 3/29/2023: Started adjuvant chemotherapy with Taxotere and Cytoxan. Received cycle 2 on 4/19/2023, complicated by allergic reaction with chest heaviness followed by diffuse rash. Then switched to weekly paclitaxel for 6 weeks with completion on 6/14/2023.  8. 6/22/2023: Start of Zoladex every 4 weeks with anastrozole.  9. 7/17/2023-8/18/2023: Completed adjuvant radiation therapy.    INTERVAL HISTORY:  Yuliya reports she has been experiencing more prominent joint stiffness and aching for 2-7 days after each Zoladex injection. She is still having intermittent hives for which she takes Benadryl.      REVIEW OF SYSTEMS:   14 point ROS was reviewed and is negative other than as noted above in HPI.       HOME MEDICATIONS:  Current Outpatient Medications   Medication Sig Dispense Refill     alcohol swab prep pads Use to swab area of injection/amy as directed. 100 each 3     anastrozole (ARIMIDEX) 1 MG tablet Take 1 tablet (1 mg) by mouth daily 90 tablet 3     atorvastatin (LIPITOR) 10 MG tablet Take 1 tablet (10 mg) by mouth daily 90 tablet 3     blood glucose (NO BRAND SPECIFIED) test strip Use to test blood sugar 2 times daily or as directed. To accompany: Blood Glucose Monitor Brands: per insurance. 100 strip 6     blood glucose calibration (NO BRAND SPECIFIED) solution To accompany: Blood Glucose Monitor Brands: per insurance. 1 each 1     blood glucose monitoring (NO BRAND SPECIFIED) meter device kit Use to test blood sugar 2 times daily or as directed. Preferred blood glucose meter OR  supplies to accompany: Blood Glucose Monitor Brands: per insurance. 1 kit 0     diphenhydrAMINE (BENADRYL) 25 MG capsule Take 2 capsules (50 mg) by mouth every 6 hours as needed for itching or allergies 30 capsule 0     diphenhydrAMINE (BENADRYL) 25 MG tablet Take 2 tablets (50 mg) by mouth every 6 hours as needed for itching or allergies 120 tablet 1     EPINEPHrine (ANY BX GENERIC EQUIV) 0.3 MG/0.3ML injection 2-pack Inject 0.3 mLs (0.3 mg) into the muscle once as needed for anaphylaxis May repeat one time in 5-15 minutes if response to initial dose is inadequate. (Patient not taking: Reported on 11/1/2023) 2 each 0     FLUoxetine (PROZAC) 40 MG capsule Take 1 capsule (40 mg) by mouth daily 90 capsule 3     hydrOXYzine (ATARAX) 25 MG tablet Take 1-2 tablets (25-50 mg) by mouth 3 times daily as needed for itching 30 tablet 1     medical cannabis (Patient's own supply) Take 1 Dose by mouth nightly as needed Gummies: 5 mg   Formulary: Red  Location: Unknown    (The purpose of this order is to document that the patient reports taking medical cannabis.  This is not a prescription, and is not used to certify that the patient has a qualifying medical condition.)       melatonin 3 MG tablet Take 6 mg by mouth nightly as needed for sleep       metFORMIN (GLUCOPHAGE XR) 500 MG 24 hr tablet TAKE 1 TABLET BY MOUTH EVERY DAY 90 tablet 1     Semaglutide, 1 MG/DOSE, (OZEMPIC, 1 MG/DOSE,) 4 MG/3ML pen Inject 1 mg Subcutaneous every 7 days (On Fridays) 9 mL 1     spironolactone (ALDACTONE) 100 MG tablet Take 1 tablet (100 mg) by mouth daily 90 tablet 3     thin (NO BRAND SPECIFIED) lancets Use with lanceting device. To accompany: Blood Glucose Monitor Brands: per insurance. 100 each 6     vitamin D2 (ERGOCALCIFEROL) 41583 units (1250 mcg) capsule Take 1 capsule (50,000 Units) by mouth once a week 8 capsule 0         ALLERGIES:  Allergies   Allergen Reactions     Docetaxel Anaphylaxis, Hives, Itching and Shortness Of Breath      Keflex [Cephalexin]      Tetracycline      Trazodone      Very sedating-         PAST MEDICAL HISTORY:  Diabetes mellitus type 2  Hyperlipidemia    Gynecologic history:  Age of menarche at 11.  Last menstrual period in  prior to hysterectomy.  G3, P3.  Age of first pregnancy at 29.  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  No use of HRT.  Used OCPs for 4 years.  No IUD use.      PAST SURGICAL HISTORY:  Past Surgical History:   Procedure Laterality Date     CYST REMOVAL       HYSTERECTOMY       HYSTERECTOMY, PAP NO LONGER INDICATED      menorrhagia     INSERT TISSUE EXPANDER BREAST Bilateral 2/3/2023    Procedure: BILATERAL TISSUE EXPANDER;  Surgeon: Jaya Malave MD;  Location:  OR     MASTECTOMY SIMPLE BILATERAL, SENTINEL NODE BILATERAL, COMBINED Left 2/3/2023    Procedure: Bilateral skin sparing mastectomies with left axillary sentinel lymph node biopsy;  Surgeon: Clarissa Fontanez MD;  Location:  OR         SOCIAL HISTORY:  Social History     Socioeconomic History     Marital status:      Spouse name: Not on file     Number of children: 3     Years of education: Not on file     Highest education level: Not on file   Occupational History     Not on file   Tobacco Use     Smoking status: Former     Packs/day: 0     Types: Cigarettes     Quit date: 1999     Years since quittin.0     Smokeless tobacco: Never   Vaping Use     Vaping Use: Never used   Substance and Sexual Activity     Alcohol use: Yes     Comment: Alcoholic Drinks/day: 1 a week     Drug use: Yes     Types: Marijuana     Comment: nightly for sleep (gummy)     Sexual activity: Yes     Partners: Male   Other Topics Concern     Not on file   Social History Narrative     Not on file     Social Determinants of Health     Financial Resource Strain: Low Risk  (10/25/2023)    Financial Resource Strain      Within the past 12 months, have you or your family members you live with been unable to get utilities (heat, electricity) when it  was really needed?: No   Food Insecurity: Low Risk  (10/25/2023)    Food Insecurity      Within the past 12 months, did you worry that your food would run out before you got money to buy more?: No      Within the past 12 months, did the food you bought just not last and you didn t have money to get more?: No   Transportation Needs: Low Risk  (10/25/2023)    Transportation Needs      Within the past 12 months, has lack of transportation kept you from medical appointments, getting your medicines, non-medical meetings or appointments, work, or from getting things that you need?: No   Physical Activity: Insufficiently Active (10/26/2022)    Exercise Vital Sign      Days of Exercise per Week: 1 day      Minutes of Exercise per Session: 20 min   Stress: Stress Concern Present (10/26/2022)    [employer] [place] [employer] - Occupational Stress Questionnaire      Feeling of Stress : Rather much   Social Connections: Moderately Isolated (10/26/2022)    Social Connection and Isolation Panel [NHANES]      Frequency of Communication with Friends and Family: More than three times a week      Frequency of Social Gatherings with Friends and Family: Once a week      Attends Judaism Services: Never      Active Member of Clubs or Organizations: No      Attends Club or Organization Meetings: Not on file      Marital Status:    Interpersonal Safety: Low Risk  (11/1/2023)    Interpersonal Safety      Do you feel physically and emotionally safe where you currently live?: Yes      Within the past 12 months, have you been hit, slapped, kicked or otherwise physically hurt by someone?: No      Within the past 12 months, have you been humiliated or emotionally abused in other ways by your partner or ex-partner?: No   Housing Stability: Low Risk  (10/25/2023)    Housing Stability      Do you have housing? : Yes      Are you worried about losing your housing?: No         FAMILY HISTORY:  Family History   Problem Relation Age  of Onset     Diabetes Mother      Atrial fibrillation Father      Alcoholism Father      Melanoma Father      Breast Cancer Maternal Aunt         in her 40's     Cervical Cancer Maternal Aunt      Colon Cancer No family hx of          PHYSICAL EXAM:  Vital signs:  /79   Pulse 103   Temp 97.8  F (36.6  C) (Oral)   Resp 16   Wt 103 kg (227 lb)   LMP  (LMP Unknown)   SpO2 96%   BMI 33.62 kg/m     ECO  GENERAL/CONSTITUTIONAL: No acute distress.  EYES: No erythema or scleral icterus.  LYMPH: No cervical, supraclavicular, axillary adenopathy.   BREAST: Bilateral breasts surgically absent with expanders in place with no new chest wall masses. Mild hyperpigmentation over left chest wall.  RESPIRATORY: No audible cough or wheezing.   GASTROINTESTINAL: No hepatosplenomegaly, masses, or tenderness. No guarding.  No distention.  MUSCULOSKELETAL: Warm and well-perfused, no cyanosis, clubbing, or edema.  NEUROLOGIC: No focal motor deficits. Alert, oriented, answers questions appropriately.  INTEGUMENTARY: No rash.  GAIT: Steady, does not use assistive device      LABS:  CBC RESULTS: Recent Labs   Lab Test 23  0933   WBC 7.9   RBC 4.54   HGB 13.6   HCT 41.6   MCV 92   MCH 30.0   MCHC 32.7   RDW 15.6*   *     2023  FSH = 15.5  Estradiol = 6  Vitamin D = 27    ASSESSMENT/PLAN:  Yuliya Li is a 53 year old female with the following issues:  1. Pathologic prognostic stage IA, [id number], grade 2 multifocal invasive ductal carcinoma with lobular features of left breast overlapping sites, ER positive, KY positive, HER-2 IHC = 2+; HER-2/crow FISH negative, Oncotype DX = 20  --Yuliya had 2 tumors measuring 2 cm and 0.4 cm and additional left chest wall nodule, but with 2/3 lymph nodes involved, one that was a macrometastasis with extranodal extension and the other with micrometastasis with no extranodal extension.  All areas of tumor were excised.  The repeat HER-2 FISH was interpreted as negative.    --2/23/2023 PET scan showed no evidence for metastatic disease.  --Oncotype DX score 20, with 9-year risk of distant recurrence of 17% after 5 years of hormone blockade therapy; breast-cancer specific survival of 96.7% treated without chemotherapy.  --She elected to proceed with 4 cycles of Taxotere and Cytoxan every 3 weeks to maximally reduce her risk beyond just hormone blockade therapy alone.  However, she reacted to Taxotere after cycle 2. Therefore, she was switched to Taxol weekly for 6 weeks, completed 6/14/2023.  We did not proceed with dose dense AC given that her maximum absolute chemo benefit is up to 3.3% and would not justify the potential adverse effects of ddAC.  --She is perimenopausal and started OST + AI with Zoladex every 4 weeks with anastrozole on 6/22/2023.   --Completed adjuvant radiation therapy 8/18/2023.  --Reviewed that her FSH and estradiol are pending from today.  --Would plan to repeat FSH and estradiol prior to next visit to see if we can discontinue Zoladex.  --3/20/2023 DEXA scan showed normal bone density for baseline. Plan to repeat DEXA in 2025.  --She has no clinical evidence for recurrent breast cancer by physical exam from today.    2.  Family history of breast cancer  - Maternal aunt with breast cancer.  - I have recommended and referred Yuliya to genetic counseling.  She will call Nalini to set up blood draw.    3. Indeterminate right middle lobe pulmonary nodules  --2/23/2023 PET/CT scan showed 2 small indeterminate right middle lobe pulmonary nodules.   --5/24/2023 CT chest showed stable right lung nodules.  --Reviewed 12/4/2023 chest CT showed stable pulmonary nodule in the right middle lobe. The smaller of the two nodules described previously is no longer  demonstrated today.  --Repeat chest CT in 6 month, then a year after that if nodule is stable.    4. Hot flashes  --Mild and tolerable.  No medication intervention needed.    5. Intermittent hives  --She saw an  allergist. Etiology unknown, but could be due to prior docetaxel. She switched from Benadryl to Zyrtec.  --She plans to see a dermatologist as well.    6. Vitamin D deficiency  --She is on vitamin D supplementation.    7. Polyarthralgia  --Related to Zoladex + anastrozole.  --Discussed she could try tart cherry juice as well as acupuncture, exercise, stretching.    Return in 3-4 months.    Louise Watson MD  Hematology/Oncology  [facility]    Total time spent today: 30 minutes in chart review, patient evaluation, counseling, documentation, test and/or medication/prescription orders, and coordination of care.       Again, thank you for allowing me to participate in the care of your patient.        Sincerely,        Louise Watson MD

## 2023-12-07 NOTE — PROGRESS NOTES
Infusion Nursing Note:  Yuliya Li presents today for Zoladex Injection.    Patient seen by provider today: Yes: Dr. Watson   present during visit today: Not Applicable.    Note: N/A.      Intravenous Access:  No Intravenous access/labs at this visit.    Treatment Conditions:  Not Applicable.      Post Infusion Assessment:  Patient tolerated injection without incident.  Site patent and intact, free from redness, edema or discomfort.  No evidence of extravasations.       Discharge Plan:   Patient declined prescription refills.  Discharge instructions reviewed with: Patient.  Patient and/or family verbalized understanding of discharge instructions and all questions answered.  Patient discharged in stable condition accompanied by: self.  Departure Mode: Ambulatory.      Baldemar Childs RN

## 2023-12-07 NOTE — PROGRESS NOTES
Medical Assistant Note:  Yuliya Li presents today for lab draw.    Patient seen by provider today: Yes: Dr Watson.   present during visit today: Not Applicable.    Concerns: No Concerns.    Procedure:  Lab draw site: RAC, Needle type: BF, Gauge: 21. Gauze and coban applied    Post Assessment:  Labs drawn without difficulty: Yes.    Discharge Plan:  Departure Mode: Ambulatory.    Face to Face Time: 5.    Jazzmine Chapa CMA

## 2023-12-08 ENCOUNTER — VIRTUAL VISIT (OUTPATIENT)
Dept: ONCOLOGY | Facility: CLINIC | Age: 53
End: 2023-12-08
Attending: GENETIC COUNSELOR, MS
Payer: COMMERCIAL

## 2023-12-08 DIAGNOSIS — Z80.8 FAMILY HISTORY OF MELANOMA: ICD-10-CM

## 2023-12-08 DIAGNOSIS — Z80.3 FAMILY HISTORY OF MALIGNANT NEOPLASM OF BREAST: ICD-10-CM

## 2023-12-08 DIAGNOSIS — C50.812 MALIGNANT NEOPLASM OF OVERLAPPING SITES OF LEFT BREAST IN FEMALE, ESTROGEN RECEPTOR POSITIVE (H): Primary | ICD-10-CM

## 2023-12-08 DIAGNOSIS — Z17.0 MALIGNANT NEOPLASM OF OVERLAPPING SITES OF LEFT BREAST IN FEMALE, ESTROGEN RECEPTOR POSITIVE (H): Primary | ICD-10-CM

## 2023-12-08 PROCEDURE — 999N000069 HC STATISTIC GENETIC COUNSELING, < 16 MIN: Mod: TEL,95 | Performed by: GENETIC COUNSELOR, MS

## 2023-12-08 NOTE — PROGRESS NOTES
12/8/2023    Virtual Visit Details  Type of service:  Telephone Visit   Phone call duration: 18 minutes (15 spent in consult)    Referring Provider: Louise Watson MD    Presenting Information:   I met with Yuliya for a telephone consult today for a follow-up visit. We previously met on 1/19/2023, at which time Yuliya elected to take some time to consider her genetic testing options before making a decision.     I was then contacted by Dr. Watson, as Yuliya expressed interest at her recent appointment in proceeding with genetic testing    Discussion:  We previously reviewed the details of Yuliya's family and personal history. Please see the clinic note from our previous appointment for details. Yuliya shared several updates to her family history since we last spoke:  Yuliya reported that her father recently passed away from metastatic melanoma.  One paternal uncle was diagnosed with melanoma in his 70's, followed by a presumably new subcutaneous melanoma in his 70's, and passed away. Additional details are unknown.  One paternal aunt (the paternal uncle's twin) was diagnosed with melanoma in her 70's. Additional details are unknown.  As we previously discussed, Yuliya does not meet current National Comprehensive Cancer Network (NCCN) criteria for genetic testing of the high penetrance breast cancer genes. However, according to the American Society of Breast Surgeons Consensus Guideline on Genetic Testing for Hereditary Breast Cancer, genetic testing should be available to all patients who have been diagnosed with breast cancer. The guidelines state that testing should include BRCA1, BRCA2, and PALB2, and that additional testing may be warranted based on personal and/or family history.   Given her personal and family history, we discussed several genetic testing options: targeted breast cancer/melanoma panel, [facility] Common Hereditary Cancers Panel, or [facility] Multi-Cancer Panel. Yuliya expressed interest in the [facility]  Common Hereditary Cancers Panel + Additional Melanoma Genes.  Genetic testing is available for 52 genes associated with cancers of the breast, ovary, uterus, prostate, and gastrointestinal system: APC, DIDI, AXIN2, BAP1, BARD1, BMPR1A, BRCA1, BRCA2, BRIP1, CDH1, CDK4, CDKN2A, CHEK2, CTNNA1, DICER1, EPCAM, FH, GREM1, HOXB13, KIT, MBD4, MEN1, MLH1, MSH2, MSH3, MSH6, MUTYH, NF1, NTHL1, PALB2, PDGFRA, PMS2, POLD1, POLE, PTEN, RAD51C, RAD51D, SDHA, SDHB, SDHC, SDHD, SMAD4, SMARCA4, STK11, TP53, TSC1, TSC2, VHL, MC1R, MITF, POT1, RB1.    Consent was obtained over the phone and Yuliya elected to schedule a lab appointment at her earliest convenience. Once her blood is drawn, genetic testing using the Common Hereditary Cancers Panel + Additional Melanoma Genes will be sent to [facility]. Of note, testing will begin with the BRCA1 and BRCA2 genes with reflex to the complete panel. Turn around time: 2-3 weeks after [facility] receives her blood sample.  Medical Management: For Yuliya, we reviewed that the information from genetic testing may determine:  additional cancer screening for which Yuliya may qualify (i.e. more frequent colonoscopies, regular pancreatic imaging, more frequent dermatologic exams, etc.),  options for risk reducing surgeries Yuliya could consider (i.e. surgery to remove her remaining ovary, etc.),    and targeted chemotherapies if she were to develop certain cancers in the future (i.e. immunotherapy for individuals with Suarez syndrome, PARP inhibitors, etc.).   These recommendations and possible targeted chemotherapies will be discussed in detail once genetic testing is completed.     Plan:  1) Today Yuliya elected to proceed with testing of the BRCA1 and BRCA2 genes, with reflex to the [facility] Common Hereditary Cancers Panel + Additional Melanoma Genes, through [facility]. She will schedule a lab appointment at her earliest convenience.  2) Yuliya will be contacted to schedule a virtual return visit with me  to review the results once they become available. Turn around time: 2-3 weeks after [facility] receives her blood sample.     Nailni Neumann MS, [facility] [place]  Licensed, Certified Genetic Counselor  Office: [phone]  Pager: [phone]

## 2023-12-08 NOTE — NURSING NOTE
Is the patient currently in the state of [place]? YES    Visit mode:TELEPHONE    If the visit is dropped, the patient can be reconnected by: TELEPHONE VISIT: Phone number:   Telephone Information:   Mobile [phone]       Will anyone else be joining the visit? NO  (If patient encounters technical issues they should call [phone]    How would you like to obtain your AVS? MyChart    Are changes needed to the allergy or medication list? No    Reason for visit: EDMARECK    BHARATI FREITAS

## 2023-12-08 NOTE — PATIENT INSTRUCTIONS
Assessing Cancer Risk  Cancer is a common diagnosis which impacts many families.  Individuals may develop cancer due to environmental factors (such as exposures and lifestyle), aging, genetic predisposition, or a combination of these factors.      Only about 5-10% of cancers are thought to be due to an inherited cancer susceptibility gene.    These families often have:  Several people with the same or related types of cancer  Cancers diagnosed at a young age (before age 50)  Individuals with more than one primary cancer  Multiple generations of the family affected with cancer    Comprehensive Breast and Gynecologic Cancer Panel  We each inherit two copies of every gene in our bodies: one from our mother, and one from our father. Each gene has a specific job to do.  When a gene has a mistake or  mutation  in it, it does not work like it should.     Some people may be candidates for genetic testing of more than one gene.  For these families, genetic testing using a cancer panel may be offered. These panels will test different genes at once known to increase the risk for breast, ovarian, uterine, and/or other cancers.    This handout will review common hereditary breast and gynecologic cancer syndromes. The genes that will be discussed in this handout are: DIDI, BRCA1, BRCA2, BRIP1, CDH1, CHEK2, MLH1, MSH2, MSH6, PMS2, EPCAM, PTEN, PALB2, RAD51C, RAD51D, and TP53.    The purpose of this handout is to serve as a brief summary of the breast and gynecologic cancer risk genes that have published clinical management guidelines for individuals who are found to carry a mutation. Inheriting a mutation does not mean a person will develop cancer, but it does significantly increase their risk above the general population risk.     ______________________________________________________________________________    Hereditary Breast and Ovarian Cancer Syndrome (BRCA1 and BRCA2)  A single mutation in one of the copies of BRCA1 or  BRCA2 increases the risk for breast and ovarian cancer, among others.  The risk for pancreatic cancer and melanoma may also be slightly increased in some families.  The chart below shows the chance that someone with a BRCA mutation would develop cancer in his or her lifetime1,2,3,4.       Lifetime Cancer Risks    General Population BRCA1  BRCA2   Breast  12% >60% >60%   Ovarian  1-2% 39-58% 13-29%   Prostate 12% 7-26% 19-61%   Male Breast 0.1% 0.2-1.2% 1.8-7.1%   Pancreas 1-2% Up to 5% 5-10%     A person s ethnic background is also important to consider, as individuals of Ashkenazi Nondenominational ancestry have a higher chance of having a BRCA gene mutation.  There are three BRCA mutations that occur more frequently in this population.      Suarez Syndrome (MLH1, MSH2, MSH6, PMS2, and EPCAM)  Currently five genes are known to cause Suarez Syndrome: MLH1, MSH2, MSH6, PMS2, and EPCAM.  A single mutation in one of the Suarez Syndrome genes increases the risk for colon, endometrial, ovarian, and stomach cancers.  Other cancers that occur less commonly in Suarez Syndrome include urinary tract, skin, and brain cancers.  The chart below shows the chance that a person with Suarez syndrome would develop cancer in his or her lifetime5.      Lifetime Cancer Risks    General Population Suarez Syndrome   Colon 5% 10-61%   Endometrial 3% 13-57%   Ovarian 1-2% 1-38%   Stomach <1% 1-9%   *Cancer risk varies depending on Suaerz syndrome gene found      Cowden Syndrome (PTEN)  Cowden syndrome is a hereditary condition that increases the risk for breast, thyroid, endometrial, colon, and kidney cancer.  Cowden syndrome is caused by a mutation in the PTEN gene.  A single mutation in one of the copies of PTEN causes Cowden syndrome and increases cancer risk.  The chart below shows the chance that someone with a PTEN mutation would develop cancer in their lifetime6,7.  Other benign features seen in some individuals with Cowden syndrome include benign  skin lesions (facial papules, keratoses, lipomas), learning disability, autism, thyroid nodules, colon polyps, and larger head size.     Lifetime Cancer Risks    General Population Cowden   Breast 12% 40-60%*   Thyroid 1% Up to 38%   Renal 1-2% Up to 35%   Endometrial 3% Up to 28%   Colon 5% Up to 9%   Melanoma 2-3% Up to 6%   *Emerging data suggests the risk for breast cancer could be greater than 60%               Li-Fraumeni Syndrome (TP53)  Li-Fraumeni Syndrome (LFS) is a cancer predisposition syndrome caused by a mutation in the TP53 gene. A single mutation in one of the copies of TP53 increases the risk for multiple cancers. Individuals with LFS are at an increased risk for developing cancer at a young age. The lifetime risk for development of a LFS-associated cancer is 50% by age 30 and 90% by age 60.   Core Cancers: Sarcomas, Breast, Brain, Lung, Leukemias/Lymphomas, Adrenocortical carcinomas  Other Cancers: Gastrointestinal, Thyroid, Skin, Genitourinary       Hereditary Diffuse Gastric Cancer (CDH1)  Currently, one gene is known to cause hereditary diffuse gastric cancer (HDGC): CDH1.  Individuals with HDGC are at increased risk for diffuse gastric cancer and lobular breast cancer. Of people diagnosed with HDGC, 30-50% have a mutation in the CDH1 gene.  This suggests there are likely other genes that may cause HDGC that have not been identified yet.      Lifetime Cancer Risks    General Population HDGC   Diffuse Gastric  <1% ~80%   Breast 12% 41-60%       Additional Genes    DIDI  DIDI is a moderate-risk breast cancer gene. Women who have a mutation in DIDI can have between a 2-4 fold increased risk for breast cancer compared to the general population8. DIDI mutations have also been associated with increased risk for pancreatic cancer between 5-10%9. Individuals who inherit two DIDI mutations have a condition called ataxia-telangiectasia (AT).  This rare autosomal recessive condition affects the nervous system  and immune system, and is associated with progressive cerebellar ataxia beginning in childhood. Individuals with ataxia-telangiectasia often have a weakened immune system and have an increased risk for childhood cancers.    PALB2  Mutations in PALB2 have been shown to increase the risk of breast cancer up to 41-60% in some families; where individuals fall within this risk range is dependent upon family udzrent54. PALB2 mutations have also been associated with increased risk for pancreatic cancer between 5-10%.  Individuals who inherit two PALB2 mutations--one from their mother and one from their father--have a condition called Fanconi Anemia.  This rare autosomal recessive condition is associated with short stature, developmental delay, bone marrow failure, and increased risk for childhood cancers.    CHEK2   CHEK2 is a moderate-risk breast cancer gene.  Women who have a mutation in CHEK2 have around a 2-4 fold increased risk for breast cancer compared to the general population, and this risk may be higher depending upon family history.11,12,13 The risk of colon cancer may be twice as high as the general population risk of colon cancer of 5%. Mutations in CHEK2 have also been shown to increase the risk of other cancers, including prostate, however these cancer risks are currently not well understood.    BRIP1, RAD51C and RAD51D  Mutations in RAD51C and RAD51D have been shown to increase the risk of ovarian cancer and breast cancer 14,. Mutations in BRIP1 have been shown to increase the risk of ovarian cancer and possibly female breast cancer 15 .       Lifetime Cancer Risk    General Population        BRIP1   RAD51C  RAD51D   Breast 12% Not well defined 20-40% 20-40%   Ovarian 1-2% 5-15% 10-15% 10-20%     ______________________________________________________________  Inheritance  All of the cancer syndromes reviewed above are inherited in an autosomal dominant pattern.  This means that if a parent has a mutation,  each of their children will have a 50% chance of inheriting that same mutation. Therefore, each child --male or female-- would have a 50% chance of being at increased risk for developing cancer.    Image obtained from Genetics Home Reference, 2013     Mutations in some genes can occur de panfilo, which means that a person s mutation occurred for the first time in them and was not inherited from a parent.  Now that they have the mutation, however, it can be passed on to future generations.    Genetic Testing  Genetic testing involves a blood test and will look for any harmful mutations that are associated with increased cancer risk.  If possible, it is recommended that the person(s) who has had cancer be tested before other family members.  That person will give us the most useful information about whether or not a specific gene is associated with the cancer in the family.    Results  There are three possible results of genetic testing:  Positive--a harmful mutation was identified in one or more of the genes  Negative--no mutations were identified in any of the genes tested  Variant of unknown significance--a variation in one of the genes was identified, but it is unclear how this impacts cancer risk in the family    Advantages and Disadvantages   There are advantages and disadvantages to genetic testing.    Advantages  May clarify your cancer risk  Can help you make medical decisions  May explain the cancers in your family  May give useful information to your family members (if you share your results)    Disadvantages  Possible negative emotional impact of learning about inherited cancer risk  Uncertainty in interpreting a negative test result in some situations  Possible genetic discrimination concerns (see below)    Genetic Information Nondiscrimination Act (AMERICO)  The Genetic Information Nondiscrimination Act of 2008 (MAERICO) is a federal law that protects individuals from health insurance or employment discrimination  based on a genetic test result alone (with some exceptions, including employers with fewer than 15 employees, and ).  Although rare, AMERICO  does not cover discrimination protections in terms of life insurance, long term care, or disability insurances.  Visit the National Human [employer] Research Maxbass website to learn more.    Reducing Cancer Risk  All of the genes described in this handout have nationally recognized cancer screening guidelines that would be recommended for individuals who test positive.  In addition to increased cancer screening, surgeries may be offered or recommended to reduce cancer risk.  Recommendations are based upon an individual s genetic test result as well as their personal and family history of cancer.    Questions to Think About Regarding Genetic Testing:  What effect will the test result have on me and my relationship with my family members if I have an inherited gene mutation?  If I don t have a gene mutation?  Should I share my test results, and how will my family react to this news, which may also affect them?  Are my children ready to learn new information that may one day affect their own health?    Hereditary Cancer Resources    FORCE: Facing Our Risk of Cancer Empowered facingourrisk.org   Bright Pink bebrightpink.org   Li-Fraumeni Syndrome Association lfsassociation.org   PTEN World PTENworld.com   No stomach for cancer, Inc. nostomachforcancer.org   Stomach cancer relief network Scrnet.org   Collaborative Group of the Americas on Inherited Colorectal Cancer (CGA) cgaicc.com    Cancer Care cancercare.org   American Cancer Society (ACS) cancer.org   National Cancer Maxbass (NCI) cancer.gov     Please call us if you have any questions or concerns.   Cancer Risk Management Program [phone]-CANCER ([phone])  John Lazaro, MS [facility]  [phone]  Suri Neely, MS, [facility] [phone]  Elayne Castrejon, MS, [facility]  [phone]  Shannan Avilez, MS, [facility]  [phone]  Bridgette Hamm,  MS, [facility]  [phone]  Nalini Neumann, MS, [facility] [phone]  Angelica Aguilar, MS, [facility] [phone]    References  Estuardo Patterson PDP, Thierno S, Vicente MAIN, Nishi JE, Leonid JL, Raudel N, Fer H, Villa O, Crow A, Pasini B, Radicarie P, Manabhijeet S, Shirley DM, Cruz N, Carlos Manuel E, Rita H, Melgoza E, Yina J, Grondawit J, Julius B, Tulinius H, Thorlacius S, Eerola H, Nevanlinna H, Martha K, Victor Hugo OP. Average risks of breast and ovarian cancer associated with BRCA1 or BRCA2 mutations detected in case series unselected for family history: a combined analysis of 222 studies. Am J Hum Erica. 2003;72:1117-30.  Jacob N, Naina M, Presley G.  BRCA1 and BRCA2 Hereditary Breast and Ovarian Cancer. Gene Reviews online. 2013.  Hakeem YC, Skip S, Bertram G, Looney S. Breast cancer risk among male BRCA1 and BRCA2 mutation carriers. J Natl Cancer Inst. 2007;99:1811-4.  Rafael MONTOYA, Katrina I, Chad J, Dontrell E, Yuni ER, Alexx F. Risk of breast cancer in male BRCA2 carriers. J Med Erica. 2010;47:710-1.  National Comprehensive Cancer Network. Clinical practice guidelines in oncology, colorectal cancer screening. Available online (registration required). 2015.  Christopher MH, Salomón J, Facundo J, Cruz WALTON, Cherrie MS, Eng C. Lifetime cancer risks in individuals with germline PTEN mutations. Clin Cancer Res. 2012;18:400-7.  Alivia R. Cowden Syndrome: A Critical Review of the Clinical Literature. J Erica . 2009:18:13-27.  Madeline FINE, Nikos MORALES, Unruly S, Jodee P, Law T, Elton M, Chris B, Lazaro H, Cara R, Salazar K, Annette L, Rafael MONTOYA, Shirley MORALES, Alber DF, Steven MR, The Breast Cancer Susceptibility Collaboration (UK) & Johnathan MCCARTY. DIDI mutations that cause ataxia-telangiectasia are breast cancer susceptibility alleles. Nature Genetics. 2006;38:873-875  Kevyn N , Sofia Y, Jessica J, Rocco L, Renetta MART , Mariza ML, Torey S, Pepe AG, Bethany S, Praneeth ML, Scott J , Eddie R, Laina JOY, Lexus  JR, Margarita VE, Souleymane M, Vomeistein B, Trixie N, Kristian RH, Jamaal KW, and Dickson AP. DIDI mutations in patients with hereditary pancreatic cancer. Cancer Discover. 2012;2:41-46  Radha PIPER., et al. Breast-Cancer Risk in Families with Mutations in PALB2. NEJM. 2014; 371(6):497-506.  CHEK2 Breast Cancer Case-Control Consortium. CHEK2*1100delC and susceptibility to breast cancer: A collaborative analysis involving 10,860 breast cancer cases and 9,065 controls from 10 studies. Am J Hum Erica, 74 (2004), pp. 0892-9096  Ann Marie T, Marianne S, Shiloh K, et al. Spectrum of Mutations in BRCA1, BRCA2, CHEK2, and TP53 in Families at High Risk of Breast Cancer. MIRANDA. 2006;295(12):4454-0008.   Juan C, Demar D, Halley FINE, et al. Risk of breast cancer in women with a CHEK2 mutation with and without a family history of breast cancer. J Clin Oncol. 2011;29:4400-8787.  Song H, Giovannis E, Ramus SJ, et al. Contribution of germline mutations in the RAD51B, RAD51C, and RAD51D genes to ovarian cancer in the population. J Clin Oncol. 2015;33(26):7074-9208. Doi:10.1200/JCO.2015.61.2408.  Marion T, Robert DF, Yasmani P, et al. Mutations in BRIP1 confer high risk of ovarian cancer. Belen Erica. 2011;43(11):3314-1303. doi:10.1038/ng.955.

## 2023-12-08 NOTE — LETTER
12/8/2023         RE: Yuliya Li  [address]  [place] [address]        Dear Colleague,    Thank you for referring your patient, Yuliya Li, to the [facility]. Please see a copy of my visit note below.    12/8/2023    Virtual Visit Details  Type of service:  Telephone Visit   Phone call duration: 18 minutes (15 spent in consult)    Referring Provider: Louise Watson MD    Presenting Information:   I met with Yuliya for a telephone consult today for a follow-up visit. We previously met on 1/19/2023, at which time Yuliya elected to take some time to consider her genetic testing options before making a decision.     I was then contacted by Dr. Watson, as Yuliya expressed interest at her recent appointment in proceeding with genetic testing    Discussion:  We previously reviewed the details of Yuliya's family and personal history. Please see the clinic note from our previous appointment for details. Yuliya shared several updates to her family history since we last spoke:  Yuliya reported that her father recently passed away from metastatic melanoma.  One paternal uncle was diagnosed with melanoma in his 70's, followed by a presumably new subcutaneous melanoma in his 70's, and passed away. Additional details are unknown.  One paternal aunt (the paternal uncle's twin) was diagnosed with melanoma in her 70's. Additional details are unknown.  As we previously discussed, Yuliya does not meet current National Comprehensive Cancer Network (NCCN) criteria for genetic testing of the high penetrance breast cancer genes. However, according to the American Society of Breast Surgeons Consensus Guideline on Genetic Testing for Hereditary Breast Cancer, genetic testing should be available to all patients who have been diagnosed with breast cancer. The guidelines state that testing should include BRCA1, BRCA2, and PALB2, and that additional testing may be warranted based on personal  and/or family history.   Given her personal and family history, we discussed several genetic testing options: targeted breast cancer/melanoma panel, [facility] Common Hereditary Cancers Panel, or [facility] Multi-Cancer Panel. Yuliya expressed interest in the [facility] Common Hereditary Cancers Panel + Additional Melanoma Genes.  Genetic testing is available for 52 genes associated with cancers of the breast, ovary, uterus, prostate, and gastrointestinal system: APC, DIDI, AXIN2, BAP1, BARD1, BMPR1A, BRCA1, BRCA2, BRIP1, CDH1, CDK4, CDKN2A, CHEK2, CTNNA1, DICER1, EPCAM, FH, GREM1, HOXB13, KIT, MBD4, MEN1, MLH1, MSH2, MSH3, MSH6, MUTYH, NF1, NTHL1, PALB2, PDGFRA, PMS2, POLD1, POLE, PTEN, RAD51C, RAD51D, SDHA, SDHB, SDHC, SDHD, SMAD4, SMARCA4, STK11, TP53, TSC1, TSC2, VHL, MC1R, MITF, POT1, RB1.    Consent was obtained over the phone and Yuliya elected to schedule a lab appointment at her earliest convenience. Once her blood is drawn, genetic testing using the Common Hereditary Cancers Panel + Additional Melanoma Genes will be sent to [facility]. Of note, testing will begin with the BRCA1 and BRCA2 genes with reflex to the complete panel. Turn around time: 2-3 weeks after Kylee receives her blood sample.  Medical Management: For Yuliya, we reviewed that the information from genetic testing may determine:  additional cancer screening for which Yuliya may qualify (i.e. more frequent colonoscopies, regular pancreatic imaging, more frequent dermatologic exams, etc.),  options for risk reducing surgeries Yuliya could consider (i.e. surgery to remove her remaining ovary, etc.),    and targeted chemotherapies if she were to develop certain cancers in the future (i.e. immunotherapy for individuals with Suarez syndrome, PARP inhibitors, etc.).   These recommendations and possible targeted chemotherapies will be discussed in detail once genetic testing is completed.     Plan:  1) Today Yuliya elected to proceed with testing of the BRCA1 and  BRCA2 genes, with reflex to the [facility] Common Hereditary Cancers Panel + Additional Melanoma Genes, through [facility]. She will schedule a lab appointment at her earliest convenience.  2) Yuliya will be contacted to schedule a virtual return visit with me to review the results once they become available. Turn around time: 2-3 weeks after Kylee receives her blood sample.     Nalini Neumann MS, [facility]  Licensed, Certified Genetic Counselor  Office: [phone]  Pager: [phone]

## 2023-12-28 DIAGNOSIS — Z17.0 MALIGNANT NEOPLASM OF OVERLAPPING SITES OF LEFT BREAST IN FEMALE, ESTROGEN RECEPTOR POSITIVE (H): Primary | ICD-10-CM

## 2023-12-28 DIAGNOSIS — C50.812 MALIGNANT NEOPLASM OF OVERLAPPING SITES OF LEFT BREAST IN FEMALE, ESTROGEN RECEPTOR POSITIVE (H): Primary | ICD-10-CM

## 2023-12-28 RX ORDER — LETROZOLE 2.5 MG/1
2.5 TABLET, FILM COATED ORAL DAILY
Qty: 90 TABLET | Refills: 3 | Status: SHIPPED | OUTPATIENT
Start: 2023-12-28 | End: 2024-04-25

## 2024-01-04 ENCOUNTER — LAB (OUTPATIENT)
Dept: INFUSION THERAPY | Facility: CLINIC | Age: 54
End: 2024-01-04
Attending: INTERNAL MEDICINE
Payer: COMMERCIAL

## 2024-01-04 ENCOUNTER — LAB (OUTPATIENT)
Dept: LAB | Facility: CLINIC | Age: 54
End: 2024-01-04
Payer: COMMERCIAL

## 2024-01-04 VITALS
HEART RATE: 91 BPM | SYSTOLIC BLOOD PRESSURE: 110 MMHG | OXYGEN SATURATION: 99 % | DIASTOLIC BLOOD PRESSURE: 78 MMHG | TEMPERATURE: 98.1 F | RESPIRATION RATE: 18 BRPM

## 2024-01-04 DIAGNOSIS — Z80.8 FAMILY HISTORY OF MELANOMA: ICD-10-CM

## 2024-01-04 DIAGNOSIS — Z17.0 MALIGNANT NEOPLASM OF OVERLAPPING SITES OF LEFT BREAST IN FEMALE, ESTROGEN RECEPTOR POSITIVE (H): ICD-10-CM

## 2024-01-04 DIAGNOSIS — Z17.0 MALIGNANT NEOPLASM OF OVERLAPPING SITES OF LEFT BREAST IN FEMALE, ESTROGEN RECEPTOR POSITIVE (H): Primary | ICD-10-CM

## 2024-01-04 DIAGNOSIS — C50.812 MALIGNANT NEOPLASM OF OVERLAPPING SITES OF LEFT BREAST IN FEMALE, ESTROGEN RECEPTOR POSITIVE (H): Primary | ICD-10-CM

## 2024-01-04 DIAGNOSIS — Z80.3 FAMILY HISTORY OF MALIGNANT NEOPLASM OF BREAST: ICD-10-CM

## 2024-01-04 DIAGNOSIS — C50.812 MALIGNANT NEOPLASM OF OVERLAPPING SITES OF LEFT BREAST IN FEMALE, ESTROGEN RECEPTOR POSITIVE (H): ICD-10-CM

## 2024-01-04 PROCEDURE — 96402 CHEMO HORMON ANTINEOPL SQ/IM: CPT

## 2024-01-04 PROCEDURE — 250N000011 HC RX IP 250 OP 636: Mod: JZ | Performed by: INTERNAL MEDICINE

## 2024-01-04 PROCEDURE — 36415 COLL VENOUS BLD VENIPUNCTURE: CPT

## 2024-01-04 RX ADMIN — GOSERELIN ACETATE 3.6 MG: 3.6 IMPLANT SUBCUTANEOUS at 12:43

## 2024-01-04 ASSESSMENT — PAIN SCALES - GENERAL: PAINLEVEL: NO PAIN (0)

## 2024-01-04 NOTE — PROGRESS NOTES
Infusion Nursing Note:  Yuliya CINTHYA Li presents today for Faslodex.    Patient seen by provider today: No   present during visit today: Not Applicable.    Note: N/A.      Intravenous Access:  No Intravenous access/labs at this visit.    Treatment Conditions:  Not Applicable.      Post Infusion Assessment:  Patient tolerated injection without incident.  Site patent and intact, free from redness, edema or discomfort.       Discharge Plan:   [facility]  Patient will return 2/1/24 for next appointment.   Patient discharged in stable condition accompanied by: self.  Departure Mode: Ambulatory.      Teri Colin RN

## 2024-01-12 DIAGNOSIS — Z17.0 MALIGNANT NEOPLASM OF OVERLAPPING SITES OF LEFT BREAST IN FEMALE, ESTROGEN RECEPTOR POSITIVE (H): Primary | ICD-10-CM

## 2024-01-12 DIAGNOSIS — Z80.3 FAMILY HISTORY OF MALIGNANT NEOPLASM OF BREAST: ICD-10-CM

## 2024-01-12 DIAGNOSIS — Z80.8 FAMILY HISTORY OF MELANOMA: ICD-10-CM

## 2024-01-12 DIAGNOSIS — C50.812 MALIGNANT NEOPLASM OF OVERLAPPING SITES OF LEFT BREAST IN FEMALE, ESTROGEN RECEPTOR POSITIVE (H): Primary | ICD-10-CM

## 2024-01-12 LAB — SCANNED LAB RESULT: NORMAL

## 2024-01-29 RX ORDER — BISACODYL 5 MG/1
TABLET, DELAYED RELEASE ORAL
Qty: 4 TABLET | Refills: 0 | Status: SHIPPED | OUTPATIENT
Start: 2024-01-29

## 2024-01-29 NOTE — TELEPHONE ENCOUNTER
Extended Golytely Bowel Prep . Instructions were sent via [employer]. Bowel prep was sent 1/29/2024 to    [facility] [id number] [place] [facility] [place], [place] - [address] [place] [address]

## 2024-01-30 ENCOUNTER — TELEPHONE (OUTPATIENT)
Dept: GASTROENTEROLOGY | Facility: CLINIC | Age: 54
End: 2024-01-30
Payer: COMMERCIAL

## 2024-01-30 NOTE — TELEPHONE ENCOUNTER
Caller: Yuliya  Reason for Reschedule/Cancellation (please be detailed, any staff messages or encounters to note?): Patient is changing insurance and wants to cancel at this time.      Prior to reschedule please review:  Ordering Provider:     Joe Moran MD in [facility]     Sedation Determined: moderate  Does patient have any ASC Exclusions, please identify?: Y WESLEY      Notes on Cancelled Procedure:  Procedure: Lower Endoscopy [Colonoscopy]   Date: 02/14/2024  Location: [facility]; [address] [place] [address]  Surgeon: Fredrick      Rescheduled: No

## 2024-02-01 ENCOUNTER — LAB (OUTPATIENT)
Dept: INFUSION THERAPY | Facility: CLINIC | Age: 54
End: 2024-02-01
Attending: INTERNAL MEDICINE
Payer: COMMERCIAL

## 2024-02-01 ENCOUNTER — VIRTUAL VISIT (OUTPATIENT)
Dept: ONCOLOGY | Facility: CLINIC | Age: 54
End: 2024-02-01
Attending: GENETIC COUNSELOR, MS
Payer: COMMERCIAL

## 2024-02-01 VITALS
SYSTOLIC BLOOD PRESSURE: 111 MMHG | DIASTOLIC BLOOD PRESSURE: 79 MMHG | TEMPERATURE: 97.8 F | HEART RATE: 114 BPM | OXYGEN SATURATION: 97 %

## 2024-02-01 DIAGNOSIS — Z80.3 FAMILY HISTORY OF MALIGNANT NEOPLASM OF BREAST: ICD-10-CM

## 2024-02-01 DIAGNOSIS — Z17.0 MALIGNANT NEOPLASM OF OVERLAPPING SITES OF LEFT BREAST IN FEMALE, ESTROGEN RECEPTOR POSITIVE (H): ICD-10-CM

## 2024-02-01 DIAGNOSIS — Z17.0 MALIGNANT NEOPLASM OF OVERLAPPING SITES OF LEFT BREAST IN FEMALE, ESTROGEN RECEPTOR POSITIVE (H): Primary | ICD-10-CM

## 2024-02-01 DIAGNOSIS — C50.812 MALIGNANT NEOPLASM OF OVERLAPPING SITES OF LEFT BREAST IN FEMALE, ESTROGEN RECEPTOR POSITIVE (H): Primary | ICD-10-CM

## 2024-02-01 DIAGNOSIS — Z80.8 FAMILY HISTORY OF MELANOMA: ICD-10-CM

## 2024-02-01 DIAGNOSIS — C50.812 MALIGNANT NEOPLASM OF OVERLAPPING SITES OF LEFT BREAST IN FEMALE, ESTROGEN RECEPTOR POSITIVE (H): ICD-10-CM

## 2024-02-01 PROCEDURE — 96402 CHEMO HORMON ANTINEOPL SQ/IM: CPT

## 2024-02-01 PROCEDURE — 999N000069 HC STATISTIC GENETIC COUNSELING, < 16 MIN: Mod: TEL,95 | Performed by: GENETIC COUNSELOR, MS

## 2024-02-01 PROCEDURE — 250N000011 HC RX IP 250 OP 636: Mod: JZ | Performed by: INTERNAL MEDICINE

## 2024-02-01 RX ADMIN — GOSERELIN ACETATE 3.6 MG: 3.6 IMPLANT SUBCUTANEOUS at 09:46

## 2024-02-01 NOTE — PROGRESS NOTES
"2/1/2024    Virtual Visit Details  Type of service:  Telephone Visit   Phone call duration: 9 minutes    Referring Provider: Louise Watson MD    Presenting Information:  I spoke to Yuliya by phone today to discuss her genetic testing results. We last met on 12/8/2023 and her blood was drawn on 1/4/2024. The [facility] Multi-Cancer Panel + MC1R Gene was ordered from [facility]. This testing was done because of Yuliya's personal and family history of cancer.    Genetic Testing Result: NEGATIVE  Yuliya is negative for mutations in the AIP, ALK, APC, DIDI, AXIN2, BAP1, BARD1, BLM, BMPR1A, BRCA1, BRCA2, BRIP1, CDC73, CDH1, CDK4, CDKN1B, CDKN2A, CHEK2, CTNNA1, DICER1, EGFR, EPCAM, FH, FLCN, GREM1, HOXB13, KIT, LZTR1, MAX, MBD4, MC1R, MEN1, MET, MITF, MLH1, MSH2, MSH3, MSH6, MUTYH, NF1, NF2, NTHL1, PALB2, PDGFRA, PMS2, POLD1, POLE, POT1, PVUTY0M, PTCH1, PTEN, RAD51C, RAD51D, RB1, RET, SDHA, SDHAF2, SDHB, SDHC, SDHD, SMAD4, SMARCA4, SMARCB1, SMARCE1, STK11, SUFU, QAIL266, TP53, TSC1, TSC2, and VHL genes.      No mutations were found in any of the 71 genes analyzed. This test involved sequencing and deletion/duplication analysis of all genes with the exceptions of EPCAM and GREM1 (deletions/duplications only), MITF (only the status of the p.E318K variant is analyzed and reported), and SDHA (sequencing only).      A copy of the test report can be found in the Laboratory tab, dated 1/4/2024, and named \"LABORATORY MISCELLANEOUS ORDER\". The report is scanned in as a linked document.    Interpretation:  We discussed several different interpretations of this negative test result.    One explanation may be that there is a different gene or combination of genes and environment that are associated with the cancers in this family that are not identifiable using this test. As such, Dia is encouraged to contact me regularly to review any new genetic testing options that may be appropriate for her.  Another explanation may be that her other " relatives with cancer did have a mutation in one of these 71 genes and Yuliya did not inherit it. If that is the case, Yuliya's breast cancer may have been sporadic and caused by random cellular changes.  There is also a small possibility that there is a mutation in one of these genes, and the testing laboratory could not find it with their current testing methods.       Screening:  Based on this negative test result, it is important for Yuliya and her relatives to refer back to the family history for appropriate cancer screening.    Yuliya should continue to follow all breast cancer treatment and follow-up recommendations as made by her medical providers.  Yuliya's close female relatives remain at increased risk for breast cancer given their family history. Breast cancer screening is generally recommended to begin approximately 10 years younger than the earliest age of breast cancer diagnosis in the family, or at age 40, whichever comes first. In this family, screening may begin at age 40. Breast screening options should be discussed with an individual's primary care provider and a genetic counselor, to determine at what age to begin screening, what screening is appropriate, and if additional screening (such as breast MRI) is necessary based on personal/family history factors.  Due to the close family history of melanoma, Yuliya and her close paternal relatives remain at some increased risk for developing melanoma. According to the American Cancer Society, they are encouraged to speak to their primary care providers about having regular skin exams and safe skin practices (i.e. sunscreen, self skin exams, limited sun exposure, etc.).  Other population cancer screening options, such as those recommended by the American Cancer Society and the National Comprehensive Cancer Network (NCCN), are also appropriate for Yuliya and her family. These screening recommendations may change if there are changes to Yuliya's personal and/or  family history of cancer. Final screening recommendations should be made by each individual's primary care provider.      Inheritance:  We reviewed the autosomal dominant inheritance of mutations in these 71 genes. We discussed that Yuliya did not pass on an identifiable mutation in these genes to her children based on this test result. Mutations in these genes do not skip generations.      Additional Testing Considerations:  Although Yuliya's genetic testing result was negative, other relatives may still carry a gene mutation associated with hereditary cancer. Genetic counseling is recommended for her paternal relatives with melanoma (or their closest relatives) to discuss their genetic testing options. Yuliya's brother could consider meeting with a genetic counselor, as well. If any of these relatives do pursue genetic testing, Yuliya is encouraged to contact me so that we may review the impact of their test results on her.    Summary:  We do not have an explanation for Yuliya's personal or family history of cancer. While no genetic changes were identified, Yuliya may still be at risk for certain cancers due to family history, environmental factors, or other genetic causes not identified by this test. Because of that, it is important that she continue with cancer screening based on her personal and family history as discussed above.    Genetic testing is rapidly advancing, and new cancer susceptibility genes will most likely be identified in the future. Therefore, I encouraged Yuliya to contact me regularly or if there are changes in her personal or family history. This may change how we assess her cancer risk, screening, and the testing we would offer.    Plan:  1. At her request, I provided Yuliya with a copy of her test results via [facility] patient portal.  2. She plans to follow-up with her medical providers, as needed.  3. She should contact me periodically, or if her personal or family history of cancer  changes.    If Yuliya has any further questions, I encouraged her to contact me at [phone].    Nalini Neumann MS, [facility] [place]ensed, Certified Genetic Counselor  Office: [phone]  Pager: [phone]

## 2024-02-01 NOTE — PROGRESS NOTES
Nursing Note:  Yuliya MENDES Jen presents today for C9D1: Zoladex .    Patient seen by provider today: No   present during visit today: Not Applicable.    Note: Patient reports to feeling well with no new concerns.  Tolerated one Zoladex injection to right abdomen without issue.    Intravenous Access:  No Intravenous access/labs at this visit.    Discharge Plan:   Patient was sent to home in stable condition.    Terese Sousa RN

## 2024-02-01 NOTE — NURSING NOTE
Is the patient currently in the state of [place]? YES    Visit mode:TELEPHONE    If the visit is dropped, the patient can be reconnected by: TELEPHONE VISIT: Phone number:   Telephone Information:   Mobile [phone]       Will anyone else be joining the visit? NO  (If patient encounters technical issues they should call [phone]    How would you like to obtain your AVS? MyChart    Are changes needed to the allergy or medication list? N/A    Reason for visit: RECHECK    Shantel FREITAS

## 2024-02-01 NOTE — LETTER
"    2/1/2024         RE: Yuliya Li  [address]  [place] [address]        Dear Colleague,    Thank you for referring your patient, Yuliya Li, to the [facility]. Please see a copy of my visit note below.    2/1/2024    Virtual Visit Details  Type of service:  Telephone Visit   Phone call duration: 9 minutes    Referring Provider: Louise Watson MD    Presenting Information:  I spoke to Yuliya by phone today to discuss her genetic testing results. We last met on 12/8/2023 and her blood was drawn on 1/4/2024. The [facility]ae Multi-Cancer Panel + MC1R Gene was ordered from [facility]. This testing was done because of Yuliya's personal and family history of cancer.    Genetic Testing Result: NEGATIVE  Yuliya is negative for mutations in the AIP, ALK, APC, DIDI, AXIN2, BAP1, BARD1, BLM, BMPR1A, BRCA1, BRCA2, BRIP1, CDC73, CDH1, CDK4, CDKN1B, CDKN2A, CHEK2, CTNNA1, DICER1, EGFR, EPCAM, FH, FLCN, GREM1, HOXB13, KIT, LZTR1, MAX, MBD4, MC1R, MEN1, MET, MITF, MLH1, MSH2, MSH3, MSH6, MUTYH, NF1, NF2, NTHL1, PALB2, PDGFRA, PMS2, POLD1, POLE, POT1, TDHQP4N, PTCH1, PTEN, RAD51C, RAD51D, RB1, RET, SDHA, SDHAF2, SDHB, SDHC, SDHD, SMAD4, SMARCA4, SMARCB1, SMARCE1, STK11, SUFU, OPOA403, TP53, TSC1, TSC2, and VHL genes.      No mutations were found in any of the 71 genes analyzed. This test involved sequencing and deletion/duplication analysis of all genes with the exceptions of EPCAM and GREM1 (deletions/duplications only), MITF (only the status of the p.E318K variant is analyzed and reported), and SDHA (sequencing only).      A copy of the test report can be found in the Laboratory tab, dated 1/4/2024, and named \"LABORATORY MISCELLANEOUS ORDER\". The report is scanned in as a linked document.    Interpretation:  We discussed several different interpretations of this negative test result.    One explanation may be that there is a different gene or combination of genes and environment that " are associated with the cancers in this family that are not identifiable using this test. As such, Dia is encouraged to contact me regularly to review any new genetic testing options that may be appropriate for her.  Another explanation may be that her other relatives with cancer did have a mutation in one of these 71 genes and Yuliya did not inherit it. If that is the case, Yuliya's breast cancer may have been sporadic and caused by random cellular changes.  There is also a small possibility that there is a mutation in one of these genes, and the testing laboratory could not find it with their current testing methods.       Screening:  Based on this negative test result, it is important for Yuliya and her relatives to refer back to the family history for appropriate cancer screening.    Yuliya should continue to follow all breast cancer treatment and follow-up recommendations as made by her medical providers.  Yuliya's close female relatives remain at increased risk for breast cancer given their family history. Breast cancer screening is generally recommended to begin approximately 10 years younger than the earliest age of breast cancer diagnosis in the family, or at age 40, whichever comes first. In this family, screening may begin at age 40. Breast screening options should be discussed with an individual's primary care provider and a genetic counselor, to determine at what age to begin screening, what screening is appropriate, and if additional screening (such as breast MRI) is necessary based on personal/family history factors.  Due to the close family history of melanoma, Yuliya and her close paternal relatives remain at some increased risk for developing melanoma. According to the American Cancer Society, they are encouraged to speak to their primary care providers about having regular skin exams and safe skin practices (i.e. sunscreen, self skin exams, limited sun exposure, etc.).  Other population cancer  screening options, such as those recommended by the American Cancer Society and the National Comprehensive Cancer Network (NCCN), are also appropriate for Yuliya and her family. These screening recommendations may change if there are changes to Yuliya's personal and/or family history of cancer. Final screening recommendations should be made by each individual's primary care provider.      Inheritance:  We reviewed the autosomal dominant inheritance of mutations in these 71 genes. We discussed that Yuliya did not pass on an identifiable mutation in these genes to her children based on this test result. Mutations in these genes do not skip generations.      Additional Testing Considerations:  Although Yuliya's genetic testing result was negative, other relatives may still carry a gene mutation associated with hereditary cancer. Genetic counseling is recommended for her paternal relatives with melanoma (or their closest relatives) to discuss their genetic testing options. Yuliya's brother could consider meeting with a genetic counselor, as well. If any of these relatives do pursue genetic testing, Yuliya is encouraged to contact me so that we may review the impact of their test results on her.    Summary:  We do not have an explanation for Yuliya's personal or family history of cancer. While no genetic changes were identified, Yuliya may still be at risk for certain cancers due to family history, environmental factors, or other genetic causes not identified by this test. Because of that, it is important that she continue with cancer screening based on her personal and family history as discussed above.    Genetic testing is rapidly advancing, and new cancer susceptibility genes will most likely be identified in the future. Therefore, I encouraged Yuliya to contact me regularly or if there are changes in her personal or family history. This may change how we assess her cancer risk, screening, and the testing we would  offer.    Plan:  1. At her request, I provided Yuliya with a copy of her test results via [facility]'s patient portal.  2. She plans to follow-up with her medical providers, as needed.  3. She should contact me periodically, or if her personal or family history of cancer changes.    If Yuliya has any further questions, I encouraged her to contact me at [phone].    Nalini Neumann MS, [facility] [place]  Licensed, Certified Genetic Counselor  Office: [phone]  Pager: [phone]

## 2024-02-05 NOTE — PATIENT INSTRUCTIONS
Negative Genetic Test Result    Genetic Testing  Genetic testing involved a blood or saliva test which looked at the genetic information in select genes for variants associated with cancer risk.  This testing may have included analysis of a single gene due to a known variant in the family, multiple genes most associated with the cancers in a family, or an expanded panel of genes related to many types of cancers.     Results  There are several possible genetic test results, including:   Positive--a harmful mutation (also known as a  pathogenic  or  likely pathogenic  variant) was identified in a gene associated with increased cancer risk.  These risks, as well as medical management options, depend on the specific genetic variant identified.    Negative--no variants were identified in the genes analyzed   Variant of unknown significance--a variant was identified in one or more genes, though it is currently unclear how this impacts cancer risk in the family.  Genetic testing labs are working to collect evidence about these uncertain variants and may provide updates in the future.    What Does a Negative Genetic Test Result Mean?  A negative genetic test results means that no genetic changes (variants) were detected in the genes tested. While no inherited risk factors for cancer were identified, you and your family may be at risk for certain cancers due to family history, environmental factors, or other genetic causes not identified by this test.  It is also possible that your family may still be at risk of carrying a genetic risk factor which you did not inherit. Your genetic counselor can help interpret the result for you and your relatives.      It is important to note which genes were included in your test. A list of these genes can be found on your test result.    Screening Recommendations  A combination of personal and family history factors may inform cancer risk and medical management recommendations.   Population cancer screening options, such as those recommended by the American Cancer Society and the National Comprehensive Cancer Network (NCCN) are appropriate for many families at average risk for cancer.  However, earlier and/or more frequent screening may be recommended based on personal factors (lifestyle, exposures, medications, screening results), family history of cancer, and sometimes genetic factors.  These cancer risk management options should be discussed in more detail with an individual's medical providers.      Please call us if you have any questions or concerns.   Cancer Risk Management Program (Appointments: [phone])  John Lazaro, MS [facility] [place]  [phone]  Suri Neely, MS, [facility] [place] [phone]  Elayne Castrejon, MS, [facility] [place]  [phone]  Shannan Avilez, MS, [facility] [place]  [phone]  Bridgette Hamm, MS, [facility] [place]  [phone]  Nalini Neumann, MS, [facility] [place] [phone]  Angelica Aguilar, MS, [facility] [place] [phone]

## 2024-02-25 ENCOUNTER — HEALTH MAINTENANCE LETTER (OUTPATIENT)
Age: 54
End: 2024-02-25

## 2024-02-29 ENCOUNTER — LAB (OUTPATIENT)
Dept: INFUSION THERAPY | Facility: CLINIC | Age: 54
End: 2024-02-29
Attending: INTERNAL MEDICINE
Payer: COMMERCIAL

## 2024-02-29 VITALS
DIASTOLIC BLOOD PRESSURE: 73 MMHG | TEMPERATURE: 98.1 F | RESPIRATION RATE: 18 BRPM | HEART RATE: 85 BPM | OXYGEN SATURATION: 100 % | SYSTOLIC BLOOD PRESSURE: 103 MMHG

## 2024-02-29 DIAGNOSIS — Z17.0 MALIGNANT NEOPLASM OF OVERLAPPING SITES OF LEFT BREAST IN FEMALE, ESTROGEN RECEPTOR POSITIVE (H): Primary | ICD-10-CM

## 2024-02-29 DIAGNOSIS — C50.812 MALIGNANT NEOPLASM OF OVERLAPPING SITES OF LEFT BREAST IN FEMALE, ESTROGEN RECEPTOR POSITIVE (H): Primary | ICD-10-CM

## 2024-02-29 PROCEDURE — 250N000011 HC RX IP 250 OP 636: Mod: JZ | Performed by: INTERNAL MEDICINE

## 2024-02-29 PROCEDURE — 96402 CHEMO HORMON ANTINEOPL SQ/IM: CPT

## 2024-02-29 RX ADMIN — GOSERELIN ACETATE 3.6 MG: 3.6 IMPLANT SUBCUTANEOUS at 12:50

## 2024-02-29 NOTE — PROGRESS NOTES
Infusion Nursing Note:  Yuliya MENDES Mellemilio presents today for Zoladex.    Patient seen by provider today: No   present during visit today: Not Applicable.    Note: N/A.      Intravenous Access:  No Intravenous access/labs at this visit.    Treatment Conditions:  Not Applicable.      Post Infusion Assessment:  Patient tolerated injection without incident.  Site patent and intact, free from redness, edema or discomfort.       Discharge Plan:   Discharge instructions reviewed with: Patient.  Patient and/or family verbalized understanding of discharge instructions and all questions answered.  AVS to patient via [email or web address].  Patient will return in April for next appointment.   Patient discharged in stable condition accompanied by: self.  Departure Mode: Ambulatory.      Teri Colin RN

## 2024-04-19 NOTE — PROGRESS NOTES
[facility]    Hematology/Oncology Established Patient Note      Today's Date: 4/25/2024    Reason for follow-up:  Left breast cancer.    HISTORY OF PRESENT ILLNESS: Yuliya Li is a 53 year old female status post removal of single ovary and hysterectomy who presents with the following oncologic history:  1.  12/23/2022: Mammogram showed possible asymmetry in right breast at 12:00; possible mass in left breast at 3:00.  2. 1/2/2023: Diagnostic bilateral mammogram showed mass in left breast at 3:00 with 0.4 cm satellite mass in retroareolar breast. Right breast with effacement of possible finding at 12:00 consistent with artifact. Left breast U/S showed mass at 3:00, 11 cm from nipple measuring 1.9 x 1.6 x 1.8 cm; no abnormal axillary lymph nodes.  3. 1/03/2022: Left breast core biopsy of 0.4 cm mass at 3:30 position showed grade 2 invasive ductal carcinoma with lobular features. Biopsy of left breast 1.9 cm mass at 3:00 showed grade 2 invasive ductal carcinoma with lobular features, associated grade 2 DCIS. ER and MS strongly positive at %; HER-2 IHC = 2+; HER-2/crow FISH negative.  4. 1/10/2023: FSH = 36.6 but estradiol = 63.  5.  2/03/2023: Bilateral mastectomies with left axillary sentinel lymph node excision and left chest wall nodule excision with Dr. Clarissa Fontanez.  Pathology showed left breast grade 2 invasive ductal carcinoma with lobular features, multifocal with 2 cm and 0.4 cm tumors, grade 2 DCIS, all margins negative.  Of 3 left axillary sentinel lymph nodes removed, one with 6 mm macrometastasis positive for extranodal extension and one with 1.1 mm micrometastasis, negative for extranodal extension, identified. Left chest wall nodule positive for metastatic breast carcinoma involving edge of tissue fragment. HER-2 FISH repeated, group 4, considered HER-2 negative. Oncotype DX = 20, 9-year risk of distant recurrence of 17% after 5 years of hormone blockade therapy;  breast-cancer specific survival of 96.7% treated without chemotherapy.  6. 2/23/2023: PET/CT scan negative for metastatic disease. Small right middle lobe lung nodules, non-FDG avid.  7. 3/29/2023: Started adjuvant chemotherapy with Taxotere and Cytoxan. Received cycle 2 on 4/19/2023, complicated by allergic reaction with chest heaviness followed by diffuse rash. Then switched to weekly paclitaxel for 6 weeks with completion on 6/14/2023.  8. 6/22/2023: Start of Zoladex every 4 weeks with anastrozole.  9. 7/17/2023-8/18/2023: Completed adjuvant radiation therapy.  10. 12/27/2023: Stopped anastrozole due to diffuse joint pain.  11. 1/15/2024: Started letrozole.  12. 3/25/2024: Stopped letrozole due to diffuse joint pain.    INTERVAL HISTORY:  Yuliya reports diffuse joint pain including in hands which has made walking difficult. She has been off Zoladex since 2/1/2024 and letrozole since end of 3/2025 with no improvement in her diffuse pain.      REVIEW OF SYSTEMS:   14 point ROS was reviewed and is negative other than as noted above in HPI.       HOME MEDICATIONS:  Current Outpatient Medications   Medication Sig Dispense Refill    alcohol swab prep pads Use to swab area of injection/amy as directed. 100 each 3    atorvastatin (LIPITOR) 10 MG tablet Take 1 tablet (10 mg) by mouth daily 90 tablet 3    bisacodyl (DULCOLAX) 5 MG EC tablet Two days prior to exam take two (2) tablets at 4pm. One day prior to exam take two (2) tablets at 4pm 4 tablet 0    blood glucose (NO BRAND SPECIFIED) test strip Use to test blood sugar 2 times daily or as directed. To accompany: Blood Glucose Monitor Brands: per insurance. 100 strip 6    blood glucose calibration (NO BRAND SPECIFIED) solution To accompany: Blood Glucose Monitor Brands: per insurance. 1 each 1    blood glucose monitoring (NO BRAND SPECIFIED) meter device kit Use to test blood sugar 2 times daily or as directed. Preferred blood glucose meter OR supplies to accompany:  Blood Glucose Monitor Brands: per insurance. 1 kit 0    diphenhydrAMINE (BENADRYL) 25 MG capsule Take 2 capsules (50 mg) by mouth every 6 hours as needed for itching or allergies 30 capsule 0    diphenhydrAMINE (BENADRYL) 25 MG tablet Take 2 tablets (50 mg) by mouth every 6 hours as needed for itching or allergies 120 tablet 1    EPINEPHrine (ANY BX GENERIC EQUIV) 0.3 MG/0.3ML injection 2-pack Inject 0.3 mLs (0.3 mg) into the muscle once as needed for anaphylaxis May repeat one time in 5-15 minutes if response to initial dose is inadequate. 2 each 0    FLUoxetine (PROZAC) 40 MG capsule Take 1 capsule (40 mg) by mouth daily 90 capsule 3    hydrOXYzine (ATARAX) 25 MG tablet Take 1-2 tablets (25-50 mg) by mouth 3 times daily as needed for itching 30 tablet 1    medical cannabis (Patient's own supply) Take 1 Dose by mouth nightly as needed Gummies: 5 mg   Formulary: Red  Location: Unknown    (The purpose of this order is to document that the patient reports taking medical cannabis.  This is not a prescription, and is not used to certify that the patient has a qualifying medical condition.)      melatonin 3 MG tablet Take 6 mg by mouth nightly as needed for sleep      metFORMIN (GLUCOPHAGE XR) 500 MG 24 hr tablet TAKE 1 TABLET BY MOUTH EVERY DAY 90 tablet 1    polyethylene glycol (GOLYTELY) 236 g suspension Take as directed. Two days before your exam fill the first container with water. Cover and shake until mixed well. At 5:00pm drink one 8oz glass every 10-15 minutes until half (1/2) of the first container is empty. Store the remainder in the refrigerator. One day before your exam at 5:00pm drink the second half of the first container until it is gone. Before you go to bed mix the second container with water and put in refrigerator. Six hours before your check in time drink one 8oz glass every 10-15 minutes until half of container is empty. Discard the remainder of solution. 8000 mL 0    Semaglutide, 1 MG/DOSE,  (OZEMPIC, 1 MG/DOSE,) 4 MG/3ML pen Inject 1 mg Subcutaneous every 7 days (On Fridays) 9 mL 1    spironolactone (ALDACTONE) 100 MG tablet Take 1 tablet (100 mg) by mouth daily 90 tablet 3    thin (NO BRAND SPECIFIED) lancets Use with lanceting device. To accompany: Blood Glucose Monitor Brands: per insurance. 100 each 6    vitamin D2 (ERGOCALCIFEROL) 35250 units (1250 mcg) capsule Take 1 capsule (50,000 Units) by mouth once a week 8 capsule 0    letrozole (FEMARA) 2.5 MG tablet Take 1 tablet (2.5 mg) by mouth daily (Patient not taking: Reported on 4/25/2024) 90 tablet 3         ALLERGIES:  Allergies   Allergen Reactions    Docetaxel Anaphylaxis, Hives, Itching and Shortness Of Breath    Keflex [Cephalexin]     Tetracycline     Trazodone      Very sedating-         PAST MEDICAL HISTORY:  Diabetes mellitus type 2  Hyperlipidemia    Gynecologic history:  Age of menarche at 11.  Last menstrual period in 2005 prior to hysterectomy.  G3, P3.  Age of first pregnancy at 29.  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  No use of HRT.  Used OCPs for 4 years.  No IUD use.      PAST SURGICAL HISTORY:  Past Surgical History:   Procedure Laterality Date    CYST REMOVAL      HYSTERECTOMY      HYSTERECTOMY, PAP NO LONGER INDICATED  2005    menorrhagia    INSERT TISSUE EXPANDER BREAST Bilateral 2/3/2023    Procedure: BILATERAL TISSUE EXPANDER;  Surgeon: Jaya Malave MD;  Location:  OR    MASTECTOMY SIMPLE BILATERAL, SENTINEL NODE BILATERAL, COMBINED Left 2/3/2023    Procedure: Bilateral skin sparing mastectomies with left axillary sentinel lymph node biopsy;  Surgeon: Clarissa Fontanez MD;  Location:  OR         SOCIAL HISTORY:  Social History     Socioeconomic History    Marital status:      Spouse name: Not on file    Number of children: 3    Years of education: Not on file    Highest education level: Not on file   Occupational History    Not on file   Tobacco Use    Smoking status: Former     Current packs/day: 0.00      Types: Cigarettes     Quit date: 1999     Years since quittin.4    Smokeless tobacco: Never   Vaping Use    Vaping status: Never Used   Substance and Sexual Activity    Alcohol use: Yes     Comment: Alcoholic Drinks/day: 1 a week    Drug use: Yes     Types: Marijuana     Comment: nightly for sleep (gummy)    Sexual activity: Yes     Partners: Male   Other Topics Concern    Not on file   Social History Narrative    Not on file     Social Determinants of Health     Financial Resource Strain: Low Risk  (10/25/2023)    Financial Resource Strain     Within the past 12 months, have you or your family members you live with been unable to get utilities (heat, electricity) when it was really needed?: No   Food Insecurity: Low Risk  (10/25/2023)    Food Insecurity     Within the past 12 months, did you worry that your food would run out before you got money to buy more?: No     Within the past 12 months, did the food you bought just not last and you didn t have money to get more?: No   Transportation Needs: Low Risk  (10/25/2023)    Transportation Needs     Within the past 12 months, has lack of transportation kept you from medical appointments, getting your medicines, non-medical meetings or appointments, work, or from getting things that you need?: No   Physical Activity: Insufficiently Active (10/26/2022)    Exercise Vital Sign     Days of Exercise per Week: 1 day     Minutes of Exercise per Session: 20 min   Stress: Stress Concern Present (10/26/2022)    [employer] - Occupational Stress Questionnaire     Feeling of Stress : Rather much   Social Connections: Moderately Isolated (10/26/2022)    Social Connection and Isolation Panel [NHANES]     Frequency of Communication with Friends and Family: More than three times a week     Frequency of Social Gatherings with Friends and Family: Once a week     Attends Nondenominational Services: Never     Active Member of Clubs or Organizations: No      Attends Club or Organization Meetings: Not on file     Marital Status:    Interpersonal Safety: Low Risk  (11/1/2023)    Interpersonal Safety     Do you feel physically and emotionally safe where you currently live?: Yes     Within the past 12 months, have you been hit, slapped, kicked or otherwise physically hurt by someone?: No     Within the past 12 months, have you been humiliated or emotionally abused in other ways by your partner or ex-partner?: No   Housing Stability: Low Risk  (10/25/2023)    Housing Stability     Do you have housing? : Yes     Are you worried about losing your housing?: No         FAMILY HISTORY:  Family History   Problem Relation Age of Onset    Diabetes Mother     Atrial fibrillation Father     Alcoholism Father     Melanoma Father     Breast Cancer Maternal Aunt         in her 40's    Cervical Cancer Maternal Aunt     Colon Cancer No family hx of          PHYSICAL EXAM:  Vital signs:  /81   Pulse 98   Temp 98.3  F (36.8  C) (Oral)   Resp 16   Wt 105.4 kg (232 lb 6.4 oz)   LMP  (LMP Unknown)   SpO2 97%   BMI 34.42 kg/m     GENERAL: No acute distress.  EYES: No scleral icterus. No overt erythema.  RESPIRATORY: No audible cough, wheezing, or labored breathing.  MUSCULOSKELETAL: Range of motion in the neck, shoulders, and arms appear normal.  SKIN: No overt rashes, discolorations, or lesions over the face and neck.  NEUROLOGIC: Alert.  No overt tremors.  PSYCHIATRIC: Normal affect and mood.  Does not appear anxious.       LABS:  CBC RESULTS:   Recent Labs   Lab Test 12/07/23  0941   WBC 5.3   RBC 4.85   HGB 13.8   HCT 41.4   MCV 85   MCH 28.5   MCHC 33.3   RDW 12.4         Last Comprehensive Metabolic Panel:  Sodium   Date Value Ref Range Status   12/07/2023 137 135 - 145 mmol/L Final     Comment:     Reference intervals for this test were updated on 09/26/2023 to more accurately reflect our healthy population. There may be differences in the flagging of prior  results with similar values performed with this method. Interpretation of those prior results can be made in the context of the updated reference intervals.      Potassium   Date Value Ref Range Status   12/07/2023 4.3 3.4 - 5.3 mmol/L Final   01/10/2023 3.9 3.4 - 5.3 mmol/L Final     Chloride   Date Value Ref Range Status   12/07/2023 103 98 - 107 mmol/L Final   01/10/2023 102 94 - 109 mmol/L Final     Carbon Dioxide (CO2)   Date Value Ref Range Status   12/07/2023 24 22 - 29 mmol/L Final   01/10/2023 26 20 - 32 mmol/L Final     Anion Gap   Date Value Ref Range Status   12/07/2023 10 7 - 15 mmol/L Final   01/10/2023 7 3 - 14 mmol/L Final     Glucose   Date Value Ref Range Status   12/07/2023 107 (H) 70 - 99 mg/dL Final   01/10/2023 100 (H) 70 - 99 mg/dL Final     GLUCOSE BY METER POCT   Date Value Ref Range Status   02/03/2023 120 (H) 70 - 99 mg/dL Final     Urea Nitrogen   Date Value Ref Range Status   12/07/2023 17.0 6.0 - 20.0 mg/dL Final   01/10/2023 16 7 - 30 mg/dL Final     Creatinine   Date Value Ref Range Status   12/07/2023 0.89 0.51 - 0.95 mg/dL Final     GFR Estimate   Date Value Ref Range Status   12/07/2023 78 >60 mL/min/1.73m2 Final   01/12/2021 >60 >60 mL/min/1.73m2 Final     Calcium   Date Value Ref Range Status   12/07/2023 9.6 8.6 - 10.0 mg/dL Final     Bilirubin Total   Date Value Ref Range Status   12/07/2023 0.2 <=1.2 mg/dL Final     Alkaline Phosphatase   Date Value Ref Range Status   12/07/2023 82 40 - 150 U/L Final     Comment:     Reference intervals for this test were updated on 11/14/2023 to more accurately reflect our healthy population. There may be differences in the flagging of prior results with similar values performed with this method. Interpretation of those prior results can be made in the context of the updated reference intervals.     ALT   Date Value Ref Range Status   12/07/2023 19 0 - 50 U/L Final     Comment:     Reference intervals for this test were updated on 6/12/2023  to more accurately reflect our healthy population. There may be differences in the flagging of prior results with similar values performed with this method. Interpretation of those prior results can be made in the context of the updated reference intervals.       AST   Date Value Ref Range Status   12/07/2023 21 0 - 45 U/L Final     Comment:     Reference intervals for this test were updated on 6/12/2023 to more accurately reflect our healthy population. There may be differences in the flagging of prior results with similar values performed with this method. Interpretation of those prior results can be made in the context of the updated reference intervals.        11/1/2023  FSH = 15.5  Estradiol = 6  Vitamin D = 27    ASSESSMENT/PLAN:  Yuliya Li is a 53 year old female with the following issues:  1. Pathologic prognostic stage IA, [id number], grade 2 multifocal invasive ductal carcinoma with lobular features of left breast overlapping sites, ER positive, WI positive, HER-2 IHC = 2+; HER-2/crow FISH negative, Oncotype DX = 20  --Yuliya had 2 tumors measuring 2 cm and 0.4 cm and additional left chest wall nodule, but with 2/3 lymph nodes involved, one that was a macrometastasis with extranodal extension and the other with micrometastasis with no extranodal extension.  All areas of tumor were excised.  The repeat HER-2 FISH was interpreted as negative.   --2/23/2023 PET scan showed no evidence for metastatic disease.  --Oncotype DX = 20, with 9-year risk of distant recurrence of 17% after 5 years of hormone blockade therapy; breast-cancer specific survival of 96.7% treated without chemotherapy.  Reiterated this today.  --She elected to proceed with 4 cycles of Taxotere and Cytoxan. However, she reacted to Taxotere after cycle 2. Therefore, she was switched to Taxol weekly for 6 weeks, completed 6/14/2023.  We did not proceed with dose dense AC given that her maximum absolute chemo benefit is up to 3.3% and  would not justify the potential adverse effects of ddAC.  --She is perimenopausal and started OST + AI with Zoladex every 4 weeks with anastrozole on 6/22/2023. She did not tolerate this due to diffuse joint pain and discontinued Zoladex by 2/2024 and tried letrozole but did not tolerate this either.    --I therefore recommended switching to tamoxifen.   --I discussed the potential side effects of tamoxifen, which may include, but not be limited to: muscle aches, mood changes, hot flashes, night sweats, weight gain, fatigue, nausea, earlier cataract formation, and a small risk of blood clots.  She agrees to try tamoxifen.  --Completed adjuvant radiation therapy 8/18/2023.  --3/20/2023 DEXA scan showed normal bone density for baseline. Plan to repeat DEXA in 2025.    2.  Family history of breast cancer  - Maternal aunt with breast cancer.  - I have recommended and referred Yuliya to genetic counseling.  She will call [facility] to set up blood draw.    3. Indeterminate right middle lobe pulmonary nodules  --2/23/2023 PET/CT scan showed 2 small indeterminate right middle lobe pulmonary nodules.   --5/24/2023 CT chest showed stable right lung nodules.  --12/4/2023 chest CT showed stable pulmonary nodule in the right middle lobe. The smaller of the two nodules described previously is no longer  demonstrated today.  --Repeat chest CT in 6/2024, then in 7/2024 if nodule is stable.    4. Hot flashes  --Mild and tolerable.  No medication intervention needed.    5. Intermittent hives  --She saw an allergist. Etiology unknown, but could be due to prior docetaxel. She switched from Benadryl to Zyrtec.  --She plans to see a dermatologist as well.    6. Vitamin D deficiency  --She is on vitamin D supplementation.    7. Polyarthralgia  --Related to Zoladex + anastrozole.  --She could try tart cherry juice as well as acupuncture, exercise, stretching.  She is seeing pain clinic.  --Discussed starting magnesium.    Return in 3  months.    Louise Watson MD  Hematology/Oncology  [facility]    Total time spent today: 30 minutes in chart review, patient evaluation, counseling, documentation, test and/or medication/prescription orders, and coordination of care.

## 2024-04-22 DIAGNOSIS — Z17.0 MALIGNANT NEOPLASM OF OVERLAPPING SITES OF LEFT BREAST IN FEMALE, ESTROGEN RECEPTOR POSITIVE (H): Primary | ICD-10-CM

## 2024-04-22 DIAGNOSIS — C50.812 MALIGNANT NEOPLASM OF OVERLAPPING SITES OF LEFT BREAST IN FEMALE, ESTROGEN RECEPTOR POSITIVE (H): Primary | ICD-10-CM

## 2024-04-25 ENCOUNTER — ONCOLOGY VISIT (OUTPATIENT)
Dept: ONCOLOGY | Facility: CLINIC | Age: 54
End: 2024-04-25
Attending: INTERNAL MEDICINE
Payer: COMMERCIAL

## 2024-04-25 VITALS
TEMPERATURE: 98.3 F | HEART RATE: 98 BPM | RESPIRATION RATE: 16 BRPM | SYSTOLIC BLOOD PRESSURE: 119 MMHG | WEIGHT: 232.4 LBS | OXYGEN SATURATION: 97 % | BODY MASS INDEX: 34.42 KG/M2 | DIASTOLIC BLOOD PRESSURE: 81 MMHG

## 2024-04-25 DIAGNOSIS — C50.812 MALIGNANT NEOPLASM OF OVERLAPPING SITES OF LEFT BREAST IN FEMALE, ESTROGEN RECEPTOR POSITIVE (H): Primary | ICD-10-CM

## 2024-04-25 DIAGNOSIS — M25.50 POLYARTHRALGIA: ICD-10-CM

## 2024-04-25 DIAGNOSIS — Z17.0 MALIGNANT NEOPLASM OF OVERLAPPING SITES OF LEFT BREAST IN FEMALE, ESTROGEN RECEPTOR POSITIVE (H): Primary | ICD-10-CM

## 2024-04-25 DIAGNOSIS — N95.1 MENOPAUSAL SYNDROME (HOT FLASHES): ICD-10-CM

## 2024-04-25 DIAGNOSIS — R91.8 PULMONARY NODULES: ICD-10-CM

## 2024-04-25 PROCEDURE — 99214 OFFICE O/P EST MOD 30 MIN: CPT | Performed by: INTERNAL MEDICINE

## 2024-04-25 RX ORDER — TAMOXIFEN CITRATE 20 MG/1
20 TABLET ORAL DAILY
Qty: 90 TABLET | Refills: 3 | Status: SHIPPED | OUTPATIENT
Start: 2024-04-25

## 2024-04-25 ASSESSMENT — PAIN SCALES - GENERAL: PAINLEVEL: SEVERE PAIN (7)

## 2024-04-25 NOTE — NURSING NOTE
"Oncology Rooming Note    April 25, 2024 1:36 PM   Yuliya Li is a 53 year old female who presents for:    Chief Complaint   Patient presents with    Oncology Clinic Visit     Initial Vitals: /81   Pulse 98   Temp 98.3  F (36.8  C) (Oral)   Resp 16   Wt 105.4 kg (232 lb 6.4 oz)   LMP  (LMP Unknown)   SpO2 97%   BMI 34.42 kg/m   Estimated body mass index is 34.42 kg/m  as calculated from the following:    Height as of 11/1/23: 1.75 m (5' 8.9\").    Weight as of this encounter: 105.4 kg (232 lb 6.4 oz). Body surface area is 2.26 meters squared.  Severe Pain (7) Comment: Data Unavailable   No LMP recorded (lmp unknown). Patient has had a hysterectomy.  Allergies reviewed: Yes  Medications reviewed: Yes    Medications: Medication refills not needed today.  Pharmacy name entered into [employer] [place]:    [id number] [place] [facility] - [place], [place] - [address] [facility] - [place], [place] - [address] [place] [address] PHARMACY [facility] [address] [id number] [place], [place] - [address] [facility] [address]    Frailty Screening:   Is the patient here for a new oncology consult visit in cancer care? 2. No      Clinical concerns: follow up        Michelle Paz            "

## 2024-04-25 NOTE — LETTER
4/25/2024         RE: Yuliya Li  [address]  [place] [address]        Dear Colleague,    Thank you for referring your patient, Yuliya Li, to the [facility]. Please see a copy of my visit note below.    [facility]    Hematology/Oncology Established Patient Note      Today's Date: 4/25/2024    Reason for follow-up:  Left breast cancer.    HISTORY OF PRESENT ILLNESS: Yuliya Li is a 53 year old female status post removal of single ovary and hysterectomy who presents with the following oncologic history:  1.  12/23/2022: Mammogram showed possible asymmetry in right breast at 12:00; possible mass in left breast at 3:00.  2. 1/2/2023: Diagnostic bilateral mammogram showed mass in left breast at 3:00 with 0.4 cm satellite mass in retroareolar breast. Right breast with effacement of possible finding at 12:00 consistent with artifact. Left breast U/S showed mass at 3:00, 11 cm from nipple measuring 1.9 x 1.6 x 1.8 cm; no abnormal axillary lymph nodes.  3. 1/03/2022: Left breast core biopsy of 0.4 cm mass at 3:30 position showed grade 2 invasive ductal carcinoma with lobular features. Biopsy of left breast 1.9 cm mass at 3:00 showed grade 2 invasive ductal carcinoma with lobular features, associated grade 2 DCIS. ER and PA strongly positive at %; HER-2 IHC = 2+; HER-2/crow FISH negative.  4. 1/10/2023: FSH = 36.6 but estradiol = 63.  5.  2/03/2023: Bilateral mastectomies with left axillary sentinel lymph node excision and left chest wall nodule excision with Dr. Clarissa Fontanez.  Pathology showed left breast grade 2 invasive ductal carcinoma with lobular features, multifocal with 2 cm and 0.4 cm tumors, grade 2 DCIS, all margins negative.  Of 3 left axillary sentinel lymph nodes removed, one with 6 mm macrometastasis positive for extranodal extension and one with 1.1 mm micrometastasis, negative for extranodal extension, identified. Left  chest wall nodule positive for metastatic breast carcinoma involving edge of tissue fragment. HER-2 FISH repeated, group 4, considered HER-2 negative. Oncotype DX = 20, 9-year risk of distant recurrence of 17% after 5 years of hormone blockade therapy; breast-cancer specific survival of 96.7% treated without chemotherapy.  6. 2/23/2023: PET/CT scan negative for metastatic disease. Small right middle lobe lung nodules, non-FDG avid.  7. 3/29/2023: Started adjuvant chemotherapy with Taxotere and Cytoxan. Received cycle 2 on 4/19/2023, complicated by allergic reaction with chest heaviness followed by diffuse rash. Then switched to weekly paclitaxel for 6 weeks with completion on 6/14/2023.  8. 6/22/2023: Start of Zoladex every 4 weeks with anastrozole.  9. 7/17/2023-8/18/2023: Completed adjuvant radiation therapy.  10. 12/27/2023: Stopped anastrozole due to diffuse joint pain.  11. 1/15/2024: Started letrozole.  12. 3/25/2024: Stopped letrozole due to diffuse joint pain.    INTERVAL HISTORY:  Yuliya reports diffuse joint pain including in hands which has made walking difficult. She has been off Zoladex since 2/1/2024 and letrozole since end of 3/2025 with no improvement in her diffuse pain.      REVIEW OF SYSTEMS:   14 point ROS was reviewed and is negative other than as noted above in HPI.       HOME MEDICATIONS:  Current Outpatient Medications   Medication Sig Dispense Refill     alcohol swab prep pads Use to swab area of injection/amy as directed. 100 each 3     atorvastatin (LIPITOR) 10 MG tablet Take 1 tablet (10 mg) by mouth daily 90 tablet 3     bisacodyl (DULCOLAX) 5 MG EC tablet Two days prior to exam take two (2) tablets at 4pm. One day prior to exam take two (2) tablets at 4pm 4 tablet 0     blood glucose (NO BRAND SPECIFIED) test strip Use to test blood sugar 2 times daily or as directed. To accompany: Blood Glucose Monitor Brands: per insurance. 100 strip 6     blood glucose calibration (NO BRAND  SPECIFIED) solution To accompany: Blood Glucose Monitor Brands: per insurance. 1 each 1     blood glucose monitoring (NO BRAND SPECIFIED) meter device kit Use to test blood sugar 2 times daily or as directed. Preferred blood glucose meter OR supplies to accompany: Blood Glucose Monitor Brands: per insurance. 1 kit 0     diphenhydrAMINE (BENADRYL) 25 MG capsule Take 2 capsules (50 mg) by mouth every 6 hours as needed for itching or allergies 30 capsule 0     diphenhydrAMINE (BENADRYL) 25 MG tablet Take 2 tablets (50 mg) by mouth every 6 hours as needed for itching or allergies 120 tablet 1     EPINEPHrine (ANY BX GENERIC EQUIV) 0.3 MG/0.3ML injection 2-pack Inject 0.3 mLs (0.3 mg) into the muscle once as needed for anaphylaxis May repeat one time in 5-15 minutes if response to initial dose is inadequate. 2 each 0     FLUoxetine (PROZAC) 40 MG capsule Take 1 capsule (40 mg) by mouth daily 90 capsule 3     hydrOXYzine (ATARAX) 25 MG tablet Take 1-2 tablets (25-50 mg) by mouth 3 times daily as needed for itching 30 tablet 1     medical cannabis (Patient's own supply) Take 1 Dose by mouth nightly as needed Gummies: 5 mg   Formulary: Red  Location: Unknown    (The purpose of this order is to document that the patient reports taking medical cannabis.  This is not a prescription, and is not used to certify that the patient has a qualifying medical condition.)       melatonin 3 MG tablet Take 6 mg by mouth nightly as needed for sleep       metFORMIN (GLUCOPHAGE XR) 500 MG 24 hr tablet TAKE 1 TABLET BY MOUTH EVERY DAY 90 tablet 1     polyethylene glycol (GOLYTELY) 236 g suspension Take as directed. Two days before your exam fill the first container with water. Cover and shake until mixed well. At 5:00pm drink one 8oz glass every 10-15 minutes until half (1/2) of the first container is empty. Store the remainder in the refrigerator. One day before your exam at 5:00pm drink the second half of the first container until it is  gone. Before you go to bed mix the second container with water and put in refrigerator. Six hours before your check in time drink one 8oz glass every 10-15 minutes until half of container is empty. Discard the remainder of solution. 8000 mL 0     Semaglutide, 1 MG/DOSE, (OZEMPIC, 1 MG/DOSE,) 4 MG/3ML pen Inject 1 mg Subcutaneous every 7 days (On Fridays) 9 mL 1     spironolactone (ALDACTONE) 100 MG tablet Take 1 tablet (100 mg) by mouth daily 90 tablet 3     thin (NO BRAND SPECIFIED) lancets Use with lanceting device. To accompany: Blood Glucose Monitor Brands: per insurance. 100 each 6     vitamin D2 (ERGOCALCIFEROL) 47673 units (1250 mcg) capsule Take 1 capsule (50,000 Units) by mouth once a week 8 capsule 0     letrozole (FEMARA) 2.5 MG tablet Take 1 tablet (2.5 mg) by mouth daily (Patient not taking: Reported on 4/25/2024) 90 tablet 3         ALLERGIES:  Allergies   Allergen Reactions     Docetaxel Anaphylaxis, Hives, Itching and Shortness Of Breath     Keflex [Cephalexin]      Tetracycline      Trazodone      Very sedating-         PAST MEDICAL HISTORY:  Diabetes mellitus type 2  Hyperlipidemia    Gynecologic history:  Age of menarche at 11.  Last menstrual period in 2005 prior to hysterectomy.  G3, P3.  Age of first pregnancy at 29.  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  No use of HRT.  Used OCPs for 4 years.  No IUD use.      PAST SURGICAL HISTORY:  Past Surgical History:   Procedure Laterality Date     CYST REMOVAL       HYSTERECTOMY       HYSTERECTOMY, PAP NO LONGER INDICATED  2005    menorrhagia     INSERT TISSUE EXPANDER BREAST Bilateral 2/3/2023    Procedure: BILATERAL TISSUE EXPANDER;  Surgeon: Jaya Malave MD;  Location:  OR     MASTECTOMY SIMPLE BILATERAL, SENTINEL NODE BILATERAL, COMBINED Left 2/3/2023    Procedure: Bilateral skin sparing mastectomies with left axillary sentinel lymph node biopsy;  Surgeon: Clarissa Fontanez MD;  Location:  OR         SOCIAL HISTORY:  Social History      Socioeconomic History     Marital status:      Spouse name: Not on file     Number of children: 3     Years of education: Not on file     Highest education level: Not on file   Occupational History     Not on file   Tobacco Use     Smoking status: Former     Current packs/day: 0.00     Types: Cigarettes     Quit date: 1999     Years since quittin.4     Smokeless tobacco: Never   Vaping Use     Vaping status: Never Used   Substance and Sexual Activity     Alcohol use: Yes     Comment: Alcoholic Drinks/day: 1 a week     Drug use: Yes     Types: Marijuana     Comment: nightly for sleep (gummy)     Sexual activity: Yes     Partners: Male   Other Topics Concern     Not on file   Social History Narrative     Not on file     Social Determinants of Health     Financial Resource Strain: Low Risk  (10/25/2023)    Financial Resource Strain      Within the past 12 months, have you or your family members you live with been unable to get utilities (heat, electricity) when it was really needed?: No   Food Insecurity: Low Risk  (10/25/2023)    Food Insecurity      Within the past 12 months, did you worry that your food would run out before you got money to buy more?: No      Within the past 12 months, did the food you bought just not last and you didn t have money to get more?: No   Transportation Needs: Low Risk  (10/25/2023)    Transportation Needs      Within the past 12 months, has lack of transportation kept you from medical appointments, getting your medicines, non-medical meetings or appointments, work, or from getting things that you need?: No   Physical Activity: Insufficiently Active (10/26/2022)    Exercise Vital Sign      Days of Exercise per Week: 1 day      Minutes of Exercise per Session: 20 min   Stress: Stress Concern Present (10/26/2022)    Citizen of [place] [employer] - Occupational Stress Questionnaire      Feeling of Stress : Rather much   Social Connections: Moderately Isolated  (10/26/2022)    Social Connection and Isolation Panel [NHANES]      Frequency of Communication with Friends and Family: More than three times a week      Frequency of Social Gatherings with Friends and Family: Once a week      Attends [employer] Services: Never      Active Member of Clubs or Organizations: No      Attends Club or Organization Meetings: Not on file      Marital Status:    Interpersonal Safety: Low Risk  (11/1/2023)    Interpersonal Safety      Do you feel physically and emotionally safe where you currently live?: Yes      Within the past 12 months, have you been hit, slapped, kicked or otherwise physically hurt by someone?: No      Within the past 12 months, have you been humiliated or emotionally abused in other ways by your partner or ex-partner?: No   Housing Stability: Low Risk  (10/25/2023)    Housing Stability      Do you have housing? : Yes      Are you worried about losing your housing?: No         FAMILY HISTORY:  Family History   Problem Relation Age of Onset     Diabetes Mother      Atrial fibrillation Father      Alcoholism Father      Melanoma Father      Breast Cancer Maternal Aunt         in her 40's     Cervical Cancer Maternal Aunt      Colon Cancer No family hx of          PHYSICAL EXAM:  Vital signs:  /81   Pulse 98   Temp 98.3  F (36.8  C) (Oral)   Resp 16   Wt 105.4 kg (232 lb 6.4 oz)   LMP  (LMP Unknown)   SpO2 97%   BMI 34.42 kg/m     GENERAL: No acute distress.  EYES: No scleral icterus. No overt erythema.  RESPIRATORY: No audible cough, wheezing, or labored breathing.  MUSCULOSKELETAL: Range of motion in the neck, shoulders, and arms appear normal.  SKIN: No overt rashes, discolorations, or lesions over the face and neck.  NEUROLOGIC: Alert.  No overt tremors.  PSYCHIATRIC: Normal affect and mood.  Does not appear anxious.       LABS:  CBC RESULTS:   Recent Labs   Lab Test 12/07/23  0941   WBC 5.3   RBC 4.85   HGB 13.8   HCT 41.4   MCV 85   MCH 28.5   MCHC  33.3   RDW 12.4         Last Comprehensive Metabolic Panel:  Sodium   Date Value Ref Range Status   12/07/2023 137 135 - 145 mmol/L Final     Comment:     Reference intervals for this test were updated on 09/26/2023 to more accurately reflect our healthy population. There may be differences in the flagging of prior results with similar values performed with this method. Interpretation of those prior results can be made in the context of the updated reference intervals.      Potassium   Date Value Ref Range Status   12/07/2023 4.3 3.4 - 5.3 mmol/L Final   01/10/2023 3.9 3.4 - 5.3 mmol/L Final     Chloride   Date Value Ref Range Status   12/07/2023 103 98 - 107 mmol/L Final   01/10/2023 102 94 - 109 mmol/L Final     Carbon Dioxide (CO2)   Date Value Ref Range Status   12/07/2023 24 22 - 29 mmol/L Final   01/10/2023 26 20 - 32 mmol/L Final     Anion Gap   Date Value Ref Range Status   12/07/2023 10 7 - 15 mmol/L Final   01/10/2023 7 3 - 14 mmol/L Final     Glucose   Date Value Ref Range Status   12/07/2023 107 (H) 70 - 99 mg/dL Final   01/10/2023 100 (H) 70 - 99 mg/dL Final     GLUCOSE BY METER POCT   Date Value Ref Range Status   02/03/2023 120 (H) 70 - 99 mg/dL Final     Urea Nitrogen   Date Value Ref Range Status   12/07/2023 17.0 6.0 - 20.0 mg/dL Final   01/10/2023 16 7 - 30 mg/dL Final     Creatinine   Date Value Ref Range Status   12/07/2023 0.89 0.51 - 0.95 mg/dL Final     GFR Estimate   Date Value Ref Range Status   12/07/2023 78 >60 mL/min/1.73m2 Final   01/12/2021 >60 >60 mL/min/1.73m2 Final     Calcium   Date Value Ref Range Status   12/07/2023 9.6 8.6 - 10.0 mg/dL Final     Bilirubin Total   Date Value Ref Range Status   12/07/2023 0.2 <=1.2 mg/dL Final     Alkaline Phosphatase   Date Value Ref Range Status   12/07/2023 82 40 - 150 U/L Final     Comment:     Reference intervals for this test were updated on 11/14/2023 to more accurately reflect our healthy population. There may be differences in the  flagging of prior results with similar values performed with this method. Interpretation of those prior results can be made in the context of the updated reference intervals.     ALT   Date Value Ref Range Status   12/07/2023 19 0 - 50 U/L Final     Comment:     Reference intervals for this test were updated on 6/12/2023 to more accurately reflect our healthy population. There may be differences in the flagging of prior results with similar values performed with this method. Interpretation of those prior results can be made in the context of the updated reference intervals.       AST   Date Value Ref Range Status   12/07/2023 21 0 - 45 U/L Final     Comment:     Reference intervals for this test were updated on 6/12/2023 to more accurately reflect our healthy population. There may be differences in the flagging of prior results with similar values performed with this method. Interpretation of those prior results can be made in the context of the updated reference intervals.        11/1/2023  FSH = 15.5  Estradiol = 6  Vitamin D = 27    ASSESSMENT/PLAN:  Yuliya Li is a 53 year old female with the following issues:  1. Pathologic prognostic stage IA, [id number], grade 2 multifocal invasive ductal carcinoma with lobular features of left breast overlapping sites, ER positive, [place] positive, HER-2 IHC = 2+; HER-2/crow FISH negative, Oncotype DX = 20  --Yuliya had 2 tumors measuring 2 cm and 0.4 cm and additional left chest wall nodule, but with 2/3 lymph nodes involved, one that was a macrometastasis with extranodal extension and the other with micrometastasis with no extranodal extension.  All areas of tumor were excised.  The repeat HER-2 FISH was interpreted as negative.   --2/23/2023 PET scan showed no evidence for metastatic disease.  --Oncotype DX = 20, with 9-year risk of distant recurrence of 17% after 5 years of hormone blockade therapy; breast-cancer specific survival of 96.7% treated without chemotherapy.   Reiterated this today.  --She elected to proceed with 4 cycles of Taxotere and Cytoxan. However, she reacted to Taxotere after cycle 2. Therefore, she was switched to Taxol weekly for 6 weeks, completed 6/14/2023.  We did not proceed with dose dense AC given that her maximum absolute chemo benefit is up to 3.3% and would not justify the potential adverse effects of ddAC.  --She is perimenopausal and started OST + AI with Zoladex every 4 weeks with anastrozole on 6/22/2023. She did not tolerate this due to diffuse joint pain and discontinued Zoladex by 2/2024 and tried letrozole but did not tolerate this either.    --I therefore recommended switching to tamoxifen.   --I discussed the potential side effects of tamoxifen, which may include, but not be limited to: muscle aches, mood changes, hot flashes, night sweats, weight gain, fatigue, nausea, earlier cataract formation, and a small risk of blood clots.  She agrees to try tamoxifen.  --Completed adjuvant radiation therapy 8/18/2023.  --3/20/2023 DEXA scan showed normal bone density for baseline. Plan to repeat DEXA in 2025.    2.  Family history of breast cancer  - Maternal aunt with breast cancer.  - I have recommended and referred Yuliya to genetic counseling.  She will call [facility] to set up blood draw.    3. Indeterminate right middle lobe pulmonary nodules  --2/23/2023 PET/CT scan showed 2 small indeterminate right middle lobe pulmonary nodules.   --5/24/2023 CT chest showed stable right lung nodules.  --12/4/2023 chest CT showed stable pulmonary nodule in the right middle lobe. The smaller of the two nodules described previously is no longer  demonstrated today.  --Repeat chest CT in 6/2024, then in 7/2024 if nodule is stable.    4. Hot flashes  --Mild and tolerable.  No medication intervention needed.    5. Intermittent hives  --She saw an allergist. Etiology unknown, but could be due to prior docetaxel. She switched from Benadryl to Zyrtec.  --She plans to  see a dermatologist as well.    6. Vitamin D deficiency  --She is on vitamin D supplementation.    7. Polyarthralgia  --Related to Zoladex + anastrozole.  --She could try tart cherry juice as well as acupuncture, exercise, stretching.  She is seeing pain clinic.  --Discussed starting magnesium.    Return in 3 months.    Louise Watson MD  Hematology/Oncology  [facility]    Total time spent today: 30 minutes in chart review, patient evaluation, counseling, documentation, test and/or medication/prescription orders, and coordination of care.       Again, thank you for allowing me to participate in the care of your patient.        Sincerely,        Louise Watson MD

## 2024-04-30 ENCOUNTER — TELEPHONE (OUTPATIENT)
Dept: PEDIATRICS | Facility: CLINIC | Age: 54
End: 2024-04-30
Payer: COMMERCIAL

## 2024-04-30 ENCOUNTER — TRANSFERRED RECORDS (OUTPATIENT)
Dept: HEALTH INFORMATION MANAGEMENT | Facility: CLINIC | Age: 54
End: 2024-04-30
Payer: COMMERCIAL

## 2024-05-01 NOTE — TELEPHONE ENCOUNTER
Pt called to let us know that she does not need a refill of anything at this time    Edwige Mitchell RN on 5/2/2024 at 7:59 AM

## 2024-05-01 NOTE — TELEPHONE ENCOUNTER
RN attempted to reach patient.LVMTCB and speak with nurse.     When patient calls back: does patient need refill? Which medication?     Susie MENDES RN on 5/1/2024 at 11:36 AM

## 2024-05-01 NOTE — TELEPHONE ENCOUNTER
There is no refill pending.     Team, please contact patient to see what refill she is requesting. If she is needing refills, please pend meds and route back to refill ballesteros.     Thank you,  Leoncio Noel, Triage RN [facility]  11:16 AM 5/1/2024

## 2024-05-03 DIAGNOSIS — E11.9 TYPE 2 DIABETES MELLITUS WITHOUT COMPLICATION, WITHOUT LONG-TERM CURRENT USE OF INSULIN (H): ICD-10-CM

## 2024-05-03 RX ORDER — SEMAGLUTIDE 1.34 MG/ML
1 INJECTION, SOLUTION SUBCUTANEOUS
Qty: 9 ML | Refills: 0 | Status: SHIPPED | OUTPATIENT
Start: 2024-05-03 | End: 2024-05-08

## 2024-05-03 NOTE — TELEPHONE ENCOUNTER
Pending Prescriptions:                       Disp   Refills    Semaglutide (1 MG/DOSE) (OZEMPIC (1 MG/DO*9 mL   1            Sig: Inject 1 mg Subcutaneous every 7 days (On           Fridays)       Last filled: 1/31/24     Last seen:  11/1/23    STEPHAN Campbell MA

## 2024-05-06 ENCOUNTER — LAB (OUTPATIENT)
Dept: LAB | Facility: CLINIC | Age: 54
End: 2024-05-06
Payer: COMMERCIAL

## 2024-05-06 DIAGNOSIS — E11.9 TYPE 2 DIABETES MELLITUS WITHOUT COMPLICATION, WITHOUT LONG-TERM CURRENT USE OF INSULIN (H): ICD-10-CM

## 2024-05-06 LAB — HBA1C MFR BLD: 5.2 % (ref 0–5.6)

## 2024-05-06 PROCEDURE — 83036 HEMOGLOBIN GLYCOSYLATED A1C: CPT

## 2024-05-06 PROCEDURE — 36415 COLL VENOUS BLD VENIPUNCTURE: CPT

## 2024-05-17 DIAGNOSIS — E11.9 TYPE 2 DIABETES MELLITUS WITHOUT COMPLICATION, WITHOUT LONG-TERM CURRENT USE OF INSULIN (H): ICD-10-CM

## 2024-05-17 RX ORDER — METFORMIN HCL 500 MG
500 TABLET, EXTENDED RELEASE 24 HR ORAL DAILY
Qty: 90 TABLET | Refills: 0 | Status: SHIPPED | OUTPATIENT
Start: 2024-05-17 | End: 2024-06-07

## 2024-05-28 ENCOUNTER — TRANSFERRED RECORDS (OUTPATIENT)
Dept: HEALTH INFORMATION MANAGEMENT | Facility: CLINIC | Age: 54
End: 2024-05-28
Payer: COMMERCIAL

## 2024-06-07 ENCOUNTER — OFFICE VISIT (OUTPATIENT)
Dept: PEDIATRICS | Facility: CLINIC | Age: 54
End: 2024-06-07
Payer: COMMERCIAL

## 2024-06-07 VITALS
WEIGHT: 234.5 LBS | OXYGEN SATURATION: 100 % | RESPIRATION RATE: 16 BRPM | HEART RATE: 83 BPM | TEMPERATURE: 98.6 F | DIASTOLIC BLOOD PRESSURE: 75 MMHG | SYSTOLIC BLOOD PRESSURE: 107 MMHG | HEIGHT: 68 IN | BODY MASS INDEX: 35.54 KG/M2

## 2024-06-07 DIAGNOSIS — Z90.13 S/P MASTECTOMY, BILATERAL: ICD-10-CM

## 2024-06-07 DIAGNOSIS — Z17.0 MALIGNANT NEOPLASM OF OVERLAPPING SITES OF LEFT BREAST IN FEMALE, ESTROGEN RECEPTOR POSITIVE (H): ICD-10-CM

## 2024-06-07 DIAGNOSIS — C50.812 MALIGNANT NEOPLASM OF OVERLAPPING SITES OF LEFT BREAST IN FEMALE, ESTROGEN RECEPTOR POSITIVE (H): ICD-10-CM

## 2024-06-07 DIAGNOSIS — E66.812 CLASS 2 SEVERE OBESITY DUE TO EXCESS CALORIES WITH SERIOUS COMORBIDITY AND BODY MASS INDEX (BMI) OF 35.0 TO 35.9 IN ADULT (H): ICD-10-CM

## 2024-06-07 DIAGNOSIS — F32.5 MAJOR DEPRESSIVE DISORDER IN FULL REMISSION, UNSPECIFIED WHETHER RECURRENT (H): ICD-10-CM

## 2024-06-07 DIAGNOSIS — Z01.818 PREOP GENERAL PHYSICAL EXAM: Primary | ICD-10-CM

## 2024-06-07 DIAGNOSIS — E66.01 CLASS 2 SEVERE OBESITY DUE TO EXCESS CALORIES WITH SERIOUS COMORBIDITY AND BODY MASS INDEX (BMI) OF 35.0 TO 35.9 IN ADULT (H): ICD-10-CM

## 2024-06-07 DIAGNOSIS — E11.9 TYPE 2 DIABETES MELLITUS WITHOUT COMPLICATION, WITHOUT LONG-TERM CURRENT USE OF INSULIN (H): ICD-10-CM

## 2024-06-07 LAB — HGB BLD-MCNC: 12.8 G/DL (ref 11.7–15.7)

## 2024-06-07 PROCEDURE — 99214 OFFICE O/P EST MOD 30 MIN: CPT | Performed by: PHYSICIAN ASSISTANT

## 2024-06-07 PROCEDURE — 85018 HEMOGLOBIN: CPT | Performed by: PHYSICIAN ASSISTANT

## 2024-06-07 PROCEDURE — 36415 COLL VENOUS BLD VENIPUNCTURE: CPT | Performed by: PHYSICIAN ASSISTANT

## 2024-06-07 RX ORDER — MAGNESIUM 200 MG
TABLET ORAL
COMMUNITY
End: 2024-08-19

## 2024-06-07 RX ORDER — PREGABALIN 75 MG/1
CAPSULE ORAL
COMMUNITY
Start: 2024-04-30

## 2024-06-07 RX ORDER — ACETAMINOPHEN 500 MG
TABLET ORAL
COMMUNITY
End: 2024-08-19

## 2024-06-07 RX ORDER — PREGABALIN 150 MG/1
1 CAPSULE ORAL EVERY 8 HOURS
COMMUNITY
Start: 2024-05-28 | End: 2024-06-26

## 2024-06-07 RX ORDER — CETIRIZINE HYDROCHLORIDE 10 MG/1
1 TABLET ORAL DAILY
COMMUNITY

## 2024-06-07 ASSESSMENT — PATIENT HEALTH QUESTIONNAIRE - PHQ9
10. IF YOU CHECKED OFF ANY PROBLEMS, HOW DIFFICULT HAVE THESE PROBLEMS MADE IT FOR YOU TO DO YOUR WORK, TAKE CARE OF THINGS AT HOME, OR GET ALONG WITH OTHER PEOPLE: NOT DIFFICULT AT ALL
SUM OF ALL RESPONSES TO PHQ QUESTIONS 1-9: 4
SUM OF ALL RESPONSES TO PHQ QUESTIONS 1-9: 4

## 2024-06-07 ASSESSMENT — PAIN SCALES - GENERAL: PAINLEVEL: NO PAIN (0)

## 2024-06-07 NOTE — PROGRESS NOTES
Preoperative Evaluation  [facility]  [address]  [address]  [place] [address]  Phone: [phone]  Fax: [phone]  Primary Provider: Latrice Perez MD  Pre-op Performing Provider: Patti Harden PA-C  Jun 7, 2024 6/2/2024   Surgical Information   What procedure is being done? Breast reconstruction after mastectomy   Facility or Hospital where procedure/surgery will be performed: [facility]   Who is doing the procedure / surgery? Dr. Malave   Date of surgery / procedure: 07/21/2024   Time of surgery / procedure: 9am   Where do you plan to recover after surgery? at home with family     Fax number for surgical facility: [phone]    Assessment & Plan     The proposed surgical procedure is considered INTERMEDIATE risk.    Preop general physical exam  No EKG required  - Hemoglobin  - Hemoglobin    S/P mastectomy, bilateral  Reason for surgery    Type 2 diabetes mellitus without complication, without long-term current use of insulin (H)  Well controlled    Major depressive disorder in full remission, unspecified whether recurrent (H24)  stable    Malignant neoplasm of overlapping sites of left breast in female, estrogen receptor positive (H)      Class 2 severe obesity due to excess calories with serious comorbidity and body mass index (BMI) of 35.0 to 35.9 in adult (H)  Continue to work on heart healthy diet and exercise.         Possible Sleep Apnea: WESLEY dx uses cpap           - No identified additional risk factors other than previously addressed    Antiplatelet or Anticoagulation Medication Instructions   - Patient is on no antiplatelet or anticoagulation medications.    Additional Medication Instructions   - metformin: DO NOT TAKE day of surgery.   - GLP-1 Injectable (exenitide, liraglutide, semaglutide, dulaglutide, etc.): DO NOT TAKE 7 days before surgery     Recommendation  Approval given to proceed with proposed procedure,  Problem: Goal Outcome Summary  Goal: Goal Outcome Summary  Outcome: No Change  VSS, SBP soft, pt asymptomatic. Tele SR. O2 sat mid 90s on 2L NC. Denies pain, discomfort, or SOB. L margie site CDI. R arm continues to be warm and red, IV removed. PO Kelex started d/t loss of IV access. Alert and oriented, up SBA. Plan for possible D/C to TCU tomorrow. Continue to monitor.           without further diagnostic evaluation.        Best Dwyer is a 53 year old, presenting for the following:  Pre-Op Exam          6/7/2024     2:10 PM   Additional Questions   Roomed by Marva Apple CMA   Accompanied by N/A         6/7/2024   Forms   Any forms needing to be completed Yes         6/7/2024     2:10 PM   Patient Reported Additional Medications   Patient reports taking the following new medications N/A     HPI related to upcoming procedure: breast reconstruction b/l        6/2/2024   Pre-Op Questionnaire   Have you ever had a heart attack or stroke? No   Have you ever had surgery on your heart or blood vessels, such as a stent placement, a coronary artery bypass, or surgery on an artery in your head, neck, heart, or legs? No   Do you have chest pain with activity? No   Do you have a history of heart failure? No   Do you currently have a cold, bronchitis or symptoms of other infection? No   Do you have a cough, shortness of breath, or wheezing? No   Do you or anyone in your family have previous history of blood clots? No   Do you or does anyone in your family have a serious bleeding problem such as prolonged bleeding following surgeries or cuts? No   Have you ever had problems with anemia or been told to take iron pills? No   Have you had any abnormal blood loss such as black, tarry or bloody stools, or abnormal vaginal bleeding? No   Have you ever had a blood transfusion? No   Are you willing to have a blood transfusion if it is medically needed before, during, or after your surgery? Yes   Have you or any of your relatives ever had problems with anesthesia? (!) YES - nausea and vomiting   Do you have sleep apnea, excessive snoring or daytime drowsiness? (!) YES   Do you have a CPAP machine? Yes   Do you have any artifical heart valves or other implanted medical devices like a pacemaker, defibrillator, or continuous glucose monitor? No   Do you have artificial joints? No   Are you allergic to  latex? No     Health Care Directive  Patient does not have a Health Care Directive or Living Will: Discussed advance care planning with patient; information given to patient to review.    Preoperative Review of    reviewed - controlled substances reflected in medication list.          Patient Active Problem List    Diagnosis Date Noted    S/P mastectomy, bilateral 11/02/2023     Priority: Medium    Pulmonary nodules 11/02/2023     Priority: Medium    Chronic urticaria - Allergy 11/02/2023     Priority: Medium    Malignant neoplasm of overlapping sites of left breast in female, estrogen receptor positive (H) 01/25/2023     Priority: Medium    Diabetes mellitus, type 2 (H) 12/08/2021     Priority: Medium    Other acne 11/07/2019     Priority: Medium    Obesity 11/07/2019     Priority: Medium    Major depressive disorder in full remission, unspecified whether recurrent (H24) 11/07/2019     Priority: Medium    Female stress incontinence 11/07/2019     Priority: Medium      Past Medical History:   Diagnosis Date    Cancer (H) 02/03/2023    Breast- bi-lateral mastectomy    Diabetes (H) 09/2021     Past Surgical History:   Procedure Laterality Date    CYST REMOVAL      HYSTERECTOMY      HYSTERECTOMY, PAP NO LONGER INDICATED  2005    menorrhagia    INSERT TISSUE EXPANDER BREAST Bilateral 2/3/2023    Procedure: BILATERAL TISSUE EXPANDER;  Surgeon: Jaya Malave MD;  Location: [facility] OR    MASTECTOMY SIMPLE BILATERAL, SENTINEL NODE BILATERAL, COMBINED Left 2/3/2023    Procedure: Bilateral skin sparing mastectomies with left axillary sentinel lymph node biopsy;  Surgeon: Clarissa Fontanez MD;  Location:  OR     Current Outpatient Medications   Medication Sig Dispense Refill    pregabalin (LYRICA) 150 MG capsule Take 1 capsule by mouth every 8 hours      pregabalin (LYRICA) 75 MG capsule TAKE 1 CAPSULE BY MOUTH TWICE DAILY FOR 5 DAYS, THEN TAKE 2 CAPSULES TWICE DAILY THEREAFTER      alcohol swab prep pads Use to  swab area of injection/amy as directed. 100 each 3    atorvastatin (LIPITOR) 10 MG tablet Take 1 tablet (10 mg) by mouth daily 90 tablet 3    bisacodyl (DULCOLAX) 5 MG EC tablet Two days prior to exam take two (2) tablets at 4pm. One day prior to exam take two (2) tablets at 4pm 4 tablet 0    blood glucose (NO BRAND SPECIFIED) test strip Use to test blood sugar 2 times daily or as directed. To accompany: Blood Glucose Monitor Brands: per insurance. 100 strip 6    blood glucose calibration (NO BRAND SPECIFIED) solution To accompany: Blood Glucose Monitor Brands: per insurance. 1 each 1    blood glucose monitoring (NO BRAND SPECIFIED) meter device kit Use to test blood sugar 2 times daily or as directed. Preferred blood glucose meter OR supplies to accompany: Blood Glucose Monitor Brands: per insurance. 1 kit 0    cetirizine (ZYRTEC) 10 MG tablet Take 1 tablet by mouth daily      diphenhydrAMINE (BENADRYL) 25 MG capsule Take 2 capsules (50 mg) by mouth every 6 hours as needed for itching or allergies 30 capsule 0    diphenhydrAMINE (BENADRYL) 25 MG tablet Take 2 tablets (50 mg) by mouth every 6 hours as needed for itching or allergies 120 tablet 1    EPINEPHrine (ANY BX GENERIC EQUIV) 0.3 MG/0.3ML injection 2-pack Inject 0.3 mLs (0.3 mg) into the muscle once as needed for anaphylaxis May repeat one time in 5-15 minutes if response to initial dose is inadequate. 2 each 0    FLUoxetine (PROZAC) 40 MG capsule Take 1 capsule (40 mg) by mouth daily 90 capsule 3    hydrOXYzine (ATARAX) 25 MG tablet Take 1-2 tablets (25-50 mg) by mouth 3 times daily as needed for itching 30 tablet 1    magnesium 200 MG TABS       medical cannabis (Patient's own supply) Take 1 Dose by mouth nightly as needed Gummies: 5 mg   Formulary: Red  Location: Unknown    (The purpose of this order is to document that the patient reports taking medical cannabis.  This is not a prescription, and is not used to certify that the patient has a qualifying  medical condition.)      melatonin 3 MG tablet Take 6 mg by mouth nightly as needed for sleep      metFORMIN (GLUCOPHAGE XR) 500 MG 24 hr tablet TAKE 1 TABLET BY MOUTH EVERY DAY 90 tablet 0    polyethylene glycol (GOLYTELY) 236 g suspension Take as directed. Two days before your exam fill the first container with water. Cover and shake until mixed well. At 5:00pm drink one 8oz glass every 10-15 minutes until half (1/2) of the first container is empty. Store the remainder in the refrigerator. One day before your exam at 5:00pm drink the second half of the first container until it is gone. Before you go to bed mix the second container with water and put in refrigerator. Six hours before your check in time drink one 8oz glass every 10-15 minutes until half of container is empty. Discard the remainder of solution. 8000 mL 0    Probiotic, Lactobacillus, CAPS       Semaglutide, 1 MG/DOSE, (OZEMPIC, 1 MG/DOSE,) 4 MG/3ML pen Inject 1 mg Subcutaneous every 7 days (On ) 9 mL 0    spironolactone (ALDACTONE) 100 MG tablet Take 1 tablet (100 mg) by mouth daily 90 tablet 3    tamoxifen (NOLVADEX) 20 MG tablet Take 1 tablet (20 mg) by mouth daily 90 tablet 3    thin (NO BRAND SPECIFIED) lancets Use with lanceting device. To accompany: Blood Glucose Monitor Brands: per insurance. 100 each 6    vitamin D2 (ERGOCALCIFEROL) 24897 units (1250 mcg) capsule Take 1 capsule (50,000 Units) by mouth once a week 8 capsule 0       Allergies   Allergen Reactions    Docetaxel Anaphylaxis, Hives, Itching and Shortness Of Breath    Keflex [Cephalexin]     Tetracycline     Trazodone      Very sedating-        Social History     Tobacco Use    Smoking status: Former     Current packs/day: 0.00     Types: Cigarettes     Quit date: 1999     Years since quittin.6    Smokeless tobacco: Never   Substance Use Topics    Alcohol use: Not Currently     Comment: Alcoholic Drinks/day: 1 a week       History   Drug Use    Types: Marijuana      "Comment: 5 mg gummy at bedtime             Review of Systems  CONSTITUTIONAL: NEGATIVE for fever, chills, change in weight  INTEGUMENTARY/SKIN: NEGATIVE for worrisome rashes, moles or lesions  EYES: NEGATIVE for vision changes or irritation  ENT/MOUTH: NEGATIVE for ear, mouth and throat problems  RESP: NEGATIVE for significant cough or SOB  BREAST: NEGATIVE for masses, tenderness or discharge  CV: NEGATIVE for chest pain, palpitations or peripheral edema  GI: NEGATIVE for nausea, abdominal pain, heartburn, or change in bowel habits  : NEGATIVE for frequency, dysuria, or hematuria  MUSCULOSKELETAL: NEGATIVE for significant arthralgias or myalgia  NEURO: NEGATIVE for weakness, dizziness or paresthesias  ENDOCRINE: NEGATIVE for temperature intolerance, skin/hair changes  HEME: NEGATIVE for bleeding problems  PSYCHIATRIC: NEGATIVE for changes in mood or affect    Objective    LMP  (LMP Unknown)    Estimated body mass index is 34.42 kg/m  as calculated from the following:    Height as of 11/1/23: 1.75 m (5' 8.9\").    Weight as of 4/25/24: 105.4 kg (232 lb 6.4 oz).  Physical Exam  GENERAL: alert and no distress  EYES: Eyes grossly normal to inspection, PERRL and conjunctivae and sclerae normal  HENT: ear canals and TM's normal, nose and mouth without ulcers or lesions  NECK: no adenopathy, no asymmetry, masses, or scars  RESP: lungs clear to auscultation - no rales, rhonchi or wheezes  CV: regular rate and rhythm, normal S1 S2, no S3 or S4, no murmur, click or rub, no peripheral edema  ABDOMEN: soft, nontender, no hepatosplenomegaly, no masses and bowel sounds normal  MS: no gross musculoskeletal defects noted, no edema  SKIN: no suspicious lesions or rashes  NEURO: Normal strength and tone, mentation intact and speech normal  PSYCH: mentation appears normal, affect normal/bright    Recent Labs   Lab Test 05/06/24  0937 12/07/23  0941 11/01/23  1106 06/14/23  0933   HGB  --  13.8  --  13.6   PLT  --  296  --  509*   NA  " --  137  --   --    POTASSIUM  --  4.3  --   --    CR  --  0.89  --   --    A1C 5.2  --  5.2  --         Diagnostics  Labs pending at this time.  Results will be reviewed when available.   No EKG required, no history of coronary heart disease, significant arrhythmia, peripheral arterial disease or other structural heart disease.    Revised Cardiac Risk Index (RCRI)  The patient has the following serious cardiovascular risks for perioperative complications:   - No serious cardiac risks = 0 points     RCRI Interpretation: 0 points: Class I (very low risk - 0.4% complication rate)         Signed Electronically by: Patti Harden PA-C  Copy of this evaluation report is provided to requesting physician.

## 2024-06-07 NOTE — PATIENT INSTRUCTIONS

## 2024-08-08 ENCOUNTER — OFFICE VISIT (OUTPATIENT)
Dept: URGENT CARE | Facility: URGENT CARE | Age: 54
End: 2024-08-08
Payer: COMMERCIAL

## 2024-08-08 VITALS
TEMPERATURE: 97.4 F | RESPIRATION RATE: 18 BRPM | BODY MASS INDEX: 34.82 KG/M2 | DIASTOLIC BLOOD PRESSURE: 75 MMHG | OXYGEN SATURATION: 98 % | HEART RATE: 80 BPM | SYSTOLIC BLOOD PRESSURE: 106 MMHG | WEIGHT: 229 LBS

## 2024-08-08 DIAGNOSIS — N39.0 URINARY TRACT INFECTION WITH HEMATURIA, SITE UNSPECIFIED: ICD-10-CM

## 2024-08-08 DIAGNOSIS — R30.0 DYSURIA: Primary | ICD-10-CM

## 2024-08-08 DIAGNOSIS — R31.9 URINARY TRACT INFECTION WITH HEMATURIA, SITE UNSPECIFIED: ICD-10-CM

## 2024-08-08 LAB
BACTERIA #/AREA URNS HPF: ABNORMAL /HPF
RBC #/AREA URNS AUTO: ABNORMAL /HPF
WBC #/AREA URNS AUTO: ABNORMAL /HPF

## 2024-08-08 PROCEDURE — 99213 OFFICE O/P EST LOW 20 MIN: CPT | Performed by: FAMILY MEDICINE

## 2024-08-08 PROCEDURE — 81015 MICROSCOPIC EXAM OF URINE: CPT

## 2024-08-08 PROCEDURE — 87086 URINE CULTURE/COLONY COUNT: CPT | Performed by: FAMILY MEDICINE

## 2024-08-08 RX ORDER — NITROFURANTOIN 25; 75 MG/1; MG/1
100 CAPSULE ORAL 2 TIMES DAILY
Qty: 14 CAPSULE | Refills: 0 | Status: SHIPPED | OUTPATIENT
Start: 2024-08-08 | End: 2024-08-15

## 2024-08-08 NOTE — PROGRESS NOTES
SUBJECTIVE:   Yuliya Li is a 53 year old female who  presents today for a possible UTI. Symptoms of dysuria and frequency have been going on for 1day(s).  Hematuria yes.  sudden onset and worseningand moderate.  There is no history of fever, chills, nausea or vomiting.  Endorsed chills and sweats.  No history of vaginal discharge, no concerns for STD. This patient does not have a history of urinary tract infections.       Past Medical History:   Diagnosis Date    Cancer (H) 02/03/2023    Breast- bi-lateral mastectomy    Diabetes (H) 09/2021     Current Outpatient Medications   Medication Sig Dispense Refill    alcohol swab prep pads Use to swab area of injection/aym as directed. 100 each 3    atorvastatin (LIPITOR) 10 MG tablet Take 1 tablet (10 mg) by mouth daily 90 tablet 3    bisacodyl (DULCOLAX) 5 MG EC tablet Two days prior to exam take two (2) tablets at 4pm. One day prior to exam take two (2) tablets at 4pm 4 tablet 0    blood glucose (NO BRAND SPECIFIED) test strip Use to test blood sugar 2 times daily or as directed. To accompany: Blood Glucose Monitor Brands: per insurance. 100 strip 6    blood glucose calibration (NO BRAND SPECIFIED) solution To accompany: Blood Glucose Monitor Brands: per insurance. 1 each 1    blood glucose monitoring (NO BRAND SPECIFIED) meter device kit Use to test blood sugar 2 times daily or as directed. Preferred blood glucose meter OR supplies to accompany: Blood Glucose Monitor Brands: per insurance. 1 kit 0    cetirizine (ZYRTEC) 10 MG tablet Take 1 tablet by mouth daily      diphenhydrAMINE (BENADRYL) 25 MG tablet Take 2 tablets (50 mg) by mouth every 6 hours as needed for itching or allergies 120 tablet 1    EPINEPHrine (ANY BX GENERIC EQUIV) 0.3 MG/0.3ML injection 2-pack Inject 0.3 mLs (0.3 mg) into the muscle once as needed for anaphylaxis May repeat one time in 5-15 minutes if response to initial dose is inadequate. 2 each 0    FLUoxetine (PROZAC) 40 MG capsule  Take 1 capsule (40 mg) by mouth daily 90 capsule 3    hydrOXYzine (ATARAX) 25 MG tablet Take 1-2 tablets (25-50 mg) by mouth 3 times daily as needed for itching 30 tablet 1    magnesium 200 MG TABS       medical cannabis (Patient's own supply) Take 1 Dose by mouth nightly as needed Gummies: 5 mg   Formulary: Red  Location: Unknown    (The purpose of this order is to document that the patient reports taking medical cannabis.  This is not a prescription, and is not used to certify that the patient has a qualifying medical condition.)      melatonin 3 MG tablet Take 6 mg by mouth nightly as needed for sleep      pregabalin (LYRICA) 75 MG capsule TAKE 1 CAPSULE BY MOUTH TWICE DAILY FOR 5 DAYS, THEN TAKE 2 CAPSULES TWICE DAILY THEREAFTER      Semaglutide, 1 MG/DOSE, (OZEMPIC, 1 MG/DOSE,) 4 MG/3ML pen Inject 1 mg Subcutaneous every 7 days (On ) 9 mL 0    spironolactone (ALDACTONE) 100 MG tablet Take 1 tablet (100 mg) by mouth daily 90 tablet 3    tamoxifen (NOLVADEX) 20 MG tablet Take 1 tablet (20 mg) by mouth daily 90 tablet 3    thin (NO BRAND SPECIFIED) lancets Use with lanceting device. To accompany: Blood Glucose Monitor Brands: per insurance. 100 each 6    diphenhydrAMINE (BENADRYL) 25 MG capsule Take 2 capsules (50 mg) by mouth every 6 hours as needed for itching or allergies (Patient not taking: Reported on 2024) 30 capsule 0    pregabalin (LYRICA) 150 MG capsule Take 1 capsule by mouth every 8 hours      Probiotic, Lactobacillus, CAPS  (Patient not taking: Reported on 2024)      vitamin D2 (ERGOCALCIFEROL) 43865 units (1250 mcg) capsule Take 1 capsule (50,000 Units) by mouth once a week (Patient not taking: Reported on 2024) 8 capsule 0     Social History     Tobacco Use    Smoking status: Former     Current packs/day: 0.00     Types: Cigarettes     Quit date: 1999     Years since quittin.7     Passive exposure: Past    Smokeless tobacco: Never   Substance Use Topics    Alcohol use:  Not Currently     Comment: Alcoholic Drinks/day: 1 a week       ROS:   Review of systems negative except as stated above.    OBJECTIVE:  /75 (BP Location: Right arm, Patient Position: Sitting, Cuff Size: Adult Large)   Pulse 80   Temp 97.4  F (36.3  C) (Temporal)   Resp 18   Wt 103.9 kg (229 lb)   LMP  (LMP Unknown)   SpO2 98%   BMI 34.82 kg/m    GENERAL APPEARANCE: healthy, alert and no distress  PSYCH: mentation appears normal and affect normal/bright    Results for orders placed or performed in visit on 08/08/24   UA Microscopic with Reflex to Culture     Status: Abnormal   Result Value Ref Range    Bacteria Urine Moderate (A) None Seen /HPF    RBC Urine 25-50 (A) 0-2 /HPF /HPF    WBC Urine 25-50 (A) 0-5 /HPF /HPF       ASSESSMENT/PLAN:   (R30.0) Dysuria  (primary encounter diagnosis)  Plan: UA Microscopic with Reflex to Culture, Urine         Culture, CANCELED: UA with Microscopic reflex         to Culture            (N39.0,  R31.9) Urinary tract infection with hematuria, site unspecified  Plan: nitroFURantoin macrocrystal-monohydrate         (MACROBID) 100 MG capsule            Empiric treatment for presumptive UTI with RX Macrobid.  Will follow up on urine culture and adjust medication if needed.  Okay to take azo for dysuria symptoms.  Drink plenty of fluids.  Prevention and treatment of UTI's discussed.Signs and symptoms of pyelonephritis mentioned.      Follow up with primary provider if no improvement of symptoms in 1 week    David Singh MD  August 8, 2024 10:30 AM

## 2024-08-09 LAB — BACTERIA UR CULT: NORMAL

## 2024-08-09 NOTE — PROGRESS NOTES
[facility]    Hematology/Oncology Established Patient Note      Today's Date: 8/19/2024    Reason for follow-up:  Left breast cancer.    HISTORY OF PRESENT ILLNESS: Yuliya Li is a 53 year old female status post removal of single ovary and hysterectomy who presents with the following oncologic history:  1.  12/23/2022: Mammogram showed possible asymmetry in right breast at 12:00; possible mass in left breast at 3:00.  2. 1/2/2023: Diagnostic bilateral mammogram showed mass in left breast at 3:00 with 0.4 cm satellite mass in retroareolar breast. Right breast with effacement of possible finding at 12:00 consistent with artifact. Left breast U/S showed mass at 3:00, 11 cm from nipple measuring 1.9 x 1.6 x 1.8 cm; no abnormal axillary lymph nodes.  3. 1/03/2022: Left breast core biopsy of 0.4 cm mass at 3:30 position showed grade 2 invasive ductal carcinoma with lobular features. Biopsy of left breast 1.9 cm mass at 3:00 showed grade 2 invasive ductal carcinoma with lobular features, associated grade 2 DCIS. ER and FL strongly positive at %; HER-2 IHC = 2+; HER-2/crow FISH negative.  4. 1/10/2023: FSH = 36.6 but estradiol = 63.  5.  2/03/2023: Bilateral mastectomies with left axillary sentinel lymph node excision and left chest wall nodule excision with Dr. Clarissa Fontanez.  Pathology showed left breast grade 2 invasive ductal carcinoma with lobular features, multifocal with 2 cm and 0.4 cm tumors, grade 2 DCIS, all margins negative.  Of 3 left axillary sentinel lymph nodes removed, one with 6 mm macrometastasis positive for extranodal extension and one with 1.1 mm micrometastasis, negative for extranodal extension, identified. Left chest wall nodule positive for metastatic breast carcinoma involving edge of tissue fragment. HER-2 FISH repeated, group 4, considered HER-2 negative. Oncotype DX = 20, 9-year risk of distant recurrence of 17% after 5 years of hormone blockade therapy;  breast-cancer specific survival of 96.7% treated without chemotherapy.  6. 2/23/2023: PET/CT scan negative for metastatic disease. Small right middle lobe lung nodules, non-FDG avid.  7. 3/29/2023: Started adjuvant chemotherapy with Taxotere and Cytoxan. Received cycle 2 on 4/19/2023, complicated by allergic reaction with chest heaviness followed by diffuse rash. Then switched to weekly paclitaxel for 6 weeks with completion on 6/14/2023.  8. 6/22/2023: Start of Zoladex every 4 weeks with anastrozole.  9. 7/17/2023-8/18/2023: Completed adjuvant radiation therapy.  10. 12/27/2023: Stopped anastrozole due to diffuse joint pain.  11. 1/15/2024: Started letrozole.  12. 3/25/2024: Stopped letrozole due to diffuse joint pain.  13. 4/25/2024: Switched to tamoxifen.    INTERVAL HISTORY:  Yuliya reports some blurry vision but mostly related to eye strain from working on her computer.  She does wear bifocals and is due for eye exam. Lyrica has helped with her joint pain. She still has hives daily. She did see an allergist who recommended taking Zyrtec daily.  This has helped prevent full-blown hives but has not eliminated it. She has taken intermittent prednisone (1-2 days at a time) to alleviate her hives for a week.  However the hives to recur.      REVIEW OF SYSTEMS:   14 point ROS was reviewed and is negative other than as noted above in HPI.       HOME MEDICATIONS:  Current Outpatient Medications   Medication Sig Dispense Refill    alcohol swab prep pads Use to swab area of injection/amy as directed. 100 each 3    atorvastatin (LIPITOR) 10 MG tablet Take 1 tablet (10 mg) by mouth daily 90 tablet 3    bisacodyl (DULCOLAX) 5 MG EC tablet Two days prior to exam take two (2) tablets at 4pm. One day prior to exam take two (2) tablets at 4pm 4 tablet 0    blood glucose (NO BRAND SPECIFIED) test strip Use to test blood sugar 2 times daily or as directed. To accompany: Blood Glucose Monitor Brands: per insurance. 100 strip 6     blood glucose calibration (NO BRAND SPECIFIED) solution To accompany: Blood Glucose Monitor Brands: per insurance. 1 each 1    blood glucose monitoring (NO BRAND SPECIFIED) meter device kit Use to test blood sugar 2 times daily or as directed. Preferred blood glucose meter OR supplies to accompany: Blood Glucose Monitor Brands: per insurance. 1 kit 0    cetirizine (ZYRTEC) 10 MG tablet Take 1 tablet by mouth daily      diphenhydrAMINE (BENADRYL) 25 MG capsule Take 2 capsules (50 mg) by mouth every 6 hours as needed for itching or allergies (Patient not taking: Reported on 8/8/2024) 30 capsule 0    diphenhydrAMINE (BENADRYL) 25 MG tablet Take 2 tablets (50 mg) by mouth every 6 hours as needed for itching or allergies 120 tablet 1    EPINEPHrine (ANY BX GENERIC EQUIV) 0.3 MG/0.3ML injection 2-pack Inject 0.3 mLs (0.3 mg) into the muscle once as needed for anaphylaxis May repeat one time in 5-15 minutes if response to initial dose is inadequate. 2 each 0    FLUoxetine (PROZAC) 40 MG capsule Take 1 capsule (40 mg) by mouth daily 90 capsule 3    hydrOXYzine (ATARAX) 25 MG tablet Take 1-2 tablets (25-50 mg) by mouth 3 times daily as needed for itching 30 tablet 1    magnesium 200 MG TABS       medical cannabis (Patient's own supply) Take 1 Dose by mouth nightly as needed Gummies: 5 mg   Formulary: Red  Location: Unknown    (The purpose of this order is to document that the patient reports taking medical cannabis.  This is not a prescription, and is not used to certify that the patient has a qualifying medical condition.)      melatonin 3 MG tablet Take 6 mg by mouth nightly as needed for sleep      nitroFURantoin macrocrystal-monohydrate (MACROBID) 100 MG capsule Take 1 capsule (100 mg) by mouth 2 times daily for 7 days 14 capsule 0    pregabalin (LYRICA) 150 MG capsule Take 1 capsule by mouth every 8 hours      pregabalin (LYRICA) 75 MG capsule TAKE 1 CAPSULE BY MOUTH TWICE DAILY FOR 5 DAYS, THEN TAKE 2 CAPSULES TWICE  DAILY THEREAFTER      Probiotic, Lactobacillus, CAPS  (Patient not taking: Reported on 8/8/2024)      Semaglutide, 1 MG/DOSE, (OZEMPIC, 1 MG/DOSE,) 4 MG/3ML pen Inject 1 mg Subcutaneous every 7 days (On Fridays) 9 mL 0    spironolactone (ALDACTONE) 100 MG tablet Take 1 tablet (100 mg) by mouth daily 90 tablet 3    tamoxifen (NOLVADEX) 20 MG tablet Take 1 tablet (20 mg) by mouth daily 90 tablet 3    thin (NO BRAND SPECIFIED) lancets Use with lanceting device. To accompany: Blood Glucose Monitor Brands: per insurance. 100 each 6    vitamin D2 (ERGOCALCIFEROL) 08328 units (1250 mcg) capsule Take 1 capsule (50,000 Units) by mouth once a week (Patient not taking: Reported on 8/8/2024) 8 capsule 0         ALLERGIES:  Allergies   Allergen Reactions    Docetaxel Anaphylaxis, Hives, Itching and Shortness Of Breath    Keflex [Cephalexin]     Pegfilgrastim     Tetracycline     Trazodone      Very sedating-         PAST MEDICAL HISTORY:  Diabetes mellitus type 2  Hyperlipidemia    Gynecologic history:  Age of menarche at 11.  Last menstrual period in 2005 prior to hysterectomy.  G3, P3.  Age of first pregnancy at 29.  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  No use of HRT.  Used OCPs for 4 years.  No IUD use.      PAST SURGICAL HISTORY:  Past Surgical History:   Procedure Laterality Date    CYST REMOVAL      HYSTERECTOMY      HYSTERECTOMY, PAP NO LONGER INDICATED  2005    menorrhagia    INSERT TISSUE EXPANDER BREAST Bilateral 2/3/2023    Procedure: BILATERAL TISSUE EXPANDER;  Surgeon: Jaya Malave MD;  Location:  OR    MASTECTOMY SIMPLE BILATERAL, SENTINEL NODE BILATERAL, COMBINED Left 2/3/2023    Procedure: Bilateral skin sparing mastectomies with left axillary sentinel lymph node biopsy;  Surgeon: Clarissa Fontanez MD;  Location:  OR         SOCIAL HISTORY:  Social History     Socioeconomic History    Marital status:      Spouse name: Not on file    Number of children: 3    Years of education: Not on file     Highest education level: Not on file   Occupational History    Not on file   Tobacco Use    Smoking status: Former     Current packs/day: 0.00     Types: Cigarettes     Quit date: 1999     Years since quittin.7     Passive exposure: Past    Smokeless tobacco: Never   Vaping Use    Vaping status: Never Used   Substance and Sexual Activity    Alcohol use: Not Currently     Comment: Alcoholic Drinks/day: 1 a week    Drug use: Yes     Types: Marijuana     Comment: 5 mg gummy at bedtime    Sexual activity: Yes     Partners: Male     Birth control/protection: Post-menopausal     Comment: Hysterectomy   Other Topics Concern    Parent/sibling w/ CABG, MI or angioplasty before 65F 55M? No   Social History Narrative    Lives with  and son    Work-      Social Determinants of Health     Financial Resource Strain: Low Risk  (10/25/2023)    Financial Resource Strain     Within the past 12 months, have you or your family members you live with been unable to get utilities (heat, electricity) when it was really needed?: No   Food Insecurity: Low Risk  (10/25/2023)    Food Insecurity     Within the past 12 months, did you worry that your food would run out before you got money to buy more?: No     Within the past 12 months, did the food you bought just not last and you didn t have money to get more?: No   Transportation Needs: Low Risk  (10/25/2023)    Transportation Needs     Within the past 12 months, has lack of transportation kept you from medical appointments, getting your medicines, non-medical meetings or appointments, work, or from getting things that you need?: No   Physical Activity: Insufficiently Active (10/26/2022)    Exercise Vital Sign     Days of Exercise per Week: 1 day     Minutes of Exercise per Session: 20 min   Stress: Stress Concern Present (10/26/2022)    [employer] - Occupational Stress Questionnaire     Feeling of Stress : Rather much   Social  Connections: Moderately Isolated (10/26/2022)    Social Connection and Isolation Panel [NHANES]     Frequency of Communication with Friends and Family: More than three times a week     Frequency of Social Gatherings with Friends and Family: Once a week     Attends Gnosticism Services: Never     Active Member of Clubs or Organizations: No     Attends Club or Organization Meetings: Not on file     Marital Status:    Interpersonal Safety: Low Risk  (2023)    Interpersonal Safety     Do you feel physically and emotionally safe where you currently live?: Yes     Within the past 12 months, have you been hit, slapped, kicked or otherwise physically hurt by someone?: No     Within the past 12 months, have you been humiliated or emotionally abused in other ways by your partner or ex-partner?: No   Housing Stability: Low Risk  (10/25/2023)    Housing Stability     Do you have housing? : Yes     Are you worried about losing your housing?: No         FAMILY HISTORY:  Family History   Problem Relation Age of Onset    Diabetes Mother     Atrial fibrillation Father     Alcoholism Father     Melanoma Father     Other Cancer Father         Melanoma-     Breast Cancer Maternal Aunt         in her 40's    Cervical Cancer Maternal Aunt     Breast Cancer Other         Maternal aunt    Colon Cancer No family hx of          PHYSICAL EXAM:  Vital signs:  LMP  (LMP Unknown)    GENERAL: No acute distress.  EYES: No scleral icterus. No overt erythema.  BREAST: Bilateral breasts surgically absent with no chest wall masses.  RESPIRATORY: No audible cough, wheezing, or labored breathing.  MUSCULOSKELETAL: Range of motion in the neck, shoulders, and arms appear normal.  SKIN: No overt rashes, discolorations, or lesions over the face and neck.  NEUROLOGIC: Alert.  No overt tremors.  PSYCHIATRIC: Normal affect and mood.  Does not appear anxious.       LABS:  CBC RESULTS:   Recent Labs   Lab Test 24  1444 23  0941    WBC  --  5.3   RBC  --  4.85   HGB 12.8 13.8   HCT  --  41.4   MCV  --  85   MCH  --  28.5   MCHC  --  33.3   RDW  --  12.4   PLT  --  296         Last Comprehensive Metabolic Panel:  Sodium   Date Value Ref Range Status   12/07/2023 137 135 - 145 mmol/L Final     Comment:     Reference intervals for this test were updated on 09/26/2023 to more accurately reflect our healthy population. There may be differences in the flagging of prior results with similar values performed with this method. Interpretation of those prior results can be made in the context of the updated reference intervals.      Potassium   Date Value Ref Range Status   12/07/2023 4.3 3.4 - 5.3 mmol/L Final   01/10/2023 3.9 3.4 - 5.3 mmol/L Final     Chloride   Date Value Ref Range Status   12/07/2023 103 98 - 107 mmol/L Final   01/10/2023 102 94 - 109 mmol/L Final     Carbon Dioxide (CO2)   Date Value Ref Range Status   12/07/2023 24 22 - 29 mmol/L Final   01/10/2023 26 20 - 32 mmol/L Final     Anion Gap   Date Value Ref Range Status   12/07/2023 10 7 - 15 mmol/L Final   01/10/2023 7 3 - 14 mmol/L Final     Glucose   Date Value Ref Range Status   12/07/2023 107 (H) 70 - 99 mg/dL Final   01/10/2023 100 (H) 70 - 99 mg/dL Final     GLUCOSE BY METER POCT   Date Value Ref Range Status   02/03/2023 120 (H) 70 - 99 mg/dL Final     Urea Nitrogen   Date Value Ref Range Status   12/07/2023 17.0 6.0 - 20.0 mg/dL Final   01/10/2023 16 7 - 30 mg/dL Final     Creatinine   Date Value Ref Range Status   12/07/2023 0.89 0.51 - 0.95 mg/dL Final     GFR Estimate   Date Value Ref Range Status   12/07/2023 78 >60 mL/min/1.73m2 Final   01/12/2021 >60 >60 mL/min/1.73m2 Final     Calcium   Date Value Ref Range Status   12/07/2023 9.6 8.6 - 10.0 mg/dL Final     Bilirubin Total   Date Value Ref Range Status   12/07/2023 0.2 <=1.2 mg/dL Final     Alkaline Phosphatase   Date Value Ref Range Status   12/07/2023 82 40 - 150 U/L Final     Comment:     Reference intervals for  this test were updated on 11/14/2023 to more accurately reflect our healthy population. There may be differences in the flagging of prior results with similar values performed with this method. Interpretation of those prior results can be made in the context of the updated reference intervals.     ALT   Date Value Ref Range Status   12/07/2023 19 0 - 50 U/L Final     Comment:     Reference intervals for this test were updated on 6/12/2023 to more accurately reflect our healthy population. There may be differences in the flagging of prior results with similar values performed with this method. Interpretation of those prior results can be made in the context of the updated reference intervals.       AST   Date Value Ref Range Status   12/07/2023 21 0 - 45 U/L Final     Comment:     Reference intervals for this test were updated on 6/12/2023 to more accurately reflect our healthy population. There may be differences in the flagging of prior results with similar values performed with this method. Interpretation of those prior results can be made in the context of the updated reference intervals.        11/1/2023  FSH = 15.5  Estradiol = 6  Vitamin D = 27    ASSESSMENT/PLAN:  Yuliya Li is a 53 year old female with the following issues:  1. Pathologic prognostic stage IA, [id number], grade 2 multifocal invasive ductal carcinoma with lobular features of left breast overlapping sites, ER positive, IL positive, HER-2 IHC = 2+; HER-2/crow FISH negative, Oncotype DX = 20  --Yuliya had 2 tumors measuring 2 cm and 0.4 cm and additional left chest wall nodule, but with 2/3 lymph nodes involved, one that was a macrometastasis with extranodal extension and the other with micrometastasis with no extranodal extension.  All areas of tumor were excised.  The repeat HER-2 FISH was interpreted as negative.   --2/23/2023 PET scan showed no evidence for metastatic disease.  --Oncotype DX = 20, with 9-year risk of distant recurrence  of 17% after 5 years of hormone blockade therapy; breast-cancer specific survival of 96.7% treated without chemotherapy.  --She elected to proceed with 4 cycles of Taxotere and Cytoxan. However, she reacted to Taxotere after cycle 2. Therefore, she was switched to Taxol weekly for 6 weeks, completed 6/14/2023.  We did not proceed with dose dense AC given that her maximum absolute chemo benefit is up to 3.3% and would not justify the potential adverse effects of ddAC. She then completed radiation.  --She is perimenopausal and started OST + AI with Zoladex every 4 weeks with anastrozole on 6/22/2023. She did not tolerate this due to diffuse joint pain and discontinued Zoladex by 2/2024 and tried letrozole but did not tolerate this either.    --She then switched to tamoxifen after 4/25/2024 visit and is tolerating this better.  --3/20/2023 DEXA scan showed normal bone density for baseline. Plan to repeat DEXA in 2025.  --Advised adequate calcium and vitamin D as well as weight bearing exercise.    2.  Family history of breast cancer  - Maternal aunt with breast cancer.  - I have recommended and referred Yuliya to genetic counseling.  She will call Nal[facility] to set up blood draw.    3. Indeterminate right middle lobe pulmonary nodules  --2/23/2023 PET/CT scan showed 2 small indeterminate right middle lobe pulmonary nodules.   --5/24/2023 CT chest showed stable right lung nodules.  --12/4/2023 chest CT showed stable pulmonary nodule in the right middle lobe. The smaller of the two nodules described previously is no longer  demonstrated today.  --Repeat chest CT in 12/2024.    4. Hot flashes  --Mild and tolerable.  No medication intervention needed.    5. Chronic urticaria  --She saw an allergist. Etiology unknown. She switched from Benadryl to Zyrtec.  --Advised she revisit with her allergist.    6. Vitamin D deficiency  --She is on vitamin D supplementation.    7. Polyarthralgia  --Since switching to tamoxifen, her  polyarthralgia has improved mostly with use of Lyrica.    8. Right middle lobe lung nodule  --Initially seen on 2/2023 PET/CT.  --Will reevaluate with chest CT in 12/2024.    Return in 4 months.    Louise Watson MD  [facility] Hematology/Oncology     Total time spent today: 40 minutes in chart review, patient evaluation, counseling, documentation, test and/or medication/prescription orders, and coordination of care.     The longitudinal plan of care for the diagnosis(es)/condition(s) as documented were addressed during this visit. Due to the added complexity in care, I will continue to support Yuliya in the subsequent management and with ongoing continuity of care.

## 2024-08-19 ENCOUNTER — ONCOLOGY VISIT (OUTPATIENT)
Dept: ONCOLOGY | Facility: CLINIC | Age: 54
End: 2024-08-19
Attending: INTERNAL MEDICINE
Payer: COMMERCIAL

## 2024-08-19 VITALS
BODY MASS INDEX: 35.31 KG/M2 | RESPIRATION RATE: 16 BRPM | HEIGHT: 68 IN | OXYGEN SATURATION: 99 % | DIASTOLIC BLOOD PRESSURE: 75 MMHG | WEIGHT: 233 LBS | SYSTOLIC BLOOD PRESSURE: 108 MMHG | HEART RATE: 74 BPM

## 2024-08-19 DIAGNOSIS — N95.1 MENOPAUSAL SYNDROME (HOT FLASHES): ICD-10-CM

## 2024-08-19 DIAGNOSIS — L50.8 CHRONIC URTICARIA: ICD-10-CM

## 2024-08-19 DIAGNOSIS — R91.8 PULMONARY NODULES: ICD-10-CM

## 2024-08-19 DIAGNOSIS — Z17.0 MALIGNANT NEOPLASM OF OVERLAPPING SITES OF LEFT BREAST IN FEMALE, ESTROGEN RECEPTOR POSITIVE (H): Primary | ICD-10-CM

## 2024-08-19 DIAGNOSIS — C50.812 MALIGNANT NEOPLASM OF OVERLAPPING SITES OF LEFT BREAST IN FEMALE, ESTROGEN RECEPTOR POSITIVE (H): Primary | ICD-10-CM

## 2024-08-19 DIAGNOSIS — M25.50 POLYARTHRALGIA: ICD-10-CM

## 2024-08-19 PROCEDURE — G2211 COMPLEX E/M VISIT ADD ON: HCPCS | Performed by: INTERNAL MEDICINE

## 2024-08-19 PROCEDURE — 99213 OFFICE O/P EST LOW 20 MIN: CPT | Performed by: INTERNAL MEDICINE

## 2024-08-19 PROCEDURE — 99215 OFFICE O/P EST HI 40 MIN: CPT | Performed by: INTERNAL MEDICINE

## 2024-08-19 ASSESSMENT — PAIN SCALES - GENERAL: PAINLEVEL: NO PAIN (0)

## 2024-08-19 NOTE — LETTER
8/19/2024      Yuliya Li  [address]  [place] [address]      Dear Colleague,    Thank you for referring your patient, Yuliya Li, to the [facility]. Please see a copy of my visit note below.    [facility]    Hematology/Oncology Established Patient Note      Today's Date: 8/19/2024    Reason for follow-up:  Left breast cancer.    HISTORY OF PRESENT ILLNESS: Yuliya Li is a 53 year old female status post removal of single ovary and hysterectomy who presents with the following oncologic history:  1.  12/23/2022: Mammogram showed possible asymmetry in right breast at 12:00; possible mass in left breast at 3:00.  2. 1/2/2023: Diagnostic bilateral mammogram showed mass in left breast at 3:00 with 0.4 cm satellite mass in retroareolar breast. Right breast with effacement of possible finding at 12:00 consistent with artifact. Left breast U/S showed mass at 3:00, 11 cm from nipple measuring 1.9 x 1.6 x 1.8 cm; no abnormal axillary lymph nodes.  3. 1/03/2022: Left breast core biopsy of 0.4 cm mass at 3:30 position showed grade 2 invasive ductal carcinoma with lobular features. Biopsy of left breast 1.9 cm mass at 3:00 showed grade 2 invasive ductal carcinoma with lobular features, associated grade 2 DCIS. ER and WY strongly positive at %; HER-2 IHC = 2+; HER-2/crow FISH negative.  4. 1/10/2023: FSH = 36.6 but estradiol = 63.  5.  2/03/2023: Bilateral mastectomies with left axillary sentinel lymph node excision and left chest wall nodule excision with Dr. Clarissa Fontanez.  Pathology showed left breast grade 2 invasive ductal carcinoma with lobular features, multifocal with 2 cm and 0.4 cm tumors, grade 2 DCIS, all margins negative.  Of 3 left axillary sentinel lymph nodes removed, one with 6 mm macrometastasis positive for extranodal extension and one with 1.1 mm micrometastasis, negative for extranodal extension, identified. Left chest wall  nodule positive for metastatic breast carcinoma involving edge of tissue fragment. HER-2 FISH repeated, group 4, considered HER-2 negative. Oncotype DX = 20, 9-year risk of distant recurrence of 17% after 5 years of hormone blockade therapy; breast-cancer specific survival of 96.7% treated without chemotherapy.  6. 2/23/2023: PET/CT scan negative for metastatic disease. Small right middle lobe lung nodules, non-FDG avid.  7. 3/29/2023: Started adjuvant chemotherapy with Taxotere and Cytoxan. Received cycle 2 on 4/19/2023, complicated by allergic reaction with chest heaviness followed by diffuse rash. Then switched to weekly paclitaxel for 6 weeks with completion on 6/14/2023.  8. 6/22/2023: Start of Zoladex every 4 weeks with anastrozole.  9. 7/17/2023-8/18/2023: Completed adjuvant radiation therapy.  10. 12/27/2023: Stopped anastrozole due to diffuse joint pain.  11. 1/15/2024: Started letrozole.  12. 3/25/2024: Stopped letrozole due to diffuse joint pain.  13. 4/25/2024: Switched to tamoxifen.    INTERVAL HISTORY:  Yuliya reports some blurry vision but mostly related to eye strain from working on her computer.  She does wear bifocals and is due for eye exam. Lyrica has helped with her joint pain. She still has hives daily. She did see an allergist who recommended taking Zyrtec daily.  This has helped prevent full-blown hives but has not eliminated it. She has taken intermittent prednisone (1-2 days at a time) to alleviate her hives for a week.  However the hives to recur.      REVIEW OF SYSTEMS:   14 point ROS was reviewed and is negative other than as noted above in HPI.       HOME MEDICATIONS:  Current Outpatient Medications   Medication Sig Dispense Refill     alcohol swab prep pads Use to swab area of injection/amy as directed. 100 each 3     atorvastatin (LIPITOR) 10 MG tablet Take 1 tablet (10 mg) by mouth daily 90 tablet 3     bisacodyl (DULCOLAX) 5 MG EC tablet Two days prior to exam take two (2) tablets  at 4pm. One day prior to exam take two (2) tablets at 4pm 4 tablet 0     blood glucose (NO BRAND SPECIFIED) test strip Use to test blood sugar 2 times daily or as directed. To accompany: Blood Glucose Monitor Brands: per insurance. 100 strip 6     blood glucose calibration (NO BRAND SPECIFIED) solution To accompany: Blood Glucose Monitor Brands: per insurance. 1 each 1     blood glucose monitoring (NO BRAND SPECIFIED) meter device kit Use to test blood sugar 2 times daily or as directed. Preferred blood glucose meter OR supplies to accompany: Blood Glucose Monitor Brands: per insurance. 1 kit 0     cetirizine (ZYRTEC) 10 MG tablet Take 1 tablet by mouth daily       diphenhydrAMINE (BENADRYL) 25 MG capsule Take 2 capsules (50 mg) by mouth every 6 hours as needed for itching or allergies (Patient not taking: Reported on 8/8/2024) 30 capsule 0     diphenhydrAMINE (BENADRYL) 25 MG tablet Take 2 tablets (50 mg) by mouth every 6 hours as needed for itching or allergies 120 tablet 1     EPINEPHrine (ANY BX GENERIC EQUIV) 0.3 MG/0.3ML injection 2-pack Inject 0.3 mLs (0.3 mg) into the muscle once as needed for anaphylaxis May repeat one time in 5-15 minutes if response to initial dose is inadequate. 2 each 0     FLUoxetine (PROZAC) 40 MG capsule Take 1 capsule (40 mg) by mouth daily 90 capsule 3     hydrOXYzine (ATARAX) 25 MG tablet Take 1-2 tablets (25-50 mg) by mouth 3 times daily as needed for itching 30 tablet 1     magnesium 200 MG TABS        medical cannabis (Patient's own supply) Take 1 Dose by mouth nightly as needed Gummies: 5 mg   Formulary: Red  Location: Unknown    (The purpose of this order is to document that the patient reports taking medical cannabis.  This is not a prescription, and is not used to certify that the patient has a qualifying medical condition.)       melatonin 3 MG tablet Take 6 mg by mouth nightly as needed for sleep       nitroFURantoin macrocrystal-monohydrate (MACROBID) 100 MG capsule Take  1 capsule (100 mg) by mouth 2 times daily for 7 days 14 capsule 0     pregabalin (LYRICA) 150 MG capsule Take 1 capsule by mouth every 8 hours       pregabalin (LYRICA) 75 MG capsule TAKE 1 CAPSULE BY MOUTH TWICE DAILY FOR 5 DAYS, THEN TAKE 2 CAPSULES TWICE DAILY THEREAFTER       Probiotic, Lactobacillus, CAPS  (Patient not taking: Reported on 8/8/2024)       Semaglutide, 1 MG/DOSE, (OZEMPIC, 1 MG/DOSE,) 4 MG/3ML pen Inject 1 mg Subcutaneous every 7 days (On Fridays) 9 mL 0     spironolactone (ALDACTONE) 100 MG tablet Take 1 tablet (100 mg) by mouth daily 90 tablet 3     tamoxifen (NOLVADEX) 20 MG tablet Take 1 tablet (20 mg) by mouth daily 90 tablet 3     thin (NO BRAND SPECIFIED) lancets Use with lanceting device. To accompany: Blood Glucose Monitor Brands: per insurance. 100 each 6     vitamin D2 (ERGOCALCIFEROL) 25095 units (1250 mcg) capsule Take 1 capsule (50,000 Units) by mouth once a week (Patient not taking: Reported on 8/8/2024) 8 capsule 0         ALLERGIES:  Allergies   Allergen Reactions     Docetaxel Anaphylaxis, Hives, Itching and Shortness Of Breath     Keflex [Cephalexin]      Pegfilgrastim      Tetracycline      Trazodone      Very sedating-         PAST MEDICAL HISTORY:  Diabetes mellitus type 2  Hyperlipidemia    Gynecologic history:  Age of menarche at 11.  Last menstrual period in 2005 prior to hysterectomy.  G3, P3.  Age of first pregnancy at 29.  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  No use of HRT.  Used OCPs for 4 years.  No IUD use.      PAST SURGICAL HISTORY:  Past Surgical History:   Procedure Laterality Date     CYST REMOVAL       HYSTERECTOMY       HYSTERECTOMY, PAP NO LONGER INDICATED  2005    menorrhagia     INSERT TISSUE EXPANDER BREAST Bilateral 2/3/2023    Procedure: BILATERAL TISSUE EXPANDER;  Surgeon: Jaya Malave MD;  Location: [facility] OR     MASTECTOMY SIMPLE BILATERAL, SENTINEL NODE BILATERAL, COMBINED Left 2/3/2023    Procedure: Bilateral skin sparing mastectomies with left  axillary sentinel lymph node biopsy;  Surgeon: Clarissa Fontanez MD;  Location:  OR         SOCIAL HISTORY:  Social History     Socioeconomic History     Marital status:      Spouse name: Not on file     Number of children: 3     Years of education: Not on file     Highest education level: Not on file   Occupational History     Not on file   Tobacco Use     Smoking status: Former     Current packs/day: 0.00     Types: Cigarettes     Quit date: 1999     Years since quittin.7     Passive exposure: Past     Smokeless tobacco: Never   Vaping Use     Vaping status: Never Used   Substance and Sexual Activity     Alcohol use: Not Currently     Comment: Alcoholic Drinks/day: 1 a week     Drug use: Yes     Types: Marijuana     Comment: 5 mg gummy at bedtime     Sexual activity: Yes     Partners: Male     Birth control/protection: Post-menopausal     Comment: Hysterectomy   Other Topics Concern     Parent/sibling w/ CABG, MI or angioplasty before 65F 55M? No   Social History Narrative    Lives with  and son    Work-      Social Determinants of Health     Financial Resource Strain: Low Risk  (10/25/2023)    Financial Resource Strain      Within the past 12 months, have you or your family members you live with been unable to get utilities (heat, electricity) when it was really needed?: No   Food Insecurity: Low Risk  (10/25/2023)    Food Insecurity      Within the past 12 months, did you worry that your food would run out before you got money to buy more?: No      Within the past 12 months, did the food you bought just not last and you didn t have money to get more?: No   Transportation Needs: Low Risk  (10/25/2023)    Transportation Needs      Within the past 12 months, has lack of transportation kept you from medical appointments, getting your medicines, non-medical meetings or appointments, work, or from getting things that you need?: No   Physical Activity: Insufficiently Active  (10/26/2022)    Exercise Vital Sign      Days of Exercise per Week: 1 day      Minutes of Exercise per Session: 20 min   Stress: Stress Concern Present (10/26/2022)    [employer] - Occupational Stress Questionnaire      Feeling of Stress : Rather much   Social Connections: Moderately Isolated (10/26/2022)    Social Connection and Isolation Panel [NHANES]      Frequency of Communication with Friends and Family: More than three times a week      Frequency of Social Gatherings with Friends and Family: Once a week      Attends Religion Services: Never      Active Member of Clubs or Organizations: No      Attends Club or Organization Meetings: Not on file      Marital Status:    Interpersonal Safety: Low Risk  (2023)    Interpersonal Safety      Do you feel physically and emotionally safe where you currently live?: Yes      Within the past 12 months, have you been hit, slapped, kicked or otherwise physically hurt by someone?: No      Within the past 12 months, have you been humiliated or emotionally abused in other ways by your partner or ex-partner?: No   Housing Stability: Low Risk  (10/25/2023)    Housing Stability      Do you have housing? : Yes      Are you worried about losing your housing?: No         FAMILY HISTORY:  Family History   Problem Relation Age of Onset     Diabetes Mother      Atrial fibrillation Father      Alcoholism Father      Melanoma Father      Other Cancer Father         Melanoma-      Breast Cancer Maternal Aunt         in her 40's     Cervical Cancer Maternal Aunt      Breast Cancer Other         Maternal aunt     Colon Cancer No family hx of          PHYSICAL EXAM:  Vital signs:  LMP  (LMP Unknown)    GENERAL: No acute distress.  EYES: No scleral icterus. No overt erythema.  BREAST: Bilateral breasts surgically absent with no chest wall masses.  RESPIRATORY: No audible cough, wheezing, or labored breathing.  MUSCULOSKELETAL: Range of motion  in the neck, shoulders, and arms appear normal.  SKIN: No overt rashes, discolorations, or lesions over the face and neck.  NEUROLOGIC: Alert.  No overt tremors.  PSYCHIATRIC: Normal affect and mood.  Does not appear anxious.       LABS:  CBC RESULTS:   Recent Labs   Lab Test 06/07/24  1444 12/07/23  0941   WBC  --  5.3   RBC  --  4.85   HGB 12.8 13.8   HCT  --  41.4   MCV  --  85   MCH  --  28.5   MCHC  --  33.3   RDW  --  12.4   PLT  --  296         Last Comprehensive Metabolic Panel:  Sodium   Date Value Ref Range Status   12/07/2023 137 135 - 145 mmol/L Final     Comment:     Reference intervals for this test were updated on 09/26/2023 to more accurately reflect our healthy population. There may be differences in the flagging of prior results with similar values performed with this method. Interpretation of those prior results can be made in the context of the updated reference intervals.      Potassium   Date Value Ref Range Status   12/07/2023 4.3 3.4 - 5.3 mmol/L Final   01/10/2023 3.9 3.4 - 5.3 mmol/L Final     Chloride   Date Value Ref Range Status   12/07/2023 103 98 - 107 mmol/L Final   01/10/2023 102 94 - 109 mmol/L Final     Carbon Dioxide (CO2)   Date Value Ref Range Status   12/07/2023 24 22 - 29 mmol/L Final   01/10/2023 26 20 - 32 mmol/L Final     Anion Gap   Date Value Ref Range Status   12/07/2023 10 7 - 15 mmol/L Final   01/10/2023 7 3 - 14 mmol/L Final     Glucose   Date Value Ref Range Status   12/07/2023 107 (H) 70 - 99 mg/dL Final   01/10/2023 100 (H) 70 - 99 mg/dL Final     GLUCOSE BY METER POCT   Date Value Ref Range Status   02/03/2023 120 (H) 70 - 99 mg/dL Final     Urea Nitrogen   Date Value Ref Range Status   12/07/2023 17.0 6.0 - 20.0 mg/dL Final   01/10/2023 16 7 - 30 mg/dL Final     Creatinine   Date Value Ref Range Status   12/07/2023 0.89 0.51 - 0.95 mg/dL Final     GFR Estimate   Date Value Ref Range Status   12/07/2023 78 >60 mL/min/1.73m2 Final   01/12/2021 >60 >60 mL/min/1.73m2  Final     Calcium   Date Value Ref Range Status   12/07/2023 9.6 8.6 - 10.0 mg/dL Final     Bilirubin Total   Date Value Ref Range Status   12/07/2023 0.2 <=1.2 mg/dL Final     Alkaline Phosphatase   Date Value Ref Range Status   12/07/2023 82 40 - 150 U/L Final     Comment:     Reference intervals for this test were updated on 11/14/2023 to more accurately reflect our healthy population. There may be differences in the flagging of prior results with similar values performed with this method. Interpretation of those prior results can be made in the context of the updated reference intervals.     ALT   Date Value Ref Range Status   12/07/2023 19 0 - 50 U/L Final     Comment:     Reference intervals for this test were updated on 6/12/2023 to more accurately reflect our healthy population. There may be differences in the flagging of prior results with similar values performed with this method. Interpretation of those prior results can be made in the context of the updated reference intervals.       AST   Date Value Ref Range Status   12/07/2023 21 0 - 45 U/L Final     Comment:     Reference intervals for this test were updated on 6/12/2023 to more accurately reflect our healthy population. There may be differences in the flagging of prior results with similar values performed with this method. Interpretation of those prior results can be made in the context of the updated reference intervals.        11/1/2023  FSH = 15.5  Estradiol = 6  Vitamin D = 27    ASSESSMENT/PLAN:  Yuliya Li is a 53 year old female with the following issues:  1. Pathologic prognostic stage IA, [id number], grade 2 multifocal invasive ductal carcinoma with lobular features of left breast overlapping sites, ER positive, WI positive, HER-2 IHC = 2+; HER-2/crow FISH negative, Oncotype DX = 20  --Yuliya had 2 tumors measuring 2 cm and 0.4 cm and additional left chest wall nodule, but with 2/3 lymph nodes involved, one that was a macrometastasis  with extranodal extension and the other with micrometastasis with no extranodal extension.  All areas of tumor were excised.  The repeat HER-2 FISH was interpreted as negative.   --2/23/2023 PET scan showed no evidence for metastatic disease.  --Oncotype DX = 20, with 9-year risk of distant recurrence of 17% after 5 years of hormone blockade therapy; breast-cancer specific survival of 96.7% treated without chemotherapy.  --She elected to proceed with 4 cycles of Taxotere and Cytoxan. However, she reacted to Taxotere after cycle 2. Therefore, she was switched to Taxol weekly for 6 weeks, completed 6/14/2023.  We did not proceed with dose dense AC given that her maximum absolute chemo benefit is up to 3.3% and would not justify the potential adverse effects of ddAC. She then completed radiation.  --She is perimenopausal and started OST + AI with Zoladex every 4 weeks with anastrozole on 6/22/2023. She did not tolerate this due to diffuse joint pain and discontinued Zoladex by 2/2024 and tried letrozole but did not tolerate this either.    --She then switched to tamoxifen after 4/25/2024 visit and is tolerating this better.  --3/20/2023 DEXA scan showed normal bone density for baseline. Plan to repeat DEXA in 2025.  --Advised adequate calcium and vitamin D as well as weight bearing exercise.    2.  Family history of breast cancer  - Maternal aunt with breast cancer.  - I have recommended and referred Yuliya to genetic counseling.  She will call Nalini to set up blood draw.    3. Indeterminate right middle lobe pulmonary nodules  --2/23/2023 PET/CT scan showed 2 small indeterminate right middle lobe pulmonary nodules.   --5/24/2023 CT chest showed stable right lung nodules.  --12/4/2023 chest CT showed stable pulmonary nodule in the right middle lobe. The smaller of the two nodules described previously is no longer  demonstrated today.  --Repeat chest CT in 12/2024.    4. Hot flashes  --Mild and tolerable.  No medication  "intervention needed.    5. Chronic urticaria  --She saw an allergist. Etiology unknown. She switched from Benadryl to Zyrtec.  --Advised she revisit with her allergist.    6. Vitamin D deficiency  --She is on vitamin D supplementation.    7. Polyarthralgia  --Since switching to tamoxifen, her polyarthralgia has improved mostly with use of Lyrica.    8. Right middle lobe lung nodule  --Initially seen on 2/2023 PET/CT.  --Will reevaluate with chest CT in 12/2024.    Return in 4 months.    Louise Watson MD  [facility]/Oncology     Total time spent today: 40 minutes in chart review, patient evaluation, counseling, documentation, test and/or medication/prescription orders, and coordination of care.     The longitudinal plan of care for the diagnosis(es)/condition(s) as documented were addressed during this visit. Due to the added complexity in care, I will continue to support Yuliya in the subsequent management and with ongoing continuity of care.    Oncology Rooming Note    August 19, 2024 1:40 PM   Yuliya Li is a 53 year old female who presents for:    Chief Complaint   Patient presents with     Oncology Clinic Visit     Initial Vitals: LMP  (LMP Unknown)  Estimated body mass index is 34.82 kg/m  as calculated from the following:    Height as of 6/7/24: 1.727 m (5' 8\").    Weight as of 8/8/24: 103.9 kg (229 lb). There is no height or weight on file to calculate BSA.  Data Unavailable Comment: Data Unavailable   No LMP recorded (lmp unknown). Patient has had a hysterectomy.  Allergies reviewed: Yes  Medications reviewed: Yes    Medications: Medication refills not needed today.  Pharmacy name entered into Aprimo:    [id number] [place], [place] - [address] [facility] - [place], [place] - [address] [place], [place] - [address] [facility]  [id number] - [place], [place] [address] [facility] [address] [facility] [address]  [id number] - [place], [place] - [address] AT [facility] [place] [facility] [address]    Frailty Screening:   Is the patient here for a new oncology consult visit in cancer care? 2. No        Guillermina Jennings MA              Again, thank you for allowing me to participate in the care of your patient.        Sincerely,        Louise Watson MD

## 2024-08-19 NOTE — PROGRESS NOTES
"Oncology Rooming Note    August 19, 2024 1:40 PM   Yuliya Li is a 53 year old female who presents for:    Chief Complaint   Patient presents with    Oncology Clinic Visit     Initial Vitals: LMP  (LMP Unknown)  Estimated body mass index is 34.82 kg/m  as calculated from the following:    Height as of 6/7/24: 1.727 m (5' 8\").    Weight as of 8/8/24: 103.9 kg (229 lb). There is no height or weight on file to calculate BSA.  Data Unavailable Comment: Data Unavailable   No LMP recorded (lmp unknown). Patient has had a hysterectomy.  Allergies reviewed: Yes  Medications reviewed: Yes    Medications: Medication refills not needed today.  Pharmacy name entered into Paystik:    [id number] - [place], [place] - [address] [facility] - [place], [place] - [address] [place] [id number] - [place], [place] - [address] [facility] [address] & [id number] - [place], [place] - [address] AT [facility] [address]    Frailty Screening:   Is the patient here for a new oncology consult visit in cancer care? 2. No        Guillermina Jennings MA            "

## 2024-08-25 DIAGNOSIS — E11.9 TYPE 2 DIABETES MELLITUS WITHOUT COMPLICATION, WITHOUT LONG-TERM CURRENT USE OF INSULIN (H): ICD-10-CM

## 2024-08-26 RX ORDER — SEMAGLUTIDE 1.34 MG/ML
1 INJECTION, SOLUTION SUBCUTANEOUS
Qty: 9 ML | Refills: 0 | Status: SHIPPED | OUTPATIENT
Start: 2024-08-26

## 2024-09-12 NOTE — PROGRESS NOTES
Surgery Postoperative Note    S: Yuliya is a 52-year-old female who recently underwent bilateral skin sparing mastectomies with left axillary sentinel lymph node biopsy and immediate tissue expander based reconstruction.  The patient has done very well postoperatively.  No significant pain in her surgical sites.  No wound concerns.  The patient's drains have been removed by plastic surgery.  She has already started expander fills.  Patient's pathology was again discussed today in clinic and the patient was provided with a copy of her pathology report.    Breast: Bilateral breast flaps warm and well perfused, breast incisions healing well without evidence of infection or significant underlying fluid collection    Pathology:   Final Diagnosis   A.  Breast, right, mastectomy:  -Benign breast tissue and skin.    B.  Breast, left, mastectomy:  -INVASIVE DUCTAL CARCINOMA with lobular features, Mckinney grade 2, multifocal, tumor size 20 mm and 4 mm.  -Ductal carcinoma in situ (DCIS), intermediate nuclear grade, solid and cribriform types.  -All margins are negative for invasive and in situ carcinoma; closest margin to invasive carcinoma is posterior at 0.8 mm.  -See tumor synoptic report.     C.  Breast, left axillary sentinel #1, excision:  -One lymph node; negative for metastatic carcinoma (0/1).    D.  Left chest wall, nodule, excision:  -Positive for metastatic breast carcinoma involving the edge of the tissue fragment.    E.  Lymph node, palpable left axillary sentinel, excision:  -One lymph node positive for macrometastatic carcinoma (1/1).  -Metastatic focus is 6 mm; positive for extranodal extension.    F.  Lymph node, left axillary sentinel #2, excision:  -One lymph node positive for micrometastatic carcinoma (1mi/1).  -Metastatic focus is 1.1 mm; negative for extranodal extension.         A/P  Yuliya Li is recovering from a bilateral simple skin sparing mastectomy with left axillary sentinel lymph node  biopsy and immediate tissue expander based reconstruction. I advised her to slowly return to regular activity. I expect she will make a complete recovery without issues. We reviewed her pathology today as well.  I did offer the patient a referral to physical therapy, which she declined at this time.  Patient plans to follow-up with medical oncology postoperatively.  She may follow-up in the general surgery clinic on an as-needed basis.    Thank you for the opportunity to help in her care.    Clarissa Fontanez MD   [facility]  [phone]    Please route or send letter to:  Primary Care Provider (PCP) and Referring Provider   100

## 2024-09-22 ENCOUNTER — HEALTH MAINTENANCE LETTER (OUTPATIENT)
Age: 54
End: 2024-09-22

## 2024-09-30 ENCOUNTER — TRANSFERRED RECORDS (OUTPATIENT)
Dept: MULTI SPECIALTY CLINIC | Facility: CLINIC | Age: 54
End: 2024-09-30
Payer: COMMERCIAL

## 2024-09-30 LAB — RETINOPATHY: NORMAL

## 2024-10-23 ENCOUNTER — OFFICE VISIT (OUTPATIENT)
Dept: PEDIATRICS | Facility: CLINIC | Age: 54
End: 2024-10-23
Payer: COMMERCIAL

## 2024-10-23 VITALS
HEART RATE: 77 BPM | WEIGHT: 233.4 LBS | DIASTOLIC BLOOD PRESSURE: 81 MMHG | SYSTOLIC BLOOD PRESSURE: 119 MMHG | RESPIRATION RATE: 18 BRPM | OXYGEN SATURATION: 99 % | BODY MASS INDEX: 34.57 KG/M2 | HEIGHT: 69 IN | TEMPERATURE: 98.2 F

## 2024-10-23 DIAGNOSIS — Z12.11 SCREEN FOR COLON CANCER: ICD-10-CM

## 2024-10-23 DIAGNOSIS — C50.812 MALIGNANT NEOPLASM OF OVERLAPPING SITES OF LEFT BREAST IN FEMALE, ESTROGEN RECEPTOR POSITIVE (H): ICD-10-CM

## 2024-10-23 DIAGNOSIS — R21 RASH: ICD-10-CM

## 2024-10-23 DIAGNOSIS — Z00.00 ROUTINE GENERAL MEDICAL EXAMINATION AT A HEALTH CARE FACILITY: Primary | ICD-10-CM

## 2024-10-23 DIAGNOSIS — T50.905D ADVERSE EFFECT OF DRUG, SUBSEQUENT ENCOUNTER: ICD-10-CM

## 2024-10-23 DIAGNOSIS — L70.8 OTHER ACNE: ICD-10-CM

## 2024-10-23 DIAGNOSIS — Z80.8 FAMILY HISTORY OF MELANOMA: ICD-10-CM

## 2024-10-23 DIAGNOSIS — E65 ABDOMINAL PANNUS: ICD-10-CM

## 2024-10-23 DIAGNOSIS — E55.9 VITAMIN D DEFICIENCY: ICD-10-CM

## 2024-10-23 DIAGNOSIS — C77.3 MALIGNANT NEOPLASM METASTATIC TO LYMPH NODE OF AXILLA (H): ICD-10-CM

## 2024-10-23 DIAGNOSIS — Z17.0 MALIGNANT NEOPLASM OF OVERLAPPING SITES OF LEFT BREAST IN FEMALE, ESTROGEN RECEPTOR POSITIVE (H): ICD-10-CM

## 2024-10-23 DIAGNOSIS — F32.5 MAJOR DEPRESSIVE DISORDER IN FULL REMISSION, UNSPECIFIED WHETHER RECURRENT (H): ICD-10-CM

## 2024-10-23 DIAGNOSIS — Z13.220 LIPID SCREENING: ICD-10-CM

## 2024-10-23 DIAGNOSIS — L50.8 CHRONIC URTICARIA: ICD-10-CM

## 2024-10-23 DIAGNOSIS — E11.9 TYPE 2 DIABETES MELLITUS WITHOUT COMPLICATION, WITHOUT LONG-TERM CURRENT USE OF INSULIN (H): ICD-10-CM

## 2024-10-23 LAB
ALBUMIN SERPL BCG-MCNC: 4.3 G/DL (ref 3.5–5.2)
ALP SERPL-CCNC: 76 U/L (ref 40–150)
ALT SERPL W P-5'-P-CCNC: 11 U/L (ref 0–50)
ANION GAP SERPL CALCULATED.3IONS-SCNC: 10 MMOL/L (ref 7–15)
AST SERPL W P-5'-P-CCNC: 23 U/L (ref 0–45)
BILIRUB SERPL-MCNC: 0.3 MG/DL
BUN SERPL-MCNC: 9.1 MG/DL (ref 6–20)
CALCIUM SERPL-MCNC: 9.6 MG/DL (ref 8.8–10.4)
CHLORIDE SERPL-SCNC: 103 MMOL/L (ref 98–107)
CHOLEST SERPL-MCNC: 141 MG/DL
CREAT SERPL-MCNC: 0.87 MG/DL (ref 0.51–0.95)
CREAT UR-MCNC: 51.6 MG/DL
EGFRCR SERPLBLD CKD-EPI 2021: 79 ML/MIN/1.73M2
EST. AVERAGE GLUCOSE BLD GHB EST-MCNC: 108 MG/DL
FASTING STATUS PATIENT QL REPORTED: ABNORMAL
FASTING STATUS PATIENT QL REPORTED: NORMAL
GLUCOSE SERPL-MCNC: 81 MG/DL (ref 70–99)
HBA1C MFR BLD: 5.4 % (ref 0–5.6)
HCO3 SERPL-SCNC: 26 MMOL/L (ref 22–29)
HDLC SERPL-MCNC: 45 MG/DL
LDLC SERPL CALC-MCNC: 77 MG/DL
MICROALBUMIN UR-MCNC: <12 MG/L
MICROALBUMIN/CREAT UR: NORMAL MG/G{CREAT}
NONHDLC SERPL-MCNC: 96 MG/DL
POTASSIUM SERPL-SCNC: 4.2 MMOL/L (ref 3.4–5.3)
PROT SERPL-MCNC: 7.1 G/DL (ref 6.4–8.3)
SODIUM SERPL-SCNC: 139 MMOL/L (ref 135–145)
TRIGL SERPL-MCNC: 94 MG/DL

## 2024-10-23 PROCEDURE — 80061 LIPID PANEL: CPT | Performed by: PHYSICIAN ASSISTANT

## 2024-10-23 PROCEDURE — 82043 UR ALBUMIN QUANTITATIVE: CPT | Performed by: PHYSICIAN ASSISTANT

## 2024-10-23 PROCEDURE — 82570 ASSAY OF URINE CREATININE: CPT | Performed by: PHYSICIAN ASSISTANT

## 2024-10-23 PROCEDURE — 99396 PREV VISIT EST AGE 40-64: CPT | Performed by: PHYSICIAN ASSISTANT

## 2024-10-23 PROCEDURE — 80053 COMPREHEN METABOLIC PANEL: CPT | Performed by: PHYSICIAN ASSISTANT

## 2024-10-23 PROCEDURE — 36415 COLL VENOUS BLD VENIPUNCTURE: CPT | Performed by: PHYSICIAN ASSISTANT

## 2024-10-23 PROCEDURE — 99214 OFFICE O/P EST MOD 30 MIN: CPT | Mod: 25 | Performed by: PHYSICIAN ASSISTANT

## 2024-10-23 PROCEDURE — 83036 HEMOGLOBIN GLYCOSYLATED A1C: CPT | Performed by: PHYSICIAN ASSISTANT

## 2024-10-23 RX ORDER — SEMAGLUTIDE 1.34 MG/ML
1 INJECTION, SOLUTION SUBCUTANEOUS
Qty: 9 ML | Refills: 0 | Status: CANCELLED | OUTPATIENT
Start: 2024-10-23

## 2024-10-23 RX ORDER — DIPHENHYDRAMINE HCL 25 MG
50 TABLET ORAL EVERY 6 HOURS PRN
Qty: 120 TABLET | Refills: 1 | Status: CANCELLED | OUTPATIENT
Start: 2024-10-23

## 2024-10-23 RX ORDER — ERGOCALCIFEROL 1.25 MG/1
50000 CAPSULE, LIQUID FILLED ORAL WEEKLY
Qty: 8 CAPSULE | Refills: 0 | Status: CANCELLED | OUTPATIENT
Start: 2024-10-23

## 2024-10-23 RX ORDER — ATORVASTATIN CALCIUM 10 MG/1
10 TABLET, FILM COATED ORAL DAILY
Qty: 90 TABLET | Refills: 3 | Status: SHIPPED | OUTPATIENT
Start: 2024-10-23

## 2024-10-23 RX ORDER — LANCETS
EACH MISCELLANEOUS
Qty: 100 EACH | Refills: 6 | Status: SHIPPED | OUTPATIENT
Start: 2024-10-23 | End: 2024-10-24

## 2024-10-23 RX ORDER — SPIRONOLACTONE 100 MG/1
100 TABLET, FILM COATED ORAL DAILY
Qty: 90 TABLET | Refills: 3 | Status: SHIPPED | OUTPATIENT
Start: 2024-10-23

## 2024-10-23 RX ORDER — GLUCOSAMINE HCL/CHONDROITIN SU 500-400 MG
CAPSULE ORAL
Qty: 100 EACH | Refills: 3 | Status: CANCELLED | OUTPATIENT
Start: 2024-10-23

## 2024-10-23 RX ORDER — FLUOXETINE 40 MG/1
40 CAPSULE ORAL DAILY
Qty: 90 CAPSULE | Refills: 3 | Status: SHIPPED | OUTPATIENT
Start: 2024-10-23

## 2024-10-23 RX ORDER — TAMOXIFEN CITRATE 20 MG/1
20 TABLET ORAL DAILY
Qty: 90 TABLET | Refills: 3 | Status: CANCELLED | OUTPATIENT
Start: 2024-10-23

## 2024-10-23 RX ORDER — HYDROXYZINE HYDROCHLORIDE 25 MG/1
25-50 TABLET, FILM COATED ORAL 3 TIMES DAILY PRN
Qty: 30 TABLET | Refills: 1 | Status: SHIPPED | OUTPATIENT
Start: 2024-10-23

## 2024-10-23 SDOH — HEALTH STABILITY: PHYSICAL HEALTH: ON AVERAGE, HOW MANY MINUTES DO YOU ENGAGE IN EXERCISE AT THIS LEVEL?: 30 MIN

## 2024-10-23 SDOH — HEALTH STABILITY: PHYSICAL HEALTH: ON AVERAGE, HOW MANY DAYS PER WEEK DO YOU ENGAGE IN MODERATE TO STRENUOUS EXERCISE (LIKE A BRISK WALK)?: 1 DAY

## 2024-10-23 ASSESSMENT — PAIN SCALES - GENERAL: PAINLEVEL_OUTOF10: NO PAIN (0)

## 2024-10-23 ASSESSMENT — SOCIAL DETERMINANTS OF HEALTH (SDOH): HOW OFTEN DO YOU GET TOGETHER WITH FRIENDS OR RELATIVES?: ONCE A WEEK

## 2024-10-23 NOTE — PATIENT INSTRUCTIONS
Patient Education   Preventive Care Advice   This is general advice given by our system to help you stay healthy. However, your care team may have specific advice just for you. Please talk to your care team about your preventive care needs.  Nutrition  Eat 5 or more servings of fruits and vegetables each day.  Try wheat bread, brown rice and whole grain pasta (instead of white bread, rice, and pasta).  Get enough calcium and vitamin D. Check the label on foods and aim for 100% of the RDA (recommended daily allowance).  Lifestyle  Exercise at least 150 minutes each week  (30 minutes a day, 5 days a week).  Do muscle strengthening activities 2 days a week. These help control your weight and prevent disease.  No smoking.  Wear sunscreen to prevent skin cancer.  Have a dental exam and cleaning every 6 months.  Yearly exams  See your health care team every year to talk about:  Any changes in your health.  Any medicines your care team has prescribed.  Preventive care, family planning, and ways to prevent chronic diseases.  Shots (vaccines)   HPV shots (up to age 26), if you've never had them before.  Hepatitis B shots (up to age 59), if you've never had them before.  COVID-19 shot: Get this shot when it's due.  Flu shot: Get a flu shot every year.  Tetanus shot: Get a tetanus shot every 10 years.  Pneumococcal, hepatitis A, and RSV shots: Ask your care team if you need these based on your risk.  Shingles shot (for age 50 and up)  General health tests  Diabetes screening:  Starting at age 35, Get screened for diabetes at least every 3 years.  If you are younger than age 35, ask your care team if you should be screened for diabetes.  Cholesterol test: At age 39, start having a cholesterol test every 5 years, or more often if advised.  Bone density scan (DEXA): At age 50, ask your care team if you should have this scan for osteoporosis (brittle bones).  Hepatitis C: Get tested at least once in your life.  STIs (sexually  transmitted infections)  Before age 24: Ask your care team if you should be screened for STIs.  After age 24: Get screened for STIs if you're at risk. You are at risk for STIs (including HIV) if:  You are sexually active with more than one person.  You don't use condoms every time.  You or a partner was diagnosed with a sexually transmitted infection.  If you are at risk for HIV, ask about PrEP medicine to prevent HIV.  Get tested for HIV at least once in your life, whether you are at risk for HIV or not.  Cancer screening tests  Cervical cancer screening: If you have a cervix, begin getting regular cervical cancer screening tests starting at age 21.  Breast cancer scan (mammogram): If you've ever had breasts, begin having regular mammograms starting at age 40. This is a scan to check for breast cancer.  Colon cancer screening: It is important to start screening for colon cancer at age 45.  Have a colonoscopy test every 10 years (or more often if you're at risk) Or, ask your provider about stool tests like a FIT test every year or Cologuard test every 3 years.  To learn more about your testing options, visit:   .  For help making a decision, visit:   https://bit.ly/ee58427.  Prostate cancer screening test: If you have a prostate, ask your care team if a prostate cancer screening test (PSA) at age 55 is right for you.  Lung cancer screening: If you are a current or former smoker ages 50 to 80, ask your care team if ongoing lung cancer screenings are right for you.  For informational purposes only. Not to replace the advice of your health care provider. Copyright   2023 [facility]. All rights reserved. Clinically reviewed by the [facility]. [employer] 830339 - REV 01/24.  Substance Use Disorder: Care Instructions  Overview     You can improve your life and health by stopping your use of alcohol or drugs. When you don't drink or use drugs, you may feel and sleep better. You may  get along better with your family, friends, and coworkers. There are medicines and programs that can help with substance use disorder.  How can you care for yourself at home?  Here are some ways to help you stay sober and prevent relapse.  If you have been given medicine to help keep you sober or reduce your cravings, be sure to take it exactly as prescribed.  Talk to your doctor about programs that can help you stop using drugs or drinking alcohol.  Do not keep alcohol or drugs in your home.  Plan ahead. Think about what you'll say if other people ask you to drink or use drugs. Try not to spend time with people who drink or use drugs.  Use the time and money spent on drinking or drugs to do something that's important to you.  Preventing a relapse  Have a plan to deal with relapse. Learn to recognize changes in your thinking that lead you to drink or use drugs. Get help before you start to drink or use drugs again.  Try to stay away from situations, friends, or places that may lead you to drink or use drugs.  If you feel the need to drink alcohol or use drugs again, seek help right away. Call a trusted friend or family member. Some people get support from organizations such as Narcotics Anonymous or [employer] or from treatment facilities.  If you relapse, get help as soon as you can. Some people make a plan with another person that outlines what they want that person to do for them if they relapse. The plan usually includes how to handle the relapse and who to notify in case of relapse.  Don't give up. Remember that a relapse doesn't mean that you have failed. Use the experience to learn the triggers that lead you to drink or use drugs. Then quit again. Recovery is a lifelong process. Many people have several relapses before they are able to quit for good.  Follow-up care is a key part of your treatment and safety. Be sure to make and go to all appointments, and call your doctor if you are having problems. It's  "also a good idea to know your test results and keep a list of the medicines you take.  When should you call for help?   Call 911  anytime you think you may need emergency care. For example, call if you or someone else:    Has overdosed or has withdrawal signs. Be sure to tell the emergency workers that you are or someone else is using or trying to quit using drugs. Overdose or withdrawal signs may include:  Losing consciousness.  Seizure.  Seeing or hearing things that aren't there (hallucinations).     Is thinking or talking about suicide or harming others.   Where to get help 24 hours a day, 7 days a week   If you or someone you know talks about suicide, self-harm, a mental health crisis, a substance use crisis, or any other kind of emotional distress, get help right away. You can:    Call the Suicide and Crisis Lifeline at 988.     Call 8-168-510-TALK (1-372.239.6838).     Text HOME to 605307 to access the Crisis Text Line.   Consider saving these numbers in your phone.  Go to [employer] for more information or to chat online.  Call your doctor now or seek immediate medical care if:    You are having withdrawal symptoms. These may include nausea or vomiting, sweating, shakiness, and anxiety.   Watch closely for changes in your health, and be sure to contact your doctor if:    You have a relapse.     You need more help or support to stop.   Where can you learn more?  Go to https://www.[employer].net/patiented  Enter H573 in the search box to learn more about \"Substance Use Disorder: Care Instructions.\"  Current as of: November 15, 2023  Content Version: 14.2 2024 [employer] [employer]   Care instructions adapted under license by your healthcare professional. If you have questions about a medical condition or this instruction, always ask your healthcare professional. [employer] disclaims any warranty or liability for your use of this information.       "

## 2024-10-23 NOTE — PROGRESS NOTES
Preventive Care Visit  [facility] RENEA Harden PA-C, Physician Assistant  Oct 23, 2024      Assessment & Plan     Routine general medical examination at a health care facility  Schedule annual physical in one year.     Malignant neoplasm of overlapping sites of left breast in female, estrogen receptor positive (H)  Continue follow up with plastic surgeon and oncology    Adverse effect of drug, subsequent encounter  Rash associated    Major depressive disorder in full remission, unspecified whether recurrent (H)  Refilled x one year.  Stable   - FLUoxetine (PROZAC) 40 MG capsule  Dispense: 90 capsule; Refill: 3    Other acne  Refilled x one year.  - spironolactone (ALDACTONE) 100 MG tablet  Dispense: 90 tablet; Refill: 3    Rash  Prn use for urticaria   - hydrOXYzine HCl (ATARAX) 25 MG tablet  Dispense: 30 tablet; Refill: 1    Type 2 diabetes mellitus without complication, without long-term current use of insulin (H)  Increase to 2mg dose today. Hope to also get more weight loss with this.  Follow up 6 months or sooner.   - HEMOGLOBIN A1C  - Albumin Random Urine Quantitative with Creat Ratio  - atorvastatin (LIPITOR) 10 MG tablet  Dispense: 90 tablet; Refill: 3  - thin (NO BRAND SPECIFIED) lancets  Dispense: 100 each; Refill: 6  - Comprehensive metabolic panel (BMP + Alb, Alk Phos, ALT, AST, Total. Bili, TP)  - HEMOGLOBIN A1C  - Albumin Random Urine Quantitative with Creat Ratio  - Comprehensive metabolic panel (BMP + Alb, Alk Phos, ALT, AST, Total. 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   Using antifungal  Can try wool in between skin               BMI  Estimated body mass index is 34.47 kg/m  as calculated from the following:    Height as of this encounter: 1.753 m (5' 9\").    Weight as of this encounter: 105.9 kg (233 lb 6.4 oz).   Weight management plan: Discussed healthy diet and exercise guidelines    Counseling  Appropriate preventive services were addressed with this patient via screening, questionnaire, or discussion as appropriate for fall prevention, nutrition, physical activity, Tobacco-use cessation, social engagement, weight loss and cognition.  Checklist reviewing preventive services available has been given to the patient.  Reviewed patient's diet, addressing concerns and/or questions.   She is at risk for lack of exercise and has been provided with information to increase physical activity for the benefit of her well-being.           Best Dwyer is a 54 year old, presenting for the following:  Physical (PX last 2023)        10/23/2024    10:10 AM   Additional Questions   Roomed by Khadijah Valentin MA        Via the Health Maintenance questionnaire, the patient has reported the following services have been completed -Eye Exam: [facility] 2024, this information has been sent to the abstraction team.    HPI        1. Immunologist  Zyrtec daily  Missing a day gets a flare. Varying degrees.   Hives.  No breathing issues    2. Dad  of  melanoma    3. Weight loss- hanging pannus         Health Care Directive  Patient does not have a Health Care Directive: Discussed advance care planning with patient; information given to patient to review.      10/23/2024   General Health   How would you rate your overall physical health? (!) FAIR   Feel stress " (tense, anxious, or unable to sleep) To some extent      (!) STRESS CONCERN      10/23/2024   Nutrition   Three or more servings of calcium each day? (!) NO   Diet: Regular (no restrictions)   How many servings of fruit and vegetables per day? (!) 0-1   How many sweetened beverages each day? 0-1            10/23/2024   Exercise   Days per week of moderate/strenous exercise 1 day   Average minutes spent exercising at this level 30 min      (!) EXERCISE CONCERN      10/23/2024   Social Factors   Frequency of gathering with friends or relatives Once a week   Worry food won't last until get money to buy more No   Food not last or not have enough money for food? No   Do you have housing? (Housing is defined as stable permanent housing and does not include staying ouside in a car, in a tent, in an abandoned building, in an overnight shelter, or couch-surfing.) Yes   Are you worried about losing your housing? No   Lack of transportation? No   Unable to get utilities (heat,electricity)? No            10/23/2024   Fall Risk   Fallen 2 or more times in the past year? No    Trouble with walking or balance? No        Patient-reported          10/23/2024   Dental   Dentist two times every year? Yes            10/23/2024   TB Screening   Were you born outside of the [place]? No            Today's PHQ-9 Score:       2024     2:07 PM   PHQ-9 SCORE   PHQ-9 Total Score MyChart 4 (Minimal depression)   PHQ-9 Total Score 4           10/23/2024   Substance Use   Alcohol more than 3/day or more than 7/wk No   Do you use any other substances recreationally? (!) CANNABIS PRODUCTS        Social History     Tobacco Use    Smoking status: Former     Current packs/day: 0.00     Types: Cigarettes     Quit date: 1999     Years since quittin.9     Passive exposure: Past    Smokeless tobacco: Never   Vaping Use    Vaping status: Never Used   Substance Use Topics    Alcohol use: Not Currently     Comment: Alcoholic Drinks/day: 1 a week     "Drug use: Yes     Types: Marijuana     Comment: 5 mg gummy at bedtime           12/23/2022   LAST FHS-7 RESULTS   1st degree relative breast or ovarian cancer No   Any relative bilateral breast cancer No   Any male have breast cancer No   Any ONE woman have BOTH breast AND ovarian cancer Yes   Any woman with breast cancer before 50yrs No   2 or more relatives with breast AND/OR ovarian cancer No   2 or more relatives with breast AND/OR bowel cancer No           Breast cancer hx, mastectomy        10/23/2024   STI Screening   New sexual partner(s) since last STI/HIV test? No        History of abnormal Pap smear: Status post hysterectomy with removal of cervix and no history of CIN2 or greater or cervical cancer. Health Maintenance and Surgical History updated.       ASCVD Risk   The 10-year ASCVD risk score (Del HENDERSON, et al., 2019) is: 2.7%    Values used to calculate the score:      Age: 54 years      Sex: Female      Is Non- : No      Diabetic: Yes      Tobacco smoker: No      Systolic Blood Pressure: 108 mmHg      Is BP treated: No      HDL Cholesterol: 44 mg/dL      Total Cholesterol: 190 mg/dL           Reviewed and updated as needed this visit by Provider   Tobacco   Meds  Problems  Med Hx                       Objective    Exam  LMP  (LMP Unknown)    Estimated body mass index is 35.43 kg/m  as calculated from the following:    Height as of 8/19/24: 1.727 m (5' 8\").    Weight as of 8/19/24: 105.7 kg (233 lb).    Physical Exam  GENERAL: alert and no distress  EYES: Eyes grossly normal to inspection, PERRL and conjunctivae and sclerae normal  HENT: ear canals and TM's normal, nose and mouth without ulcers or lesions  NECK: no adenopathy, no asymmetry, masses, or scars  RESP: lungs clear to auscultation - no rales, rhonchi or wheezes  CV: regular rate and rhythm, normal S1 S2, no S3 or S4, no murmur, click or rub, no peripheral edema  ABDOMEN: soft, nontender, no " hepatosplenomegaly, no masses and bowel sounds normal  MS: no gross musculoskeletal defects noted, no edema  SKIN: under pannus,  mild red/pink rash  NEURO: Normal strength and tone, mentation intact and speech normal  PSYCH: mentation appears normal, affect normal/bright        Signed Electronically by: Patti Harden PA-C

## 2024-10-24 ENCOUNTER — TELEPHONE (OUTPATIENT)
Dept: PEDIATRICS | Facility: CLINIC | Age: 54
End: 2024-10-24
Payer: COMMERCIAL

## 2024-10-24 DIAGNOSIS — E11.9 TYPE 2 DIABETES MELLITUS WITHOUT COMPLICATION, WITHOUT LONG-TERM CURRENT USE OF INSULIN (H): ICD-10-CM

## 2024-10-24 RX ORDER — LANCETS
EACH MISCELLANEOUS
Qty: 100 EACH | Refills: 6 | Status: SHIPPED | OUTPATIENT
Start: 2024-10-24

## 2024-10-24 NOTE — TELEPHONE ENCOUNTER
Message from pharmacy.    Medication Name: Softclix Lancets    Message: Need frequency to bill insurance    Phone # [phone]  Fax # [phone]

## 2024-11-25 ENCOUNTER — HOSPITAL ENCOUNTER (OUTPATIENT)
Dept: CT IMAGING | Facility: CLINIC | Age: 54
Discharge: HOME | End: 2024-11-25
Attending: INTERNAL MEDICINE | Admitting: INTERNAL MEDICINE
Payer: COMMERCIAL

## 2024-11-25 DIAGNOSIS — R91.8 PULMONARY NODULES: ICD-10-CM

## 2024-11-25 DIAGNOSIS — C50.812 MALIGNANT NEOPLASM OF OVERLAPPING SITES OF LEFT BREAST IN FEMALE, ESTROGEN RECEPTOR POSITIVE (H): ICD-10-CM

## 2024-11-25 DIAGNOSIS — Z17.0 MALIGNANT NEOPLASM OF OVERLAPPING SITES OF LEFT BREAST IN FEMALE, ESTROGEN RECEPTOR POSITIVE (H): ICD-10-CM

## 2024-11-25 PROCEDURE — 71250 CT THORAX DX C-: CPT

## 2024-11-26 ENCOUNTER — MYC MEDICAL ADVICE (OUTPATIENT)
Dept: PEDIATRICS | Facility: CLINIC | Age: 54
End: 2024-11-26
Payer: COMMERCIAL

## 2024-11-26 NOTE — PROGRESS NOTES
[facility]    Hematology/Oncology Established Patient Note      Today's Date: 12/5/2024    Reason for follow-up:  Left breast cancer.    HISTORY OF PRESENT ILLNESS: Yuliya Li is a 54 year old female status post removal of single ovary and hysterectomy who presents with the following oncologic history:  1.  12/23/2022: Mammogram showed possible asymmetry in right breast at 12:00; possible mass in left breast at 3:00.  2. 1/2/2023: Diagnostic bilateral mammogram showed mass in left breast at 3:00 with 0.4 cm satellite mass in retroareolar breast. Right breast with effacement of possible finding at 12:00 consistent with artifact. Left breast U/S showed mass at 3:00, 11 cm from nipple measuring 1.9 x 1.6 x 1.8 cm; no abnormal axillary lymph nodes.  3. 1/03/2022: Left breast core biopsy of 0.4 cm mass at 3:30 position showed grade 2 invasive ductal carcinoma with lobular features. Biopsy of left breast 1.9 cm mass at 3:00 showed grade 2 invasive ductal carcinoma with lobular features, associated grade 2 DCIS. ER and MN strongly positive at %; HER-2 IHC = 2+; HER-2/crow FISH negative.  4. 1/10/2023: FSH = 36.6 but estradiol = 63.  5.  2/03/2023: Bilateral mastectomies with left axillary sentinel lymph node excision and left chest wall nodule excision with Dr. Clarissa Fontanez.  Pathology showed left breast grade 2 invasive ductal carcinoma with lobular features, multifocal with 2 cm and 0.4 cm tumors, grade 2 DCIS, all margins negative.  Of 3 left axillary sentinel lymph nodes removed, one with 6 mm macrometastasis positive for extranodal extension and one with 1.1 mm micrometastasis, negative for extranodal extension, identified. Left chest wall nodule positive for metastatic breast carcinoma involving edge of tissue fragment. HER-2 FISH repeated, group 4, considered HER-2 negative. Oncotype DX = 20, 9-year risk of distant recurrence of 17% after 5 years of hormone blockade therapy;  breast-cancer specific survival of 96.7% treated without chemotherapy.  6. 2/23/2023: PET/CT scan negative for metastatic disease. Small right middle lobe lung nodules, non-FDG avid.  7. 3/29/2023: Started adjuvant chemotherapy with Taxotere and Cytoxan. Received cycle 2 on 4/19/2023, complicated by allergic reaction with chest heaviness followed by diffuse rash. Then switched to weekly paclitaxel for 6 weeks with completion on 6/14/2023.  8. 6/22/2023: Start of Zoladex every 4 weeks with anastrozole.  9. 7/17/2023-8/18/2023: Completed adjuvant radiation therapy.  10. 12/27/2023: Stopped anastrozole due to diffuse joint pain.  11. 1/15/2024: Started letrozole.  12. 3/25/2024: Stopped letrozole due to diffuse joint pain.  13. 4/25/2024: Switched to tamoxifen.    INTERVAL HISTORY:  Yuliya reports she had weaned off Lyrica but then restarted it due to pain in her hands. Hives are slowly improving. She is taking Zyrtec daily.      REVIEW OF SYSTEMS:   14 point ROS was reviewed and is negative other than as noted above in HPI.       HOME MEDICATIONS:  Current Outpatient Medications   Medication Sig Dispense Refill    alcohol swab prep pads Use to swab area of injection/amy as directed. 100 each 3    atorvastatin (LIPITOR) 10 MG tablet Take 1 tablet (10 mg) by mouth daily. 90 tablet 3    blood glucose (NO BRAND SPECIFIED) test strip Use to test blood sugar 2 times daily or as directed. To accompany: Blood Glucose Monitor Brands: per insurance. 100 strip 6    blood glucose calibration (NO BRAND SPECIFIED) solution To accompany: Blood Glucose Monitor Brands: per insurance. 1 each 1    blood glucose monitoring (NO BRAND SPECIFIED) meter device kit Use to test blood sugar 2 times daily or as directed. Preferred blood glucose meter OR supplies to accompany: Blood Glucose Monitor Brands: per insurance. 1 kit 0    cetirizine (ZYRTEC) 10 MG tablet Take 1 tablet by mouth daily      diphenhydrAMINE (BENADRYL) 25 MG tablet Take 2  tablets (50 mg) by mouth every 6 hours as needed for itching or allergies 120 tablet 1    EPINEPHrine (ANY BX GENERIC EQUIV) 0.3 MG/0.3ML injection 2-pack Inject 0.3 mLs (0.3 mg) into the muscle once as needed for anaphylaxis May repeat one time in 5-15 minutes if response to initial dose is inadequate. 2 each 0    FLUoxetine (PROZAC) 40 MG capsule Take 1 capsule (40 mg) by mouth daily. 90 capsule 3    hydrOXYzine HCl (ATARAX) 25 MG tablet Take 1-2 tablets (25-50 mg) by mouth 3 times daily as needed for itching. 30 tablet 1    medical cannabis (Patient's own supply) Take 1 Dose by mouth nightly as needed Gummies: 5 mg   Formulary: Red  Location: Unknown    (The purpose of this order is to document that the patient reports taking medical cannabis.  This is not a prescription, and is not used to certify that the patient has a qualifying medical condition.)      melatonin 3 MG tablet Take 6 mg by mouth nightly as needed for sleep      Semaglutide, 1 MG/DOSE, (OZEMPIC, 1 MG/DOSE,) 4 MG/3ML pen Inject 1 mg subcutaneously every 7 days. (On Fridays) 9 mL 0    Semaglutide, 2 MG/DOSE, (OZEMPIC) 8 MG/3ML pen Inject 2 mg subcutaneously every 7 days. 9 mL 3    spironolactone (ALDACTONE) 100 MG tablet Take 1 tablet (100 mg) by mouth daily. 90 tablet 3    tamoxifen (NOLVADEX) 20 MG tablet Take 1 tablet (20 mg) by mouth daily 90 tablet 3    thin (NO BRAND SPECIFIED) lancets Use with lanceting device.  To test every day. To accompany: Blood Glucose Monitor Brands: per insurance. 100 each 6    pregabalin (LYRICA) 150 MG capsule Take 1 capsule by mouth every 8 hours           ALLERGIES:  Allergies   Allergen Reactions    Docetaxel Anaphylaxis, Hives, Itching and Shortness Of Breath    Keflex [Cephalexin]     Pegfilgrastim     Tetracycline     Trazodone      Very sedating-         PAST MEDICAL HISTORY:  Diabetes mellitus type 2  Hyperlipidemia    Gynecologic history:  Age of menarche at 11.  Last menstrual period in 2005 prior to  hysterectomy.  G3, P3.  Age of first pregnancy at 29.  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  No use of HRT.  Used OCPs for 4 years.  No IUD use.      PAST SURGICAL HISTORY:  Past Surgical History:   Procedure Laterality Date    CYST REMOVAL      HYSTERECTOMY      HYSTERECTOMY, PAP NO LONGER INDICATED      menorrhagia    INSERT TISSUE EXPANDER BREAST Bilateral 2/3/2023    Procedure: BILATERAL TISSUE EXPANDER;  Surgeon: Jaya Malave MD;  Location: [facility] OR    MASTECTOMY SIMPLE BILATERAL, SENTINEL NODE BILATERAL, COMBINED Left 2/3/2023    Procedure: Bilateral skin sparing mastectomies with left axillary sentinel lymph node biopsy;  Surgeon: Clarissa Fontanez MD;  Location:  OR         SOCIAL HISTORY:  Social History     Socioeconomic History    Marital status:      Spouse name: Not on file    Number of children: 3    Years of education: Not on file    Highest education level: Not on file   Occupational History    Not on file   Tobacco Use    Smoking status: Former     Current packs/day: 0.00     Types: Cigarettes     Quit date: 1999     Years since quittin.0     Passive exposure: Past    Smokeless tobacco: Never   Vaping Use    Vaping status: Never Used   Substance and Sexual Activity    Alcohol use: Not Currently     Comment: Alcoholic Drinks/day: 1 a week    Drug use: Yes     Types: Marijuana     Comment: 5 mg gummy at bedtime    Sexual activity: Yes     Partners: Male     Birth control/protection: Post-menopausal     Comment: Hysterectomy   Other Topics Concern    Parent/sibling w/ CABG, MI or angioplasty before 65F 55M? No   Social History Narrative    Lives with  and son    Work-      Social Drivers of Health     Financial Resource Strain: Low Risk  (10/23/2024)    Financial Resource Strain     Within the past 12 months, have you or your family members you live with been unable to get utilities (heat, electricity) when it was really needed?: No   Food Insecurity: Low  Risk  (10/23/2024)    Food Insecurity     Within the past 12 months, did you worry that your food would run out before you got money to buy more?: No     Within the past 12 months, did the food you bought just not last and you didn t have money to get more?: No   Transportation Needs: Low Risk  (10/23/2024)    Transportation Needs     Within the past 12 months, has lack of transportation kept you from medical appointments, getting your medicines, non-medical meetings or appointments, work, or from getting things that you need?: No   Physical Activity: Insufficiently Active (10/23/2024)    Exercise Vital Sign     Days of Exercise per Week: 1 day     Minutes of Exercise per Session: 30 min   Stress: Stress Concern Present (10/23/2024)    [place] [employer] - Occupational Stress Questionnaire     Feeling of Stress : To some extent   Social Connections: Unknown (10/23/2024)    Social Connection and Isolation Panel [NHANES]     Frequency of Communication with Friends and Family: Not on file     Frequency of Social Gatherings with Friends and Family: Once a week     Attends Hinduism Services: Not on file     Active Member of Clubs or Organizations: Not on file     Attends Club or Organization Meetings: Not on file     Marital Status: Not on file   Interpersonal Safety: Low Risk  (10/23/2024)    Interpersonal Safety     Do you feel physically and emotionally safe where you currently live?: Yes     Within the past 12 months, have you been hit, slapped, kicked or otherwise physically hurt by someone?: No     Within the past 12 months, have you been humiliated or emotionally abused in other ways by your partner or ex-partner?: No   Housing Stability: Low Risk  (10/23/2024)    Housing Stability     Do you have housing? : Yes     Are you worried about losing your housing?: No         FAMILY HISTORY:  Family History   Problem Relation Age of Onset    Diabetes Mother     Atrial fibrillation Father      Alcoholism Father     Melanoma Father     Other Cancer Father         Melanoma-     Breast Cancer Maternal Aunt         in her 40's    Cervical Cancer Maternal Aunt     Breast Cancer Other         Maternal aunt    Colon Cancer No family hx of          PHYSICAL EXAM:  Vital signs:  /72   Pulse 82   Temp 98  F (36.7  C) (Oral)   Resp 16   Wt 104 kg (229 lb 3.2 oz)   LMP  (LMP Unknown)   SpO2 97%   BMI 33.85 kg/m     GENERAL: No acute distress.  EYES: No scleral icterus. No overt erythema.  BREAST: Bilateral breasts surgically absent with no chest wall masses and implants in place and no periprosthetic fluid.  Very mild discomfort to palpation at the left upper outer chest wall with no associated mass.  RESPIRATORY: No audible cough, wheezing, or labored breathing.  MUSCULOSKELETAL: Range of motion in the neck, shoulders, and arms appear normal.  SKIN: No overt rashes, discolorations, or lesions over the face and neck.  NEUROLOGIC: Alert.  No overt tremors.  PSYCHIATRIC: Normal affect and mood.  Does not appear anxious.       LABS:  CBC RESULTS:   Recent Labs   Lab Test 24  1444 23  0941   WBC  --  5.3   RBC  --  4.85   HGB 12.8 13.8   HCT  --  41.4   MCV  --  85   MCH  --  28.5   MCHC  --  33.3   RDW  --  12.4   PLT  --  296         Last Comprehensive Metabolic Panel:  Sodium   Date Value Ref Range Status   10/23/2024 139 135 - 145 mmol/L Final     Potassium   Date Value Ref Range Status   10/23/2024 4.2 3.4 - 5.3 mmol/L Final   01/10/2023 3.9 3.4 - 5.3 mmol/L Final     Chloride   Date Value Ref Range Status   10/23/2024 103 98 - 107 mmol/L Final   01/10/2023 102 94 - 109 mmol/L Final     Carbon Dioxide (CO2)   Date Value Ref Range Status   10/23/2024 26 22 - 29 mmol/L Final   01/10/2023 26 20 - 32 mmol/L Final     Anion Gap   Date Value Ref Range Status   10/23/2024 10 7 - 15 mmol/L Final   01/10/2023 7 3 - 14 mmol/L Final     Glucose   Date Value Ref Range Status   10/23/2024 81 70 -  99 mg/dL Final   01/10/2023 100 (H) 70 - 99 mg/dL Final     GLUCOSE BY METER POCT   Date Value Ref Range Status   02/03/2023 120 (H) 70 - 99 mg/dL Final     Urea Nitrogen   Date Value Ref Range Status   10/23/2024 9.1 6.0 - 20.0 mg/dL Final   01/10/2023 16 7 - 30 mg/dL Final     Creatinine   Date Value Ref Range Status   10/23/2024 0.87 0.51 - 0.95 mg/dL Final     GFR Estimate   Date Value Ref Range Status   10/23/2024 79 >60 mL/min/1.73m2 Final     Comment:     eGFR calculated using 2021 CKD-EPI equation.   01/12/2021 >60 >60 mL/min/1.73m2 Final     Calcium   Date Value Ref Range Status   10/23/2024 9.6 8.8 - 10.4 mg/dL Final     Comment:     Reference intervals for this test were updated on 7/16/2024 to reflect our healthy population more accurately. There may be differences in the flagging of prior results with similar values performed with this method. Those prior results can be interpreted in the context of the updated reference intervals.     Bilirubin Total   Date Value Ref Range Status   10/23/2024 0.3 <=1.2 mg/dL Final     Alkaline Phosphatase   Date Value Ref Range Status   10/23/2024 76 40 - 150 U/L Final     ALT   Date Value Ref Range Status   10/23/2024 11 0 - 50 U/L Final     AST   Date Value Ref Range Status   10/23/2024 23 0 - 45 U/L Final        11/1/2023  FSH = 15.5  Estradiol = 6  Vitamin D = 27    ASSESSMENT/PLAN:  Yuliya Li is a 54 year old female with the following issues:  1. Pathologic prognostic stage IA, [id number], grade 2 multifocal invasive ductal carcinoma with lobular features of left breast overlapping sites, ER positive, KY positive, HER-2 IHC = 2+; HER-2/crow FISH negative, Oncotype DX = 20  --Yuliya had 2 tumors measuring 2 cm and 0.4 cm and additional left chest wall nodule, but with 2/3 lymph nodes involved, one that was a macrometastasis with extranodal extension and the other with micrometastasis with no extranodal extension.  All areas of tumor were excised.  The repeat  HER-2 FISH was interpreted as negative.   --2/23/2023 PET scan showed no evidence for metastatic disease.  --Oncotype DX = 20, with 9-year risk of distant recurrence of 17% after 5 years of hormone blockade therapy; breast-cancer specific survival of 96.7% treated without chemotherapy.  --She elected to proceed with 4 cycles of Taxotere and Cytoxan. However, she reacted to Taxotere after cycle 2. Therefore, she was switched to Taxol weekly for 6 weeks, completed 6/14/2023.  We did not proceed with dose dense AC given that her maximum absolute chemo benefit is up to 3.3% and would not justify the potential adverse effects of ddAC. She then completed radiation.  --She is perimenopausal and started OST + AI with Zoladex every 4 weeks with anastrozole on 6/22/2023. She did not tolerate this due to diffuse joint pain and discontinued Zoladex by 2/2024 and tried letrozole but did not tolerate this either.    --She then switched to tamoxifen after 4/25/2024 visit and is tolerating this better.  --3/20/2023 DEXA scan showed normal bone density for baseline. Plan to repeat DEXA in 2025.  --Advised adequate calcium and vitamin D as well as weight bearing exercise.  --Discussed she has no clinical evidence for recurrent breast cancer by physical exam.  Discussed she has likely post-surgical/post-radiation scar tissue at area of mild discomfort.    2.  Family history of breast cancer  - Maternal aunt with breast cancer.  - 2/2024 [facility] Multi-Cancer Panel + MC1R Gene was negative.    3. Indeterminate right middle lobe pulmonary nodules  --2/23/2023 PET/CT scan showed 2 small indeterminate right middle lobe pulmonary nodules.   --5/24/2023 CT chest showed stable right lung nodules.  --12/4/2023 Chest CT showed stable pulmonary nodule in the right middle lobe. The smaller of the two nodules described previously is no longer  demonstrated today.  --11/25/2024 Chest CT showed unchanged 5 mm right middle lobe pulmonary nodule; no  new nodules; stable radiation fibrosis of anterior left lung; no lymphadenopathy.  --Repeat chest CT in 11/2025.    4. Hot flashes  --Mild and tolerable.  No medication intervention needed.    5. Chronic urticaria  --She saw an allergist. Etiology unknown. She switched from Benadryl to Zyrtec with good control.    6. Vitamin D deficiency  --She is on vitamin D supplementation.    7. Polyarthralgia  --Since switching to tamoxifen, her polyarthralgia has improved mostly with use of Lyrica.    Return in 4 months.    Louise Watson MD  [facility] Hematology/Oncology     Total time spent today: 30 minutes in chart review, patient evaluation, counseling, documentation, test and/or medication/prescription orders, and coordination of care.     The longitudinal plan of care for the diagnosis(es)/condition(s) as documented were addressed during this visit. Due to the added complexity in care, I will continue to support Yuliya in the subsequent management and with ongoing continuity of care.

## 2024-12-05 ENCOUNTER — ONCOLOGY VISIT (OUTPATIENT)
Dept: ONCOLOGY | Facility: CLINIC | Age: 54
End: 2024-12-05
Attending: INTERNAL MEDICINE
Payer: COMMERCIAL

## 2024-12-05 VITALS
DIASTOLIC BLOOD PRESSURE: 72 MMHG | SYSTOLIC BLOOD PRESSURE: 103 MMHG | TEMPERATURE: 98 F | RESPIRATION RATE: 16 BRPM | HEART RATE: 82 BPM | WEIGHT: 229.2 LBS | BODY MASS INDEX: 33.85 KG/M2 | OXYGEN SATURATION: 97 %

## 2024-12-05 DIAGNOSIS — M85.9 DISORDER OF BONE DENSITY AND STRUCTURE, UNSPECIFIED: ICD-10-CM

## 2024-12-05 DIAGNOSIS — C50.812 MALIGNANT NEOPLASM OF OVERLAPPING SITES OF LEFT BREAST IN FEMALE, ESTROGEN RECEPTOR POSITIVE (H): Primary | ICD-10-CM

## 2024-12-05 DIAGNOSIS — M25.50 POLYARTHRALGIA: ICD-10-CM

## 2024-12-05 DIAGNOSIS — R91.8 PULMONARY NODULES: ICD-10-CM

## 2024-12-05 DIAGNOSIS — N95.1 MENOPAUSAL SYNDROME (HOT FLASHES): ICD-10-CM

## 2024-12-05 DIAGNOSIS — Z17.0 MALIGNANT NEOPLASM OF OVERLAPPING SITES OF LEFT BREAST IN FEMALE, ESTROGEN RECEPTOR POSITIVE (H): Primary | ICD-10-CM

## 2024-12-05 PROCEDURE — 99213 OFFICE O/P EST LOW 20 MIN: CPT | Performed by: INTERNAL MEDICINE

## 2024-12-05 ASSESSMENT — PAIN SCALES - GENERAL: PAINLEVEL_OUTOF10: NO PAIN (0)

## 2024-12-05 NOTE — LETTER
12/5/2024      Yuliya Li  [address]  [place] [address]      Dear Colleague,    Thank you for referring your patient, Yuliya Li, to the [facility] CANCER [facility]. Please see a copy of my visit note below.    [facility]    Hematology/Oncology Established Patient Note      Today's Date: 12/5/2024    Reason for follow-up:  Left breast cancer.    HISTORY OF PRESENT ILLNESS: Yuliya Li is a 54 year old female status post removal of single ovary and hysterectomy who presents with the following oncologic history:  1.  12/23/2022: Mammogram showed possible asymmetry in right breast at 12:00; possible mass in left breast at 3:00.  2. 1/2/2023: Diagnostic bilateral mammogram showed mass in left breast at 3:00 with 0.4 cm satellite mass in retroareolar breast. Right breast with effacement of possible finding at 12:00 consistent with artifact. Left breast U/S showed mass at 3:00, 11 cm from nipple measuring 1.9 x 1.6 x 1.8 cm; no abnormal axillary lymph nodes.  3. 1/03/2022: Left breast core biopsy of 0.4 cm mass at 3:30 position showed grade 2 invasive ductal carcinoma with lobular features. Biopsy of left breast 1.9 cm mass at 3:00 showed grade 2 invasive ductal carcinoma with lobular features, associated grade 2 DCIS. ER and TX strongly positive at %; HER-2 IHC = 2+; HER-2/crow FISH negative.  4. 1/10/2023: FSH = 36.6 but estradiol = 63.  5.  2/03/2023: Bilateral mastectomies with left axillary sentinel lymph node excision and left chest wall nodule excision with Dr. Clarissa Fontanez.  Pathology showed left breast grade 2 invasive ductal carcinoma with lobular features, multifocal with 2 cm and 0.4 cm tumors, grade 2 DCIS, all margins negative.  Of 3 left axillary sentinel lymph nodes removed, one with 6 mm macrometastasis positive for extranodal extension and one with 1.1 mm micrometastasis, negative for extranodal extension, identified. Left chest wall  nodule positive for metastatic breast carcinoma involving edge of tissue fragment. HER-2 FISH repeated, group 4, considered HER-2 negative. Oncotype DX = 20, 9-year risk of distant recurrence of 17% after 5 years of hormone blockade therapy; breast-cancer specific survival of 96.7% treated without chemotherapy.  6. 2/23/2023: PET/CT scan negative for metastatic disease. Small right middle lobe lung nodules, non-FDG avid.  7. 3/29/2023: Started adjuvant chemotherapy with Taxotere and Cytoxan. Received cycle 2 on 4/19/2023, complicated by allergic reaction with chest heaviness followed by diffuse rash. Then switched to weekly paclitaxel for 6 weeks with completion on 6/14/2023.  8. 6/22/2023: Start of Zoladex every 4 weeks with anastrozole.  9. 7/17/2023-8/18/2023: Completed adjuvant radiation therapy.  10. 12/27/2023: Stopped anastrozole due to diffuse joint pain.  11. 1/15/2024: Started letrozole.  12. 3/25/2024: Stopped letrozole due to diffuse joint pain.  13. 4/25/2024: Switched to tamoxifen.    INTERVAL HISTORY:  Yuliya reports she had weaned off Lyrica but then restarted it due to pain in her hands. Hives are slowly improving. She is taking Zyrtec daily.      REVIEW OF SYSTEMS:   14 point ROS was reviewed and is negative other than as noted above in HPI.       HOME MEDICATIONS:  Current Outpatient Medications   Medication Sig Dispense Refill     alcohol swab prep pads Use to swab area of injection/amy as directed. 100 each 3     atorvastatin (LIPITOR) 10 MG tablet Take 1 tablet (10 mg) by mouth daily. 90 tablet 3     blood glucose (NO BRAND SPECIFIED) test strip Use to test blood sugar 2 times daily or as directed. To accompany: Blood Glucose Monitor Brands: per insurance. 100 strip 6     blood glucose calibration (NO BRAND SPECIFIED) solution To accompany: Blood Glucose Monitor Brands: per insurance. 1 each 1     blood glucose monitoring (NO BRAND SPECIFIED) meter device kit Use to test blood sugar 2 times  daily or as directed. Preferred blood glucose meter OR supplies to accompany: Blood Glucose Monitor Brands: per insurance. 1 kit 0     cetirizine (ZYRTEC) 10 MG tablet Take 1 tablet by mouth daily       diphenhydrAMINE (BENADRYL) 25 MG tablet Take 2 tablets (50 mg) by mouth every 6 hours as needed for itching or allergies 120 tablet 1     EPINEPHrine (ANY BX GENERIC EQUIV) 0.3 MG/0.3ML injection 2-pack Inject 0.3 mLs (0.3 mg) into the muscle once as needed for anaphylaxis May repeat one time in 5-15 minutes if response to initial dose is inadequate. 2 each 0     FLUoxetine (PROZAC) 40 MG capsule Take 1 capsule (40 mg) by mouth daily. 90 capsule 3     hydrOXYzine HCl (ATARAX) 25 MG tablet Take 1-2 tablets (25-50 mg) by mouth 3 times daily as needed for itching. 30 tablet 1     medical cannabis (Patient's own supply) Take 1 Dose by mouth nightly as needed Gummies: 5 mg   Formulary: Red  Location: Unknown    (The purpose of this order is to document that the patient reports taking medical cannabis.  This is not a prescription, and is not used to certify that the patient has a qualifying medical condition.)       melatonin 3 MG tablet Take 6 mg by mouth nightly as needed for sleep       Semaglutide, 1 MG/DOSE, (OZEMPIC, 1 MG/DOSE,) 4 MG/3ML pen Inject 1 mg subcutaneously every 7 days. (On Fridays) 9 mL 0     Semaglutide, 2 MG/DOSE, (OZEMPIC) 8 MG/3ML pen Inject 2 mg subcutaneously every 7 days. 9 mL 3     spironolactone (ALDACTONE) 100 MG tablet Take 1 tablet (100 mg) by mouth daily. 90 tablet 3     tamoxifen (NOLVADEX) 20 MG tablet Take 1 tablet (20 mg) by mouth daily 90 tablet 3     thin (NO BRAND SPECIFIED) lancets Use with lanceting device.  To test every day. To accompany: Blood Glucose Monitor Brands: per insurance. 100 each 6     pregabalin (LYRICA) 150 MG capsule Take 1 capsule by mouth every 8 hours           ALLERGIES:  Allergies   Allergen Reactions     Docetaxel Anaphylaxis, Hives, Itching and Shortness Of  Breath     Keflex [Cephalexin]      Pegfilgrastim      Tetracycline      Trazodone      Very sedating-         PAST MEDICAL HISTORY:  Diabetes mellitus type 2  Hyperlipidemia    Gynecologic history:  Age of menarche at 11.  Last menstrual period in  prior to hysterectomy.  G3, P3.  Age of first pregnancy at 29.  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  No use of HRT.  Used OCPs for 4 years.  No IUD use.      PAST SURGICAL HISTORY:  Past Surgical History:   Procedure Laterality Date     CYST REMOVAL       HYSTERECTOMY       HYSTERECTOMY, PAP NO LONGER INDICATED      menorrhagia     INSERT TISSUE EXPANDER BREAST Bilateral 2/3/2023    Procedure: BILATERAL TISSUE EXPANDER;  Surgeon: Jaya Malave MD;  Location:  OR     MASTECTOMY SIMPLE BILATERAL, SENTINEL NODE BILATERAL, COMBINED Left 2/3/2023    Procedure: Bilateral skin sparing mastectomies with left axillary sentinel lymph node biopsy;  Surgeon: Clarissa Fontanez MD;  Location:  OR         SOCIAL HISTORY:  Social History     Socioeconomic History     Marital status:      Spouse name: Not on file     Number of children: 3     Years of education: Not on file     Highest education level: Not on file   Occupational History     Not on file   Tobacco Use     Smoking status: Former     Current packs/day: 0.00     Types: Cigarettes     Quit date: 1999     Years since quittin.0     Passive exposure: Past     Smokeless tobacco: Never   Vaping Use     Vaping status: Never Used   Substance and Sexual Activity     Alcohol use: Not Currently     Comment: Alcoholic Drinks/day: 1 a week     Drug use: Yes     Types: Marijuana     Comment: 5 mg gummy at bedtime     Sexual activity: Yes     Partners: Male     Birth control/protection: Post-menopausal     Comment: Hysterectomy   Other Topics Concern     Parent/sibling w/ CABG, MI or angioplasty before 65F 55M? No   Social History Narrative    Lives with  and son    Work-      Social  Drivers of Health     Financial Resource Strain: Low Risk  (10/23/2024)    Financial Resource Strain      Within the past 12 months, have you or your family members you live with been unable to get utilities (heat, electricity) when it was really needed?: No   Food Insecurity: Low Risk  (10/23/2024)    Food Insecurity      Within the past 12 months, did you worry that your food would run out before you got money to buy more?: No      Within the past 12 months, did the food you bought just not last and you didn t have money to get more?: No   Transportation Needs: Low Risk  (10/23/2024)    Transportation Needs      Within the past 12 months, has lack of transportation kept you from medical appointments, getting your medicines, non-medical meetings or appointments, work, or from getting things that you need?: No   Physical Activity: Insufficiently Active (10/23/2024)    Exercise Vital Sign      Days of Exercise per Week: 1 day      Minutes of Exercise per Session: 30 min   Stress: Stress Concern Present (10/23/2024)    [employer] - Occupational Stress Questionnaire      Feeling of Stress : To some extent   Social Connections: Unknown (10/23/2024)    Social Connection and Isolation Panel [NHANES]      Frequency of Communication with Friends and Family: Not on file      Frequency of Social Gatherings with Friends and Family: Once a week      Attends Sabianism Services: Not on file      Active Member of Clubs or Organizations: Not on file      Attends Club or Organization Meetings: Not on file      Marital Status: Not on file   Interpersonal Safety: Low Risk  (10/23/2024)    Interpersonal Safety      Do you feel physically and emotionally safe where you currently live?: Yes      Within the past 12 months, have you been hit, slapped, kicked or otherwise physically hurt by someone?: No      Within the past 12 months, have you been humiliated or emotionally abused in other ways by your partner  or ex-partner?: No   Housing Stability: Low Risk  (10/23/2024)    Housing Stability      Do you have housing? : Yes      Are you worried about losing your housing?: No         FAMILY HISTORY:  Family History   Problem Relation Age of Onset     Diabetes Mother      Atrial fibrillation Father      Alcoholism Father      Melanoma Father      Other Cancer Father         Melanoma-      Breast Cancer Maternal Aunt         in her 40's     Cervical Cancer Maternal Aunt      Breast Cancer Other         Maternal aunt     Colon Cancer No family hx of          PHYSICAL EXAM:  Vital signs:  /72   Pulse 82   Temp 98  F (36.7  C) (Oral)   Resp 16   Wt 104 kg (229 lb 3.2 oz)   LMP  (LMP Unknown)   SpO2 97%   BMI 33.85 kg/m     GENERAL: No acute distress.  EYES: No scleral icterus. No overt erythema.  BREAST: Bilateral breasts surgically absent with no chest wall masses and implants in place and no periprosthetic fluid.  Very mild discomfort to palpation at the left upper outer chest wall with no associated mass.  RESPIRATORY: No audible cough, wheezing, or labored breathing.  MUSCULOSKELETAL: Range of motion in the neck, shoulders, and arms appear normal.  SKIN: No overt rashes, discolorations, or lesions over the face and neck.  NEUROLOGIC: Alert.  No overt tremors.  PSYCHIATRIC: Normal affect and mood.  Does not appear anxious.       LABS:  CBC RESULTS:   Recent Labs   Lab Test 24  1444 23  0941   WBC  --  5.3   RBC  --  4.85   HGB 12.8 13.8   HCT  --  41.4   MCV  --  85   MCH  --  28.5   MCHC  --  33.3   RDW  --  12.4   PLT  --  296         Last Comprehensive Metabolic Panel:  Sodium   Date Value Ref Range Status   10/23/2024 139 135 - 145 mmol/L Final     Potassium   Date Value Ref Range Status   10/23/2024 4.2 3.4 - 5.3 mmol/L Final   01/10/2023 3.9 3.4 - 5.3 mmol/L Final     Chloride   Date Value Ref Range Status   10/23/2024 103 98 - 107 mmol/L Final   01/10/2023 102 94 - 109 mmol/L Final      Carbon Dioxide (CO2)   Date Value Ref Range Status   10/23/2024 26 22 - 29 mmol/L Final   01/10/2023 26 20 - 32 mmol/L Final     Anion Gap   Date Value Ref Range Status   10/23/2024 10 7 - 15 mmol/L Final   01/10/2023 7 3 - 14 mmol/L Final     Glucose   Date Value Ref Range Status   10/23/2024 81 70 - 99 mg/dL Final   01/10/2023 100 (H) 70 - 99 mg/dL Final     GLUCOSE BY METER POCT   Date Value Ref Range Status   02/03/2023 120 (H) 70 - 99 mg/dL Final     Urea Nitrogen   Date Value Ref Range Status   10/23/2024 9.1 6.0 - 20.0 mg/dL Final   01/10/2023 16 7 - 30 mg/dL Final     Creatinine   Date Value Ref Range Status   10/23/2024 0.87 0.51 - 0.95 mg/dL Final     GFR Estimate   Date Value Ref Range Status   10/23/2024 79 >60 mL/min/1.73m2 Final     Comment:     eGFR calculated using 2021 CKD-EPI equation.   01/12/2021 >60 >60 mL/min/1.73m2 Final     Calcium   Date Value Ref Range Status   10/23/2024 9.6 8.8 - 10.4 mg/dL Final     Comment:     Reference intervals for this test were updated on 7/16/2024 to reflect our healthy population more accurately. There may be differences in the flagging of prior results with similar values performed with this method. Those prior results can be interpreted in the context of the updated reference intervals.     Bilirubin Total   Date Value Ref Range Status   10/23/2024 0.3 <=1.2 mg/dL Final     Alkaline Phosphatase   Date Value Ref Range Status   10/23/2024 76 40 - 150 U/L Final     ALT   Date Value Ref Range Status   10/23/2024 11 0 - 50 U/L Final     AST   Date Value Ref Range Status   10/23/2024 23 0 - 45 U/L Final        11/1/2023  FSH = 15.5  Estradiol = 6  Vitamin D = 27    ASSESSMENT/PLAN:  Yuliya Li is a 54 year old female with the following issues:  1. Pathologic prognostic stage IA, [id number], grade 2 multifocal invasive ductal carcinoma with lobular features of left breast overlapping sites, ER positive, MD positive, HER-2 IHC = 2+; HER-2/crow FISH negative,  Oncotype DX = 20  --Yuliya had 2 tumors measuring 2 cm and 0.4 cm and additional left chest wall nodule, but with 2/3 lymph nodes involved, one that was a macrometastasis with extranodal extension and the other with micrometastasis with no extranodal extension.  All areas of tumor were excised.  The repeat HER-2 FISH was interpreted as negative.   --2/23/2023 PET scan showed no evidence for metastatic disease.  --Oncotype DX = 20, with 9-year risk of distant recurrence of 17% after 5 years of hormone blockade therapy; breast-cancer specific survival of 96.7% treated without chemotherapy.  --She elected to proceed with 4 cycles of Taxotere and Cytoxan. However, she reacted to Taxotere after cycle 2. Therefore, she was switched to Taxol weekly for 6 weeks, completed 6/14/2023.  We did not proceed with dose dense AC given that her maximum absolute chemo benefit is up to 3.3% and would not justify the potential adverse effects of ddAC. She then completed radiation.  --She is perimenopausal and started OST + AI with Zoladex every 4 weeks with anastrozole on 6/22/2023. She did not tolerate this due to diffuse joint pain and discontinued Zoladex by 2/2024 and tried letrozole but did not tolerate this either.    --She then switched to tamoxifen after 4/25/2024 visit and is tolerating this better.  --3/20/2023 DEXA scan showed normal bone density for baseline. Plan to repeat DEXA in 2025.  --Advised adequate calcium and vitamin D as well as weight bearing exercise.  --Discussed she has no clinical evidence for recurrent breast cancer by physical exam.  Discussed she has likely post-surgical/post-radiation scar tissue at area of mild discomfort.    2.  Family history of breast cancer  - Maternal aunt with breast cancer.  - 2/2024 [facility] Multi-Cancer Panel + MC1R Gene was negative.    3. Indeterminate right middle lobe pulmonary nodules  --2/23/2023 PET/CT scan showed 2 small indeterminate right middle lobe pulmonary nodules.  "  --5/24/2023 CT chest showed stable right lung nodules.  --12/4/2023 Chest CT showed stable pulmonary nodule in the right middle lobe. The smaller of the two nodules described previously is no longer  demonstrated today.  --11/25/2024 Chest CT showed unchanged 5 mm right middle lobe pulmonary nodule; no new nodules; stable radiation fibrosis of anterior left lung; no lymphadenopathy.  --Repeat chest CT in 11/2025.    4. Hot flashes  --Mild and tolerable.  No medication intervention needed.    5. Chronic urticaria  --She saw an allergist. Etiology unknown. She switched from Benadryl to Zyrtec with good control.    6. Vitamin D deficiency  --She is on vitamin D supplementation.    7. Polyarthralgia  --Since switching to tamoxifen, her polyarthralgia has improved mostly with use of Lyrica.    Return in 4 months.    Louise Watson MD  [facility] Hematology/Oncology     Total time spent today: 30 minutes in chart review, patient evaluation, counseling, documentation, test and/or medication/prescription orders, and coordination of care.     The longitudinal plan of care for the diagnosis(es)/condition(s) as documented were addressed during this visit. Due to the added complexity in care, I will continue to support Yuliya in the subsequent management and with ongoing continuity of care.    Oncology Rooming Note    December 5, 2024 3:35 PM   Yuliya Li is a 54 year old female who presents for:    Chief Complaint   Patient presents with     Oncology Clinic Visit     Initial Vitals: /72   Pulse 82   Temp 98  F (36.7  C) (Oral)   Resp 16   Wt 104 kg (229 lb 3.2 oz)   LMP  (LMP Unknown)   SpO2 97%   BMI 33.85 kg/m   Estimated body mass index is 33.85 kg/m  as calculated from the following:    Height as of 10/23/24: 1.753 m (5' 9\").    Weight as of this encounter: 104 kg (229 lb 3.2 oz). Body surface area is 2.25 meters squared.  No Pain (0) Comment: Data Unavailable   No LMP recorded (lmp unknown). " Patient has had a hysterectomy.  Allergies reviewed: Yes  Medications reviewed: Yes    Medications: MEDICATION REFILLS NEEDED TODAY. Provider was notified.  Pharmacy name entered into EPIC:      Tamoxifen    [id number] [place], [place] - [address] [facility], [place] - [address]  [facility] [place] [facility] [address] [facility] [address] [place] [id number] - [place], [place] - [address] [id number] - [place], [place] - [address] AT [facility] [address]    Frailty Screening:   Is the patient here for a new oncology consult visit in cancer care? 2. No      Clinical concerns: no       Shari J. Schoenberger, CMA                Again, thank you for allowing me to participate in the care of your patient.        Sincerely,        Louise Watson MD

## 2024-12-05 NOTE — PROGRESS NOTES
"Oncology Rooming Note    December 5, 2024 3:35 PM   Yuliya Li is a 54 year old female who presents for:    Chief Complaint   Patient presents with    Oncology Clinic Visit     Initial Vitals: /72   Pulse 82   Temp 98  F (36.7  C) (Oral)   Resp 16   Wt 104 kg (229 lb 3.2 oz)   LMP  (LMP Unknown)   SpO2 97%   BMI 33.85 kg/m   Estimated body mass index is 33.85 kg/m  as calculated from the following:    Height as of 10/23/24: 1.753 m (5' 9\").    Weight as of this encounter: 104 kg (229 lb 3.2 oz). Body surface area is 2.25 meters squared.  No Pain (0) Comment: Data Unavailable   No LMP recorded (lmp unknown). Patient has had a hysterectomy.  Allergies reviewed: Yes  Medications reviewed: Yes    Medications: MEDICATION REFILLS NEEDED TODAY. Provider was notified.  Pharmacy name entered into EPIC:      Tamoxifen    [id number] [place], [place] - [address] [facility] - [place], [place] - [address] [place], [place] [id number] - [place], [place] - [address] [id number] - [place], [place] - [address] AT [facility] [address]    Frailty Screening:   Is the patient here for a new oncology consult visit in cancer care? 2. No      Clinical concerns: no       Shari J. Schoenberger, CMA              "

## 2025-01-14 ASSESSMENT — PATIENT HEALTH QUESTIONNAIRE - PHQ9
SUM OF ALL RESPONSES TO PHQ QUESTIONS 1-9: 2
SUM OF ALL RESPONSES TO PHQ QUESTIONS 1-9: 2
10. IF YOU CHECKED OFF ANY PROBLEMS, HOW DIFFICULT HAVE THESE PROBLEMS MADE IT FOR YOU TO DO YOUR WORK, TAKE CARE OF THINGS AT HOME, OR GET ALONG WITH OTHER PEOPLE: NOT DIFFICULT AT ALL

## 2025-01-15 ENCOUNTER — VIRTUAL VISIT (OUTPATIENT)
Dept: PEDIATRICS | Facility: CLINIC | Age: 55
End: 2025-01-15
Payer: COMMERCIAL

## 2025-01-15 DIAGNOSIS — Z17.0 MALIGNANT NEOPLASM OF OVERLAPPING SITES OF LEFT BREAST IN FEMALE, ESTROGEN RECEPTOR POSITIVE (H): ICD-10-CM

## 2025-01-15 DIAGNOSIS — C50.812 MALIGNANT NEOPLASM OF OVERLAPPING SITES OF LEFT BREAST IN FEMALE, ESTROGEN RECEPTOR POSITIVE (H): ICD-10-CM

## 2025-01-15 DIAGNOSIS — R51.9 CHRONIC NONINTRACTABLE HEADACHE, UNSPECIFIED HEADACHE TYPE: Primary | ICD-10-CM

## 2025-01-15 DIAGNOSIS — C77.3 MALIGNANT NEOPLASM METASTATIC TO LYMPH NODE OF AXILLA (H): ICD-10-CM

## 2025-01-15 DIAGNOSIS — G89.29 CHRONIC NONINTRACTABLE HEADACHE, UNSPECIFIED HEADACHE TYPE: Primary | ICD-10-CM

## 2025-01-15 PROCEDURE — 98006 SYNCH AUDIO-VIDEO EST MOD 30: CPT | Performed by: PHYSICIAN ASSISTANT

## 2025-01-15 RX ORDER — TOPIRAMATE 25 MG/1
25 TABLET, FILM COATED ORAL AT BEDTIME
Qty: 60 TABLET | Refills: 1 | Status: SHIPPED | OUTPATIENT
Start: 2025-01-15

## 2025-01-15 ASSESSMENT — ENCOUNTER SYMPTOMS: HEADACHES: 1

## 2025-01-15 NOTE — PROGRESS NOTES
Yuliya is a 54 year old who is being evaluated via a billable video visit.          Assessment & Plan     Chronic nonintractable headache, unspecified headache type  Two month daily headache, worsening.   Notes pain with coughing, some vision changes.  Does not resemble migraine.   Not related to uri, trauma.  Takes nsaid daily- ? Rebound headache- switch to tylenol at onset of headache.  Hx of breast cancer- 2023- b/l mastectomy, radiation and chemo.   Video visit so limited evaluation  Will get MRI due to cancer hx, two month of headache and worsening  Trial of topamax for now. Ice, tylenol, hydration. Has muscle relaxant at home she can try.  Worsening sxs to follow up- worst headache to emergency department, nausea vomiting vision changes, numbness, tingling or weakness. To emergency department.  The patient indicates understanding of these issues and agrees with the plan.   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 Due to the added complexity in care, I will continue to support Yuliya in the subsequent management and with ongoing continuity of care.            Subjective   Yuliya is a 54 year old, presenting for the following health issues:  Headache      1/15/2025     6:38 AM   Additional Questions   Roomed by Vivek OLIVA   Accompanied by N/A         1/15/2025     6:38 AM   Patient Reported Additional Medications   Patient reports taking the following new medications No     Headache     History of Present Illness       Headaches:   Since the patient's last clinic visit, headaches are: no change  The patient is  "getting headaches:  I have been having these headaches everyday for about two months  She is not able to do normal daily activities when she has a migraine.  The patient is taking the following rescue/relief medications:  Ibuprofen (Advil, Motrin)   Patient states \"I get some relief\" from the rescue/relief medications.   The patient is taking the following medications to prevent migraines:  No medications to prevent migraines  In the past 4 weeks, the patient has gone to an Urgent Care or Emergency Room 0 times times due to headaches.   She is taking medications regularly.       Headache- two months ago- started getting headaches, awful feeling. Frontal. Can be debilitating. Sometimes gets a \" pulsating light\". Has tried to keep a trigger diary- can not find anything .  Coughing makes worse when she has headache  Eye movement is worse when she has the headache  \"Hears eye move\" when she has the headache- ? A swish sound.    Does have uri symptoms currently, but  has not had the whole  time she has had headache  Had bad ear ache  when headache started.  headache was worse before joel  Currently taking ibuprofen daily.    No nausea vomiting  Got new glasses-headache  started before    No fevers  No trauma  Sleep- good  Mood - good  No changes food or  medication    No numbness, tingling or weakness.    Tylenol does not help  Ibuprofen helps somewhat  Tried advils sinus and cold no real changes    One ovary left-            No nausea vomiting. Sometimes vision changes. headache is pressure.       Gets plenty of water.   Thought it was ozempic, stopped, but headache did not change.     No influenza exposure      Breast cancer dx 2023-  B/l mastectomy- chemo, radiation. Tamoxifen for one year.    Headaches  are daily. Staying the same. Worst-9, avg- 3  Has not wake her.  Headaches come and goes    Cervical muscle knot    Has muscle relaxant- at home                Objective           Vitals:  No vitals were obtained " today due to virtual visit.    Physical Exam   GENERAL: alert and no distress  EYES: Eyes grossly normal to inspection.  No discharge or erythema, or obvious scleral/conjunctival abnormalities.  RESP: No audible wheeze, cough, or visible cyanosis.    SKIN: Visible skin clear. No significant rash, abnormal pigmentation or lesions.  NEURO: Cranial nerves grossly intact.  Mentation and speech appropriate for age.  PSYCH: Appropriate affect, tone, and pace of words          Video-Visit Details    Type of service:  Video Visit   Originating Location (pt. Location): Home    Distant Location (provider location):  On-site  Platform used for Video Visit: Mary [place]  Signed Electronically by: Patti Harden PA-C

## 2025-01-23 ENCOUNTER — ANCILLARY PROCEDURE (OUTPATIENT)
Dept: MRI IMAGING | Facility: CLINIC | Age: 55
End: 2025-01-23
Attending: PHYSICIAN ASSISTANT
Payer: COMMERCIAL

## 2025-01-23 DIAGNOSIS — G89.29 CHRONIC NONINTRACTABLE HEADACHE, UNSPECIFIED HEADACHE TYPE: ICD-10-CM

## 2025-01-23 DIAGNOSIS — R51.9 CHRONIC NONINTRACTABLE HEADACHE, UNSPECIFIED HEADACHE TYPE: ICD-10-CM

## 2025-01-23 DIAGNOSIS — C77.3 MALIGNANT NEOPLASM METASTATIC TO LYMPH NODE OF AXILLA (H): ICD-10-CM

## 2025-01-23 DIAGNOSIS — C50.812 MALIGNANT NEOPLASM OF OVERLAPPING SITES OF LEFT BREAST IN FEMALE, ESTROGEN RECEPTOR POSITIVE (H): ICD-10-CM

## 2025-01-23 DIAGNOSIS — Z17.0 MALIGNANT NEOPLASM OF OVERLAPPING SITES OF LEFT BREAST IN FEMALE, ESTROGEN RECEPTOR POSITIVE (H): ICD-10-CM

## 2025-01-23 PROCEDURE — 70553 MRI BRAIN STEM W/O & W/DYE: CPT

## 2025-01-23 PROCEDURE — 255N000002 HC RX 255 OP 636: Performed by: PHYSICIAN ASSISTANT

## 2025-01-23 PROCEDURE — A9585 GADOBUTROL INJECTION: HCPCS | Performed by: PHYSICIAN ASSISTANT

## 2025-01-23 RX ORDER — GADOBUTROL 604.72 MG/ML
10 INJECTION INTRAVENOUS ONCE
Status: COMPLETED | OUTPATIENT
Start: 2025-01-23 | End: 2025-01-23

## 2025-01-23 RX ADMIN — GADOBUTROL 10 ML: 604.72 INJECTION INTRAVENOUS at 17:14

## 2025-02-03 ENCOUNTER — MYC MEDICAL ADVICE (OUTPATIENT)
Dept: PEDIATRICS | Facility: CLINIC | Age: 55
End: 2025-02-03
Payer: COMMERCIAL

## 2025-02-04 ENCOUNTER — TELEPHONE (OUTPATIENT)
Dept: PEDIATRICS | Facility: CLINIC | Age: 55
End: 2025-02-04
Payer: COMMERCIAL

## 2025-02-04 NOTE — TELEPHONE ENCOUNTER
Central Prior Authorization Team  Phone: [phone]    PA Initiation    Medication: OZEMPIC (2 MG/DOSE) 8 MG/3ML SC SOPN  Insurance Company: [employer] Non-Specialty PA's - Phone [phone] Fax [phone]  Ph[facility] the [id number] [place] [address] AT [facility]  Filling Pharmacy Phone: [phone]  Filling Pharmacy Fax:    Start Date: 2/4/2025

## 2025-02-04 NOTE — TELEPHONE ENCOUNTER
Prior Authorization Retail Medication Request    Medication/Dose: Semaglutide, 2 MG/DOSE, (OZEMPIC) 8 MG/3ML pen   Diagnosis and ICD code (if different than what is on RX):    New/renewal/insurance change PA/secondary ins. PA:  Previously Tried and Failed:    Rationale:      Insurance   Primary: [employer]   Insurance ID:  [id number]      Pharmacy Information (if different than what is on RX)  Name:  [facility]  Phone:  [phone]  Fax:[phone]    Clinic Information  Preferred routing pool for dept communication: [place]

## 2025-02-04 NOTE — TELEPHONE ENCOUNTER
Prior Authorization Approval    Medication: OZEMPIC (2 MG/DOSE) 8 MG/3ML SC SOPN  Authorization Effective Date: 1/5/2025  Authorization Expiration Date: 2/4/2026  Approved Dose/Quantity:   Reference #:     Insurance Company: [employer] Non-Specialty PA's - Phone [phone] Fax [phone]  Expected CoPay: $    CoPay Card Available:      Financial Assistance Needed:   Which Pharmacy is filling the prescription: [id number] - [place], [place] - [address]  Pharmacy Notified: Yes  Patient Notified: **Instructed pharmacy to notify patient when script is ready to /ship.**

## 2025-02-14 ENCOUNTER — MYC MEDICAL ADVICE (OUTPATIENT)
Dept: FAMILY MEDICINE | Facility: CLINIC | Age: 55
End: 2025-02-14
Payer: COMMERCIAL

## 2025-02-15 ENCOUNTER — HEALTH MAINTENANCE LETTER (OUTPATIENT)
Age: 55
End: 2025-02-15

## 2025-02-18 ENCOUNTER — MYC MEDICAL ADVICE (OUTPATIENT)
Dept: PEDIATRICS | Facility: CLINIC | Age: 55
End: 2025-02-18
Payer: COMMERCIAL

## 2025-02-18 NOTE — TELEPHONE ENCOUNTER
Pt calling back, helped to schedule an appointment with provider tomorrow.    Kimberly MICHELLE  Clinic RN  [facility]

## 2025-02-18 NOTE — TELEPHONE ENCOUNTER
Pt calling back, helped to schedule an appointment with provider tomorrow.    Kimberly MICHELLE  Clinic RN  [facility]

## 2025-02-19 ENCOUNTER — OFFICE VISIT (OUTPATIENT)
Dept: PEDIATRICS | Facility: CLINIC | Age: 55
End: 2025-02-19
Payer: COMMERCIAL

## 2025-02-19 ENCOUNTER — ANCILLARY PROCEDURE (OUTPATIENT)
Dept: GENERAL RADIOLOGY | Facility: CLINIC | Age: 55
End: 2025-02-19
Attending: PHYSICIAN ASSISTANT
Payer: COMMERCIAL

## 2025-02-19 VITALS
WEIGHT: 226 LBS | DIASTOLIC BLOOD PRESSURE: 77 MMHG | BODY MASS INDEX: 33.47 KG/M2 | OXYGEN SATURATION: 98 % | HEART RATE: 94 BPM | SYSTOLIC BLOOD PRESSURE: 116 MMHG | RESPIRATION RATE: 16 BRPM | HEIGHT: 69 IN

## 2025-02-19 DIAGNOSIS — M54.2 NECK PAIN: ICD-10-CM

## 2025-02-19 DIAGNOSIS — R51.9 CHRONIC INTRACTABLE HEADACHE, UNSPECIFIED HEADACHE TYPE: ICD-10-CM

## 2025-02-19 DIAGNOSIS — R21 RASH: ICD-10-CM

## 2025-02-19 DIAGNOSIS — E11.9 TYPE 2 DIABETES MELLITUS WITHOUT COMPLICATION, WITHOUT LONG-TERM CURRENT USE OF INSULIN (H): Primary | ICD-10-CM

## 2025-02-19 DIAGNOSIS — G89.29 CHRONIC INTRACTABLE HEADACHE, UNSPECIFIED HEADACHE TYPE: ICD-10-CM

## 2025-02-19 LAB
EST. AVERAGE GLUCOSE BLD GHB EST-MCNC: 100 MG/DL
HBA1C MFR BLD: 5.1 % (ref 0–5.6)

## 2025-02-19 PROCEDURE — 83036 HEMOGLOBIN GLYCOSYLATED A1C: CPT | Performed by: PHYSICIAN ASSISTANT

## 2025-02-19 PROCEDURE — G2211 COMPLEX E/M VISIT ADD ON: HCPCS | Performed by: PHYSICIAN ASSISTANT

## 2025-02-19 PROCEDURE — 36415 COLL VENOUS BLD VENIPUNCTURE: CPT | Performed by: PHYSICIAN ASSISTANT

## 2025-02-19 PROCEDURE — 99215 OFFICE O/P EST HI 40 MIN: CPT | Performed by: PHYSICIAN ASSISTANT

## 2025-02-19 PROCEDURE — 72040 X-RAY EXAM NECK SPINE 2-3 VW: CPT | Mod: TC | Performed by: RADIOLOGY

## 2025-02-19 RX ORDER — NYSTATIN 100000 [USP'U]/G
POWDER TOPICAL 2 TIMES DAILY
Qty: 60 G | Refills: 1 | Status: SHIPPED | OUTPATIENT
Start: 2025-02-19

## 2025-02-19 RX ORDER — NYSTATIN 100000 [USP'U]/G
POWDER TOPICAL
Status: CANCELLED | OUTPATIENT
Start: 2025-02-19

## 2025-02-19 ASSESSMENT — ENCOUNTER SYMPTOMS: HEADACHES: 1

## 2025-02-19 ASSESSMENT — PAIN SCALES - GENERAL: PAINLEVEL_OUTOF10: MILD PAIN (2)

## 2025-02-19 NOTE — PROGRESS NOTES
Patient identified using two patient identifiers.  Ear exam showing wax occlusion completed by MA.  H202/H20 was placed in the both ear(s) via irrigation tool: elephant ear.   Assessment & Plan     Type 2 diabetes mellitus without complication, without long-term current use of insulin (H)  Well controlled. No changes today  - HEMOGLOBIN A1C  - HEMOGLOBIN A1C    Rash  Under abdominal fold.   Trial of nystatin powder.   Keep area dry- wool pieces can be helpful.   - nystatin (MYCOSTATIN) 755978 UNIT/GM external powder  Dispense: 60 g; Refill: 1    Neck pain  Some pain in neck. Will get xray to evaluate spine as possible cause of headache.   - XR Cervical Spine 2/3 Views    Chronic intractable headache, unspecified headache type  5 moths of daily headache.   MRI negative for findings  Headache worse with laugh, cough-  No nausea vomiting or vision changes  Different then her migraine headaches  Topamax may have helped with sensitivity, but not resolving  Use of nsaids, tylenol when needed  At this time, neurology consult due to length and nature of headache.  If numbness, tingling or weakness., vision changes, severe headache to emergency department.   - Adult Neurology  Referral      The longitudinal plan of care for the diagnosis(es)/condition(s) as documented were addressed during this visit. Due to the added complexity in care, I will continue to support Yuliya in the subsequent management and with ongoing continuity of care.      I spent a total of 48 minutes on the day of the visit.   Time spent by me today doing chart review, history and exam, documentation and further activities per the note            Subjective   Yuliya is a 54 year old, presenting for the following health issues:  Headache        2/19/2025     8:13 AM   Additional Questions   Roomed by Lilia         2/19/2025     8:13 AM   Patient Reported Additional Medications   Patient reports taking the following new medications None     History  "of Present Illness       Headaches:   Since the patient's last clinic visit, headaches are: no change  The patient is getting headaches:  Daily  She is not able to do normal daily activities when she has a migraine.  The patient is taking the following rescue/relief medications:  Tylenol   Patient states \"The relief is inconsistent\" from the rescue/relief medications.   The patient is taking the following medications to prevent migraines:  Topomax  In the past 4 weeks, the patient has gone to an Urgent Care or Emergency Room 0 times times due to headaches.    She eats 2-3 servings of fruits and vegetables daily.She consumes 0 sweetened beverage(s) daily.She exercises with enough effort to increase her heart rate 10 to 19 minutes per day.  She exercises with enough effort to increase her heart rate 4 days per week.   She is taking medications regularly.             1. Follow up headache. Headaches started a few months prior to [place]. They are daily.  Patient was seen by me in  virtual visit 1/15. Added topamax and got a MRI brain due to breast cancer history. Normal MRI.    Headache is worse with pressure, coughing, moving over in bed, leaning back creates pain  She also has noted with lifting objects. Does not happen with bearing down for bowel movement.  Can hear eyes move when headaches get bad. Hard to describes  Her headaches goes in waves throughout the day. Severity varies.  Doesn't seem to specifically wake her out of sleep    Topamax was given as a trial. Possibly decrease severity but not preventing.     Tamoxifen- at least a year    Does have a history of migraine- not the same    Patient has had not trauma. No fever chills uri symptoms   No rash  No new medications                  Objective    /77 (BP Location: Right arm, Patient Position: Sitting, Cuff Size: Adult Large)   Pulse 94   Resp 16   Ht 1.753 m (5' 9\")   Wt 102.5 kg (226 lb)   LMP  (LMP Unknown)   SpO2 98%   BMI 33.37 kg/m  "   Body mass index is 33.37 kg/m .  Physical Exam   GENERAL: alert and no distress  EYES: Eyes grossly normal to inspection, PERRL and conjunctivae and sclerae normal  HENT: ear canals and TM's normal, nose and mouth without ulcers or lesions  NECK: no adenopathy, no asymmetry, masses, or scars  RESP: lungs clear to auscultation - no rales, rhonchi or wheezes  CV: regular rate and rhythm, normal S1 S2, no S3 or S4, no murmur, click or rub, no peripheral edema  MS: no gross musculoskeletal defects noted, no edema  SKIN: no suspicious lesions or rashes  NEURO: Normal strength and tone, mentation intact and speech normal  PSYCH: mentation appears normal, affect normal/bright  Diabetic foot exam: normal DP and PT pulses, no trophic changes or ulcerative lesions, normal sensory exam, and normal monofilament exam            Signed Electronically by: Patti Harden PA-C

## 2025-02-20 NOTE — CONFIDENTIAL NOTE
Reason for visit: Chronic intractable headache, unspecified     Additional Information:   Daily headache x 5 months. mri brain normal. no nv, worse with cough, laugh.   Referring Provider: Patti Harden PA-C   [facility]   Office Visit Notes: 02/19/2025       All IMAGING   STATUS/LOCATION   DATE   MRI/MRA Internal  01/23/2025    CT/CTA N/A    LABS Internal    EEG N/A    EMG N/A    NEUROPSYCH   TEST: N/A

## 2025-02-21 ENCOUNTER — PRE VISIT (OUTPATIENT)
Dept: NEUROLOGY | Facility: CLINIC | Age: 55
End: 2025-02-21

## 2025-03-09 ENCOUNTER — VIRTUAL VISIT (OUTPATIENT)
Dept: URGENT CARE | Facility: CLINIC | Age: 55
End: 2025-03-09
Payer: COMMERCIAL

## 2025-03-09 DIAGNOSIS — N39.0 ACUTE UTI: Primary | ICD-10-CM

## 2025-03-09 PROCEDURE — 98001 SYNCH AUDIO-VIDEO NEW LOW 30: CPT

## 2025-03-09 RX ORDER — NITROFURANTOIN 25; 75 MG/1; MG/1
100 CAPSULE ORAL 2 TIMES DAILY
Qty: 10 CAPSULE | Refills: 0 | Status: SHIPPED | OUTPATIENT
Start: 2025-03-09 | End: 2025-03-14

## 2025-03-09 NOTE — PROGRESS NOTES
No chief complaint on file.      Assessment & Plan  Assessment  - Urinary tract infection (UTI), consistent with previous episodes.    Plan  - Prescribe Macrobid for urinary tract infection treatment.  - Monitor symptoms for 24 to 48 hours to assess improvement.  - Consider an in-person visit and perform a urine culture if symptoms do not improve within 24 to 48 hours.    Prescription  - Nitrofurantoin (Macrobid)     ICD-10-CM    1. Acute UTI  N39.0 nitroFURantoin macrocrystal-monohydrate (MACROBID) 100 MG capsule        Patient instructions:  Take antibiotic as prescribed with food. Drink plenty of water. If you develop sever abdominal or lower back pain or fever please go to ED.     Medical decision making:  Pt presents with urgency, frequency and dysuria. Ddx include but not limited to UTI, pyelonephritis, STI, sepsis and others. Symptoms today consistent with UTI. No signs of more systemic disease of pyelonephritis or sepsis. Will treat patient with Macrobid here today. If no improvement or worsening symptoms should be seen in person.       SUBJECTIVE:  History of Present Illness-  Yuliya Li, 54-year-old female    - Symptoms began on March 8, 2025, with stinging at the end of urination and a constant urge to urinate.  - No fever reported.  - History of urinary tract infections (UTIs), with the last occurrence in August 2024.  - Previous treatment with Macrobid (Nitrofurantoin) for UTIs.  - Not prone to yeast infections when on antibiotics, but currently has a yeast infection under the abdominal flap.     ROS: Pertinent ROS neg other than the symptoms noted above in the HPI.     OBJECTIVE:  GENERAL: alert and no distress  EYES: Eyes grossly normal to inspection.  No discharge or erythema, or obvious scleral/conjunctival abnormalities.  RESP: No audible wheeze, cough, or visible cyanosis.    SKIN: Visible skin clear. No significant rash, abnormal pigmentation or lesions.  NEURO: Cranial nerves grossly  intact.  Mentation and speech appropriate for age.  PSYCH: Appropriate affect, tone, and pace of words     Madison Coughlin FNP    Video-Visit Details     Type of service:  Video Visit  Video Start Time: 1423  Video End Time: 1428    Originating Location: Home     Distant Location:  [facility]      Platform used for Video Visit: SHIELA Lopes CNP        Consent was obtained from the patient to use an AI documentation tool in the creation of this note.  Any grammatical or spelling errors are non-intentional. Please contact the author of this note directly if you are in need of any clarification.     Current Outpatient Medications   Medication Sig Dispense Refill    nitroFURantoin macrocrystal-monohydrate (MACROBID) 100 MG capsule Take 1 capsule (100 mg) by mouth 2 times daily for 5 days. 10 capsule 0    alcohol swab prep pads Use to swab area of injection/amy as directed. 100 each 3    atorvastatin (LIPITOR) 10 MG tablet Take 1 tablet (10 mg) by mouth daily. 90 tablet 3    blood glucose (NO BRAND SPECIFIED) test strip Use to test blood sugar 2 times daily or as directed. To accompany: Blood Glucose Monitor Brands: per insurance. 100 strip 6    blood glucose calibration (NO BRAND SPECIFIED) solution To accompany: Blood Glucose Monitor Brands: per insurance. 1 each 1    blood glucose monitoring (NO BRAND SPECIFIED) meter device kit Use to test blood sugar 2 times daily or as directed. Preferred blood glucose meter OR supplies to accompany: Blood Glucose Monitor Brands: per insurance. 1 kit 0    cetirizine (ZYRTEC) 10 MG tablet Take 1 tablet by mouth daily      EPINEPHrine (ANY BX GENERIC EQUIV) 0.3 MG/0.3ML injection 2-pack Inject 0.3 mLs (0.3 mg) into the muscle once as needed for anaphylaxis May repeat one time in 5-15 minutes if response to initial dose is inadequate. 2 each 0    FLUoxetine (PROZAC) 40 MG capsule Take 1 capsule (40 mg) by mouth daily. 90 capsule 3     hydrOXYzine HCl (ATARAX) 25 MG tablet Take 1-2 tablets (25-50 mg) by mouth 3 times daily as needed for itching. 30 tablet 1    medical cannabis (Patient's own supply) Take 1 Dose by mouth nightly as needed Gummies: 5 mg   Formulary: Red  Location: Unknown    (The purpose of this order is to document that the patient reports taking medical cannabis.  This is not a prescription, and is not used to certify that the patient has a qualifying medical condition.)      melatonin 3 MG tablet Take 6 mg by mouth nightly as needed for sleep      nystatin (MYCOSTATIN) 536984 UNIT/GM external powder Apply topically 2 times daily. To rash under abdominal fold 60 g 1    Semaglutide, 2 MG/DOSE, (OZEMPIC) 8 MG/3ML pen Inject 2 mg subcutaneously every 7 days. 9 mL 3    spironolactone (ALDACTONE) 100 MG tablet Take 1 tablet (100 mg) by mouth daily. 90 tablet 3    tamoxifen (NOLVADEX) 20 MG tablet Take 1 tablet (20 mg) by mouth daily 90 tablet 3    thin (NO BRAND SPECIFIED) lancets Use with lanceting device.  To test every day. To accompany: Blood Glucose Monitor Brands: per insurance. 100 each 6    topiramate (TOPAMAX) 25 MG tablet Take 2 tablets (50 mg) by mouth at bedtime. 180 tablet 1    topiramate (TOPAMAX) 50 MG tablet Take 2 tablets (100 mg) by mouth at bedtime. 60 tablet 5     No current facility-administered medications for this visit.      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)      Past Medical History:   Diagnosis Date    Cancer (H)  2023    Breast- bi-lateral mastectomy    Diabetes (H) 2021     Past Surgical History:   Procedure Laterality Date    CYST REMOVAL      HYSTERECTOMY      HYSTERECTOMY, PAP NO LONGER INDICATED      menorrhagia    INSERT TISSUE EXPANDER BREAST Bilateral 2/3/2023    Procedure: BILATERAL TISSUE EXPANDER;  Surgeon: Jaya Malave MD;  Location: [facility] OR    MASTECTOMY SIMPLE BILATERAL, SENTINEL NODE BILATERAL, COMBINED Left 2/3/2023    Procedure: Bilateral skin sparing mastectomies with left axillary sentinel lymph node biopsy;  Surgeon: Clarissa Fontanez MD;  Location:  OR     Family History   Problem Relation Age of Onset    Diabetes Mother     Atrial fibrillation Father     Alcoholism Father     Melanoma Father     Other Cancer Father         Melanoma-     Breast Cancer Maternal Aunt         in her 40's    Cervical Cancer Maternal Aunt     Breast Cancer Other         Maternal aunt    Colon Cancer No family hx of      Social History     Tobacco Use    Smoking status: Former     Current packs/day: 0.00     Types: Cigarettes     Quit date: 1999     Years since quittin.3     Passive exposure: Past    Smokeless tobacco: Never   Substance Use Topics    Alcohol use: Not Currently     Comment: Alcoholic Drinks/day: 1 a week

## 2025-03-21 ENCOUNTER — HOSPITAL ENCOUNTER (OUTPATIENT)
Dept: BONE DENSITY | Facility: CLINIC | Age: 55
Discharge: HOME | End: 2025-03-21
Attending: INTERNAL MEDICINE | Admitting: INTERNAL MEDICINE
Payer: COMMERCIAL

## 2025-03-21 DIAGNOSIS — M85.9 DISORDER OF BONE DENSITY AND STRUCTURE, UNSPECIFIED: ICD-10-CM

## 2025-03-21 PROCEDURE — 77080 DXA BONE DENSITY AXIAL: CPT

## 2025-04-16 ENCOUNTER — HOSPITAL ENCOUNTER (OUTPATIENT)
Dept: MRI IMAGING | Facility: CLINIC | Age: 55
Discharge: HOME | End: 2025-04-16
Payer: COMMERCIAL

## 2025-04-16 DIAGNOSIS — R51.9 CHRONIC DAILY HEADACHE: ICD-10-CM

## 2025-04-16 DIAGNOSIS — G44.83 PRIMARY COUGH HEADACHE: ICD-10-CM

## 2025-04-16 PROCEDURE — 255N000002 HC RX 255 OP 636

## 2025-04-16 PROCEDURE — 70546 MR ANGIOGRAPH HEAD W/O&W/DYE: CPT

## 2025-04-16 PROCEDURE — A9585 GADOBUTROL INJECTION: HCPCS

## 2025-04-16 RX ORDER — GADOBUTROL 604.72 MG/ML
10 INJECTION INTRAVENOUS ONCE
Status: COMPLETED | OUTPATIENT
Start: 2025-04-16 | End: 2025-04-16

## 2025-04-16 RX ORDER — ACYCLOVIR 200 MG/1
50 CAPSULE ORAL ONCE
Status: DISCONTINUED | OUTPATIENT
Start: 2025-04-16 | End: 2025-04-17 | Stop reason: HOSPADM

## 2025-04-16 RX ADMIN — GADOBUTROL 10 ML: 604.72 INJECTION INTRAVENOUS at 10:02

## 2025-04-21 ENCOUNTER — VIRTUAL VISIT (OUTPATIENT)
Dept: NEUROLOGY | Facility: CLINIC | Age: 55
End: 2025-04-21
Payer: COMMERCIAL

## 2025-04-21 VITALS — BODY MASS INDEX: 32.58 KG/M2 | HEIGHT: 69 IN | WEIGHT: 220 LBS

## 2025-04-21 DIAGNOSIS — G44.83 PRIMARY COUGH HEADACHE: ICD-10-CM

## 2025-04-21 RX ORDER — TOPIRAMATE 50 MG/1
50 TABLET, FILM COATED ORAL AT BEDTIME
Qty: 90 TABLET | Refills: 1 | Status: SHIPPED | OUTPATIENT
Start: 2025-04-21

## 2025-04-21 ASSESSMENT — MIGRAINE DISABILITY ASSESSMENT (MIDAS)
HOW OFTEN WERE SOCIAL ACTIVITIES MISSED DUE TO HEADACHES: 0
HOW MANY DAYS DID YOU NOT DO HOUSEWORK BECAUSE OF HEADACHES: 3
ON A SCALE FROM 0-10 ON AVERAGE HOW PAINFUL WERE HEADACHES: 4
HOW MANY DAYS IN THE PAST 3 MONTHS HAVE YOU HAD A HEADACHE: 10
HOW MANY DAYS WAS HOUSEWORK PRODUCTIVITY CUT IN HALF DUE TO HEADACHES: 2
HOW MANY DAYS DID YOU MISS WORK OR SCHOOL BECAUSE OF HEADACHES: 0
HOW MANY DAYS WAS YOUR PRODUCTIVITY CUT IN HALF BECAUSE OF HEADACHES: 4
TOTAL SCORE: 9

## 2025-04-21 ASSESSMENT — HEADACHE IMPACT TEST (HIT 6)
WHEN YOU HAVE HEADACHES HOW OFTEN IS THE PAIN SEVERE: SOMETIMES
HOW OFTEN HAVE YOU FELT FED UP OR IRRITATED BECAUSE OF YOUR HEADACHES: RARELY
HOW OFTEN DID HEADACHS LIMIT CONCENTRATION ON WORK OR DAILY ACTIVITY: RARELY
WHEN YOU HAVE A HEADACHE HOW OFTEN DO YOU WISH YOU COULD LIE DOWN: SOMETIMES
HIT6 TOTAL SCORE: 54
HOW OFTEN DO HEADACHES LIMIT YOUR DAILY ACTIVITIES: SOMETIMES
HOW OFTEN HAVE YOU FELT TOO TIRED TO WORK BECAUSE OF YOUR HEADACHES: RARELY

## 2025-04-21 ASSESSMENT — PAIN SCALES - GENERAL: PAINLEVEL_OUTOF10: NO PAIN (0)

## 2025-04-21 NOTE — LETTER
"4/21/2025      Yuliya Li  [address]  [place] [address]      Dear Colleague,    Thank you for referring your patient, Yuliya Li, to the [facility]. Please see a copy of my visit note below.    Virtual Visit Details    Type of service:  Video Visit     Originating Location (pt. Location): Home    Distant Location (provider location):  Off-site  Platform used for Video Visit: [facility]    Headache Neurology Progress Note    April 21, 2025    Assessment and Plan:   Yuliya is a 54 year old female who presents for follow up of primary cough headache.     Recommend she reduce topiramate dose back down to 50 mg as she has no appetite at 100 mg dose. This has been effective for reducing duration of headaches when they occur, so they are now only occurring during coughing/straining and not persisting beyond this.   Will plan for her to continue at this dose for 6 months then can consider trialing without.     The longitudinal plan of care for the diagnosis(es)/condition(s) as documented were addressed during this visit. Due to the added complexity in care, I will continue to support Yuliya in the subsequent management and with ongoing continuity of care.     Follow up in 6 months.     Audrey Courtney PA-C  Headache Neurology  [facility]      Subjective:    Yuliya presents for follow up of primary cough headache.     Headache description from initial consult (2/21/2025): \"Headaches are felt throughout the forehead, around her eyes, at bilateral temples.   If she coughs, strains, bends over, walks quickly, lifts, etc, headache will spread throughout her head and become a very intense pressure. Initially, headaches would stay frontal when they became severe, but now they become holocephalic. Severity gets up to a 10/10 with coughing, straining. Severe pain will last for ~10 minutes before subsiding to her " "baseline level of pain, which she rates as a 3/10.   When headaches first started, she would have severe headaches that were unprovoked, unrelated to valsalva, and these would last for several hours.   She denies any nausea, photophobia or phonophobia. She denies pulsatile tinnitus but she feels as though she can hear her eyeballs move (like the sound of her muscles/tendons moving), particularly before a severe headache starts. \"    Headaches still only happening with laughing, coughing, or straining hard. However, now they are only lasting the duration of the activity, not persisting for longer.     No new vision changes, exertional component, or other new headache features.     She is taking topiramate 100 mg nightly. Notes significantly decreased appetite at this higher dose. No significant change in headache control at higher dose.           2/20/2025    10:52 PM 4/21/2025    10:27 AM   HIT-6   When you have headaches, how often is the pain severe 10 10   How often do headaches limit your ability to do usual daily activities including household work, work, school, or social activities? 10 10   When you have a headache, how often do you wish you could lie down? 10 10   In the past 4 weeks, how often have you felt too tired to do work or daily activities because of your headaches 10 8   In the past 4 weeks, how often have you felt fed up or irritated because of your headaches 11 8   In the past 4 weeks, how often did headaches limit your ability to concentrate on work or daily activities 11 8   HIT-6 Total Score 62  54        Patient-reported           2/20/2025    10:59 PM 4/21/2025    10:29 AM   MIDAS - in the past three months:   On how many days did you miss work or school because of your headaches? 2 0   How many days was your productivity at work or school reduced by half or more because of your headaches? 5 4   On how many days did you not do household work because of your headaches? 5 3   How many days was " "your productivity in household work reduced by half or more because of your headaches? 5 2   On how many days did you miss family, social, or leisure activities because of your headaches? 2 0   On how many days did you have a headache? 40 10   On a scale of 0-10, on average how painful were these headaches? 7 4   MIDAS Score 19 (III - Moderate Disability) 9 (II - Mild Disability)       Objective:    Vitals: Ht 1.753 m (5' 9\")   Wt 99.8 kg (220 lb)   LMP  (LMP Unknown)   BMI 32.49 kg/m    General: Cooperative, NAD  Neurologic Exam:  Mental Status: Fully alert, attentive and oriented. Speech is clear and fluent.   Cranial Nerves: Facial movements symmetric.   Motor: No abnormal movements.      Pertinent Investigations:    EXAM: MRV BRAIN  W/O and W CONTRAST  LOCATION: [facility]  DATE: 4/16/2025     INDICATION: new cough headache headache worse with valsalva, rule out vascular cause  COMPARISON: 01/23/2025  CONTRAST: 10mL gadavist  TECHNIQUE:   1) Routine multiplanar multisequence head MRI without and with intravenous contrast.  2) Phase contrast and 2-D time-of-flight head MRV performed after administration of intravenous contrast.     FINDINGS:  HEAD MRV:   DURAL SINUSES: No significant stenosis or occlusion. Dominant right and smaller left transverse and sigmoid dural sinuses.     INTERNAL CEREBRAL VEINS: No significant stenosis or occlusion.       MAJOR CORTICAL VENOUS BRANCHES: No significant stenosis or occlusion.     Normal appearance of the proximal right internal jugular vein however the proximal left internal jugular vein is not is well traced through the jugular foramen.                                                                      IMPRESSION:     HEAD MRV:      1.  No dural venous sinus thrombosis or significant stenosis.  2.  Normal appearance of the proximal right internal jugular vein however the proximal left internal jugular vein is somewhat tortuous and not " well-visualized otherwise. Consider a contrast enhanced CT venogram of the neck for further characterization of the more distal aspects of the left internal jugular vein.        Again, thank you for allowing me to participate in the care of your patient.        Sincerely,        Audrey Courtney PA-C    Electronically signed

## 2025-04-21 NOTE — NURSING NOTE
Current patient location:  [place], [place]    Is the patient currently in the state of [place]? YES    Visit mode: VIDEO    If the visit is dropped, the patient can be reconnected by:VIDEO VISIT: Text to cell phone:   Telephone Information:   Mobile [phone]       Will anyone else be joining the visit? NO  (If patient encounters technical issues they should call [phone])    Are changes needed to the allergy or medication list? Pt stated no changes to allergies and Pt stated no med changes    Are refills needed on medications prescribed by this physician? NO    Rooming Documentation:  Questionnaire(s) completed    Reason for visit: KEIRY Villalobos VVF

## 2025-04-21 NOTE — PROGRESS NOTES
"Virtual Visit Details    Type of service:  Video Visit     Originating Location (pt. Location): Home    Distant Location (provider location):  Off-site  Platform used for Video Visit: [facility]    Headache Neurology Progress Note    April 21, 2025    Assessment and Plan:   Yuliya is a 54 year old female who presents for follow up of primary cough headache.     Recommend she reduce topiramate dose back down to 50 mg as she has no appetite at 100 mg dose. This has been effective for reducing duration of headaches when they occur, so they are now only occurring during coughing/straining and not persisting beyond this.   Will plan for her to continue at this dose for 6 months then can consider trialing without.     The longitudinal plan of care for the diagnosis(es)/condition(s) as documented were addressed during this visit. Due to the added complexity in care, I will continue to support Yuliya in the subsequent management and with ongoing continuity of care.     Follow up in 6 months.     Audrey Courtney PA-C  Headache Neurology  [facility] [place]      Subjective:    Yuliya presents for follow up of primary cough headache.     Headache description from initial consult (2/21/2025): \"Headaches are felt throughout the forehead, around her eyes, at bilateral temples.   If she coughs, strains, bends over, walks quickly, lifts, etc, headache will spread throughout her head and become a very intense pressure. Initially, headaches would stay frontal when they became severe, but now they become holocephalic. Severity gets up to a 10/10 with coughing, straining. Severe pain will last for ~10 minutes before subsiding to her baseline level of pain, which she rates as a 3/10.   When headaches first started, she would have severe headaches that were unprovoked, unrelated to valsalva, and these would last for several hours.   She denies any nausea, photophobia or phonophobia. She " "denies pulsatile tinnitus but she feels as though she can hear her eyeballs move (like the sound of her muscles/tendons moving), particularly before a severe headache starts. \"    Headaches still only happening with laughing, coughing, or straining hard. However, now they are only lasting the duration of the activity, not persisting for longer.     No new vision changes, exertional component, or other new headache features.     She is taking topiramate 100 mg nightly. Notes significantly decreased appetite at this higher dose. No significant change in headache control at higher dose.           2/20/2025    10:52 PM 4/21/2025    10:27 AM   HIT-6   When you have headaches, how often is the pain severe 10 10   How often do headaches limit your ability to do usual daily activities including household work, work, school, or social activities? 10 10   When you have a headache, how often do you wish you could lie down? 10 10   In the past 4 weeks, how often have you felt too tired to do work or daily activities because of your headaches 10 8   In the past 4 weeks, how often have you felt fed up or irritated because of your headaches 11 8   In the past 4 weeks, how often did headaches limit your ability to concentrate on work or daily activities 11 8   HIT-6 Total Score 62  54        Patient-reported           2/20/2025    10:59 PM 4/21/2025    10:29 AM   MIDAS - in the past three months:   On how many days did you miss work or school because of your headaches? 2 0   How many days was your productivity at work or school reduced by half or more because of your headaches? 5 4   On how many days did you not do household work because of your headaches? 5 3   How many days was your productivity in household work reduced by half or more because of your headaches? 5 2   On how many days did you miss family, social, or leisure activities because of your headaches? 2 0   On how many days did you have a headache? 40 10   On a scale " "of 0-10, on average how painful were these headaches? 7 4   MIDAS Score 19 (III - Moderate Disability) 9 (II - Mild Disability)       Objective:    Vitals: Ht 1.753 m (5' 9\")   Wt 99.8 kg (220 lb)   LMP  (LMP Unknown)   BMI 32.49 kg/m    General: Cooperative, NAD  Neurologic Exam:  Mental Status: Fully alert, attentive and oriented. Speech is clear and fluent.   Cranial Nerves: Facial movements symmetric.   Motor: No abnormal movements.      Pertinent Investigations:    EXAM: MRV BRAIN  W/O and W CONTRAST  LOCATION: [facility]  DATE: 4/16/2025     INDICATION: new cough headache headache worse with valsalva, rule out vascular cause  COMPARISON: 01/23/2025  CONTRAST: 10mL gadavist  TECHNIQUE:   1) Routine multiplanar multisequence head MRI without and with intravenous contrast.  2) Phase contrast and 2-D time-of-flight head MRV performed after administration of intravenous contrast.     FINDINGS:  HEAD MRV:   DURAL SINUSES: No significant stenosis or occlusion. Dominant right and smaller left transverse and sigmoid dural sinuses.     INTERNAL CEREBRAL VEINS: No significant stenosis or occlusion.       MAJOR CORTICAL VENOUS BRANCHES: No significant stenosis or occlusion.     Normal appearance of the proximal right internal jugular vein however the proximal left internal jugular vein is not is well traced through the jugular foramen.                                                                      IMPRESSION:     HEAD MRV:      1.  No dural venous sinus thrombosis or significant stenosis.  2.  Normal appearance of the proximal right internal jugular vein however the proximal left internal jugular vein is somewhat tortuous and not well-visualized otherwise. Consider a contrast enhanced CT venogram of the neck for further characterization of the more distal aspects of the left internal jugular vein.      "

## 2025-04-28 NOTE — PROGRESS NOTES
[facility]    Hematology/Oncology Established Patient Note      Today's Date: 5/8/2025    Reason for follow-up:  Left breast cancer.    HISTORY OF PRESENT ILLNESS: Yuliya Li is a 54 year old female status post removal of single ovary and hysterectomy who presents with the following oncologic history:  1.  12/23/2022: Mammogram showed possible asymmetry in right breast at 12:00; possible mass in left breast at 3:00.  2. 1/2/2023: Diagnostic bilateral mammogram showed mass in left breast at 3:00 with 0.4 cm satellite mass in retroareolar breast. Right breast with effacement of possible finding at 12:00 consistent with artifact. Left breast U/S showed mass at 3:00, 11 cm from nipple measuring 1.9 x 1.6 x 1.8 cm; no abnormal axillary lymph nodes.  3. 1/03/2022: Left breast core biopsy of 0.4 cm mass at 3:30 position showed grade 2 invasive ductal carcinoma with lobular features. Biopsy of left breast 1.9 cm mass at 3:00 showed grade 2 invasive ductal carcinoma with lobular features, associated grade 2 DCIS. ER and MO strongly positive at %; HER-2 IHC = 2+; HER-2/crow FISH negative.  4. 1/10/2023: FSH = 36.6 but estradiol = 63.  5.  2/03/2023: Bilateral mastectomies with left axillary sentinel lymph node excision and left chest wall nodule excision with Dr. Clarissa Fontanez.  Pathology showed left breast grade 2 invasive ductal carcinoma with lobular features, multifocal with 2 cm and 0.4 cm tumors, grade 2 DCIS, all margins negative.  Of 3 left axillary sentinel lymph nodes removed, one with 6 mm macrometastasis positive for extranodal extension and one with 1.1 mm micrometastasis, negative for extranodal extension, identified. Left chest wall nodule positive for metastatic breast carcinoma involving edge of tissue fragment. HER-2 FISH repeated, group 4, considered HER-2 negative. Oncotype DX = 20, 9-year risk of distant recurrence of 17% after 5 years of hormone blockade therapy;  breast-cancer specific survival of 96.7% treated without chemotherapy.  6. 2/23/2023: PET/CT scan negative for metastatic disease. Small right middle lobe lung nodules, non-FDG avid.  7. 3/29/2023: Started adjuvant chemotherapy with Taxotere and Cytoxan. Received cycle 2 on 4/19/2023, complicated by allergic reaction with chest heaviness followed by diffuse rash. Then switched to weekly paclitaxel for 6 weeks with completion on 6/14/2023.  8. 6/22/2023: Start of Zoladex every 4 weeks with anastrozole.  9. 7/17/2023-8/18/2023: Completed adjuvant radiation therapy.  10. 12/27/2023: Stopped anastrozole due to diffuse joint pain.  11. 1/15/2024: Started letrozole.  12. 3/25/2024: Stopped letrozole due to diffuse joint pain.  13. 4/25/2024: Switched to tamoxifen.    INTERVAL HISTORY:  Yuliya reports headaches and just started on Topamax.  Her urticaria is well controlled on Zyrtec.    REVIEW OF SYSTEMS:   14 point ROS was reviewed and is negative other than as noted above in HPI.       HOME MEDICATIONS:  Current Outpatient Medications   Medication Sig Dispense Refill    alcohol swab prep pads Use to swab area of injection/amy as directed. 100 each 3    atorvastatin (LIPITOR) 10 MG tablet Take 1 tablet (10 mg) by mouth daily. 90 tablet 3    blood glucose (NO BRAND SPECIFIED) test strip Use to test blood sugar 2 times daily or as directed. To accompany: Blood Glucose Monitor Brands: per insurance. 100 strip 6    blood glucose calibration (NO BRAND SPECIFIED) solution To accompany: Blood Glucose Monitor Brands: per insurance. 1 each 1    blood glucose monitoring (NO BRAND SPECIFIED) meter device kit Use to test blood sugar 2 times daily or as directed. Preferred blood glucose meter OR supplies to accompany: Blood Glucose Monitor Brands: per insurance. 1 kit 0    cetirizine (ZYRTEC) 10 MG tablet Take 1 tablet by mouth daily      EPINEPHrine (ANY BX GENERIC EQUIV) 0.3 MG/0.3ML injection 2-pack Inject 0.3 mLs (0.3 mg) into  the muscle once as needed for anaphylaxis May repeat one time in 5-15 minutes if response to initial dose is inadequate. 2 each 0    FLUoxetine (PROZAC) 40 MG capsule Take 1 capsule (40 mg) by mouth daily. 90 capsule 3    hydrOXYzine HCl (ATARAX) 25 MG tablet Take 1-2 tablets (25-50 mg) by mouth 3 times daily as needed for itching. 30 tablet 1    medical cannabis (Patient's own supply) Take 1 Dose by mouth nightly as needed Gummies: 5 mg   Formulary: Red  Location: Unknown    (The purpose of this order is to document that the patient reports taking medical cannabis.  This is not a prescription, and is not used to certify that the patient has a qualifying medical condition.)      melatonin 3 MG tablet Take 6 mg by mouth nightly as needed for sleep      nystatin (MYCOSTATIN) 114005 UNIT/GM external powder Apply topically 2 times daily. To rash under abdominal fold 60 g 1    Semaglutide, 2 MG/DOSE, (OZEMPIC) 8 MG/3ML pen Inject 2 mg subcutaneously every 7 days. 9 mL 3    spironolactone (ALDACTONE) 100 MG tablet Take 1 tablet (100 mg) by mouth daily. 90 tablet 3    tamoxifen (NOLVADEX) 20 MG tablet Take 1 tablet (20 mg) by mouth daily. 90 tablet 3    thin (NO BRAND SPECIFIED) lancets Use with lanceting device.  To test every day. To accompany: Blood Glucose Monitor Brands: per insurance. 100 each 6    topiramate (TOPAMAX) 25 MG tablet Take 2 tablets (50 mg) by mouth at bedtime. 180 tablet 1    topiramate (TOPAMAX) 50 MG tablet Take 1 tablet (50 mg) by mouth at bedtime. 90 tablet 1         ALLERGIES:  Allergies   Allergen Reactions    Docetaxel Anaphylaxis, Hives, Itching and Shortness Of Breath    Keflex [Cephalexin]     Pegfilgrastim     Tetracycline     Trazodone      Very sedating-         PAST MEDICAL HISTORY:  Diabetes mellitus type 2  Hyperlipidemia    Gynecologic history:  Age of menarche at 11.  Last menstrual period in 2005 prior to hysterectomy.  G3, P3.  Age of first pregnancy at 29.  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  No use of HRT.  Used OCPs for 4 years.  No IUD use.      PAST SURGICAL HISTORY:  Past Surgical History:   Procedure Laterality Date    CYST REMOVAL      HYSTERECTOMY      HYSTERECTOMY, PAP NO LONGER INDICATED      menorrhagia    INSERT TISSUE EXPANDER BREAST Bilateral 2/3/2023    Procedure: BILATERAL TISSUE EXPANDER;  Surgeon: Jaya Malave MD;  Location:  OR    MASTECTOMY SIMPLE BILATERAL, SENTINEL NODE BILATERAL, COMBINED Left 2/3/2023    Procedure: Bilateral skin sparing mastectomies with left axillary sentinel lymph node biopsy;  Surgeon: Clarissa Fontanez MD;  Location:  OR         SOCIAL HISTORY:  Social History     Socioeconomic History    Marital status:      Spouse name: Not on file    Number of children: 3    Years of education: Not on file    Highest education level: Not on file   Occupational History    Not on file   Tobacco Use    Smoking status: Former     Current packs/day: 0.00     Types: Cigarettes     Quit date: 1999     Years since quittin.4     Passive exposure: Past    Smokeless tobacco: Never   Vaping Use    Vaping status: Never Used   Substance and Sexual Activity    Alcohol use: Not Currently     Comment: Alcoholic Drinks/day: 1 a week    Drug use: Yes     Types: Marijuana     Comment: 5 mg gummy at bedtime    Sexual activity: Yes     Partners: Male     Birth control/protection: Post-menopausal     Comment: Hysterectomy   Other Topics Concern    Parent/sibling w/ CABG, MI or angioplasty before 65F 55M? No   Social History Narrative    Lives with  and son    Work-      Social Drivers of Health     Financial Resource Strain: Low Risk  (10/23/2024)    Financial Resource Strain     Within the past 12 months, have you or your family members you live with been unable to get utilities (heat, electricity) when it was really needed?: No   Food Insecurity: Low Risk  (10/23/2024)    Food Insecurity     Within the past 12 months, did  you worry that your food would run out before you got money to buy more?: No     Within the past 12 months, did the food you bought just not last and you didn t have money to get more?: No   Transportation Needs: Low Risk  (10/23/2024)    Transportation Needs     Within the past 12 months, has lack of transportation kept you from medical appointments, getting your medicines, non-medical meetings or appointments, work, or from getting things that you need?: No   Physical Activity: Insufficiently Active (10/23/2024)    Exercise Vital Sign     Days of Exercise per Week: 1 day     Minutes of Exercise per Session: 30 min   Stress: Stress Concern Present (10/23/2024)    [employer] - Occupational Stress Questionnaire     Feeling of Stress : To some extent   Social Connections: Unknown (10/23/2024)    Social Connection and Isolation Panel [NHANES]     Frequency of Communication with Friends and Family: Not on file     Frequency of Social Gatherings with Friends and Family: Once a week     Attends Druze Services: Not on file     Active Member of Clubs or Organizations: Not on file     Attends Club or Organization Meetings: Not on file     Marital Status: Not on file   Interpersonal Safety: Low Risk  (10/23/2024)    Interpersonal Safety     Do you feel physically and emotionally safe where you currently live?: Yes     Within the past 12 months, have you been hit, slapped, kicked or otherwise physically hurt by someone?: No     Within the past 12 months, have you been humiliated or emotionally abused in other ways by your partner or ex-partner?: No   Housing Stability: Low Risk  (10/23/2024)    Housing Stability     Do you have housing? : Yes     Are you worried about losing your housing?: No         FAMILY HISTORY:  Family History   Problem Relation Age of Onset    Diabetes Mother     Atrial fibrillation Father     Alcoholism Father     Melanoma Father     Other Cancer Father          Melanoma-     Breast Cancer Maternal Aunt         in her 40's    Cervical Cancer Maternal Aunt     Breast Cancer Other         Maternal aunt    Colon Cancer No family hx of          PHYSICAL EXAM:  Vital signs:  BP 92/74   Pulse 86   Resp 16   Wt 98.4 kg (217 lb)   LMP  (LMP Unknown)   SpO2 96%   BMI 32.05 kg/m     GENERAL: No acute distress.  EYES: No scleral icterus. No overt erythema.  BREAST: Bilateral breasts surgically absent with no chest wall masses and implants in place and no periprosthetic fluid.   RESPIRATORY: No audible cough, wheezing, or labored breathing.  MUSCULOSKELETAL: Range of motion in the neck, shoulders, and arms appear normal.  SKIN: No overt rashes, discolorations, or lesions over the face and neck.  NEUROLOGIC: Alert.  No overt tremors.  PSYCHIATRIC: Normal affect and mood.  Does not appear anxious.       LABS:  CBC RESULTS:   Recent Labs   Lab Test 24  1444 23  0941   WBC  --  5.3   RBC  --  4.85   HGB 12.8 13.8   HCT  --  41.4   MCV  --  85   MCH  --  28.5   MCHC  --  33.3   RDW  --  12.4   PLT  --  296         Last Comprehensive Metabolic Panel:  Sodium   Date Value Ref Range Status   10/23/2024 139 135 - 145 mmol/L Final     Potassium   Date Value Ref Range Status   10/23/2024 4.2 3.4 - 5.3 mmol/L Final   01/10/2023 3.9 3.4 - 5.3 mmol/L Final     Chloride   Date Value Ref Range Status   10/23/2024 103 98 - 107 mmol/L Final   01/10/2023 102 94 - 109 mmol/L Final     Carbon Dioxide (CO2)   Date Value Ref Range Status   10/23/2024 26 22 - 29 mmol/L Final   01/10/2023 26 20 - 32 mmol/L Final     Anion Gap   Date Value Ref Range Status   10/23/2024 10 7 - 15 mmol/L Final   01/10/2023 7 3 - 14 mmol/L Final     Glucose   Date Value Ref Range Status   10/23/2024 81 70 - 99 mg/dL Final   01/10/2023 100 (H) 70 - 99 mg/dL Final     GLUCOSE BY METER POCT   Date Value Ref Range Status   2023 120 (H) 70 - 99 mg/dL Final     Urea Nitrogen   Date Value Ref Range Status    10/23/2024 9.1 6.0 - 20.0 mg/dL Final   01/10/2023 16 7 - 30 mg/dL Final     Creatinine   Date Value Ref Range Status   10/23/2024 0.87 0.51 - 0.95 mg/dL Final     GFR Estimate   Date Value Ref Range Status   10/23/2024 79 >60 mL/min/1.73m2 Final     Comment:     eGFR calculated using 2021 CKD-EPI equation.   01/12/2021 >60 >60 mL/min/1.73m2 Final     Calcium   Date Value Ref Range Status   10/23/2024 9.6 8.8 - 10.4 mg/dL Final     Comment:     Reference intervals for this test were updated on 7/16/2024 to reflect our healthy population more accurately. There may be differences in the flagging of prior results with similar values performed with this method. Those prior results can be interpreted in the context of the updated reference intervals.     Bilirubin Total   Date Value Ref Range Status   10/23/2024 0.3 <=1.2 mg/dL Final     Alkaline Phosphatase   Date Value Ref Range Status   10/23/2024 76 40 - 150 U/L Final     ALT   Date Value Ref Range Status   10/23/2024 11 0 - 50 U/L Final     AST   Date Value Ref Range Status   10/23/2024 23 0 - 45 U/L Final        11/1/2023  FSH = 15.5  Estradiol = 6  Vitamin D = 27    ASSESSMENT/PLAN:  Yuliya Li is a 54 year old female with the following issues:  1. Pathologic prognostic stage IA, [id number], grade 2 multifocal invasive ductal carcinoma with lobular features of left breast overlapping sites, ER positive, MT positive, HER-2 IHC = 2+; HER-2/crow FISH negative, Oncotype DX = 20  --Yuliya had 2 tumors measuring 2 cm and 0.4 cm and additional left chest wall nodule, but with 2/3 lymph nodes involved, one that was a macrometastasis with extranodal extension and the other with micrometastasis with no extranodal extension.  All areas of tumor were excised.  The repeat HER-2 FISH was interpreted as negative.   --2/23/2023 PET scan showed no evidence for metastatic disease.  --Oncotype DX = 20, with 9-year risk of distant recurrence of 17% after 5 years of hormone  blockade therapy; breast-cancer specific survival of 96.7% treated without chemotherapy.  --She elected to proceed with 4 cycles of Taxotere and Cytoxan. However, she reacted to Taxotere after cycle 2. Therefore, she was switched to Taxol weekly for 6 weeks, completed 6/14/2023.  We did not proceed with dose dense AC given that her maximum absolute chemo benefit is up to 3.3% and would not justify the potential adverse effects of ddAC. She then completed radiation.  --She is perimenopausal and started OST + AI with Zoladex every 4 weeks with anastrozole on 6/22/2023. She did not tolerate this due to diffuse joint pain and discontinued Zoladex by 2/2024 and tried letrozole but did not tolerate this either.    --She then switched to tamoxifen after 4/25/2024 visit and is tolerating this better.  --3/20/2023 DEXA scan showed normal bone density for baseline.   --3/21/2025 DEXA showed normal bone density but 10.7% decrease in lumbar spine BMD and10.9% decrease in bilateral hip BMD.  --Advised adequate calcium and vitamin D as well as weight bearing exercise.  --Discussed she has no clinical evidence for recurrent breast cancer by physical exam from today.    2.  Family history of breast cancer  - Maternal aunt with breast cancer.  - 2/2024 [facility] Multi-Cancer Panel + MC1R Gene was negative.    3. Indeterminate right middle lobe pulmonary nodules  --2/23/2023 PET/CT scan showed 2 small indeterminate right middle lobe pulmonary nodules.   --5/24/2023 CT chest showed stable right lung nodules.  --12/4/2023 Chest CT showed stable pulmonary nodule in the right middle lobe. The smaller of the two nodules described previously is no longer  demonstrated today.  --11/25/2024 Chest CT showed unchanged 5 mm right middle lobe pulmonary nodule; no new nodules; stable radiation fibrosis of anterior left lung; no lymphadenopathy.  --Repeat chest CT in 11/2025.    4. Hot flashes  --Mild and tolerable.  No medication intervention  needed.    5. Chronic urticaria  --She saw an allergist. Etiology unknown. She switched from Benadryl to Zyrtec with good control.    6. Vitamin D deficiency  --She is on vitamin D supplementation.    7. Polyarthralgia  --Since switching to tamoxifen, her polyarthralgia has improved mostly with use of Lyrica.    8. Headaches  --On topiramate with uncertain response so far.    Return in 4 months.    Louise Watson MD  [facility] Hematology/Oncology     Total time spent today: 40 minutes in chart review, patient evaluation, counseling, documentation, test and/or medication/prescription orders, and coordination of care.     The longitudinal plan of care for the diagnosis(es)/condition(s) as documented were addressed during this visit. Due to the added complexity in care, I will continue to support Yuliya in the subsequent management and with ongoing continuity of care.

## 2025-05-08 ENCOUNTER — ONCOLOGY VISIT (OUTPATIENT)
Dept: ONCOLOGY | Facility: CLINIC | Age: 55
End: 2025-05-08
Attending: INTERNAL MEDICINE
Payer: COMMERCIAL

## 2025-05-08 VITALS
SYSTOLIC BLOOD PRESSURE: 92 MMHG | BODY MASS INDEX: 32.05 KG/M2 | DIASTOLIC BLOOD PRESSURE: 74 MMHG | WEIGHT: 217 LBS | HEART RATE: 86 BPM | RESPIRATION RATE: 16 BRPM | OXYGEN SATURATION: 96 %

## 2025-05-08 DIAGNOSIS — N95.1 MENOPAUSAL SYNDROME (HOT FLASHES): ICD-10-CM

## 2025-05-08 DIAGNOSIS — M25.50 POLYARTHRALGIA: ICD-10-CM

## 2025-05-08 DIAGNOSIS — L50.8 CHRONIC URTICARIA: ICD-10-CM

## 2025-05-08 DIAGNOSIS — Z17.0 MALIGNANT NEOPLASM OF OVERLAPPING SITES OF LEFT BREAST IN FEMALE, ESTROGEN RECEPTOR POSITIVE (H): Primary | ICD-10-CM

## 2025-05-08 DIAGNOSIS — C50.812 MALIGNANT NEOPLASM OF OVERLAPPING SITES OF LEFT BREAST IN FEMALE, ESTROGEN RECEPTOR POSITIVE (H): Primary | ICD-10-CM

## 2025-05-08 DIAGNOSIS — R91.8 PULMONARY NODULES: ICD-10-CM

## 2025-05-08 PROCEDURE — 99213 OFFICE O/P EST LOW 20 MIN: CPT | Performed by: INTERNAL MEDICINE

## 2025-05-08 ASSESSMENT — PAIN SCALES - GENERAL: PAINLEVEL_OUTOF10: NO PAIN (0)

## 2025-05-08 NOTE — LETTER
5/8/2025      Yuliya Li  [address]  [place] [address]      Dear Colleague,    Thank you for referring your patient, Yuliya Li, to the [facility]. Please see a copy of my visit note below.    [facility]    Hematology/Oncology Established Patient Note      Today's Date: 5/8/2025    Reason for follow-up:  Left breast cancer.    HISTORY OF PRESENT ILLNESS: Yuliya Li is a 54 year old female status post removal of single ovary and hysterectomy who presents with the following oncologic history:  1.  12/23/2022: Mammogram showed possible asymmetry in right breast at 12:00; possible mass in left breast at 3:00.  2. 1/2/2023: Diagnostic bilateral mammogram showed mass in left breast at 3:00 with 0.4 cm satellite mass in retroareolar breast. Right breast with effacement of possible finding at 12:00 consistent with artifact. Left breast U/S showed mass at 3:00, 11 cm from nipple measuring 1.9 x 1.6 x 1.8 cm; no abnormal axillary lymph nodes.  3. 1/03/2022: Left breast core biopsy of 0.4 cm mass at 3:30 position showed grade 2 invasive ductal carcinoma with lobular features. Biopsy of left breast 1.9 cm mass at 3:00 showed grade 2 invasive ductal carcinoma with lobular features, associated grade 2 DCIS. ER and IN strongly positive at %; HER-2 IHC = 2+; HER-2/crow FISH negative.  4. 1/10/2023: FSH = 36.6 but estradiol = 63.  5.  2/03/2023: Bilateral mastectomies with left axillary sentinel lymph node excision and left chest wall nodule excision with Dr. Clarissa Fontanez.  Pathology showed left breast grade 2 invasive ductal carcinoma with lobular features, multifocal with 2 cm and 0.4 cm tumors, grade 2 DCIS, all margins negative.  Of 3 left axillary sentinel lymph nodes removed, one with 6 mm macrometastasis positive for extranodal extension and one with 1.1 mm micrometastasis, negative for extranodal extension, identified. Left chest wall  nodule positive for metastatic breast carcinoma involving edge of tissue fragment. HER-2 FISH repeated, group 4, considered HER-2 negative. Oncotype DX = 20, 9-year risk of distant recurrence of 17% after 5 years of hormone blockade therapy; breast-cancer specific survival of 96.7% treated without chemotherapy.  6. 2/23/2023: PET/CT scan negative for metastatic disease. Small right middle lobe lung nodules, non-FDG avid.  7. 3/29/2023: Started adjuvant chemotherapy with Taxotere and Cytoxan. Received cycle 2 on 4/19/2023, complicated by allergic reaction with chest heaviness followed by diffuse rash. Then switched to weekly paclitaxel for 6 weeks with completion on 6/14/2023.  8. 6/22/2023: Start of Zoladex every 4 weeks with anastrozole.  9. 7/17/2023-8/18/2023: Completed adjuvant radiation therapy.  10. 12/27/2023: Stopped anastrozole due to diffuse joint pain.  11. 1/15/2024: Started letrozole.  12. 3/25/2024: Stopped letrozole due to diffuse joint pain.  13. 4/25/2024: Switched to tamoxifen.    INTERVAL HISTORY:  Yuliya reports headaches and just started on Topamax.  Her urticaria is well controlled on Zyrtec.    REVIEW OF SYSTEMS:   14 point ROS was reviewed and is negative other than as noted above in HPI.       HOME MEDICATIONS:  Current Outpatient Medications   Medication Sig Dispense Refill     alcohol swab prep pads Use to swab area of injection/amy as directed. 100 each 3     atorvastatin (LIPITOR) 10 MG tablet Take 1 tablet (10 mg) by mouth daily. 90 tablet 3     blood glucose (NO BRAND SPECIFIED) test strip Use to test blood sugar 2 times daily or as directed. To accompany: Blood Glucose Monitor Brands: per insurance. 100 strip 6     blood glucose calibration (NO BRAND SPECIFIED) solution To accompany: Blood Glucose Monitor Brands: per insurance. 1 each 1     blood glucose monitoring (NO BRAND SPECIFIED) meter device kit Use to test blood sugar 2 times daily or as directed. Preferred blood glucose  meter OR supplies to accompany: Blood Glucose Monitor Brands: per insurance. 1 kit 0     cetirizine (ZYRTEC) 10 MG tablet Take 1 tablet by mouth daily       EPINEPHrine (ANY BX GENERIC EQUIV) 0.3 MG/0.3ML injection 2-pack Inject 0.3 mLs (0.3 mg) into the muscle once as needed for anaphylaxis May repeat one time in 5-15 minutes if response to initial dose is inadequate. 2 each 0     FLUoxetine (PROZAC) 40 MG capsule Take 1 capsule (40 mg) by mouth daily. 90 capsule 3     hydrOXYzine HCl (ATARAX) 25 MG tablet Take 1-2 tablets (25-50 mg) by mouth 3 times daily as needed for itching. 30 tablet 1     medical cannabis (Patient's own supply) Take 1 Dose by mouth nightly as needed Gummies: 5 mg   Formulary: Red  Location: Unknown    (The purpose of this order is to document that the patient reports taking medical cannabis.  This is not a prescription, and is not used to certify that the patient has a qualifying medical condition.)       melatonin 3 MG tablet Take 6 mg by mouth nightly as needed for sleep       nystatin (MYCOSTATIN) 769776 UNIT/GM external powder Apply topically 2 times daily. To rash under abdominal fold 60 g 1     Semaglutide, 2 MG/DOSE, (OZEMPIC) 8 MG/3ML pen Inject 2 mg subcutaneously every 7 days. 9 mL 3     spironolactone (ALDACTONE) 100 MG tablet Take 1 tablet (100 mg) by mouth daily. 90 tablet 3     tamoxifen (NOLVADEX) 20 MG tablet Take 1 tablet (20 mg) by mouth daily. 90 tablet 3     thin (NO BRAND SPECIFIED) lancets Use with lanceting device.  To test every day. To accompany: Blood Glucose Monitor Brands: per insurance. 100 each 6     topiramate (TOPAMAX) 25 MG tablet Take 2 tablets (50 mg) by mouth at bedtime. 180 tablet 1     topiramate (TOPAMAX) 50 MG tablet Take 1 tablet (50 mg) by mouth at bedtime. 90 tablet 1         ALLERGIES:  Allergies   Allergen Reactions     Docetaxel Anaphylaxis, Hives, Itching and Shortness Of Breath     Keflex [Cephalexin]      Pegfilgrastim      Tetracycline       Trazodone      Very sedating-         PAST MEDICAL HISTORY:  Diabetes mellitus type 2  Hyperlipidemia    Gynecologic history:  Age of menarche at 11.  Last menstrual period in  prior to hysterectomy.  G3, P3.  Age of first pregnancy at 29.  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  No use of HRT.  Used OCPs for 4 years.  No IUD use.      PAST SURGICAL HISTORY:  Past Surgical History:   Procedure Laterality Date     CYST REMOVAL       HYSTERECTOMY       HYSTERECTOMY, PAP NO LONGER INDICATED      menorrhagia     INSERT TISSUE EXPANDER BREAST Bilateral 2/3/2023    Procedure: BILATERAL TISSUE EXPANDER;  Surgeon: Jaya Malave MD;  Location:  OR     MASTECTOMY SIMPLE BILATERAL, SENTINEL NODE BILATERAL, COMBINED Left 2/3/2023    Procedure: Bilateral skin sparing mastectomies with left axillary sentinel lymph node biopsy;  Surgeon: Clarissa Fontanez MD;  Location:  OR         SOCIAL HISTORY:  Social History     Socioeconomic History     Marital status:      Spouse name: Not on file     Number of children: 3     Years of education: Not on file     Highest education level: Not on file   Occupational History     Not on file   Tobacco Use     Smoking status: Former     Current packs/day: 0.00     Types: Cigarettes     Quit date: 1999     Years since quittin.4     Passive exposure: Past     Smokeless tobacco: Never   Vaping Use     Vaping status: Never Used   Substance and Sexual Activity     Alcohol use: Not Currently     Comment: Alcoholic Drinks/day: 1 a week     Drug use: Yes     Types: Marijuana     Comment: 5 mg gummy at bedtime     Sexual activity: Yes     Partners: Male     Birth control/protection: Post-menopausal     Comment: Hysterectomy   Other Topics Concern     Parent/sibling w/ CABG, MI or angioplasty before 65F 55M? No   Social History Narrative    Lives with  and son    Work-      Social Drivers of Health     Financial Resource Strain: Low Risk  (10/23/2024)     Financial Resource Strain      Within the past 12 months, have you or your family members you live with been unable to get utilities (heat, electricity) when it was really needed?: No   Food Insecurity: Low Risk  (10/23/2024)    Food Insecurity      Within the past 12 months, did you worry that your food would run out before you got money to buy more?: No      Within the past 12 months, did the food you bought just not last and you didn t have money to get more?: No   Transportation Needs: Low Risk  (10/23/2024)    Transportation Needs      Within the past 12 months, has lack of transportation kept you from medical appointments, getting your medicines, non-medical meetings or appointments, work, or from getting things that you need?: No   Physical Activity: Insufficiently Active (10/23/2024)    Exercise Vital Sign      Days of Exercise per Week: 1 day      Minutes of Exercise per Session: 30 min   Stress: Stress Concern Present (10/23/2024)    [employer] - Occupational Stress Questionnaire      Feeling of Stress : To some extent   Social Connections: Unknown (10/23/2024)    Social Connection and Isolation Panel [NHANES]      Frequency of Communication with Friends and Family: Not on file      Frequency of Social Gatherings with Friends and Family: Once a week      Attends Muslim Services: Not on file      Active Member of Clubs or Organizations: Not on file      Attends Club or Organization Meetings: Not on file      Marital Status: Not on file   Interpersonal Safety: Low Risk  (10/23/2024)    Interpersonal Safety      Do you feel physically and emotionally safe where you currently live?: Yes      Within the past 12 months, have you been hit, slapped, kicked or otherwise physically hurt by someone?: No      Within the past 12 months, have you been humiliated or emotionally abused in other ways by your partner or ex-partner?: No   Housing Stability: Low Risk  (10/23/2024)    Housing  Stability      Do you have housing? : Yes      Are you worried about losing your housing?: No         FAMILY HISTORY:  Family History   Problem Relation Age of Onset     Diabetes Mother      Atrial fibrillation Father      Alcoholism Father      Melanoma Father      Other Cancer Father         Melanoma-      Breast Cancer Maternal Aunt         in her 40's     Cervical Cancer Maternal Aunt      Breast Cancer Other         Maternal aunt     Colon Cancer No family hx of          PHYSICAL EXAM:  Vital signs:  BP 92/74   Pulse 86   Resp 16   Wt 98.4 kg (217 lb)   LMP  (LMP Unknown)   SpO2 96%   BMI 32.05 kg/m     GENERAL: No acute distress.  EYES: No scleral icterus. No overt erythema.  BREAST: Bilateral breasts surgically absent with no chest wall masses and implants in place and no periprosthetic fluid.   RESPIRATORY: No audible cough, wheezing, or labored breathing.  MUSCULOSKELETAL: Range of motion in the neck, shoulders, and arms appear normal.  SKIN: No overt rashes, discolorations, or lesions over the face and neck.  NEUROLOGIC: Alert.  No overt tremors.  PSYCHIATRIC: Normal affect and mood.  Does not appear anxious.       LABS:  CBC RESULTS:   Recent Labs   Lab Test 24  1444 23  0941   WBC  --  5.3   RBC  --  4.85   HGB 12.8 13.8   HCT  --  41.4   MCV  --  85   MCH  --  28.5   MCHC  --  33.3   RDW  --  12.4   PLT  --  296         Last Comprehensive Metabolic Panel:  Sodium   Date Value Ref Range Status   10/23/2024 139 135 - 145 mmol/L Final     Potassium   Date Value Ref Range Status   10/23/2024 4.2 3.4 - 5.3 mmol/L Final   01/10/2023 3.9 3.4 - 5.3 mmol/L Final     Chloride   Date Value Ref Range Status   10/23/2024 103 98 - 107 mmol/L Final   01/10/2023 102 94 - 109 mmol/L Final     Carbon Dioxide (CO2)   Date Value Ref Range Status   10/23/2024 26 22 - 29 mmol/L Final   01/10/2023 26 20 - 32 mmol/L Final     Anion Gap   Date Value Ref Range Status   10/23/2024 10 7 - 15 mmol/L Final    01/10/2023 7 3 - 14 mmol/L Final     Glucose   Date Value Ref Range Status   10/23/2024 81 70 - 99 mg/dL Final   01/10/2023 100 (H) 70 - 99 mg/dL Final     GLUCOSE BY METER POCT   Date Value Ref Range Status   02/03/2023 120 (H) 70 - 99 mg/dL Final     Urea Nitrogen   Date Value Ref Range Status   10/23/2024 9.1 6.0 - 20.0 mg/dL Final   01/10/2023 16 7 - 30 mg/dL Final     Creatinine   Date Value Ref Range Status   10/23/2024 0.87 0.51 - 0.95 mg/dL Final     GFR Estimate   Date Value Ref Range Status   10/23/2024 79 >60 mL/min/1.73m2 Final     Comment:     eGFR calculated using 2021 CKD-EPI equation.   01/12/2021 >60 >60 mL/min/1.73m2 Final     Calcium   Date Value Ref Range Status   10/23/2024 9.6 8.8 - 10.4 mg/dL Final     Comment:     Reference intervals for this test were updated on 7/16/2024 to reflect our healthy population more accurately. There may be differences in the flagging of prior results with similar values performed with this method. Those prior results can be interpreted in the context of the updated reference intervals.     Bilirubin Total   Date Value Ref Range Status   10/23/2024 0.3 <=1.2 mg/dL Final     Alkaline Phosphatase   Date Value Ref Range Status   10/23/2024 76 40 - 150 U/L Final     ALT   Date Value Ref Range Status   10/23/2024 11 0 - 50 U/L Final     AST   Date Value Ref Range Status   10/23/2024 23 0 - 45 U/L Final        11/1/2023  FSH = 15.5  Estradiol = 6  Vitamin D = 27    ASSESSMENT/PLAN:  Yuliya Li is a 54 year old female with the following issues:  1. Pathologic prognostic stage IA, [id number], grade 2 multifocal invasive ductal carcinoma with lobular features of left breast overlapping sites, ER positive, NV positive, HER-2 IHC = 2+; HER-2/crow FISH negative, Oncotype DX = 20  --Yuliya had 2 tumors measuring 2 cm and 0.4 cm and additional left chest wall nodule, but with 2/3 lymph nodes involved, one that was a macrometastasis with extranodal extension and the  other with micrometastasis with no extranodal extension.  All areas of tumor were excised.  The repeat HER-2 FISH was interpreted as negative.   --2/23/2023 PET scan showed no evidence for metastatic disease.  --Oncotype DX = 20, with 9-year risk of distant recurrence of 17% after 5 years of hormone blockade therapy; breast-cancer specific survival of 96.7% treated without chemotherapy.  --She elected to proceed with 4 cycles of Taxotere and Cytoxan. However, she reacted to Taxotere after cycle 2. Therefore, she was switched to Taxol weekly for 6 weeks, completed 6/14/2023.  We did not proceed with dose dense AC given that her maximum absolute chemo benefit is up to 3.3% and would not justify the potential adverse effects of ddAC. She then completed radiation.  --She is perimenopausal and started OST + AI with Zoladex every 4 weeks with anastrozole on 6/22/2023. She did not tolerate this due to diffuse joint pain and discontinued Zoladex by 2/2024 and tried letrozole but did not tolerate this either.    --She then switched to tamoxifen after 4/25/2024 visit and is tolerating this better.  --3/20/2023 DEXA scan showed normal bone density for baseline.   --3/21/2025 DEXA showed normal bone density but 10.7% decrease in lumbar spine BMD and10.9% decrease in bilateral hip BMD.  --Advised adequate calcium and vitamin D as well as weight bearing exercise.  --Discussed she has no clinical evidence for recurrent breast cancer by physical exam from today.    2.  Family history of breast cancer  - Maternal aunt with breast cancer.  - 2/2024 [facility] Multi-Cancer Panel + MC1R Gene was negative.    3. Indeterminate right middle lobe pulmonary nodules  --2/23/2023 PET/CT scan showed 2 small indeterminate right middle lobe pulmonary nodules.   --5/24/2023 CT chest showed stable right lung nodules.  --12/4/2023 Chest CT showed stable pulmonary nodule in the right middle lobe. The smaller of the two nodules described previously is  no longer  demonstrated today.  --11/25/2024 Chest CT showed unchanged 5 mm right middle lobe pulmonary nodule; no new nodules; stable radiation fibrosis of anterior left lung; no lymphadenopathy.  --Repeat chest CT in 11/2025.    4. Hot flashes  --Mild and tolerable.  No medication intervention needed.    5. Chronic urticaria  --She saw an allergist. Etiology unknown. She switched from Benadryl to Zyrtec with good control.    6. Vitamin D deficiency  --She is on vitamin D supplementation.    7. Polyarthralgia  --Since switching to tamoxifen, her polyarthralgia has improved mostly with use of Lyrica.    8. Headaches  --On topiramate with uncertain response so far.    Return in 4 months.    Louise Watson MD  [facility] Hematology/Oncology     Total time spent today: 40 minutes in chart review, patient evaluation, counseling, documentation, test and/or medication/prescription orders, and coordination of care.     The longitudinal plan of care for the diagnosis(es)/condition(s) as documented were addressed during this visit. Due to the added complexity in care, I will continue to support Yuliya in the subsequent management and with ongoing continuity of care.    Again, thank you for allowing me to participate in the care of your patient.        Sincerely,        Louise Watson MD    Electronically signed

## 2025-06-07 ENCOUNTER — HEALTH MAINTENANCE LETTER (OUTPATIENT)
Age: 55
End: 2025-06-07

## 2025-06-24 ENCOUNTER — TELEPHONE (OUTPATIENT)
Dept: GASTROENTEROLOGY | Facility: CLINIC | Age: 55
End: 2025-06-24
Payer: COMMERCIAL

## 2025-06-24 DIAGNOSIS — Z12.11 ENCOUNTER FOR SCREENING COLONOSCOPY: Primary | ICD-10-CM

## 2025-06-24 RX ORDER — BISACODYL 5 MG/1
TABLET, DELAYED RELEASE ORAL
Qty: 4 TABLET | Refills: 0 | Status: SHIPPED | OUTPATIENT
Start: 2025-06-24

## 2025-06-24 NOTE — TELEPHONE ENCOUNTER
"Endoscopy Scheduling Screen    Caller: patient    Have you had any respiratory illness or flu-like symptoms in the last 10 days?  No    Patient is ACTIVE on [email or web address].  Inform patient that all appointment instructions will be sent via [email or web address].    Review patient's insurance for any non participating payor.    Ordering/Referring Provider:     [facility] [place]      (If ordering provider performs procedure, schedule with ordering provider unless otherwise instructed. )    BMI: Estimated body mass index is 32.05 kg/m  as calculated from the following:    Height as of 4/21/25: 1.753 m (5' 9\").    Weight as of 5/8/25: 98.4 kg (217 lb).     Sedation Ordered  moderate sedation.   If patient BMI > 50 do not schedule in ASC.    If patient BMI > 45 do not schedule at [facility].    Are you taking methadone or Suboxone?  NO, No RN review required.    Have you been diagnosed and are being treated for severe PTSD or severe anxiety?  NO, No RN review required.    Are you taking any prescription medications for pain 3 or more times per week?   NO, No RN review required.    Do you have a history of malignant hyperthermia?  No    (Females) Are you currently pregnant?   No     Have you been diagnosed or told you have pulmonary hypertension?   No    Do you have an LVAD?  No    Have you been told you have moderate to severe sleep apnea?  Yes. Do you use a CPAP? Yes Where is the patient located?. (RN Review required for scheduling unless scheduling in Hospital.)     Have you been told you have COPD, asthma, or any other lung disease?  No    Has your doctor ordered any cardiac tests like echo, angiogram, stress test, ablation, or EKG, that you have not completed yet?  No    Do you  have a history of any heart conditions?  No     Have you ever had or are you waiting for an organ transplant?  No. Continue scheduling, no site restrictions.    Have you had a stroke or transient ischemic attack (TIA aka \"mini stroke\") in the last 2 years?   No.    Have " "you been diagnosed with or been told you have cirrhosis of the liver?   No.    Are you currently on dialysis?   No    Do you need assistance transferring?   No    BMI: Estimated body mass index is 32.05 kg/m  as calculated from the following:    Height as of 4/21/25: 1.753 m (5' 9\").    Weight as of 5/8/25: 98.4 kg (217 lb).     Is patients BMI > 40 and scheduling location [facility]?  No    Do you take an injectable or oral medication for weight loss or diabetes (excluding insulin)?  Yes, hold time can be up to 7 days. Please consult with you prescribing provider to discuss endoscopy recommendations. (Please schedule at least 7 days out.)  oxempic  Do you take the medication Naltrexone?  No    Do you take blood thinners?  No       Prep   Are you currently on dialysis or do you have chronic kidney disease?  No    Do you have a diagnosis of diabetes?  Yes (Golytely Prep)    Do you have a diagnosis of cystic fibrosis (CF)?  No    On a regular basis do you go 3 -5 days between bowel movements?  Yes (Extended Prep)    BMI > 40?  No    Preferred Pharmacy:    [id number] - [place], [place] - [address] [place] [address]  Phone: [phone] Fax: [phone]          Final Scheduling Details     Procedure scheduled  Colonoscopy    Surgeon:  Juan     Date of procedure:  7/8     Pre-OP / PAC:   No - Not required for this site.    Location  [facility] - Patient preference.    Sedation   Moderate Sedation - Per order.      Patient Reminders:   You will receive a call from a Nurse to review instructions and health history.  This assessment must be completed prior to your procedure.  Failure to complete the Nurse assessment may result in the procedure being cancelled.      On the day of your procedure, please designate an adult(s) who can drive you home stay with you for the next 24 hours. The medicines used in the exam will make you sleepy. You will not be able to drive.      You cannot take public " transportation, ride share services, or non-medical taxi service without a responsible caregiver.  Medical transport services are allowed with the requirement that a responsible caregiver will receive you at your destination.  We require that drivers and caregivers are confirmed prior to your procedure.

## 2025-06-24 NOTE — TELEPHONE ENCOUNTER
Attempted to contact patient in order to complete pre assessment questions.     No answer. Left message to return call to [phone] option 3    Callback communication sent via Sp[employer].  --------------------------------------------------------------------------------------------------------------------       Rescheduled Colonoscopy  Due to changed insurance  ADD ON        Pre visit planning completed.      Procedure details:    Patient scheduled for Colonoscopy on 7-8-25.     Arrival time: 1015. Procedure time 1100    Facility location: [facility]; [address] [place] [id number]. Check in location: [address] [place] [address].     Sedation type: Conscious sedation     Pre op exam needed? No.    Indication for procedure: screening      Chart review:     Electronic implanted devices? No    Recent diagnosis of diverticulitis within the last 6 weeks? No      Medication review:    Diabetic? Yes. Diabetic injectables: Ozempic (Semaglutide). Weekly dosing of medication.  HOLD 7 days before procedure.  Follow up with managing provider.     Anticoagulants? No    Weight loss medication/injectable? No. Patient is on GLP-1 medication but for DM (see above).    Other medication HOLDING recommendations:  Cannabis/Marijuana: Stop night before procedure.      Prep for procedure:     Bowel prep recommendation: Extended Golytely. Bowel prep sent to    [id number] - [place], [place] - [address]    Due to: diabetes and GLP-1 agonist medication noted in chart    Procedure information and instructions sent via Spockly, resent 06/24/25      NOTE: pt has allergy listed as Pegfilgrastim.  Cross sensitivity warning came up when sending prep Rx.  No severity or reaction listed-verify with pt.  Advised pt to ask pharmacist about allergy and Prep Rx when they pick it up.       Edyta Bowie RN  Endoscopy Procedure Pre Assessment   [phone] option 3

## 2025-06-25 NOTE — TELEPHONE ENCOUNTER
Pre assessment completed for upcoming procedure.   (Please see previous telephone encounter notes for complete details)    Patient returned call.       Procedure details:    Procedure date 7/8/25, arrival time 1015 and facility location reviewed.    Pre op exam needed? No.    Designated  policy reviewed. Instructed to have someone stay 6  hours post procedure.       Medication review:    Medications reviewed. Please see supporting documentation below. Holding recommendations discussed (if applicable).       Prep for procedure:     Procedure prep instructions reviewed.        Any additional information needed:  Discussed allergies with patient, she states that the reaction she had was hives and she did need to be seen in the ED. Patient does state that they are not sure if that medication was the reason for the reaction. This was given during chemotherapy and there are other medications that could have caused this issue.      Patient has taken many different laxatives in the past with no issues/reactions.     Patient was advised to contact pharmacist to discuss and call back if pharmacy has concerns.       Patient verbalized understanding and had no questions or concerns at this time.      Madison Le RN  Endoscopy Procedure Pre Assessment   [phone] option 3

## 2025-07-08 ENCOUNTER — HOSPITAL ENCOUNTER (OUTPATIENT)
Facility: CLINIC | Age: 55
Discharge: HOME | End: 2025-07-08
Attending: COLON & RECTAL SURGERY | Admitting: COLON & RECTAL SURGERY
Payer: COMMERCIAL

## 2025-07-08 VITALS
OXYGEN SATURATION: 98 % | RESPIRATION RATE: 37 BRPM | HEART RATE: 94 BPM | DIASTOLIC BLOOD PRESSURE: 77 MMHG | SYSTOLIC BLOOD PRESSURE: 108 MMHG

## 2025-07-08 LAB — COLONOSCOPY: NORMAL

## 2025-07-08 PROCEDURE — 250N000011 HC RX IP 250 OP 636: Performed by: COLON & RECTAL SURGERY

## 2025-07-08 PROCEDURE — G0500 MOD SEDAT ENDO SERVICE >5YRS: HCPCS | Performed by: COLON & RECTAL SURGERY

## 2025-07-08 PROCEDURE — G0121 COLON CA SCRN NOT HI RSK IND: HCPCS | Performed by: COLON & RECTAL SURGERY

## 2025-07-08 RX ORDER — NALOXONE HYDROCHLORIDE 0.4 MG/ML
0.2 INJECTION, SOLUTION INTRAMUSCULAR; INTRAVENOUS; SUBCUTANEOUS
Status: DISCONTINUED | OUTPATIENT
Start: 2025-07-08 | End: 2025-07-08 | Stop reason: HOSPADM

## 2025-07-08 RX ORDER — LIDOCAINE 40 MG/G
CREAM TOPICAL
Status: DISCONTINUED | OUTPATIENT
Start: 2025-07-08 | End: 2025-07-08 | Stop reason: HOSPADM

## 2025-07-08 RX ORDER — ONDANSETRON 2 MG/ML
4 INJECTION INTRAMUSCULAR; INTRAVENOUS
Status: DISCONTINUED | OUTPATIENT
Start: 2025-07-08 | End: 2025-07-08 | Stop reason: HOSPADM

## 2025-07-08 RX ORDER — FENTANYL CITRATE 50 UG/ML
INJECTION, SOLUTION INTRAMUSCULAR; INTRAVENOUS PRN
Status: DISCONTINUED | OUTPATIENT
Start: 2025-07-08 | End: 2025-07-08 | Stop reason: HOSPADM

## 2025-07-08 RX ORDER — PROCHLORPERAZINE MALEATE 10 MG
10 TABLET ORAL EVERY 6 HOURS PRN
Status: DISCONTINUED | OUTPATIENT
Start: 2025-07-08 | End: 2025-07-08 | Stop reason: HOSPADM

## 2025-07-08 RX ORDER — SODIUM CHLORIDE, SODIUM LACTATE, POTASSIUM CHLORIDE, CALCIUM CHLORIDE 600; 310; 30; 20 MG/100ML; MG/100ML; MG/100ML; MG/100ML
INJECTION, SOLUTION INTRAVENOUS CONTINUOUS
Status: DISCONTINUED | OUTPATIENT
Start: 2025-07-08 | End: 2025-07-08 | Stop reason: HOSPADM

## 2025-07-08 RX ORDER — ONDANSETRON 4 MG/1
4 TABLET, ORALLY DISINTEGRATING ORAL EVERY 6 HOURS PRN
Status: DISCONTINUED | OUTPATIENT
Start: 2025-07-08 | End: 2025-07-08 | Stop reason: HOSPADM

## 2025-07-08 RX ORDER — NALOXONE HYDROCHLORIDE 0.4 MG/ML
0.4 INJECTION, SOLUTION INTRAMUSCULAR; INTRAVENOUS; SUBCUTANEOUS
Status: DISCONTINUED | OUTPATIENT
Start: 2025-07-08 | End: 2025-07-08 | Stop reason: HOSPADM

## 2025-07-08 RX ORDER — FLUMAZENIL 0.1 MG/ML
0.2 INJECTION, SOLUTION INTRAVENOUS
Status: DISCONTINUED | OUTPATIENT
Start: 2025-07-08 | End: 2025-07-08 | Stop reason: HOSPADM

## 2025-07-08 RX ORDER — ONDANSETRON 2 MG/ML
4 INJECTION INTRAMUSCULAR; INTRAVENOUS EVERY 6 HOURS PRN
Status: DISCONTINUED | OUTPATIENT
Start: 2025-07-08 | End: 2025-07-08 | Stop reason: HOSPADM

## 2025-07-08 ASSESSMENT — ACTIVITIES OF DAILY LIVING (ADL)
ADLS_ACUITY_SCORE: 42

## 2025-07-08 NOTE — H&P
Pre-Endoscopy History and Physical     Yuliya Li MRN# [id number]   YOB: 1970 Age: 54 year old     Date of Procedure: 7/8/2025  Primary care provider: Patti Harden  Type of Endoscopy: colonoscopy  Reason for Procedure: Screening  Type of Anesthesia Anticipated: Moderate (conscious) sedation  [id number]}    HPI:    Yuliya is a 54 year old female who will be undergoing the above procedure.      A history and physical has been performed. The patient's medications and allergies have been reviewed. The risks and benefits of the procedure and the sedation options and risks were discussed with the patient.  All questions were answered and informed consent was obtained.      She denies a personal or family history of anesthesia complications or bleeding disorders.     Allergies   Allergen Reactions    Docetaxel Anaphylaxis, Hives, Itching and Shortness Of Breath    Keflex [Cephalexin]     Pegfilgrastim     Tetracycline     Trazodone      Very sedating-        Current Facility-Administered Medications   Medication Dose Route Frequency Provider Last Rate Last Admin    fentaNYL (PF) (SUBLIMAZE) injection   Intravenous PRN Sherrill Martinez MD   100 mcg at 07/08/25 1119    lactated ringers infusion   Intravenous Continuous Sherrill Martinez MD        lidocaine (LMX4) cream   Topical Q1H PRN Sherrill Martinez MD        lidocaine 1 % 0.1-1 mL  0.1-1 mL Other Q1H PRN Sherrill Martinez MD        midazolam (VERSED) injection   Intravenous PRN Sherrill Martinez MD   2 mg at 07/08/25 1119    ondansetron (ZOFRAN) injection 4 mg  4 mg Intravenous Once PRN Sherrill Martinez MD        sodium chloride (PF) 0.9% PF flush 3 mL  3 mL Intracatheter Q8H Sherrill Martinez MD        sodium chloride (PF) 0.9% PF flush 3 mL  3 mL Intracatheter q1 min prn Sherrill Martinez MD           Patient Active Problem List   Diagnosis    Other acne    Obesity    Major depressive disorder in full remission,  unspecified whether recurrent    Female stress incontinence    Diabetes mellitus, type 2 (H)    Malignant neoplasm of overlapping sites of left breast in female, estrogen receptor positive (H)    S/P mastectomy, bilateral    Pulmonary nodules    Chronic urticaria - Allergy    Class 2 severe obesity due to excess calories with serious comorbidity in adult (H)    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)        Past Medical History:   Diagnosis Date    Cancer (H) 2023    Breast- bi-lateral mastectomy    Diabetes (H) 2021        Past Surgical History:   Procedure Laterality Date    CYST REMOVAL      HYSTERECTOMY      HYSTERECTOMY, PAP NO LONGER INDICATED      menorrhagia    INSERT TISSUE EXPANDER BREAST Bilateral 2/3/2023    Procedure: BILATERAL TISSUE EXPANDER;  Surgeon: Jaya Malave MD;  Location: [facility] OR    MASTECTOMY SIMPLE BILATERAL, SENTINEL NODE BILATERAL, COMBINED Left 2/3/2023    Procedure: Bilateral skin sparing mastectomies with left axillary sentinel lymph node biopsy;  Surgeon: Clarissa Fontanez MD;  Location:  OR       Social History     Tobacco Use    Smoking status: Former     Current packs/day: 0.00     Types: Cigarettes     Quit date: 1999     Years since quittin.6     Passive exposure: Past    Smokeless tobacco: Never   Substance Use Topics    Alcohol use: Not Currently     Comment: Alcoholic Drinks/day: 1 a week       Family History   Problem Relation Age of Onset    Diabetes Mother     Atrial fibrillation Father     Alcoholism Father     Melanoma Father     Other Cancer Father         Melanoma-     Breast Cancer Maternal Aunt         in her 40's    Cervical Cancer Maternal Aunt     Breast Cancer Other         Maternal aunt    Colon Cancer No family hx of        REVIEW OF SYSTEMS:     5 point ROS negative except as noted above in HPI, including Gen., Resp., CV, GI &  system review.    PHYSICAL EXAM:   /80   Pulse 89   Resp 16    "LMP  (LMP Unknown)   SpO2 99%  Estimated body mass index is 32.05 kg/m  as calculated from the following:    Height as of 4/21/25: 1.753 m (5' 9\").    Weight as of 5/8/25: 98.4 kg (217 lb).   GENERAL APPEARANCE: healthy, alert, and no distress  MENTAL STATUS: alert and oriented x 3  AIRWAY EXAM: Mallampatti Class II (visualization of the soft palate, fauces, and uvula)  RESP: lungs clear to auscultation - no rales, rhonchi or wheezes  CV: regular rates and rhythm, normal S1 S2, no S3 or S4, and no murmur, click or rub      DIAGNOSTICS:    Not indicated      IMPRESSION   ASA Class 2 - Mild systemic disease        PLAN:       Colonoscopy    The above has been forwarded to the consulting provider.      Signed Electronically by: Sherrill Martinez MD  July 8, 2025    .          "

## (undated) DEVICE — DRAPE BREAST/CHEST 29420

## (undated) DEVICE — DRAIN JACKSON PRATT 15FR ROUND SU130-1323

## (undated) DEVICE — CLIP ETHICON LIGACLIP SM BLUE LT100

## (undated) DEVICE — NDL 19GA 1.5"

## (undated) DEVICE — SOL WATER IRRIG 1000ML BOTTLE 2F7114

## (undated) DEVICE — DRSG STERI STRIP 1/2X4" R1547

## (undated) DEVICE — PACK MAJOR SBA15MAFSI

## (undated) DEVICE — DRAIN JACKSON PRATT RESERVOIR 100ML SU130-1305

## (undated) DEVICE — SYR 10ML SLIP TIP W/O NDL

## (undated) DEVICE — GLOVE BIOGEL PI SZ 6.5 40865

## (undated) DEVICE — SOL NACL 0.9% INJ 1000ML BAG 2B1324X

## (undated) DEVICE — SYR 10ML FINGER CONTROL W/O NDL 309695

## (undated) DEVICE — ESU GROUND PAD UNIVERSAL W/O CORD

## (undated) DEVICE — SOL NACL 0.9% IRRIG 1000ML BOTTLE 2F7124

## (undated) DEVICE — PREP SKIN SCRUB TRAY 4461A

## (undated) DEVICE — LINEN TOWEL PACK X5 5464

## (undated) DEVICE — PAD CHUX UNDERPAD 23X24" 7136

## (undated) DEVICE — ESU ELEC BLADE 2.75" COATED/INSULATED E1455

## (undated) DEVICE — BLADE KNIFE SURG 15 371115

## (undated) DEVICE — SU VICRYL 3-0 PS-1 18" UND J683

## (undated) DEVICE — SU VICRYL 2-0 CT-1 27" UND J259H

## (undated) DEVICE — NDL SPINAL 22GA 3.5" QUINCKE 405181

## (undated) DEVICE — SYR 10ML LL W/O NDL 302995

## (undated) DEVICE — SU VICRYL 4-0 PS-2 18" UND J496H

## (undated) DEVICE — GOWN IMPERVIOUS SPECIALTY XL/XLONG 39049

## (undated) DEVICE — SU VICRYL 3-0 TIE 12X18" J904T

## (undated) DEVICE — PROBE ELECTROSURGICAL TRUNODE GAMMA 120-807605

## (undated) DEVICE — ESU ELEC BLADE NN-STCK PLUMEPEN PRO 360D 10FT PLPRO4020

## (undated) DEVICE — SYR 50ML LL W/O NDL 309653

## (undated) DEVICE — DRSG GAUZE 4X4" 3033

## (undated) DEVICE — TUBING INFUSION INFILTRATION LIPOSUCTION 156" 24-6008

## (undated) DEVICE — NDL 22GA 1.5"

## (undated) DEVICE — SU SILK 2-0 SH 30" K833H

## (undated) DEVICE — DRSG KERLIX 4 1/2"X4YDS ROLL 6715

## (undated) RX ORDER — LIDOCAINE HYDROCHLORIDE 10 MG/ML
INJECTION, SOLUTION EPIDURAL; INFILTRATION; INTRACAUDAL; PERINEURAL
Status: DISPENSED
Start: 2023-02-03

## (undated) RX ORDER — CLINDAMYCIN PHOSPHATE 900 MG/50ML
INJECTION, SOLUTION INTRAVENOUS
Status: DISPENSED
Start: 2023-02-03

## (undated) RX ORDER — CEFAZOLIN SODIUM 1 G/3ML
INJECTION, POWDER, FOR SOLUTION INTRAMUSCULAR; INTRAVENOUS
Status: DISPENSED
Start: 2023-02-03

## (undated) RX ORDER — ONDANSETRON 2 MG/ML
INJECTION INTRAMUSCULAR; INTRAVENOUS
Status: DISPENSED
Start: 2023-02-03

## (undated) RX ORDER — FENTANYL CITRATE 50 UG/ML
INJECTION, SOLUTION INTRAMUSCULAR; INTRAVENOUS
Status: DISPENSED
Start: 2023-02-03

## (undated) RX ORDER — FENTANYL CITRATE 0.05 MG/ML
INJECTION, SOLUTION INTRAMUSCULAR; INTRAVENOUS
Status: DISPENSED
Start: 2023-02-03

## (undated) RX ORDER — DEXAMETHASONE SODIUM PHOSPHATE 4 MG/ML
INJECTION, SOLUTION INTRA-ARTICULAR; INTRALESIONAL; INTRAMUSCULAR; INTRAVENOUS; SOFT TISSUE
Status: DISPENSED
Start: 2023-02-03

## (undated) RX ORDER — PROPOFOL 10 MG/ML
INJECTION, EMULSION INTRAVENOUS
Status: DISPENSED
Start: 2023-02-03

## (undated) RX ORDER — FENTANYL CITRATE 50 UG/ML
INJECTION, SOLUTION INTRAMUSCULAR; INTRAVENOUS
Status: DISPENSED
Start: 2025-07-08

## (undated) RX ORDER — HYDROMORPHONE HYDROCHLORIDE 1 MG/ML
INJECTION, SOLUTION INTRAMUSCULAR; INTRAVENOUS; SUBCUTANEOUS
Status: DISPENSED
Start: 2023-02-03

## (undated) RX ORDER — CEFAZOLIN SODIUM/WATER 2 G/20 ML
SYRINGE (ML) INTRAVENOUS
Status: DISPENSED
Start: 2023-02-03

## (undated) RX ORDER — BUPIVACAINE HYDROCHLORIDE AND EPINEPHRINE 5; 5 MG/ML; UG/ML
INJECTION, SOLUTION EPIDURAL; INTRACAUDAL; PERINEURAL
Status: DISPENSED
Start: 2023-02-03